# Patient Record
Sex: FEMALE | Race: WHITE | Employment: OTHER | URBAN - METROPOLITAN AREA
[De-identification: names, ages, dates, MRNs, and addresses within clinical notes are randomized per-mention and may not be internally consistent; named-entity substitution may affect disease eponyms.]

---

## 2017-01-13 ENCOUNTER — TELEPHONE (OUTPATIENT)
Dept: FAMILY MEDICINE | Facility: CLINIC | Age: 77
End: 2017-01-13

## 2017-01-13 NOTE — TELEPHONE ENCOUNTER
Prior Authorization required for Verapamil HCl ER 240MG er tablets        PA created and sent to plan. Waiting for response

## 2017-02-01 ENCOUNTER — TRANSFERRED RECORDS (OUTPATIENT)
Dept: HEALTH INFORMATION MANAGEMENT | Facility: CLINIC | Age: 77
End: 2017-02-01

## 2017-03-07 DIAGNOSIS — G47.00 PERSISTENT INSOMNIA: ICD-10-CM

## 2017-03-07 RX ORDER — ZOLPIDEM TARTRATE 5 MG/1
TABLET ORAL
Qty: 90 TABLET | Refills: 0 | Status: SHIPPED | OUTPATIENT
Start: 2017-03-07 | End: 2017-06-03

## 2017-03-07 NOTE — TELEPHONE ENCOUNTER
Routing to CMA team to please prep this med.    Then route directly to PCP for approval.  No need to route to RN---doesn't need  review.     Thank you,  Elizabeth Isabel RN, BSN

## 2017-03-07 NOTE — TELEPHONE ENCOUNTER
zolpidem (AMBIEN) 5 MG tablet      Last Written Prescription Date: 12/1/2016  Last Fill Quantity: 90,  # refills: 0   Last Office Visit with [facility], [facility] or [facility] prescribing provider: 7/25/2016

## 2017-03-07 NOTE — TELEPHONE ENCOUNTER
Reason for Call:  Medication or medication refill:    Do you use a [facility]?  Name of the pharmacy and phone number for the current request:       [facility] [address] [place], [place] - [address]      Name of the medication requested: zolpidem (AMBIEN) 5 MG tablet    Other request: pt is completely out of med. Pt was told this prescription  and they need to request a new one.    Can we leave a detailed message on this number? YES    Phone number patient can be reached at: Home number on file [phone] (home)    Best Time:     Call taken on 3/7/2017 at 3:06 PM by King Harper

## 2017-03-08 ENCOUNTER — TELEPHONE (OUTPATIENT)
Dept: FAMILY MEDICINE | Facility: CLINIC | Age: 77
End: 2017-03-08

## 2017-03-08 NOTE — TELEPHONE ENCOUNTER
Reason for Call:  Prior Auth     Detailed comments: Spoke with Sage Bell from [employer]  Who stated they are in the process of of filing a Prior Auth for zolpidem (AMBIEN) 5 MG tablet    Ph. For Sage with [employer] [phone]      Call taken on 3/8/2017 at 8:13 AM by Maryellen Newman

## 2017-03-09 NOTE — TELEPHONE ENCOUNTER
I just got a denial notice from the patient's insurance on the zolpidem through [facility] It looks like it might have been the patient that tried starting this PA. Either way from notification of this PA yesterday the insurance only gave us a day to get clinical information to them which was not enough time, thus resulting in a denial for lack of information.    I will now send in an appeal for this denial which will take longer to complete. I left detailed voice message to the patient to let her know what is going on.    Jorgito Dos Santos, [facility]

## 2017-03-15 NOTE — TELEPHONE ENCOUNTER
Appeal has come back approved. Patient can now get zolpidem covered under her insurance from 12/12/16 - 12/31/17. I called patient to let her know.    Jorgito Dos Santos, CMA

## 2017-06-03 DIAGNOSIS — G47.00 PERSISTENT INSOMNIA: ICD-10-CM

## 2017-06-03 NOTE — TELEPHONE ENCOUNTER
Pending Prescriptions:                       Disp   Refills    zolpidem (AMBIEN) 5 MG tablet [Pharmacy M*90 tab*0            Sig: TAKE 1 TABLET BY MOUTH EVERY EVENING AS NEEDED           FOR SLEEP          Last Written Prescription Date:  3/1/17  Last Fill Quantity: 90,   # refills: 0  Last Office Visit with [facility], [facility] or [facility] prescribing provider: 7/25/16 [facility]  Future Office visit:       Routing refill request to provider for review/approval because:  Drug not on the [facility], [facility] or [facility] refill protocol or controlled substance    RT Fermín(R)

## 2017-06-05 DIAGNOSIS — G47.00 PERSISTENT INSOMNIA: ICD-10-CM

## 2017-06-05 RX ORDER — ZOLPIDEM TARTRATE 5 MG/1
TABLET ORAL
Qty: 90 TABLET | Refills: 0 | OUTPATIENT
Start: 2017-06-05

## 2017-06-05 RX ORDER — ZOLPIDEM TARTRATE 5 MG/1
TABLET ORAL
Qty: 90 TABLET | Refills: 0 | Status: SHIPPED | OUTPATIENT
Start: 2017-06-05 | End: 2017-08-22

## 2017-07-19 DIAGNOSIS — I10 BENIGN ESSENTIAL HYPERTENSION: ICD-10-CM

## 2017-07-19 NOTE — TELEPHONE ENCOUNTER
verapamil (CALAN-SR) 240 MG CR tablet           Last Written Prescription Date: 7/25/2016  Last Fill Quantity: 90, # refills: 3    Last Office Visit with G, P or [facility] prescribing provider:  7/25/2016   Future Office Visit:    Next 5 appointments (look out 90 days)     Aug 22, 2017  1:30 PM CDT   PHYSICAL with Marky Josue MD   [facility]    [address] [address]   [phone]                  BP Readings from Last 3 Encounters:   10/21/16 151/82   10/04/16 168/80   08/05/16 128/86     Lab Results   Component Value Date    ALT 12 12/12/2008     No results found for: CHOL  No results found for: HDL  No results found for: LDL  No results found for: TRIG  No results found for: CHOLHDLRATIO

## 2017-07-20 RX ORDER — VERAPAMIL HYDROCHLORIDE 240 MG/1
TABLET, FILM COATED, EXTENDED RELEASE ORAL
Qty: 30 TABLET | Refills: 0 | Status: SHIPPED | OUTPATIENT
Start: 2017-07-20 | End: 2017-07-24

## 2017-07-20 NOTE — TELEPHONE ENCOUNTER
Medication is being filled for 1 time refill only due to:  Patient needs to be seen because it has been more than one year since last visit.   Last seen 7/25/16.  Future OV 8/22/17.  Roxana Carmichael RN

## 2017-07-24 DIAGNOSIS — I10 BENIGN ESSENTIAL HYPERTENSION: ICD-10-CM

## 2017-07-24 NOTE — TELEPHONE ENCOUNTER
Reason for Call:  Medication or medication refill:    Do you use a [facility]?  Name of the pharmacy and phone number for the current request:         [facility] [address] [place], [place] - [address]      Name of the medication requested: verapamil (CALAN-SR) 240 MG CR tablet    Other request: pt only wants a script now for the remaining 60 tablets  She has an upcoming appt with [facility] in August.  She is not going to  the second refill of 30.    Can we leave a detailed message on this number? YES    Phone number patient can be reached at: Home number on file [phone] (home)    Best Time: any    Call taken on 7/24/2017 at 3:28 PM by [place] Ardon

## 2017-07-24 NOTE — TELEPHONE ENCOUNTER
Patient requesting 60 day supply      verapamil (CALAN-SR) 240 MG CR tablet      Last Written Prescription Date: 7/20/2017  Last Fill Quantity: 30, # refills: 0  Last Office Visit with G, P or [facility] prescribing provider: 7/25/2016  Next 5 appointments (look out 90 days)     Aug 22, 2017  1:30 PM CDT   [facility]   [facility]    [address] [facility] [address]   [phone]                   Potassium   Date Value Ref Range Status   08/05/2016 4.5 3.4 - 5.3 mmol/L Final     Creatinine   Date Value Ref Range Status   08/05/2016 0.88 0.52 - 1.04 mg/dL Final     BP Readings from Last 3 Encounters:   10/21/16 151/82   10/04/16 168/80   08/05/16 128/86

## 2017-07-26 RX ORDER — VERAPAMIL HYDROCHLORIDE 240 MG/1
TABLET, FILM COATED, EXTENDED RELEASE ORAL
Qty: 90 TABLET | Refills: 0 | Status: SHIPPED | OUTPATIENT
Start: 2017-07-26 | End: 2017-08-22

## 2017-07-26 NOTE — TELEPHONE ENCOUNTER
PCP:    Pt is just now overdue for OV with you, is scheduled for 8/22 for full Px.   Per med list, 30 day supply was sent in (per [facility] protocol)  I did relay to the Pt I will send a 90 day supply for you to sign if appropriate (she likely will not have enough medication to get her out to her OV with you)    Thanks. Med pended.     Rona Niño RN

## 2017-08-21 NOTE — PROGRESS NOTES
SUBJECTIVE:   Elizabeth Joy is a 77 year old female who presents for Preventive Visit.    The patient feels fine, not working out reg.  Has hypertension and blood pressure fine.  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  Tried 10mg in past and not better.  Had syncope last year and no cause found. ziopatch with short lived vtach and then follow up with ep cards and nothing found, echo fine as noted.  Has elevated cholesterol but does not want to take meds for it.  No other c/o.     2nd time, 43 years, 3 sons live out of town               Past Medical History:      Past Medical History:   Diagnosis Date     Elevated blood sugar      HTN (hypertension) 35     Hx of colonoscopy 2008    bx collagenous colitis     Hypercholesteremia      Insomnia     on ambien 10mg 1/2 daily for long time     Syncope 08/2016    echo trace lvh, nl ef; ziopatch with one 5 beat run of vtach, seen by ep Dr. Allen and nothing found     Ventricular tachycardia (H) 8/16    seen on ziopatch done for syncope, just one 5 beat run             Past Surgical History:      Past Surgical History:   Procedure Laterality Date     HYSTERECTOMY, PAP NO LONGER INDICATED  1990    had bso also, not for cancer     KERATOTOMY ARCUATE WITH FEMTOSECOND LASER/IMAGING FOR ATIOL Right 7/13/2015    Procedure: KERATOTOMY ARCUATE WITH FEMTOSECOND LASER/IMAGING FOR ATIOL;  Surgeon: Lionel Reza MD;  [facility]:  [facility]     KERATOTOMY ARCUATE WITH FEMTOSECOND LASER/IMAGING FOR ATIOL Right 7/13/2015    Procedure: KERATOTOMY ARCUATE WITH FEMTOSECOND LASER/IMAGING FOR ATIOL;  Surgeon: Lionel Reza MD;  Location:  [facility]     PHACOEMULSIFICATION CLEAR CORNEA WITH STANDARD INTRAOCULAR LENS IMPLANT Right 7/13/2015    Procedure: PHACOEMULSIFICATION CLEAR CORNEA WITH STANDARD INTRAOCULAR LENS IMPLANT;  Surgeon: Lionel Reza MD;  Location:  [facility]     pilonidal cyst removed  30's             Social History:  "    Social History     Social History     Marital status:      Spouse name: N/A     Number of children: 3     Years of education: N/A     Occupational History     Not on file.     Social History Main Topics     Smoking status: Former Smoker     Types: Cigarettes     Quit date: 9/29/1997     Smokeless tobacco: Never Used     Alcohol use 0.0 oz/week     0 Standard drinks or equivalent per week      Comment: 1 drink a week     Drug use: No     Sexual activity: No     Other Topics Concern     Not on file     Social History Narrative             Family History:   reviewed         Allergies:     Allergies   Allergen Reactions     Ace Inhibitors Cough             Medications:     Current Outpatient Prescriptions   Medication Sig Dispense Refill     verapamil (CALAN-SR) 240 MG CR tablet TAKE 1 TABLET(240 MG) BY MOUTH DAILY 90 tablet 3     zolpidem (AMBIEN) 5 MG tablet TAKE 1 TABLET BY MOUTH EVERY EVENING AS NEEDED FOR SLEEP 90 tablet 0     [DISCONTINUED] verapamil (CALAN-SR) 240 MG CR tablet TAKE 1 TABLET(240 MG) BY MOUTH DAILY 90 tablet 0               Review of Systems:   The 10 point Review of Systems is negative other than noted in the HPI           Physical Exam:   Blood pressure 136/82, pulse 63, temperature 98.3  F (36.8  C), temperature source Oral, height 5' 3\" (1.6 m), weight 165 lb (74.8 kg), SpO2 99 %, not currently breastfeeding.    Exam:  Constitutional: healthy appearing, alert and in no distress  Heent: Normocephalic. Head without obvious masses or lesions. PERRLDC, EOMI. Mouth exam within normal limits: tongue, mucous membranes, posterior pharynx all normal, no lesions or abnormalities seen.  Tm's and canals within normal limits bilaterally. Neck supple, no nuchal rigidity or masses. No supraclavicular, or cervical adenopathy. Thyroid symmetric, no masses.  Cardiovascular: Regular rate and rhythm, no murmer, rub or gallops.  JVP not elevated, no edema.  Carotids within normal limits bilaterally, no " bruits.  Respiratory: Normal respiratory effort.  Lungs clear, normal flow, no wheezing or crackles.  Breasts: Normal bilaterally.  No masses or lesions.  Nipples within normal limits.  No axillary lesions or nodes.  Gastrointestinal: Normal active bowel sounds.   Soft, not tender, no masses, guarding or rebound.  No hepatosplenomegaly.   Musculoskeletal: extremities normal, no gross deformities noted.  Skin: no suspicious lesions or rashes   Neurologic: Mental status within normal limits.  Speech fluent.  No gross motor abnormalities and gait intact.  Psychiatric: mentation appears normal and affect normal.         Data:   Labs sent        Assessment:   1. Normal cpx  2. Chronic insomnia, she understands risk of ambien, would not use higher dose, try melatonin  3. Hypertension, control ok  4. Syncope, vtach, no issues  5. Elevated sugar, fulabs  6. Elevated cholesterol, does not want meds so will not check  7. hcm         Plan:   Up to date immunizations  Exercise, diet  Letter with labs  Try melatonin  Call if problems      Marky Josue M.D.              Are you in the first 12 months of your Medicare Part B coverage?  No    Healthy Habits:    Do you get at least three servings of calcium containing foods daily (dairy, green leafy vegetables, etc.)? yes    Amount of exercise or daily activities, outside of work: 7 day(s) per week    Problems taking medications regularly No    Medication side effects: No    Have you had an eye exam in the past two years? yes    Do you see a dentist twice per year? yes    Do you have sleep apnea, excessive snoring or daytime drowsiness?no    COGNITIVE SCREEN  1) Repeat 3 items (Banana, Sunrise, Chair)    2) Clock draw:   3) 3 item recall: Recalls 3 objects  Results: 3 items recalled: COGNITIVE IMPAIRMENT LESS LIKELY    Mini-CogTM Copyright S Brent. Licensed by the author for use in [facility]; reprinted with permission (harry@.[facility]). All rights reserved.   "                  Reviewed and updated as needed this visit by clinical staff         Reviewed and updated as needed this visit by Provider      Social History   Substance Use Topics     Smoking status: Former Smoker     Types: Cigarettes     Quit date: 9/29/1997     Smokeless tobacco: Never Used     Alcohol use 0.0 oz/week     0 Standard drinks or equivalent per week      Comment: 1 drink a week       The patient does not drink >3 drinks per day nor >7 drinks per week.    Today's PHQ-2 Score:   PHQ-2 ( 1999 Pfizer) 10/21/2016 1/14/2014   Q1: Little interest or pleasure in doing things 0 0   Q2: Feeling down, depressed or hopeless 0 0   PHQ-2 Score 0 0         Do you feel safe in your environment - Yes    Do you have a Health Care Directive?: Yes: Patient states has Advance Directive and will bring in a copy to clinic.    Current providers sharing in care for this patient include: Patient Care Team:  Marky Josue MD as PCP - General (Internal Medicine)      Hearing impairment: No    Ability to successfully perform activities of daily living: Yes, no assistance needed     Fall risk:         Home safety:  none identified      The following health maintenance items are reviewed in Epic and correct as of today:Health Maintenance   Topic Date Due     LIPID SCREEN Q5 YR FEMALE (SYSTEM ASSIGNED)  02/11/1985     DEXA SCAN SCREENING (SYSTEM ASSIGNED)  02/11/2005     INFLUENZA VACCINE (SYSTEM ASSIGNED)  09/01/2017     ADVANCE DIRECTIVE PLANNING Q5 YRS  10/02/2017     FALL RISK ASSESSMENT  10/21/2017     TETANUS IMMUNIZATION (SYSTEM ASSIGNED)  01/14/2024     PNEUMOCOCCAL  Completed       End of Life Planning:  Patient currently has an advanced directive: yes    COUNSELING:  Reviewed preventive health counseling, as reflected in patient instructions       Regular exercise       Healthy diet/nutrition        Estimated body mass index is 29.23 kg/(m^2) as calculated from the following:    Height as of 10/21/16: 5' 3\" " (1.6 m).    Weight as of 10/21/16: 165 lb (74.8 kg).  Weight management plan: exercise, diet   reports that she quit smoking about 19 years ago. Her smoking use included Cigarettes. She has never used smokeless tobacco.        Appropriate preventive services were discussed with this patient, including applicable screening as appropriate for cardiovascular disease, diabetes, osteopenia/osteoporosis, and glaucoma.  As appropriate for age/gender, discussed screening for colorectal cancer, prostate cancer, breast cancer, and cervical cancer. Checklist reviewing preventive services available has been given to the patient.    Reviewed patients plan of care and provided an AVS. The Basic Care Plan (routine screening as documented in Health Maintenance) for Elizabeth meets the Care Plan requirement. This Care Plan has been established and reviewed with the Patient.    Counseling Resources:  ATP IV Guidelines  Pooled Cohorts Equation Calculator  Breast Cancer Risk Calculator  FRAX Risk Assessment  ICSI Preventive Guidelines  Dietary Guidelines for Americans, 2010  USDA's MyPlate  ASA Prophylaxis  Lung CA Screening    Marky Josue MD  [facility]

## 2017-08-22 ENCOUNTER — OFFICE VISIT (OUTPATIENT)
Dept: FAMILY MEDICINE | Facility: CLINIC | Age: 77
End: 2017-08-22
Payer: COMMERCIAL

## 2017-08-22 VITALS
HEART RATE: 63 BPM | BODY MASS INDEX: 29.23 KG/M2 | TEMPERATURE: 98.3 F | SYSTOLIC BLOOD PRESSURE: 136 MMHG | WEIGHT: 165 LBS | OXYGEN SATURATION: 99 % | HEIGHT: 63 IN | DIASTOLIC BLOOD PRESSURE: 82 MMHG

## 2017-08-22 DIAGNOSIS — E78.5 HYPERLIPIDEMIA LDL GOAL <160: ICD-10-CM

## 2017-08-22 DIAGNOSIS — I47.20 VENTRICULAR TACHYCARDIA (H): ICD-10-CM

## 2017-08-22 DIAGNOSIS — R73.9 ELEVATED BLOOD SUGAR: ICD-10-CM

## 2017-08-22 DIAGNOSIS — G47.00 PERSISTENT INSOMNIA: ICD-10-CM

## 2017-08-22 DIAGNOSIS — I10 BENIGN ESSENTIAL HYPERTENSION: ICD-10-CM

## 2017-08-22 DIAGNOSIS — R55 SYNCOPE, UNSPECIFIED SYNCOPE TYPE: ICD-10-CM

## 2017-08-22 DIAGNOSIS — Z00.00 ROUTINE GENERAL MEDICAL EXAMINATION AT A HEALTH CARE FACILITY: Primary | ICD-10-CM

## 2017-08-22 DIAGNOSIS — G47.00 INSOMNIA, UNSPECIFIED TYPE: ICD-10-CM

## 2017-08-22 LAB
ERYTHROCYTE [DISTWIDTH] IN BLOOD BY AUTOMATED COUNT: 12.3 % (ref 10–15)
HBA1C MFR BLD: 5.1 % (ref 4.3–6)
HCT VFR BLD AUTO: 39 % (ref 35–47)
HGB BLD-MCNC: 13.4 G/DL (ref 11.7–15.7)
MCH RBC QN AUTO: 31.2 PG (ref 26.5–33)
MCHC RBC AUTO-ENTMCNC: 34.4 G/DL (ref 31.5–36.5)
MCV RBC AUTO: 91 FL (ref 78–100)
PLATELET # BLD AUTO: 284 10E9/L (ref 150–450)
RBC # BLD AUTO: 4.29 10E12/L (ref 3.8–5.2)
WBC # BLD AUTO: 9.1 10E9/L (ref 4–11)

## 2017-08-22 PROCEDURE — 83036 HEMOGLOBIN GLYCOSYLATED A1C: CPT | Performed by: INTERNAL MEDICINE

## 2017-08-22 PROCEDURE — 80048 BASIC METABOLIC PNL TOTAL CA: CPT | Performed by: INTERNAL MEDICINE

## 2017-08-22 PROCEDURE — 85027 COMPLETE CBC AUTOMATED: CPT | Performed by: INTERNAL MEDICINE

## 2017-08-22 PROCEDURE — 36415 COLL VENOUS BLD VENIPUNCTURE: CPT | Performed by: INTERNAL MEDICINE

## 2017-08-22 PROCEDURE — G0439 PPPS, SUBSEQ VISIT: HCPCS | Performed by: INTERNAL MEDICINE

## 2017-08-22 RX ORDER — ZOLPIDEM TARTRATE 5 MG/1
TABLET ORAL
Qty: 90 TABLET | Refills: 0 | Status: SHIPPED | OUTPATIENT
Start: 2017-08-22 | End: 2017-11-20

## 2017-08-22 RX ORDER — VERAPAMIL HYDROCHLORIDE 240 MG/1
TABLET, FILM COATED, EXTENDED RELEASE ORAL
Qty: 90 TABLET | Refills: 3 | Status: SHIPPED | OUTPATIENT
Start: 2017-08-22 | End: 2017-11-13

## 2017-08-22 NOTE — MR AVS SNAPSHOT
After Visit Summary   8/22/2017    Elizabeth Joy    MRN: [id number]           Patient Information     Date Of Birth          1940        Visit Information        Provider Department      8/22/2017 1:30 PM Marky Josue MD [facility]        Today's Diagnoses     Routine general medical examination at a health care facility    -  1    Benign essential hypertension        Persistent insomnia        Ventricular tachycardia (H)        Elevated blood sugar        Hyperlipidemia LDL goal <160        Insomnia, unspecified type        Syncope, unspecified syncope type          Care Instructions      Preventive Health Recommendations    Female Ages 65 +    Yearly exam:     See your health care provider every year in order to  o Review health changes.   o Discuss preventive care.    o Review your medicines if your doctor has prescribed any.      You no longer need a yearly Pap test unless you've had an abnormal Pap test in the past 10 years. If you have vaginal symptoms, such as bleeding or discharge, be sure to talk with your provider about a Pap test.      Every 1 to 2 years, have a mammogram.  If you are over 69, talk with your health care provider about whether or not you want to continue having screening mammograms.      Every 10 years, have a colonoscopy. Or, have a yearly FIT test (stool test). These exams will check for colon cancer.       Have a cholesterol test every 5 years, or more often if your doctor advises it.       Have a diabetes test (fasting glucose) every three years. If you are at risk for diabetes, you should have this test more often.       At age 65, have a bone density scan (DEXA) to check for osteoporosis (brittle bone disease).    Shots:    Get a flu shot each year.    Get a tetanus shot every 10 years.    Talk to your doctor about your pneumonia vaccines. There are now two you should receive - Pneumovax (PPSV 23) and Prevnar (PCV 13).    Talk to your doctor  "about the shingles vaccine.    Talk to your doctor about the hepatitis B vaccine.    Nutrition:     Eat at least 5 servings of fruits and vegetables each day.      Eat whole-grain bread, whole-wheat pasta and brown rice instead of white grains and rice.      Talk to your provider about Calcium and Vitamin D.     Lifestyle    Exercise at least 150 minutes a week (30 minutes a day, 5 days a week). This will help you control your weight and prevent disease.      Limit alcohol to one drink per day.      No smoking.       Wear sunscreen to prevent skin cancer.       See your dentist twice a year for an exam and cleaning.      See your eye doctor every 1 to 2 years to screen for conditions such as glaucoma, macular degeneration and cataracts.          Follow-ups after your visit        Who to contact     If you have questions or need follow up information about today's clinic visit or your schedule please contact [facility] directly at [phone].  Normal or non-critical lab and imaging results will be communicated to you by MyChart, letter or phone within 4 business days after the clinic has received the results. If you do not hear from us within 7 days, please contact the clinic through [facility] or phone. If you have a critical or abnormal lab result, we will notify you by phone as soon as possible.  Submit refill requests through [facility] or call your pharmacy and they will forward the refill request to us. Please allow 3 business days for your refill to be completed.          Additional Information About Your Visit        [facility] Information     [facility] lets you send messages to your doctor, view your test results, renew your prescriptions, schedule appointments and more. To sign up, go to [email or web address] . Click on \"Log in\" on the left side of the screen, which will take you to the Welcome page. Then click on \"Sign up Now\" on the right side of the page.     You will be asked to enter the access " "code listed below, as well as some personal information. Please follow the directions to create your username and password.     Your access code is: [email or web address]  Expires: 2017  1:47 PM     Your access code will  in 90 days. If you need help or a new code, please call your [facility] or [phone].        Care EveryWhere ID     This is your Care EveryWhere ID. This could be used by other organizations to access your [place] medical records  [id number]        Your Vitals Were     Pulse Temperature Height Pulse Oximetry Breastfeeding? BMI (Body Mass Index)    63 98.3  F (36.8  C) (Oral) 5' 3\" (1.6 m) 99% No 29.23 kg/m2       Blood Pressure from Last 3 Encounters:   17 136/82   10/21/16 151/82   10/04/16 168/80    Weight from Last 3 Encounters:   17 165 lb (74.8 kg)   10/21/16 165 lb (74.8 kg)   16 165 lb (74.8 kg)              We Performed the Following     Basic metabolic panel     CBC with platelets     Hemoglobin A1c          Today's Medication Changes          These changes are accurate as of: 17  1:47 PM.  If you have any questions, ask your nurse or doctor.               These medicines have changed or have updated prescriptions.        Dose/Directions    zolpidem 5 MG tablet   Commonly known as:  AMBIEN   This may have changed:  See the new instructions.   Used for:  Persistent insomnia   Changed by:  Marky Josue MD        TAKE 1 TABLET BY MOUTH EVERY EVENING AS NEEDED FOR SLEEP   Quantity:  90 tablet   Refills:  0            Where to get your medicines      These medications were sent to [facility]  [place], [place] - [address] AT [address] [place] [address] & [facility]  [address] [place] [address] [place] [address]    Hours:  24-hours Phone:  [phone]     verapamil 240 MG CR tablet         Some of these will need a paper prescription and others can be bought over the counter.  Ask your nurse if you have questions.     Bring a paper prescription for each of " these medications     zolpidem 5 MG tablet                Primary Care Provider Office Phone # Fax #    Marky Josue [phone] [address] [address]        Equal Access to Services     CASSY MARADIAGA : Hadii aad ku hadanelo Soomaali, waaxda luqadaha, qaybta kaalmada adeegyada, waxsandy idiin haychangn adejayden chua nalini . So [facility] [phone].    ATENCIÓN: Si habla español, tiene a reyes disposición servicios gratuitos de asistencia lingüística. Llame al [phone].    We comply with applicable federal civil rights laws and [place] laws. We do not discriminate on the basis of race, color, national origin, age, disability sex, sexual orientation or gender identity.            Thank you!     Thank you for choosing [facility]  for your care. Our goal is always to provide you with excellent care. Hearing back from our patients is one way we can continue to improve our services. Please take a few minutes to complete the written survey that you may receive in the mail after your visit with us. Thank you!             Your Updated Medication List - Protect others around you: Learn how to safely use, store and throw away your medicines at www.disposemymeds.org.          This list is accurate as of: 8/22/17  1:47 PM.  Always use your most recent med list.                   Brand Name Dispense Instructions for use Diagnosis    verapamil 240 MG CR tablet    CALAN-SR    90 tablet    TAKE 1 TABLET(240 MG) BY MOUTH DAILY    Benign essential hypertension       zolpidem 5 MG tablet    AMBIEN    90 tablet    TAKE 1 TABLET BY MOUTH EVERY EVENING AS NEEDED FOR SLEEP    Persistent insomnia

## 2017-08-22 NOTE — LETTER
[place] [address] [facility]  [address]  [place]  [address]  Tel: [phone]    August 23, 2017    Elizabeth Joy  [address]   [facility] [address]        Dear Ms. Joy,    It was a pleasure seeing you.  I wanted to get back to you with your test results.  I have enclosed a copy for your records.    Your labs look good including your sugar test for diabetes, blood salts and kidney tests.  Resulted Orders   CBC with platelets   Result Value Ref Range    WBC 9.1 4.0 - 11.0 10e9/L    RBC Count 4.29 3.8 - 5.2 10e12/L    Hemoglobin 13.4 11.7 - 15.7 g/dL    Hematocrit 39.0 35.0 - 47.0 %    MCV 91 78 - 100 fl    MCH 31.2 26.5 - 33.0 pg    MCHC 34.4 31.5 - 36.5 g/dL    RDW 12.3 10.0 - 15.0 %    Platelet Count 284 150 - 450 10e9/L   Basic metabolic panel   Result Value Ref Range    Sodium 141 133 - 144 mmol/L    Potassium 3.9 3.4 - 5.3 mmol/L    Chloride 105 94 - 109 mmol/L    Carbon Dioxide 29 20 - 32 mmol/L    Anion Gap 7 3 - 14 mmol/L    Glucose 98 70 - 99 mg/dL    Urea Nitrogen 16 7 - 30 mg/dL    Creatinine 1.10 (H) 0.52 - 1.04 mg/dL    GFR Estimate 48 (L) >60 mL/min/1.7m2      Comment:      Non  GFR Calc    GFR Estimate If Black 58 (L) >60 mL/min/1.7m2      Comment:       GFR Calc    Calcium 9.3 8.5 - 10.1 mg/dL   Hemoglobin A1c   Result Value Ref Range    Hemoglobin A1C 5.1 4.3 - 6.0 %        If you have any questions please call me.        Sincerely,    Marky Josue MD / agnes

## 2017-08-22 NOTE — NURSING NOTE
"Chief Complaint   Patient presents with     Medicare Visit       Initial /82  Pulse 63  Temp 98.3  F (36.8  C) (Oral)  Ht 5' 3\" (1.6 m)  Wt 165 lb (74.8 kg)  SpO2 99%  Breastfeeding? No  BMI 29.23 kg/m2 Estimated body mass index is 29.23 kg/(m^2) as calculated from the following:    Height as of this encounter: 5' 3\" (1.6 m).    Weight as of this encounter: 165 lb (74.8 kg).  Medication Reconciliation: complete   Shirlene LERMA CMA      "

## 2017-08-23 LAB
ANION GAP SERPL CALCULATED.3IONS-SCNC: 7 MMOL/L (ref 3–14)
BUN SERPL-MCNC: 16 MG/DL (ref 7–30)
CALCIUM SERPL-MCNC: 9.3 MG/DL (ref 8.5–10.1)
CHLORIDE SERPL-SCNC: 105 MMOL/L (ref 94–109)
CO2 SERPL-SCNC: 29 MMOL/L (ref 20–32)
CREAT SERPL-MCNC: 1.1 MG/DL (ref 0.52–1.04)
GFR SERPL CREATININE-BSD FRML MDRD: 48 ML/MIN/1.7M2
GLUCOSE SERPL-MCNC: 98 MG/DL (ref 70–99)
POTASSIUM SERPL-SCNC: 3.9 MMOL/L (ref 3.4–5.3)
SODIUM SERPL-SCNC: 141 MMOL/L (ref 133–144)

## 2017-08-23 NOTE — PROGRESS NOTES
It was a pleasure seeing you.  I wanted to get back to you with your test results.  I have enclosed a copy for your records.    Your labs look good including your sugar test for diabetes, blood salts and kidney tests.  If you have any questions please call me.

## 2017-08-28 DIAGNOSIS — G47.00 PERSISTENT INSOMNIA: ICD-10-CM

## 2017-08-29 RX ORDER — ZOLPIDEM TARTRATE 5 MG/1
TABLET ORAL
Qty: 90 TABLET | Refills: 0 | OUTPATIENT
Start: 2017-08-29

## 2017-11-13 DIAGNOSIS — F51.04 CHRONIC INSOMNIA: Primary | ICD-10-CM

## 2017-11-13 DIAGNOSIS — I10 BENIGN ESSENTIAL HYPERTENSION: ICD-10-CM

## 2017-11-13 RX ORDER — VERAPAMIL HYDROCHLORIDE 240 MG/1
TABLET, FILM COATED, EXTENDED RELEASE ORAL
Qty: 90 TABLET | Refills: 2 | Status: SHIPPED | OUTPATIENT
Start: 2017-11-13 | End: 2018-07-30

## 2017-11-13 RX ORDER — ZOLPIDEM TARTRATE 10 MG/1
TABLET ORAL
Qty: 90 TABLET | Refills: 0 | Status: SHIPPED | OUTPATIENT
Start: 2017-11-13 | End: 2017-11-22

## 2017-11-13 NOTE — TELEPHONE ENCOUNTER
[facility] requesting refills --- we had sent ok to  [facility]  [address]  on 08/22/2017  Verapamil qnty #90 with 3 additional refills &  Zolpidem #90 with 0 additional refills             Disp Refills Start End FREDY   verapamil (CALAN-SR) 240 MG CR tablet 90 tablet 3 8/22/2017  No   Sig: TAKE 1 TABLET(240 MG) BY MOUTH DAILY         zolpidem (AMBIEN) 5 MG tablet 90 tablet 0 8/22/2017  No   Sig: TAKE 1 TABLET BY MOUTH EVERY EVENING AS NEEDED FOR SLEEP

## 2017-11-20 ENCOUNTER — TELEPHONE (OUTPATIENT)
Dept: FAMILY MEDICINE | Facility: CLINIC | Age: 77
End: 2017-11-20

## 2017-11-20 DIAGNOSIS — G47.00 PERSISTENT INSOMNIA: ICD-10-CM

## 2017-11-20 DIAGNOSIS — F51.04 CHRONIC INSOMNIA: ICD-10-CM

## 2017-11-20 RX ORDER — ZOLPIDEM TARTRATE 5 MG/1
TABLET ORAL
Qty: 90 TABLET | Refills: 0 | Status: SHIPPED | OUTPATIENT
Start: 2017-11-20 | End: 2018-02-19

## 2017-11-20 NOTE — TELEPHONE ENCOUNTER
Reason for Call:  Medication or medication refill:    Do you use a [facility]?  Name of the pharmacy and phone number for the current request:     [facility] [id number] [place] [facility], [place] [address]    Name of the medication requested: zolpidem (AMBIEN) 5 MG tablet     Other request: Pt was prescribed the 5 mg and when switching to a new pharmacy the [facility], she was prescribed a 10 mg with 1/2 tablet directions. She would just like a a 5 mg prescription written instead of cutting the tablets in 1/2     Can we leave a detailed message on this number? YES     Phone number patient can be reached at: Other phone number:  [phone]    Best Time: anytime     Call taken on 11/20/2017 at 8:42 AM by Gricelda Fabian

## 2017-11-22 NOTE — TELEPHONE ENCOUNTER
Pharmacy did not receive script and unable to located printed Rx from 11/20/17.   Huddled with PCP - okay to call Rx in to pharmacy as it was printed:     Called in Rx:   Zolpidem Tartrate 5mg 1 tab po every evening PRN for sleep, #90, 0 refills   [facility] [address] [address] [facility] - [place], [place] - [address]

## 2017-11-24 ENCOUNTER — OFFICE VISIT (OUTPATIENT)
Dept: FAMILY MEDICINE | Facility: CLINIC | Age: 77
End: 2017-11-24
Payer: COMMERCIAL

## 2017-11-24 VITALS
WEIGHT: 165 LBS | HEIGHT: 63 IN | OXYGEN SATURATION: 98 % | HEART RATE: 57 BPM | BODY MASS INDEX: 29.23 KG/M2 | SYSTOLIC BLOOD PRESSURE: 187 MMHG | DIASTOLIC BLOOD PRESSURE: 88 MMHG | TEMPERATURE: 97.6 F

## 2017-11-24 DIAGNOSIS — I10 BENIGN ESSENTIAL HYPERTENSION: ICD-10-CM

## 2017-11-24 DIAGNOSIS — K92.1 BLACK STOOLS: ICD-10-CM

## 2017-11-24 DIAGNOSIS — Z12.11 ENCOUNTER FOR SCREENING FECAL OCCULT BLOOD TESTING: ICD-10-CM

## 2017-11-24 DIAGNOSIS — R19.7 DIARRHEA, UNSPECIFIED TYPE: Primary | ICD-10-CM

## 2017-11-24 LAB
ANION GAP SERPL CALCULATED.3IONS-SCNC: 6 MMOL/L (ref 3–14)
BUN SERPL-MCNC: 14 MG/DL (ref 7–30)
CALCIUM SERPL-MCNC: 9.1 MG/DL (ref 8.5–10.1)
CHLORIDE SERPL-SCNC: 105 MMOL/L (ref 94–109)
CO2 SERPL-SCNC: 28 MMOL/L (ref 20–32)
COLLECT DATE SP2 STL: NORMAL
COLLECT DATE SP3 STL: NORMAL
COLLECT DATE STL: NORMAL
CREAT SERPL-MCNC: 1 MG/DL (ref 0.52–1.04)
GFR SERPL CREATININE-BSD FRML MDRD: 54 ML/MIN/1.7M2
GLUCOSE SERPL-MCNC: 98 MG/DL (ref 70–99)
HEMOCCULT SP1 STL QL: NEGATIVE
HEMOCCULT SP2 STL QL: NEGATIVE
HEMOCCULT SP3 STL QL: NEGATIVE
POTASSIUM SERPL-SCNC: 3.6 MMOL/L (ref 3.4–5.3)
SODIUM SERPL-SCNC: 139 MMOL/L (ref 133–144)

## 2017-11-24 PROCEDURE — 82270 OCCULT BLOOD FECES: CPT | Performed by: NURSE PRACTITIONER

## 2017-11-24 PROCEDURE — 36415 COLL VENOUS BLD VENIPUNCTURE: CPT | Performed by: NURSE PRACTITIONER

## 2017-11-24 PROCEDURE — 80048 BASIC METABOLIC PNL TOTAL CA: CPT | Performed by: NURSE PRACTITIONER

## 2017-11-24 PROCEDURE — 99214 OFFICE O/P EST MOD 30 MIN: CPT | Performed by: NURSE PRACTITIONER

## 2017-11-24 NOTE — LETTER
[place] [address] [facility]  [address]  [place]  [address]  Tel: [phone]    November 29, 2017    Elizabeth Joy  [address]   [facility] [address]        Dear Ms. Joy,    Here are the lab results which we discussed at your most recent office visit with me.    If you have any further questions or problems, please contact our office.      Sincerely,    Marky Josue MD/yobani         Results for orders placed or performed in visit on 11/24/17   Basic metabolic panel   Result Value Ref Range    Sodium 139 133 - 144 mmol/L    Potassium 3.6 3.4 - 5.3 mmol/L    Chloride 105 94 - 109 mmol/L    Carbon Dioxide 28 20 - 32 mmol/L    Anion Gap 6 3 - 14 mmol/L    Glucose 98 70 - 99 mg/dL    Urea Nitrogen 14 7 - 30 mg/dL    Creatinine 1.00 0.52 - 1.04 mg/dL    GFR Estimate 54 (L) >60 mL/min/1.7m2    GFR Estimate If Black 65 >60 mL/min/1.7m2    Calcium 9.1 8.5 - 10.1 mg/dL   Occult blood stool 1-3 spec   Result Value Ref Range    Occult Blood Slide 1 Negative NEG^Negative    Slide 1 Date [id number]     Occult Blood Slide 2 Negative NEG^Negative    Slide 2 Date [id number]     Occult Blood Slide 3 Negative NEG^Negative    Slide 3 Date [id number]

## 2017-11-24 NOTE — PATIENT INSTRUCTIONS
Begin taking imodium as directed on the packaging  Eat a bland diet  Avoid fatty , greasy food  Push fluid intake including electrolyte fluids like gatorade  If your symptoms persist or recur you need to come back to the clinic for additional evaluation

## 2017-11-24 NOTE — MR AVS SNAPSHOT
"              After Visit Summary   11/24/2017    Elizabeth Joy    MRN: [id number]           Patient Information     Date Of Birth          1940        Visit Information        Provider Department      11/24/2017 8:30 AM Anjelica Bethea APRN CNP [facility]        Today's Diagnoses     Loose stools    -  1    Encounter for screening fecal occult blood testing          Care Instructions    Begin taking imodium as directed on the packaging  Eat a bland diet  Avoid fatty , greasy food  Push fluid intake including electrolyte fluids like gatorade  If your symptoms persist or recur you need to come back to the clinic for additional evaluation           Follow-ups after your visit        Future tests that were ordered for you today     Open Future Orders        Priority Expected Expires Ordered    **CBC with platelets FUTURE 14d Routine 12/1/2017 12/8/2017 11/24/2017    Occult blood stool 1-3 spec Routine  11/24/2018 11/24/2017            Who to contact     If you have questions or need follow up information about today's clinic visit or your schedule please contact [facility] directly at [phone].  Normal or non-critical lab and imaging results will be communicated to you by MyChart, letter or phone within 4 business days after the clinic has received the results. If you do not hear from us within 7 days, please contact the clinic through Askuityhart or phone. If you have a critical or abnormal lab result, we will notify you by phone as soon as possible.  Submit refill requests through Versa or call your pharmacy and they will forward the refill request to us. Please allow 3 business days for your refill to be completed.          Additional Information About Your Visit        Askuityhart Information     Versa lets you send messages to your doctor, view your test results, renew your prescriptions, schedule appointments and more. To sign up, go to [email or web address] . Click on \"Log " "in\" on the left side of the screen, which will take you to the Welcome page. Then click on \"Sign up Now\" on the right side of the page.     You will be asked to enter the access code listed below, as well as some personal information. Please follow the directions to create your username and password.     Your access code is: [email or web address]  Expires: 2018  8:59 AM     Your access code will  in 90 days. If you need help or a new code, please call your [facility] clinic or [phone].        Care EveryWhere ID     This is your Care EveryWhere ID. This could be used by other organizations to access your [facility] medical records  [id number]        Your Vitals Were     Pulse Temperature Height Pulse Oximetry Breastfeeding? BMI (Body Mass Index)    57 97.6  F (36.4  C) (Oral) 5' 3\" (1.6 m) 98% No 29.23 kg/m2       Blood Pressure from Last 3 Encounters:   17 187/88   17 136/82   10/21/16 151/82    Weight from Last 3 Encounters:   17 165 lb (74.8 kg)   17 165 lb (74.8 kg)   10/21/16 165 lb (74.8 kg)              We Performed the Following     Basic metabolic panel        Primary Care Provider Office Phone # Fax #    Marky Howie Josue [phone] [address]  [place] [address]        Equal Access to Services     [facility] AH: Hadii aad ku hadasho Soomaali, waaxda luqadaha, qaybta kaalmada adeegyada, yudith gayle . So [facility] [phone].    ATENCIÓN: Si habla español, tiene a reyes disposición servicios gratuitos de asistencia lingüística. Llame al [phone].    We comply with applicable federal civil rights laws and [place] laws. We do not discriminate on the basis of race, color, national origin, age, disability, sex, sexual orientation, or gender identity.            Thank you!     Thank you for choosing [facility]  for your care. Our goal is always to provide you with excellent care. Hearing back from our " patients is one way we can continue to improve our services. Please take a few minutes to complete the written survey that you may receive in the mail after your visit with us. Thank you!             Your Updated Medication List - Protect others around you: Learn how to safely use, store and throw away your medicines at www.disposemymeds.org.          This list is accurate as of: 11/24/17  8:59 AM.  Always use your most recent med list.                   Brand Name Dispense Instructions for use Diagnosis    verapamil 240 MG CR tablet    CALAN-SR    90 tablet    TAKE ONE TABLET BY MOUTH ONE TIME DAILY    Benign essential hypertension       zolpidem 5 MG tablet    AMBIEN    90 tablet    TAKE 1 TABLET BY MOUTH EVERY EVENING AS NEEDED FOR SLEEP    Persistent insomnia

## 2017-11-24 NOTE — NURSING NOTE
"Chief Complaint   Patient presents with     Diarrhea       Initial /88 (BP Location: Right arm, Patient Position: Chair, Cuff Size: Adult Regular)  Pulse 57  Temp 97.6  F (36.4  C) (Oral)  Ht 5' 3\" (1.6 m)  Wt 165 lb (74.8 kg)  SpO2 98%  Breastfeeding? No  BMI 29.23 kg/m2 Estimated body mass index is 29.23 kg/(m^2) as calculated from the following:    Height as of this encounter: 5' 3\" (1.6 m).    Weight as of this encounter: 165 lb (74.8 kg).  Medication Reconciliation: complete.  India Troy CMA    "

## 2017-11-24 NOTE — PROGRESS NOTES
SUBJECTIVE:   Elizabeth Joy is a 77 year old female who presents to clinic today for the following health issues:      Diarrhea      Duration: 1 week of water stools with flecks of stool, has had some imporvement over the past 2 days.  Fewer movements, more solid material.  Has been taking a lot of pepto bismol for 3-4 days and now notices stools are black.  No abdominal pain , no nausea or vomiting but decreased appetite.  No fever or chills.  Eating potato chips last night made it worse Concerned because of duration of these symptoms, no dizziness or weakness , no chest pain or palpitations, no SOB or headache     Description:       Consistency of stool: black       Blood in stool: no        Number of loose stools past 24 hours: 5    Intensity:  moderate    Accompanying signs and symptoms:       Fever: no        Nausea/vomitting: no        Abdominal pain: no        Weight loss: no     History (recent antibiotics or travel/ill contacts/med changes/testing done): Has had episodes like this once or twice a year ever since she can remember     Precipitating or alleviating factors: None    Therapies tried and outcome: kaopectate    Colonoscopy 2008, no polyps    No family H/O bowel disease or cancers        Problem list and histories reviewed & adjusted, as indicated.  Additional history: as documented    Patient Active Problem List   Diagnosis     Hx of colonoscopy     Benign essential hypertension     Insomnia     Advanced directives, counseling/discussion     Hyperlipidemia LDL goal <160     Elevated blood sugar     Ventricular tachycardia (H)     Syncope     Past Surgical History:   Procedure Laterality Date     HYSTERECTOMY, PAP NO LONGER INDICATED  1990    had bso also, not for cancer     KERATOTOMY ARCUATE WITH FEMTOSECOND LASER/IMAGING FOR ATIOL Right 7/13/2015    Procedure: KERATOTOMY ARCUATE WITH FEMTOSECOND LASER/IMAGING FOR ATIOL;  Surgeon: Lionel Reza MD;  Location: [facility]     KERATOTOMY ARCUATE  "WITH FEMTOSECOND LASER/IMAGING FOR ATIOL Right 7/13/2015    Procedure: KERATOTOMY ARCUATE WITH FEMTOSECOND LASER/IMAGING FOR ATIOL;  Surgeon: Lionel Reza MD;  Location:  [facility]     PHACOEMULSIFICATION CLEAR CORNEA WITH STANDARD INTRAOCULAR LENS IMPLANT Right 7/13/2015    Procedure: PHACOEMULSIFICATION CLEAR CORNEA WITH STANDARD INTRAOCULAR LENS IMPLANT;  Surgeon: Lionel Reza MD;  Location:  [facility]     pilonidal cyst removed  30's       Social History   Substance Use Topics     Smoking status: Former Smoker     Types: Cigarettes     Quit date: 9/29/1997     Smokeless tobacco: Never Used     Alcohol use 0.0 oz/week     0 Standard drinks or equivalent per week      Comment: 1 drink a week     Family History   Problem Relation Age of Onset     Breast Cancer Mother          Current Outpatient Prescriptions   Medication Sig Dispense Refill     zolpidem (AMBIEN) 5 MG tablet TAKE 1 TABLET BY MOUTH EVERY EVENING AS NEEDED FOR SLEEP 90 tablet 0     verapamil (CALAN-SR) 240 MG CR tablet TAKE ONE TABLET BY MOUTH ONE TIME DAILY 90 tablet 2     Allergies   Allergen Reactions     Ace Inhibitors Cough         Reviewed and updated as needed this visit by clinical staff     Reviewed and updated as needed this visit by Provider         ROS:  Constitutional, HEENT, cardiovascular, pulmonary, gi and gu systems are negative, except as otherwise noted.      OBJECTIVE:   /88 (BP Location: Right arm, Patient Position: Chair, Cuff Size: Adult Regular)  Pulse 57  Temp 97.6  F (36.4  C) (Oral)  Ht 5' 3\" (1.6 m)  Wt 165 lb (74.8 kg)  SpO2 98%  Breastfeeding? No  BMI 29.23 kg/m2  Body mass index is 29.23 kg/(m^2). Manual /102  GENERAL: healthy, alert and no distress  EYES: Eyes grossly normal to inspection, PERRL and conjunctivae and sclerae HENT: ear canals and TM's normal, nose and mouth without ulcers or lesions  NECK: no adenopathy, no asymmetry, masses, or scars and thyroid normal to palpation  RESP: lungs clear " to auscultation - no rales, rhonchi or wheezes  CV: regular rate and rhythm, normal S1 S2, no S3 or S4, no murmur, click or rub, no peripheral edema and peripheral pulses strong  ABDOMEN: soft, nontender, no hepatosplenomegaly, no masses and bowel sounds normal  MS: no gross musculoskeletal defects noted, no edema  PSYCH: mentation appears normal, affect normal/bright; poor historian, reluctant with recomendations    Diagnostic Test Results:  pending    ASSESSMENT/PLAN:         ICD-10-CM    1. Diarrhea, unspecified type R19.7    2. Encounter for screening fecal occult blood testing Z12.11 Occult blood stool 1-3 spec   I suspect pepto bismol as the source of stool blackening but can not assume  She strongly objects to rectal exam and in clinic guia so will do this in home     Patient Instructions   Begin taking imodium as directed on the packaging  Eat a bland diet  Avoid fatty , greasy food  Push fluid intake including electrolyte fluids like gatorade  RTC on Monday or Tuesday for diarrhea and BP recheck    TT: 30min  CT: 25min  ZUHAIR Mason CNP  [facility]

## 2017-11-27 ENCOUNTER — OFFICE VISIT (OUTPATIENT)
Dept: FAMILY MEDICINE | Facility: CLINIC | Age: 77
End: 2017-11-27
Payer: COMMERCIAL

## 2017-11-27 VITALS
HEART RATE: 68 BPM | TEMPERATURE: 98 F | HEIGHT: 63 IN | DIASTOLIC BLOOD PRESSURE: 75 MMHG | SYSTOLIC BLOOD PRESSURE: 129 MMHG | OXYGEN SATURATION: 93 %

## 2017-11-27 DIAGNOSIS — I10 BENIGN ESSENTIAL HYPERTENSION: ICD-10-CM

## 2017-11-27 DIAGNOSIS — R19.7 DIARRHEA, UNSPECIFIED TYPE: Primary | ICD-10-CM

## 2017-11-27 LAB
ALBUMIN UR-MCNC: ABNORMAL MG/DL
APPEARANCE UR: CLEAR
BACTERIA #/AREA URNS HPF: ABNORMAL /HPF
BASOPHILS # BLD AUTO: 0 10E9/L (ref 0–0.2)
BASOPHILS NFR BLD AUTO: 0.3 %
BILIRUB UR QL STRIP: ABNORMAL
C DIFF STL QL CULT: ABNORMAL
C DIFF TOX B STL QL: NEGATIVE
COLOR UR AUTO: YELLOW
DIFFERENTIAL METHOD BLD: NORMAL
EOSINOPHIL # BLD AUTO: 0.1 10E9/L (ref 0–0.7)
EOSINOPHIL NFR BLD AUTO: 1 %
ERYTHROCYTE [DISTWIDTH] IN BLOOD BY AUTOMATED COUNT: 12.2 % (ref 10–15)
GLUCOSE UR STRIP-MCNC: NEGATIVE MG/DL
HCT VFR BLD AUTO: 38.6 % (ref 35–47)
HGB BLD-MCNC: 13.3 G/DL (ref 11.7–15.7)
HGB UR QL STRIP: ABNORMAL
KETONES UR STRIP-MCNC: ABNORMAL MG/DL
LEUKOCYTE ESTERASE UR QL STRIP: NEGATIVE
LYMPHOCYTES # BLD AUTO: 2.7 10E9/L (ref 0.8–5.3)
LYMPHOCYTES NFR BLD AUTO: 25.1 %
MCH RBC QN AUTO: 31.3 PG (ref 26.5–33)
MCHC RBC AUTO-ENTMCNC: 34.5 G/DL (ref 31.5–36.5)
MCV RBC AUTO: 91 FL (ref 78–100)
MONOCYTES # BLD AUTO: 0.7 10E9/L (ref 0–1.3)
MONOCYTES NFR BLD AUTO: 6.7 %
MUCOUS THREADS #/AREA URNS LPF: PRESENT /LPF
NEUTROPHILS # BLD AUTO: 7.3 10E9/L (ref 1.6–8.3)
NEUTROPHILS NFR BLD AUTO: 66.9 %
NITRATE UR QL: NEGATIVE
NON-SQ EPI CELLS #/AREA URNS LPF: ABNORMAL /LPF
PH UR STRIP: 5.5 PH (ref 5–7)
PLATELET # BLD AUTO: 292 10E9/L (ref 150–450)
RBC # BLD AUTO: 4.25 10E12/L (ref 3.8–5.2)
RBC #/AREA URNS AUTO: ABNORMAL /HPF
SOURCE: ABNORMAL
SP GR UR STRIP: >1.03 (ref 1–1.03)
SPECIMEN SOURCE: ABNORMAL
SPECIMEN SOURCE: NORMAL
UROBILINOGEN UR STRIP-ACNC: 0.2 EU/DL (ref 0.2–1)
WBC # BLD AUTO: 10.9 10E9/L (ref 4–11)
WBC #/AREA URNS AUTO: ABNORMAL /HPF

## 2017-11-27 PROCEDURE — 85025 COMPLETE CBC W/AUTO DIFF WBC: CPT | Performed by: NURSE PRACTITIONER

## 2017-11-27 PROCEDURE — 87075 CULTR BACTERIA EXCEPT BLOOD: CPT | Performed by: NURSE PRACTITIONER

## 2017-11-27 PROCEDURE — 99213 OFFICE O/P EST LOW 20 MIN: CPT | Performed by: NURSE PRACTITIONER

## 2017-11-27 PROCEDURE — 36415 COLL VENOUS BLD VENIPUNCTURE: CPT | Performed by: NURSE PRACTITIONER

## 2017-11-27 PROCEDURE — 81001 URINALYSIS AUTO W/SCOPE: CPT | Performed by: NURSE PRACTITIONER

## 2017-11-27 PROCEDURE — 87506 IADNA-DNA/RNA PROBE TQ 6-11: CPT | Performed by: NURSE PRACTITIONER

## 2017-11-27 PROCEDURE — 87493 C DIFF AMPLIFIED PROBE: CPT | Performed by: NURSE PRACTITIONER

## 2017-11-27 NOTE — PATIENT INSTRUCTIONS
Make sure you have enough fluids  Stop taking the imodium now  Collect the stool specimens for bacterial and viral cultures  Eat a bland diet

## 2017-11-27 NOTE — LETTER
[place] [address]  [address]  [place]  [address]  Tel: [phone]    November 28, 2017    Elizabeth Joy  [address]   [facility] [place] [address]        Dear Ms. Joy,    You do not have either bacterial or viral infection by these specified organisms.    So as we discussed it is time for you to have a colonoscopy with biopsy to identify the problem and then follow up with Dr Josue.    I am putting in an order for the colonoscopy and you will be called to schedule it .  Please don't make the schedule for too far out  Call me if you need anything else, Elizabeth.      Sincerely,    Anjelica Bethea, HEATHER/bhargavi    Results for orders placed or performed in visit on 11/27/17   CBC with platelets and differential   Result Value Ref Range    WBC 10.9 4.0 - 11.0 10e9/L    RBC Count 4.25 3.8 - 5.2 10e12/L    Hemoglobin 13.3 11.7 - 15.7 g/dL    Hematocrit 38.6 35.0 - 47.0 %    MCV 91 78 - 100 fl    MCH 31.3 26.5 - 33.0 pg    MCHC 34.5 31.5 - 36.5 g/dL    RDW 12.2 10.0 - 15.0 %    Platelet Count 292 150 - 450 10e9/L    Diff Method Automated Method     % Neutrophils 66.9 %    % Lymphocytes 25.1 %    % Monocytes 6.7 %    % Eosinophils 1.0 %    % Basophils 0.3 %    Absolute Neutrophil 7.3 1.6 - 8.3 10e9/L    Absolute Lymphocytes 2.7 0.8 - 5.3 10e9/L    Absolute Monocytes 0.7 0.0 - 1.3 10e9/L    Absolute Eosinophils 0.1 0.0 - 0.7 10e9/L    Absolute Basophils 0.0 0.0 - 0.2 10e9/L   *UA reflex to Microscopic and Culture ([place] and [facility] (except [place] and [place])   Result Value Ref Range    Color Urine Yellow     Appearance Urine Clear     Glucose Urine Negative NEG^Negative mg/dL    Bilirubin Urine Small (A) NEG^Negative    Ketones Urine Trace (A) NEG^Negative mg/dL    Specific Gravity Urine >1.030 1.003 - 1.035    Blood Urine Small (A) NEG^Negative    pH Urine 5.5 5.0 - 7.0 pH    Protein Albumin Urine Trace (A) NEG^Negative mg/dL    Urobilinogen Urine 0.2 0.2 - 1.0 EU/dL    Nitrite Urine  Negative NEG^Negative    Leukocyte Esterase Urine Negative NEG^Negative    Source Midstream Urine    Urine Microscopic   Result Value Ref Range    WBC Urine O - 2 OTO2^O - 2 /HPF    RBC Urine O - 2 OTO2^O - 2 /HPF    Squamous Epithelial /LPF Urine Moderate (A) FEW^Few /LPF    Bacteria Urine Few (A) NEG^Negative /HPF    Mucous Urine Present (A) NEG^Negative /LPF   C difficile culture   Result Value Ref Range    Specimen Descrip Feces     C Difficile Culture Test canceled - Lab  error (A) NEG^Negative   Enteric Bacteria and Virus Panel by TOAN Stool   Result Value Ref Range    Campylobacter group by TOAN Not Detected NDET^Not Detected    Salmonella species by TOAN Not Detected NDET^Not Detected    Shigella species by TOAN Not Detected NDET^Not Detected    Vibrio group by TOAN Not Detected NDET^Not Detected    Rotavirus A by TOAN Not Detected NDET^Not Detected    Shiga toxin 1 gene by TOAN Not Detected NDET^Not Detected    Shiga toxin 2 gene by TOAN Not Detected NDET^Not Detected    Norovirus I and II by TOAN Not Detected NDET^Not Detected    Yersinia enterocolitica by TOAN Not Detected NDET^Not Detected    Enteric pathogen comment       Testing performed by multiplexed, qualitative PCR using the [employer] Enteric   Pathogens Nucleic Acid Test. Results should not be used as the sole basis for diagnosis,   treatment, or other patient management decisions.     Clostridium difficile Toxin B PCR   Result Value Ref Range    Specimen Description Feces     C Diff Toxin B PCR Negative NEG^Negative           Enclosure: Lab Results

## 2017-11-27 NOTE — MR AVS SNAPSHOT
"              After Visit Summary   11/27/2017    Elizabeth Joy    MRN: [id number]           Patient Information     Date Of Birth          1940        Visit Information        Provider Department      11/27/2017 8:00 AM Anjelica Bethea APRN CNP [facility]        Today's Diagnoses     Diarrhea, unspecified type    -  1      Care Instructions    Make sure you have enough fluids  Stop taking the imodium now  Collect the stool specimens for bacterial and viral cultures  Eat a bland diet           Follow-ups after your visit        Future tests that were ordered for you today     Open Future Orders        Priority Expected Expires Ordered    C difficile culture Routine  11/27/2018 11/27/2017    Enteric Bacteria and Virus Panel by TOAN Stool Routine  11/27/2018 11/27/2017            Who to contact     If you have questions or need follow up information about today's clinic visit or your schedule please contact [facility] directly at [phone].  Normal or non-critical lab and imaging results will be communicated to you by MyChart, letter or phone within 4 business days after the clinic has received the results. If you do not hear from us within 7 days, please contact the clinic through [facility] or phone. If you have a critical or abnormal lab result, we will notify you by phone as soon as possible.  Submit refill requests through [facility] or call your pharmacy and they will forward the refill request to us. Please allow 3 business days for your refill to be completed.          Additional Information About Your Visit        MyChart Information     [facility] lets you send messages to your doctor, view your test results, renew your prescriptions, schedule appointments and more. To sign up, go to [email or web address] . Click on \"Log in\" on the left side of the screen, which will take you to the Welcome page. Then click on \"Sign up Now\" on the right side of the page.     You will be asked to " "enter the access code listed below, as well as some personal information. Please follow the directions to create your username and password.     Your access code is: [email or web address]  Expires: 2018  8:59 AM     Your access code will  in 90 days. If you need help or a new code, please call your [facility] clinic or [phone].        Care EveryWhere ID     This is your Care EveryWhere ID. This could be used by other organizations to access your [facility] medical records  [id number]        Your Vitals Were     Pulse Temperature Height Pulse Oximetry          68 98  F (36.7  C) (Oral) 5' 3\" (1.6 m) 93%         Blood Pressure from Last 3 Encounters:   17 129/75   17 187/88   17 136/82    Weight from Last 3 Encounters:   17 165 lb (74.8 kg)   17 165 lb (74.8 kg)   10/21/16 165 lb (74.8 kg)              We Performed the Following     *UA reflex to Microscopic and Culture ([place] and [facility] (except [place] and [place])     CBC with platelets and differential        Primary Care Provider Office Phone # Fax #    Marky Josue [phone] [address] [address]        Equal Access to Services     [facility]: Hadii aad ku hadasho Soomaali, waaxda luqadaha, qaybta kaalmada adeegyada, yudith gayle . So [facility] [phone].    ATENCIÓN: Si habla español, tiene a reyes disposición servicios gratuitos de asistencia lingüística. Wallace al [phone].    We comply with applicable federal civil rights laws and [place] laws. We do not discriminate on the basis of race, color, national origin, age, disability, sex, sexual orientation, or gender identity.            Thank you!     Thank you for choosing [facility]  for your care. Our goal is always to provide you with excellent care. Hearing back from our patients is one way we can continue to improve our services. Please take a few minutes to " complete the written survey that you may receive in the mail after your visit with us. Thank you!             Your Updated Medication List - Protect others around you: Learn how to safely use, store and throw away your medicines at www.disposemymeds.org.          This list is accurate as of: 11/27/17  8:39 AM.  Always use your most recent med list.                   Brand Name Dispense Instructions for use Diagnosis    verapamil 240 MG CR tablet    CALAN-SR    90 tablet    TAKE ONE TABLET BY MOUTH ONE TIME DAILY    Benign essential hypertension       zolpidem 5 MG tablet    AMBIEN    90 tablet    TAKE 1 TABLET BY MOUTH EVERY EVENING AS NEEDED FOR SLEEP    Persistent insomnia

## 2017-11-27 NOTE — NURSING NOTE
"Chief Complaint   Patient presents with     Hypertension       Initial /75 (BP Location: Left arm, Cuff Size: Adult Regular)  Pulse 68  Temp 98  F (36.7  C) (Oral)  Ht 5' 3\" (1.6 m)  SpO2 93% Estimated body mass index is 29.23 kg/(m^2) as calculated from the following:    Height as of 11/24/17: 5' 3\" (1.6 m).    Weight as of 11/24/17: 165 lb (74.8 kg).  Medication Reconciliation: complete     Maryellen Wilks MA    "

## 2017-11-27 NOTE — PROGRESS NOTES
SUBJECTIVE:   Elizabeth Joy is a 77 year old female who presents to clinic today for the following health issues:      Hypertension Follow-up      Outpatient blood pressures are not being checked.    Low Salt Diet: no added salt    Amount of exercise or physical activity: None    Problems taking medications regularly: No    Medication side effects: none    Diet: regular (no restrictions)    Elizabeth is here today as followup on persistent diarrhea of 2 weeks.  She is taking more than recommended dose of imodium exceeding daily by 1 pill.  She has had only one watery stool this morning.  Continues to deny blood (hemoccult x 3 negative), no abd cramping, no nausea or vomiting, tired of bland diet. Has no fever or chills.  Denies weakness, light headedness or dizziness    Problem list and histories reviewed & adjusted, as indicated.  Additional history: as documented    Patient Active Problem List   Diagnosis     Hx of colonoscopy     Benign essential hypertension     Insomnia     Advanced directives, counseling/discussion     Hyperlipidemia LDL goal <160     Elevated blood sugar     Ventricular tachycardia (H)     Syncope     Past Surgical History:   Procedure Laterality Date     HYSTERECTOMY, PAP NO LONGER INDICATED  1990    had bso also, not for cancer     KERATOTOMY ARCUATE WITH FEMTOSECOND LASER/IMAGING FOR ATIOL Right 7/13/2015    Procedure: KERATOTOMY ARCUATE WITH FEMTOSECOND LASER/IMAGING FOR ATIOL;  Surgeon: Lionel Reza MD;  [facility]:  [facility]     KERATOTOMY ARCUATE WITH FEMTOSECOND LASER/IMAGING FOR ATIOL Right 7/13/2015    Procedure: KERATOTOMY ARCUATE WITH FEMTOSECOND LASER/IMAGING FOR ATIOL;  Surgeon: Lionel Reza MD;  Location:  [facility]     PHACOEMULSIFICATION CLEAR CORNEA WITH STANDARD INTRAOCULAR LENS IMPLANT Right 7/13/2015    Procedure: PHACOEMULSIFICATION CLEAR CORNEA WITH STANDARD INTRAOCULAR LENS IMPLANT;  Surgeon: Lionel Reza MD;  Location:  [facility]     pilonidal cyst removed  30's      "  Social History   Substance Use Topics     Smoking status: Former Smoker     Types: Cigarettes     Quit date: 9/29/1997     Smokeless tobacco: Never Used     Alcohol use 0.0 oz/week     0 Standard drinks or equivalent per week      Comment: 1 drink a week     Family History   Problem Relation Age of Onset     Breast Cancer Mother          Current Outpatient Prescriptions   Medication Sig Dispense Refill     zolpidem (AMBIEN) 5 MG tablet TAKE 1 TABLET BY MOUTH EVERY EVENING AS NEEDED FOR SLEEP 90 tablet 0     verapamil (CALAN-SR) 240 MG CR tablet TAKE ONE TABLET BY MOUTH ONE TIME DAILY 90 tablet 2     Allergies   Allergen Reactions     Ace Inhibitors Cough         Reviewed and updated as needed this visit by clinical staffTobacco  Allergies  Meds  Problems  Med Hx  Surg Hx  Fam Hx  Soc Hx        Reviewed and updated as needed this visit by Provider         ROS:  Constitutional, HEENT, cardiovascular, pulmonary, gi and gu systems are negative, except as otherwise noted.      OBJECTIVE:   /75 (BP Location: Left arm, Cuff Size: Adult Regular)  Pulse 68  Temp 98  F (36.7  C) (Oral)  Ht 5' 3\" (1.6 m)  SpO2 93%  There is no height or weight on file to calculate BMI.  GENERAL: healthy, alert and no distress  EYES: Eyes grossly normal to inspection, PERRL and conjunctivae and sclerae normal  HENT: ear canals and TM's normal, nose and mouth without ulcers or lesions  NECK: no adenopathy, no asymmetry, masses, or scars and thyroid normal to palpation  RESP: lungs clear to auscultation - no rales, rhonchi or wheezes  CV: regular rate and rhythm, normal S1 S2, no S3 or S4, no murmur, click or rub,   ABDOMEN: soft, nontender, no hepatosplenomegaly, no masses and bowel sounds normal  MS: no gross musculoskeletal defects noted, no edema    Diagnostic Test Results:  Additional tests ordered ; stool cultures and c dif   negative    ASSESSMENT/PLAN:       ICD-10-CM    1. Diarrhea, unspecified type R19.7 CBC with " platelets and differential     C difficile culture     Enteric Bacteria and Virus Panel by TOAN Stool     *UA reflex to Microscopic and Culture ([facility] (except [place] and [place])     Urine Microscopic     C difficile culture     Enteric Bacteria and Virus Panel by TOAN Stool     Clostridium difficile Toxin B PCR     GASTROENTEROLOGY ADULT REF PROCEDURE ONLY   2. Benign essential hypertension I10 Normotensive today       Patient Instructions   Make sure you have enough fluids  Stop taking the imodium now  Collect the stool specimens for bacterial and viral cultures  Eat a bland diet     [id number]/1/17  She is referred for colonoscopy and will follow up with Dr Josue after procedure completed to discuss result       ZUHAIR Mason [facility]

## 2017-11-28 NOTE — PROGRESS NOTES
You do not have either bacterial or viral infection by these specified organisms.    So as we discussed it is time for you to have a colonoscopy with biopsy to identify the problem and then follow up with Dr Josue.    I am putting in an order for the colonoscopy and you will be called to schedule it .  Please don't make the schedule for too far out  Call me if you need anything else, Elizabeth.

## 2018-01-01 NOTE — TELEPHONE ENCOUNTER
Reason for call:  Other   Patient called regarding (reason for call): appointment  Additional comments: Needs a referral to an gastro Doctor.      Phone number to reach patient:  Home number on file [phone] (home)    Best Time:  Anytime      Can we leave a detailed message on this number?  YES

## 2018-01-02 ENCOUNTER — TELEPHONE (OUTPATIENT)
Dept: FAMILY MEDICINE | Facility: CLINIC | Age: 78
End: 2018-01-02

## 2018-01-02 NOTE — TELEPHONE ENCOUNTER
Reason for Call: Request for an order or referral:    Order or referral being requested: Referral    Date needed: at your convenience    Has the patient been seen by the PCP for this problem? NO   Anjelica Bethea recommenced she have this done    Additional comments: asking for a referral for a Gastroenterologist so she can have a Colonoscopy     Phone number Patient can be reached at:  Home number on file [phone] (home)    Best Time:  anytime    Can we leave a detailed message on this number?  YES    Call taken on 1/2/2018 at 8:39 AM by Sagar Vásquez

## 2018-01-02 NOTE — TELEPHONE ENCOUNTER
Spoke with pt and let her know that referral was done 11/24/2017 and info was provided.    Whit Young [phone]    Which [facility] Provider? Dr. FLORENTIN Baca ,[facility]

## 2018-01-02 NOTE — LETTER
[facility]  [address] [facility]  [address]  [place]  [address]  Tel: [phone]    January 2, 2018    Elizabeth Joy  [address]   [facility] [address]        Dear Ms. Joy,    You have been referred to Gastroenterology for a colonoscopy. Normally we would just have you call our [facility]'s [facility] phone number at [phone]. If you do not care who you see for this colonoscopy procedure these guys will be able to get you scheduled with the soonest available provider.    If you would like to see someone that has been particularly recommended then Anjelica recommends, Dr Yovany Lan. His office phone is [phone].    If you have any further questions or problems, please contact our office.      Sincerely,    [facility]

## 2018-01-02 NOTE — TELEPHONE ENCOUNTER
Called and left the patient a detailed voice message and sent letter.    Jorgito Dos Santos, [facility]

## 2018-01-02 NOTE — TELEPHONE ENCOUNTER
This patient saw Anjelica Huffman for diarrhea and colon was ordered then, is there a reason needs another referral?    Thanks    Marky Josue M.D.

## 2018-01-08 ENCOUNTER — OFFICE VISIT (OUTPATIENT)
Dept: FAMILY MEDICINE | Facility: CLINIC | Age: 78
End: 2018-01-08
Payer: COMMERCIAL

## 2018-01-08 VITALS
SYSTOLIC BLOOD PRESSURE: 135 MMHG | OXYGEN SATURATION: 95 % | BODY MASS INDEX: 29.23 KG/M2 | WEIGHT: 165 LBS | HEART RATE: 70 BPM | HEIGHT: 63 IN | TEMPERATURE: 97.6 F | DIASTOLIC BLOOD PRESSURE: 81 MMHG

## 2018-01-08 DIAGNOSIS — K52.832 LYMPHOCYTIC COLITIS: ICD-10-CM

## 2018-01-08 DIAGNOSIS — N95.1 VAGINAL DRYNESS, MENOPAUSAL: Primary | ICD-10-CM

## 2018-01-08 PROCEDURE — 99213 OFFICE O/P EST LOW 20 MIN: CPT | Performed by: INTERNAL MEDICINE

## 2018-01-08 RX ORDER — ESTRADIOL 0.1 MG/G
2 CREAM VAGINAL
Qty: 42.5 G | Refills: 0 | Status: SHIPPED | OUTPATIENT
Start: 2018-01-08 | End: 2018-09-04

## 2018-01-08 NOTE — NURSING NOTE
"Chief Complaint   Patient presents with     Vaginal Problem     vaginal dryness, saw percy/gyn     Diarrhea     since Nov Fall     last Wed., put ice on it       Initial /81 (BP Location: Right arm, Patient Position: Chair, Cuff Size: Adult Large)  Pulse 70  Temp 97.6  F (36.4  C) (Oral)  Ht 5' 3\" (1.6 m)  Wt 165 lb (74.8 kg)  SpO2 95%  Breastfeeding? No  BMI 29.23 kg/m2 Estimated body mass index is 29.23 kg/(m^2) as calculated from the following:    Height as of this encounter: 5' 3\" (1.6 m).    Weight as of this encounter: 165 lb (74.8 kg).  Medication Reconciliation: complete.  India Troy CMA    "

## 2018-01-08 NOTE — PROGRESS NOTES
The patient is here for a few issues.  1.  She slid off the bed last Thursday.  She gently hit the right side of her head.  She had some bruising but this is mostly resolved.  She is not on any blood thinners and she feels fine.  She does not have headaches or neurologic changes.    2.  she has had vaginal dryness for quite some time.  She saw her gynecologist,  who did an evaluation biopsy.  The patient everything was normal.  She does not have any discharge or bleeding.  She wants to consider hormone replacement for this.    3.  She has had diarrhea since early November.  By this she means she will have an hour or 2 in the morning will she will have diarrhea on and off but then throughout the rest of the day she is fine.  She is not having abdominal pain or nausea or vomiting.  No fevers chills or night sweats.  Occasionally she will have nocturnal diarrhea.  She does not have bloody or black stools.  She does not use NSAIDs.  She has not been on antibiotics recently or traveled recently.  There have been no diet changes, supplements, or medication changes.  Of significance is the fact that in 2008 she had diarrhea and a colonoscopy and biopsy did show lymphocytic colitis at that time.  Up until this November her bowels were fine.  She has been using Kaopectate and this does help during the day but she still has morning diarrhea.  She did have stool testing as noted in the fall.    Past Medical History:   Diagnosis Date     Elevated blood sugar      HTN (hypertension) 35     Hx of colonoscopy 2008    bx collagenous colitis     Hypercholesteremia      Insomnia     on ambien 10mg 1/2 daily for long time     Lymphocytic colitis 2008    on colon exam at [facility]     Syncope 08/2016    echo trace lvh, nl ef; ziopatch with one 5 beat run of vtach, seen by ep Dr. Allen and nothing found     Ventricular tachycardia (H) 8/16    seen on ziopatch done for syncope, just one 5 beat run     Past Surgical History:    Procedure Laterality Date     HYSTERECTOMY, PAP NO LONGER INDICATED  1990    had bso also, not for cancer     KERATOTOMY ARCUATE WITH FEMTOSECOND LASER/IMAGING FOR ATIOL Right 7/13/2015    Procedure: KERATOTOMY ARCUATE WITH FEMTOSECOND LASER/IMAGING FOR ATIOL;  Surgeon: Lionel Reza MD;  [facility]: [facility]     KERATOTOMY ARCUATE WITH FEMTOSECOND LASER/IMAGING FOR ATIOL Right 7/13/2015    Procedure: KERATOTOMY ARCUATE WITH FEMTOSECOND LASER/IMAGING FOR ATIOL;  Surgeon: Lionel Reza MD;  Location: [facility]     PHACOEMULSIFICATION CLEAR CORNEA WITH STANDARD INTRAOCULAR LENS IMPLANT Right 7/13/2015    Procedure: PHACOEMULSIFICATION CLEAR CORNEA WITH STANDARD INTRAOCULAR LENS IMPLANT;  Surgeon: Lionel Reza MD;  Location:  [facility]     pilonidal cyst removed  30's     Social History     Social History     Marital status:      Spouse name: N/A     Number of children: 3     Years of education: N/A     Occupational History     Not on file.     Social History Main Topics     Smoking status: Former Smoker     Types: Cigarettes     Quit date: 9/29/1997     Smokeless tobacco: Never Used     Alcohol use 0.0 oz/week     0 Standard drinks or equivalent per week      Comment: 1 drink a week     Drug use: No     Sexual activity: No     Other Topics Concern     Not on file     Social History Narrative     Current Outpatient Prescriptions   Medication Sig Dispense Refill     estradiol (ESTRACE VAGINAL) 0.1 MG/GM cream Place 2 g vaginally twice a week 42.5 g 0     zolpidem (AMBIEN) 5 MG tablet TAKE 1 TABLET BY MOUTH EVERY EVENING AS NEEDED FOR SLEEP 90 tablet 0     verapamil (CALAN-SR) 240 MG CR tablet TAKE ONE TABLET BY MOUTH ONE TIME DAILY 90 tablet 2     Allergies   Allergen Reactions     Ace Inhibitors Cough     FAMILY HISTORY NOTED AND REVIEWED    REVIEW OF SYSTEMS: above    PHYSICAL EXAM    /81 (BP Location: Right arm, Patient Position: Chair, Cuff Size: Adult Large)  Pulse 70  Temp 97.6  F (36.4  C) (Oral)  " Ht 5' 3\" (1.6 m)  Wt 165 lb (74.8 kg)  SpO2 95%  Breastfeeding? No  BMI 29.23 kg/m2    Patient appears non toxic  Mouth and eyes within normal limits  Neck supple  No supraclavicular, cervical or axillary lymphadenopathy  Lungs clear  cv reglar rate and rhythm  Abdomen normal active bowel sounds soft, non-tender, no mgr, no hepatosplenomegaly  Neuro exam: cn within normal limits, motor grossly normal, gait within normal limits, speech fluent    ASSESSMENT:  1. Diarrhea, most c/w lymphocytic colitis, doubt ibd, doubt tumor, doubt med or other cause, doubt infectious  2. Vag dryness, ok to try med  3. Head trauma, doubt significant, doubt cns bleed    PLAN:  Call if any neuro changes  Estrace vag cream  Try imodium and if not resolvinig can try budesonide pills.  If not better soon to get colon exam    Marky Josue M.D.      I called patient this am and discussed with patient her dx of lichen slerosis.  Explained small risk of going on to malig and to monitor lesions.  I discussed with patient that estrogen may not help, but she already picked it up and prefers to use that first. If not effective can try steroid creams    Marky Josue M.D.    "

## 2018-01-08 NOTE — MR AVS SNAPSHOT
After Visit Summary   1/8/2018    Elizabeth Joy    MRN: [id number]           Patient Information     Date Of Birth          1940        Visit Information        Provider Department      1/8/2018 3:30 PM Marky Josue MD [facility]        Today's Diagnoses     Vaginal dryness, menopausal    -  1    Lymphocytic colitis           Follow-ups after your visit        Your next 10 appointments already scheduled     Jan 09, 2018  2:00 PM CST   Office Visit with ZUHAIR Mason CNP   [facility]    [address] [id number] [phone]           Bring a current list of meds and any records pertaining to this visit. For Physicals, please bring immunization records and any forms needing to be filled out. Please arrive 10 minutes early to complete paperwork.            Jan 18, 2018   Procedure with Yovany Lan MD   [facility] ([facility])    [address] [id number] [phone]           [facility] is located at [address] [place]              Who to contact     If you have questions or need follow up information about today's clinic visit or your schedule please contact [facility] directly at [phone].  Normal or non-critical lab and imaging results will be communicated to you by MyChart, letter or phone within 4 business days after the clinic has received the results. If you do not hear from us within 7 days, please contact the clinic through MyChart or phone. If you have a critical or abnormal lab result, we will notify you by phone as soon as possible.  Submit refill requests through Algisys or call your pharmacy and they will forward the refill request to us. Please allow 3 business days for your refill to be completed.          Additional Information About Your Visit        ArviragoharWan Shidao management Information     Algisys lets you send  "messages to your doctor, view your test results, renew your prescriptions, schedule appointments and more. To sign up, go to [email or web address] . Click on \"Log in\" on the left side of the screen, which will take you to the Welcome page. Then click on \"Sign up Now\" on the right side of the page.     You will be asked to enter the access code listed below, as well as some personal information. Please follow the directions to create your username and password.     Your access code is: [email or web address]  Expires: 2018  8:59 AM     Your access code will  in 90 days. If you need help or a new code, please call your [place] clinic or [phone].        Care EveryWhere ID     This is your Care EveryWhere ID. This could be used by other organizations to access your [place] medical records  [id number]        Your Vitals Were     Pulse Temperature Height Pulse Oximetry Breastfeeding? BMI (Body Mass Index)    70 97.6  F (36.4  C) (Oral) 5' 3\" (1.6 m) 95% No 29.23 kg/m2       Blood Pressure from Last 3 Encounters:   18 135/81   17 129/75   17 187/88    Weight from Last 3 Encounters:   18 165 lb (74.8 kg)   17 165 lb (74.8 kg)   17 165 lb (74.8 kg)              Today, you had the following     No orders found for display         Today's Medication Changes          These changes are accurate as of: 18  4:22 PM.  If you have any questions, ask your nurse or doctor.               Start taking these medicines.        Dose/Directions    estradiol 0.1 MG/GM cream   Commonly known as:  ESTRACE VAGINAL   Used for:  Vaginal dryness, menopausal   Started by:  Marky Josue MD        Dose:  2 g   Place 2 g vaginally twice a week   Quantity:  42.5 g   Refills:  0            Where to get your medicines      These medications were sent to [facility] [address] [place] [facility] [place] - [address]  [address] [place] [address]     Phone:  [phone]     estradiol 0.1 MG/GM cream       "          Primary Care Provider Office Phone # Fax #    Marky Josue [phone] [address] [facility] [address]  [facility] [address]        Equal Access to Services     CASSY MARADIAGA : Hadii rosita ku hadanelo Soomaali, waaxda luqadaha, qaybta kaalmada adeegyada, yudith chua laTheresahernan almanzar. So [facility] [phone].    ATENCIÓN: Si habla español, tiene a reyes disposición servicios gratuitos de asistencia lingüística. Llame al [phone].    We comply with applicable federal civil rights laws and [place] laws. We do not discriminate on the basis of race, color, national origin, age, disability, sex, sexual orientation, or gender identity.            Thank you!     Thank you for choosing [facility]  for your care. Our goal is always to provide you with excellent care. Hearing back from our patients is one way we can continue to improve our services. Please take a few minutes to complete the written survey that you may receive in the mail after your visit with us. Thank you!             Your Updated Medication List - Protect others around you: Learn how to safely use, store and throw away your medicines at www.disposemymeds.org.          This list is accurate as of: 1/8/18  4:22 PM.  Always use your most recent med list.                   Brand Name Dispense Instructions for use Diagnosis    estradiol 0.1 MG/GM cream    ESTRACE VAGINAL    42.5 g    Place 2 g vaginally twice a week    Vaginal dryness, menopausal       verapamil 240 MG CR tablet    CALAN-SR    90 tablet    TAKE ONE TABLET BY MOUTH ONE TIME DAILY    Benign essential hypertension       zolpidem 5 MG tablet    AMBIEN    90 tablet    TAKE 1 TABLET BY MOUTH EVERY EVENING AS NEEDED FOR SLEEP    Persistent insomnia

## 2018-01-09 PROBLEM — N90.4 LICHEN SCLEROSUS ET ATROPHICUS OF THE VULVA: Status: ACTIVE | Noted: 2017-02-01

## 2018-02-05 ENCOUNTER — TELEPHONE (OUTPATIENT)
Dept: FAMILY MEDICINE | Facility: CLINIC | Age: 78
End: 2018-02-05

## 2018-02-19 DIAGNOSIS — G47.00 PERSISTENT INSOMNIA: ICD-10-CM

## 2018-02-19 RX ORDER — ZOLPIDEM TARTRATE 5 MG/1
TABLET ORAL
Qty: 90 TABLET | Refills: 0 | Status: SHIPPED | OUTPATIENT
Start: 2018-02-19 | End: 2018-05-11

## 2018-02-19 NOTE — TELEPHONE ENCOUNTER
zolpidem (AMBIEN) 5 MG tablet        Last Written Prescription Date:  11/20/2017  Last Fill Quantity: 90,   # refills: 0  Last Office Visit: 1/8/2018  Future Office visit:       Routing refill request to provider for review/approval because:  Drug not on the [facility], [facility] or [facility] refill protocol or controlled substance

## 2018-02-23 ENCOUNTER — HOSPITAL ENCOUNTER (OUTPATIENT)
Facility: CLINIC | Age: 78
End: 2018-02-23
Attending: INTERNAL MEDICINE | Admitting: INTERNAL MEDICINE
Payer: MEDICARE

## 2018-03-07 ENCOUNTER — TRANSFERRED RECORDS (OUTPATIENT)
Dept: HEALTH INFORMATION MANAGEMENT | Facility: CLINIC | Age: 78
End: 2018-03-07

## 2018-04-17 ENCOUNTER — TRANSFERRED RECORDS (OUTPATIENT)
Dept: HEALTH INFORMATION MANAGEMENT | Facility: CLINIC | Age: 78
End: 2018-04-17

## 2018-04-23 ENCOUNTER — OFFICE VISIT (OUTPATIENT)
Dept: FAMILY MEDICINE | Facility: CLINIC | Age: 78
End: 2018-04-23
Payer: COMMERCIAL

## 2018-04-23 VITALS
HEIGHT: 63 IN | HEART RATE: 66 BPM | TEMPERATURE: 97.6 F | OXYGEN SATURATION: 97 % | WEIGHT: 165 LBS | SYSTOLIC BLOOD PRESSURE: 128 MMHG | BODY MASS INDEX: 29.23 KG/M2 | DIASTOLIC BLOOD PRESSURE: 79 MMHG

## 2018-04-23 DIAGNOSIS — R30.0 DYSURIA: Primary | ICD-10-CM

## 2018-04-23 LAB
ALBUMIN UR-MCNC: NEGATIVE MG/DL
APPEARANCE UR: CLEAR
BILIRUB UR QL STRIP: NEGATIVE
COLOR UR AUTO: YELLOW
GLUCOSE UR STRIP-MCNC: NEGATIVE MG/DL
HGB UR QL STRIP: ABNORMAL
KETONES UR STRIP-MCNC: NEGATIVE MG/DL
LEUKOCYTE ESTERASE UR QL STRIP: NEGATIVE
NITRATE UR QL: NEGATIVE
PH UR STRIP: 5.5 PH (ref 5–7)
RBC #/AREA URNS AUTO: NORMAL /HPF
SOURCE: ABNORMAL
SP GR UR STRIP: 1.02 (ref 1–1.03)
UROBILINOGEN UR STRIP-ACNC: 0.2 EU/DL (ref 0.2–1)
WBC #/AREA URNS AUTO: NORMAL /HPF

## 2018-04-23 PROCEDURE — 99213 OFFICE O/P EST LOW 20 MIN: CPT | Performed by: NURSE PRACTITIONER

## 2018-04-23 PROCEDURE — 81001 URINALYSIS AUTO W/SCOPE: CPT | Performed by: NURSE PRACTITIONER

## 2018-04-23 NOTE — PROGRESS NOTES
SUBJECTIVE:   Elizabeth Joy is a 78 year old female who presents to clinic today for the following health issues:      URINARY TRACT SYMPTOMS      Duration: 5 days ago for one day had urinary frequency, small voidings and some shivering with micturition    Description  Low output, feels strange    Intensity:      Accompanying signs and symptoms:  Fever/chills: no   Flank pain no   Nausea and vomiting: no   Vaginal symptoms: none  Abdominal/Pelvic Pain: no     History  History of frequent UTI's: no   History of kidney stones: no   Sexually Active: no   Possibility of pregnancy: No    Precipitating or alleviating factors: None    Therapies tried and outcome: none   Outcome:       Problem list and histories reviewed & adjusted, as indicated.  Additional history: as documented    Patient Active Problem List   Diagnosis     Hx of colonoscopy     Benign essential hypertension     Insomnia     Advanced directives, counseling/discussion     Hyperlipidemia LDL goal <160     Elevated blood sugar     Ventricular tachycardia (H)     Syncope     Lymphocytic colitis     Lichen sclerosus et atrophicus of the vulva     Past Surgical History:   Procedure Laterality Date     HYSTERECTOMY, PAP NO LONGER INDICATED  1990    had bso also, not for cancer     KERATOTOMY ARCUATE WITH FEMTOSECOND LASER/IMAGING FOR ATIOL Right 7/13/2015    Procedure: KERATOTOMY ARCUATE WITH FEMTOSECOND LASER/IMAGING FOR ATIOL;  Surgeon: Lionel Reza MD;  Location:  [facility]     KERATOTOMY ARCUATE WITH FEMTOSECOND LASER/IMAGING FOR ATIOL Right 7/13/2015    Procedure: KERATOTOMY ARCUATE WITH FEMTOSECOND LASER/IMAGING FOR ATIOL;  Surgeon: Lionel Reza MD;  Location:  [facility]     PHACOEMULSIFICATION CLEAR CORNEA WITH STANDARD INTRAOCULAR LENS IMPLANT Right 7/13/2015    Procedure: PHACOEMULSIFICATION CLEAR CORNEA WITH STANDARD INTRAOCULAR LENS IMPLANT;  Surgeon: Lionel Reza MD;  Location:  [facility]     pilonidal cyst removed  30's       Social History  "  Substance Use Topics     Smoking status: Former Smoker     Types: Cigarettes     Quit date: 9/29/1997     Smokeless tobacco: Never Used     Alcohol use 0.0 oz/week     0 Standard drinks or equivalent per week      Comment: 1 drink a week     Family History   Problem Relation Age of Onset     Breast Cancer Mother          Current Outpatient Prescriptions   Medication Sig Dispense Refill     estradiol (ESTRACE VAGINAL) 0.1 MG/GM cream Place 2 g vaginally twice a week 42.5 g 0     verapamil (CALAN-SR) 240 MG CR tablet TAKE ONE TABLET BY MOUTH ONE TIME DAILY 90 tablet 2     zolpidem (AMBIEN) 5 MG tablet TAKE 1 TABLET BY MOUTH NIGHTLY AT BEDTIME AS NEEDED FOR SLEEP 90 tablet 0     Allergies   Allergen Reactions     Ace Inhibitors Cough       Reviewed and updated as needed this visit by clinical staff       Reviewed and updated as needed this visit by Provider       ROS:     OBJECTIVE:     /79 (BP Location: Left arm, Patient Position: Chair, Cuff Size: Adult Regular)  Pulse 66  Temp 97.6  F (36.4  C) (Oral)  Ht 5' 3\" (1.6 m)  Wt 165 lb (74.8 kg)  SpO2 97%  Breastfeeding? No  BMI 29.23 kg/m2  Body mass index is 29.23 kg/(m^2).  GENERAL: healthy, alert and no distress  BACK: no CVA tenderness,   Diagnostic Test Results:  Results for orders placed or performed in visit on 04/23/18   *UA reflex to Microscopic and Culture ([facility] (except [place] and [place])   Result Value Ref Range    Color Urine Yellow     Appearance Urine Clear     Glucose Urine Negative NEG^Negative mg/dL    Bilirubin Urine Negative NEG^Negative    Ketones Urine Negative NEG^Negative mg/dL    Specific Gravity Urine 1.020 1.003 - 1.035    Blood Urine Trace (A) NEG^Negative    pH Urine 5.5 5.0 - 7.0 pH    Protein Albumin Urine Negative NEG^Negative mg/dL    Urobilinogen Urine 0.2 0.2 - 1.0 EU/dL    Nitrite Urine Negative NEG^Negative    Leukocyte Esterase Urine Negative NEG^Negative    Source Midstream Urine    Urine " Microscopic   Result Value Ref Range    WBC Urine 0 - 5 OTO5^0 - 5 /HPF    RBC Urine O - 2 OTO2^O - 2 /HPF       ASSESSMENT/PLAN:       ICD-10-CM    1. Dysuria R30.0 *UA reflex to Microscopic and Culture ([facility] (except [place] and [place])     Urine Microscopic   keep up with hydration   Avoid caffeine and hot water baths    ZUHAIR Mason [facility]

## 2018-04-23 NOTE — MR AVS SNAPSHOT
"              After Visit Summary   4/23/2018    Elizabeth Joy    MRN: [id number]           Patient Information     Date Of Birth          1940        Visit Information        Provider Department      4/23/2018 8:30 AM Anjelica Bethea APRN CNP [employer]        Today's Diagnoses     Dysuria    -  1       Follow-ups after your visit        Follow-up notes from your care team     Return if symptoms worsen or fail to improve.      Your next 10 appointments already scheduled     May 03, 2018   Procedure with Yovany Lan MD   [facility] ([facility])    [address]  [place] [id number] [phone]           [facility] [place] [facility] is located at [address] [place]              Who to contact     If you have questions or need follow up information about today's clinic visit or your schedule please contact [facility] directly at [phone].  Normal or non-critical lab and imaging results will be communicated to you by LiveOnDemandhart, letter or phone within 4 business days after the clinic has received the results. If you do not hear from us within 7 days, please contact the clinic through LiveOnDemandhart or phone. If you have a critical or abnormal lab result, we will notify you by phone as soon as possible.  Submit refill requests through Dada Room or call your pharmacy and they will forward the refill request to us. Please allow 3 business days for your refill to be completed.          Additional Information About Your Visit        LiveOnDemandhart Information     Dada Room lets you send messages to your doctor, view your test results, renew your prescriptions, schedule appointments and more. To sign up, go to [email or web address] . Click on \"Log in\" on the left side of the screen, which will take you to the Welcome page. Then click on \"Sign up Now\" on the right side of the page.     You will be asked to enter the access code listed below, as well " "as some personal information. Please follow the directions to create your username and password.     Your access code is: [email or web address]  Expires: 2018  9:41 AM     Your access code will  in 90 days. If you need help or a new code, please call your [place] clinic or [phone].        Care EveryWhere ID     This is your Care EveryWhere ID. This could be used by other organizations to access your [place] medical records  [id number]        Your Vitals Were     Pulse Temperature Height Pulse Oximetry Breastfeeding? BMI (Body Mass Index)    66 97.6  F (36.4  C) (Oral) 5' 3\" (1.6 m) 97% No 29.23 kg/m2       Blood Pressure from Last 3 Encounters:   18 128/79   18 135/81   17 129/75    Weight from Last 3 Encounters:   18 165 lb (74.8 kg)   18 165 lb (74.8 kg)   17 165 lb (74.8 kg)              We Performed the Following     *UA reflex to Microscopic and Culture ([facility] (except [place] and [place])     Urine Microscopic        Primary Care Provider Office Phone # Fax #    Marky Josue [phone] [address] [address]        Equal Access to Services     MONTRELL [facility] Hospital[facility] : Hadii aad ku hadasho Soomaali, waaxda luqadaha, qaybta kaalmada adeselwyn, yudith gayle . So [facility] [phone].    ATENCIÓN: Si habla español, tiene a reyes disposición servicios gratuitos de asistencia lingüística. Wallace al [phone].    We comply with applicable federal civil rights laws and [place] laws. We do not discriminate on the basis of race, color, national origin, age, disability, sex, sexual orientation, or gender identity.            Thank you!     Thank you for choosing [facility]  for your care. Our goal is always to provide you with excellent care. Hearing back from our patients is one way we can continue to improve our services. Please take a few minutes to complete the written " survey that you may receive in the mail after your visit with us. Thank you!             Your Updated Medication List - Protect others around you: Learn how to safely use, store and throw away your medicines at www.disposemymeds.org.          This list is accurate as of 4/23/18  9:41 AM.  Always use your most recent med list.                   Brand Name Dispense Instructions for use Diagnosis    estradiol 0.1 MG/GM cream    ESTRACE VAGINAL    42.5 g    Place 2 g vaginally twice a week    Vaginal dryness, menopausal       verapamil 240 MG CR tablet    CALAN-SR    90 tablet    TAKE ONE TABLET BY MOUTH ONE TIME DAILY    Benign essential hypertension       zolpidem 5 MG tablet    AMBIEN    90 tablet    TAKE 1 TABLET BY MOUTH NIGHTLY AT BEDTIME AS NEEDED FOR SLEEP    Persistent insomnia

## 2018-04-23 NOTE — NURSING NOTE
"Chief Complaint   Patient presents with     Urinary Problem       Initial /79 (BP Location: Left arm, Patient Position: Chair, Cuff Size: Adult Regular)  Pulse 66  Temp 97.6  F (36.4  C) (Oral)  Ht 5' 3\" (1.6 m)  Wt 165 lb (74.8 kg)  SpO2 97%  Breastfeeding? No  BMI 29.23 kg/m2 Estimated body mass index is 29.23 kg/(m^2) as calculated from the following:    Height as of this encounter: 5' 3\" (1.6 m).    Weight as of this encounter: 165 lb (74.8 kg).  Medication Reconciliation: complete.  India Troy CMA    "

## 2018-04-23 NOTE — PROGRESS NOTES
Your urine was without sign of infection today , Elizabeth and the RBC shows that the dipstick reporting blood in urine was not accurate.  There is no blood in the urine either

## 2018-04-23 NOTE — LETTER
[place] [address] [facility]  [address]  [place]  [address]  Tel: [phone]    April 23, 2018    Elizabeth Joy  [address]   [place] [address]        Dear Ms. Joy,    Your urine was without sign of infection today , Elizabeth and the RBC shows that the dipstick reporting blood in urine was not accurate.  There is no blood in the urine either.       If you have any further questions or problems, please contact our office.      Sincerely,    Anjelica Bethea NP          Enclosure: Lab Results

## 2018-05-11 DIAGNOSIS — G47.00 PERSISTENT INSOMNIA: ICD-10-CM

## 2018-05-11 RX ORDER — ZOLPIDEM TARTRATE 5 MG/1
TABLET ORAL
Qty: 90 TABLET | Refills: 0 | Status: SHIPPED | OUTPATIENT
Start: 2018-05-11 | End: 2018-08-11

## 2018-05-11 NOTE — TELEPHONE ENCOUNTER
Zolpidem   Last Written Prescription Date:  2/19/18   Last Fill Quantity: 90,   # refills: 0  Last Office Visit: 1/8/18 with PCP (last yical 8/22/17)   Future Office visit: none     Routing refill request to provider for review/approval because:  Drug not on the [facility], [facility] or [facility] refill protocol or controlled substance    Kizzy LERMA RN

## 2018-05-18 ENCOUNTER — TELEPHONE (OUTPATIENT)
Dept: FAMILY MEDICINE | Facility: CLINIC | Age: 78
End: 2018-05-18

## 2018-05-18 NOTE — TELEPHONE ENCOUNTER
Prior Authorization Retail Medication Request    Medication/Dose: zolpidem (AMBIEN) 5 MG tablet  ICD code (if different than what is on RX):    Previously Tried and Failed:  Trazodone, Rozerem  Rationale:  Patient has failed with other hypnotics. Stable on zolpidem. Patient also requires treatment almost every night so she needs greater than 90 tablets per 365 days. Aware of high risk in people over 64 years, however benefits outweigh risks    Insurance Name:  [employer] Rx ([facility] part D)  Insurance ID:  [id number]      Pharmacy Information (if different than what is on RX)  Name:    Phone:      Jorgito Dos Santos CMA

## 2018-05-18 NOTE — TELEPHONE ENCOUNTER
PA Initiation    Medication: zolpidem (AMBIEN) 5 MG tablet  Insurance Company: [employer] - Phone [phone] Fax [phone]  Pharmacy Filling the Rx: [id number] [place] [facility] [place] - [address]  Filling Pharmacy Phone: [phone]  Filling Pharmacy Fax:    Start Date: 5/18/2018    THIS HAS BEEN SUBMITTED BY THE PRIOR-AUTHORIZATION TEAM. ANY QUESTIONS PLEASE CALL [phone]. THANK YOU

## 2018-05-21 NOTE — TELEPHONE ENCOUNTER
Prior Authorization Approval    Authorization Effective Date: 2/17/2018  Authorization Expiration Date: 12/31/2018  Medication: zolpidem (AMBIEN) 5 MG tablet APPROVED   Approved Dose/Quantity:   Reference #:     Insurance Company: [employer] - [employer] [phone] Fax [phone]  Expected CoPay:       CoPay Card Available:      Foundation Assistance Needed:    Which Pharmacy is filling the prescription (Not needed for infusion/clinic administered): [facility] [id number] [place] [facility] [place] - [address]  Pharmacy Notified: Yes  Patient Notified: Yes

## 2018-07-30 DIAGNOSIS — I10 BENIGN ESSENTIAL HYPERTENSION: ICD-10-CM

## 2018-07-30 NOTE — TELEPHONE ENCOUNTER
"Requested Prescriptions   Pending Prescriptions Disp Refills     verapamil (CALAN-SR) 240 MG CR tablet [Pharmacy Med Name: VERAPAMIL  MG TABLET] 90 tablet 2     Sig: TAKE ONE TABLET BY MOUTH ONE TIME DAILY    Calcium Channel Blockers Protocol  Failed    7/30/2018  1:04 AM       Failed - Normal ALT in past 12 months    No lab results found.         Passed - Blood pressure under 140/90 in past 12 months    BP Readings from Last 3 Encounters:   04/23/18 128/79   01/08/18 135/81   11/27/17 129/75                Passed - Recent (12 mo) or future (30 days) visit within the authorizing provider's specialty    Patient had office visit in the last 12 months or has a visit in the next 30 days with authorizing provider or within the authorizing provider's specialty.  See \"Patient Info\" tab in inbasket, or \"Choose Columns\" in Meds & Orders section of the refill encounter.           Passed - Patient is age 18 or older       Passed - No active pregnancy on record       Passed - Normal serum creatinine on file in past 12 months    Recent Labs   Lab Test  11/24/17   0925   CR  1.00            Passed - No positive pregnancy test in past 12 months        Last Written Prescription Date:  11/13/17  Last Fill Quantity: 90,  # refills: 2   Last office visit: 4/23/2018 with prescribing provider:     Future Office Visit:    Maryellen Wilks MA      "

## 2018-07-31 RX ORDER — VERAPAMIL HYDROCHLORIDE 240 MG/1
TABLET, FILM COATED, EXTENDED RELEASE ORAL
Qty: 90 TABLET | Refills: 0 | Status: SHIPPED | OUTPATIENT
Start: 2018-07-31 | End: 2018-09-04

## 2018-07-31 NOTE — TELEPHONE ENCOUNTER
Routing refill request to provider for review/approval because:  Labs not current:  No ALT results found in Epic  Noted last physical was 8/22/17 - 90 day Rx pended with note to schedule OV  Kizzy LERMA RN

## 2018-08-11 DIAGNOSIS — G47.00 PERSISTENT INSOMNIA: ICD-10-CM

## 2018-08-13 NOTE — TELEPHONE ENCOUNTER
Zolpidem 5 mg    Last Written Prescription Date:  05/11/18  Last Fill Quantity: 90 tablets,  # refills: 0   Last office visit: 4/23/2018 with prescribing provider:  Anjelica Bethea   Future Office Visit:      Routing refill request to provider for review/approval because:  Drug not on the [facility], [facility] or [facility] refill protocol or controlled substance

## 2018-08-14 RX ORDER — ZOLPIDEM TARTRATE 5 MG/1
TABLET ORAL
Qty: 90 TABLET | Refills: 0 | Status: SHIPPED | OUTPATIENT
Start: 2018-08-14 | End: 2018-10-30

## 2018-08-14 NOTE — TELEPHONE ENCOUNTER
TCs: Please call pt to help schedule appt.     Routing refill request to provider for review/approval because:  Drug not on the [facility], P or  Health refill protocol or controlled substance    Please review and authorize if appropriate,   Thank you,  Gutierrez BERMAN RN

## 2018-09-04 ENCOUNTER — OFFICE VISIT (OUTPATIENT)
Dept: FAMILY MEDICINE | Facility: CLINIC | Age: 78
End: 2018-09-04
Payer: COMMERCIAL

## 2018-09-04 VITALS
SYSTOLIC BLOOD PRESSURE: 141 MMHG | OXYGEN SATURATION: 97 % | TEMPERATURE: 98.7 F | DIASTOLIC BLOOD PRESSURE: 84 MMHG | HEART RATE: 53 BPM

## 2018-09-04 DIAGNOSIS — I10 BENIGN ESSENTIAL HYPERTENSION: ICD-10-CM

## 2018-09-04 DIAGNOSIS — G47.00 INSOMNIA, UNSPECIFIED TYPE: ICD-10-CM

## 2018-09-04 DIAGNOSIS — G47.00 PERSISTENT INSOMNIA: Primary | ICD-10-CM

## 2018-09-04 DIAGNOSIS — I47.20 VENTRICULAR TACHYCARDIA (H): ICD-10-CM

## 2018-09-04 PROCEDURE — 99213 OFFICE O/P EST LOW 20 MIN: CPT | Performed by: INTERNAL MEDICINE

## 2018-09-04 RX ORDER — VERAPAMIL HYDROCHLORIDE 240 MG/1
240 TABLET, FILM COATED, EXTENDED RELEASE ORAL DAILY
Qty: 90 TABLET | Refills: 3 | Status: SHIPPED | OUTPATIENT
Start: 2018-09-04 | End: 2019-11-14

## 2018-09-04 RX ORDER — ZOLPIDEM TARTRATE 5 MG/1
TABLET ORAL
Qty: 90 TABLET | Refills: 0 | Status: CANCELLED | OUTPATIENT
Start: 2018-09-04

## 2018-09-04 NOTE — MR AVS SNAPSHOT
After Visit Summary   9/4/2018    Elizabeth Joy    MRN: [id number]           Patient Information     Date Of Birth          1940        Visit Information        Provider Department      9/4/2018 1:30 PM Marky Josue MD [facility]        Today's Diagnoses     Persistent insomnia    -  1    Benign essential hypertension        Ventricular tachycardia (H)        Insomnia, unspecified type           Follow-ups after your visit        Who to contact     If you have questions or need follow up information about today's clinic visit or your schedule please contact [facility] directly at [phone].  Normal or non-critical lab and imaging results will be communicated to you by MyChart, letter or phone within 4 business days after the clinic has received the results. If you do not hear from us within 7 days, please contact the clinic through MyChart or phone. If you have a critical or abnormal lab result, we will notify you by phone as soon as possible.  Submit refill requests through [employer] or call your pharmacy and they will forward the refill request to us. Please allow 3 business days for your refill to be completed.          Additional Information About Your Visit        Care EveryWhere ID     This is your Care EveryWhere ID. This could be used by other organizations to access your [facility] medical records  [id number]        Your Vitals Were     Pulse Temperature Pulse Oximetry             53 98.7  F (37.1  C) (Tympanic) 97%          Blood Pressure from Last 3 Encounters:   09/04/18 141/84   04/23/18 128/79   01/08/18 135/81    Weight from Last 3 Encounters:   04/23/18 165 lb (74.8 kg)   01/08/18 165 lb (74.8 kg)   11/24/17 165 lb (74.8 kg)              Today, you had the following     No orders found for display         Today's Medication Changes          These changes are accurate as of 9/4/18  1:42 PM.  If you have any questions, ask your nurse or doctor.                These medicines have changed or have updated prescriptions.        Dose/Directions    verapamil 240 MG CR tablet   Commonly known as:  CALAN-SR   This may have changed:  See the new instructions.   Used for:  Benign essential hypertension   Changed by:  Marky Josue MD        Dose:  240 mg   Take 1 tablet (240 mg) by mouth daily   Quantity:  90 tablet   Refills:  3         Stop taking these medicines if you haven't already. Please contact your care team if you have questions.     estradiol 0.1 MG/GM cream   Commonly known as:  ESTRACE VAGINAL   Stopped by:  Marky Josue MD                Where to get your medicines      These medications were sent to [facility] - [place], [place] - [address]  [address], [place] [address]     Phone:  [phone]     verapamil 240 MG CR tablet                Primary Care Provider Office Phone # Fax #    Marky Josue [phone] [address]  [place] [address]        Equal Access to Services     [facility]: Hadii aad ku hadasho Soomaali, waaxda luqadaha, qaybta kaalmada adeegyada, waxay idiin hayaan adeeg toma gayle . So [facility] [phone].    ATENCIÓN: Si habla español, tiene a reyes disposición servicios gratuitos de asistencia lingüística. [facility] [phone].    We comply with applicable federal civil rights laws and [place] laws. We do not discriminate on the basis of race, color, national origin, age, disability, sex, sexual orientation, or gender identity.            Thank you!     Thank you for choosing [facility]  for your care. Our goal is always to provide you with excellent care. Hearing back from our patients is one way we can continue to improve our services. Please take a few minutes to complete the written survey that you may receive in the mail after your visit with us. Thank you!             Your Updated Medication List - Protect others around you: Learn how to safely  use, store and throw away your medicines at www.disposemymeds.org.          This list is accurate as of 9/4/18  1:42 PM.  Always use your most recent med list.                   Brand Name Dispense Instructions for use Diagnosis    verapamil 240 MG CR tablet    CALAN-SR    90 tablet    Take 1 tablet (240 mg) by mouth daily    Benign essential hypertension       zolpidem 5 MG tablet    AMBIEN    90 tablet    TAKE 1 TABLET BY MOUTH NIGHTLY AT BEDTIME AS NEEDED FOR SLEEP    Persistent insomnia

## 2018-09-04 NOTE — PROGRESS NOTES
The patient is here for routine follow-up and really does not want any exam or testing done.  She feels quite well.  Is active.  She takes her medications regularly.  She is not using the Estrace.  She is not monitoring her blood pressure.  With respect to the Ambien she has been on this for many years.  She takes one at bedtime and occasionally will take a second.  She has no complaints and review of systems.    Past Medical History:   Diagnosis Date     Elevated blood sugar      HTN (hypertension) 35     Hx of colonoscopy 2008    bx collagenous colitis     Hypercholesteremia      Insomnia     on ambien 10mg 1/2 daily for long time     Lichen sclerosus et atrophicus of the vulva 02/2017    dx by Dr. Weldon, had bx     Lymphocytic colitis 2008    on colon [facility]     Syncope 08/2016    echo trace lvh, nl ef; ziopatch with one 5 beat run of vtach, seen by ep Dr. Allen and nothing found     Ventricular tachycardia (H) 8/16    seen on ziopatch done for syncope, just one 5 beat run     Past Surgical History:   Procedure Laterality Date     HYSTERECTOMY, PAP NO LONGER INDICATED  1990    had bso also, not for cancer     KERATOTOMY ARCUATE WITH FEMTOSECOND LASER/IMAGING FOR ATIOL Right 7/13/2015    Procedure: KERATOTOMY ARCUATE WITH FEMTOSECOND LASER/IMAGING FOR ATIOL;  Surgeon: Lionel Reza MD;  [facility]     KERATOTOMY ARCUATE WITH FEMTOSECOND LASER/IMAGING FOR ATIOL Right 7/13/2015    Procedure: KERATOTOMY ARCUATE WITH FEMTOSECOND LASER/IMAGING FOR ATIOL;  Surgeon: Lionel Reza MD;  [facility]     PHACOEMULSIFICATION CLEAR CORNEA WITH STANDARD INTRAOCULAR LENS IMPLANT Right 7/13/2015    Procedure: PHACOEMULSIFICATION CLEAR CORNEA WITH STANDARD INTRAOCULAR LENS IMPLANT;  Surgeon: Lionel Reza MD;  Location:  [facility]     pilonidal cyst removed  30's     Social History     Social History     Marital status:      Spouse name: N/A     Number of children: 3     Years of education: N/A      Occupational History     Not on file.     Social History Main Topics     Smoking status: Former Smoker     Types: Cigarettes     Quit date: 9/29/1997     Smokeless tobacco: Never Used     Alcohol use 0.0 oz/week     0 Standard drinks or equivalent per week      Comment: 1 drink a week     Drug use: No     Sexual activity: No     Other Topics Concern     Not on file     Social History Narrative     Current Outpatient Prescriptions   Medication Sig Dispense Refill     verapamil (CALAN-SR) 240 MG CR tablet Take 1 tablet (240 mg) by mouth daily 90 tablet 3     zolpidem (AMBIEN) 5 MG tablet TAKE 1 TABLET BY MOUTH NIGHTLY AT BEDTIME AS NEEDED FOR SLEEP 90 tablet 0     [DISCONTINUED] verapamil (CALAN-SR) 240 MG CR tablet TAKE ONE TABLET BY MOUTH ONE TIME DAILY 90 tablet 0     Allergies   Allergen Reactions     Ace Inhibitors Cough     FAMILY HISTORY NOTED AND REVIEWED    REVIEW OF SYSTEMS: above    PHYSICAL EXAM    /84 (BP Location: Right arm, Cuff Size: Adult Large)  Pulse 53  Temp 98.7  F (37.1  C) (Tympanic)  SpO2 97%    Patient appears non toxic  Lungs clear  cv reglar rate and rhythm    ASSESSMENT:  1. Hypertension, control fair, whe will monitor it  2. Insomnia, chronic.  I discussed with the patient the risk of Ambien.  She states she has tried all the over-the-counter medications have not been helpful and she needs to take this.  She has been on it for years.  I explained to her the risks including memory issues, high risk of falling as well as confusion and she clearly understands this but wants to continue it.  3. V tach, no recurrence    PLAN:  Refilled meds  Call if problems    Marky Josue M.D.

## 2018-10-30 DIAGNOSIS — G47.00 PERSISTENT INSOMNIA: ICD-10-CM

## 2018-10-30 NOTE — TELEPHONE ENCOUNTER
zolpidem (AMBIEN) 5 MG tablet  Last Written Prescription Date:  8/14/2018  Last Fill Quantity: 90,  # refills: 0   Last office visit: 9/4/2018 with prescribing provider:  Joselo   Future Office Visit:      Routing refill request to provider for review/approval because:  Drug not on the [facility], [facility] or [facility] refill protocol or controlled substance

## 2018-11-01 RX ORDER — ZOLPIDEM TARTRATE 5 MG/1
TABLET ORAL
Qty: 90 TABLET | Refills: 0 | Status: SHIPPED | OUTPATIENT
Start: 2018-11-01 | End: 2019-01-31

## 2018-12-31 ENCOUNTER — TRANSFERRED RECORDS (OUTPATIENT)
Dept: HEALTH INFORMATION MANAGEMENT | Facility: CLINIC | Age: 78
End: 2018-12-31

## 2019-01-31 ENCOUNTER — OFFICE VISIT (OUTPATIENT)
Dept: FAMILY MEDICINE | Facility: CLINIC | Age: 79
End: 2019-01-31
Payer: COMMERCIAL

## 2019-01-31 VITALS
BODY MASS INDEX: 29.23 KG/M2 | DIASTOLIC BLOOD PRESSURE: 80 MMHG | OXYGEN SATURATION: 97 % | TEMPERATURE: 98 F | HEIGHT: 63 IN | WEIGHT: 165 LBS | HEART RATE: 85 BPM | SYSTOLIC BLOOD PRESSURE: 140 MMHG

## 2019-01-31 DIAGNOSIS — I47.20 VENTRICULAR TACHYCARDIA (H): ICD-10-CM

## 2019-01-31 DIAGNOSIS — I10 BENIGN ESSENTIAL HYPERTENSION: ICD-10-CM

## 2019-01-31 DIAGNOSIS — R55 SYNCOPE, UNSPECIFIED SYNCOPE TYPE: Primary | ICD-10-CM

## 2019-01-31 DIAGNOSIS — M25.50 MULTIPLE JOINT PAIN: ICD-10-CM

## 2019-01-31 DIAGNOSIS — G47.00 PERSISTENT INSOMNIA: ICD-10-CM

## 2019-01-31 LAB
CRP SERPL-MCNC: 100 MG/L (ref 0–8)
ERYTHROCYTE [DISTWIDTH] IN BLOOD BY AUTOMATED COUNT: 12.8 % (ref 10–15)
ERYTHROCYTE [SEDIMENTATION RATE] IN BLOOD BY WESTERGREN METHOD: 72 MM/H (ref 0–30)
HCT VFR BLD AUTO: 33.2 % (ref 35–47)
HGB BLD-MCNC: 10.4 G/DL (ref 11.7–15.7)
MCH RBC QN AUTO: 29.3 PG (ref 26.5–33)
MCHC RBC AUTO-ENTMCNC: 31.3 G/DL (ref 31.5–36.5)
MCV RBC AUTO: 94 FL (ref 78–100)
PLATELET # BLD AUTO: 471 10E9/L (ref 150–450)
RBC # BLD AUTO: 3.55 10E12/L (ref 3.8–5.2)
WBC # BLD AUTO: 11.6 10E9/L (ref 4–11)

## 2019-01-31 PROCEDURE — 85652 RBC SED RATE AUTOMATED: CPT | Performed by: INTERNAL MEDICINE

## 2019-01-31 PROCEDURE — 86431 RHEUMATOID FACTOR QUANT: CPT | Performed by: INTERNAL MEDICINE

## 2019-01-31 PROCEDURE — 85027 COMPLETE CBC AUTOMATED: CPT | Performed by: INTERNAL MEDICINE

## 2019-01-31 PROCEDURE — 80048 BASIC METABOLIC PNL TOTAL CA: CPT | Performed by: INTERNAL MEDICINE

## 2019-01-31 PROCEDURE — 93000 ELECTROCARDIOGRAM COMPLETE: CPT | Performed by: INTERNAL MEDICINE

## 2019-01-31 PROCEDURE — 99214 OFFICE O/P EST MOD 30 MIN: CPT | Performed by: INTERNAL MEDICINE

## 2019-01-31 PROCEDURE — 36415 COLL VENOUS BLD VENIPUNCTURE: CPT | Performed by: INTERNAL MEDICINE

## 2019-01-31 PROCEDURE — 86140 C-REACTIVE PROTEIN: CPT | Performed by: INTERNAL MEDICINE

## 2019-01-31 RX ORDER — MELOXICAM 7.5 MG/1
7.5 TABLET ORAL DAILY
Qty: 90 TABLET | Refills: 3 | Status: SHIPPED | OUTPATIENT
Start: 2019-01-31 | End: 2019-01-31

## 2019-01-31 RX ORDER — ZOLPIDEM TARTRATE 5 MG/1
TABLET ORAL
Qty: 90 TABLET | Refills: 0 | Status: SHIPPED | OUTPATIENT
Start: 2019-01-31 | End: 2019-02-01

## 2019-01-31 RX ORDER — MELOXICAM 7.5 MG/1
7.5 TABLET ORAL DAILY
Qty: 60 TABLET | Refills: 3 | Status: SHIPPED | OUTPATIENT
Start: 2019-01-31 | End: 2020-04-06

## 2019-01-31 ASSESSMENT — MIFFLIN-ST. JEOR: SCORE: 1197.57

## 2019-01-31 NOTE — TELEPHONE ENCOUNTER
zolpidem (AMBIEN) 5 MG tablet 90 tablet 0 11/1/2018           Last Written Prescription Date:  11/01/2018  Last Fill Quantity: 90,   # refills: 0  Last Office Visit: 01/02/14/201931/2019  Future Office visit:    Next 5 appointments (look out 90 days)    Feb 14, 2019  2:00 PM CST  [facility]  [facility] [address] [facility] [address]  [phone]           Routing refill request to provider for review/approval because:  Drug not on the [facility], [facility] or  Health refill protocol or controlled substance

## 2019-01-31 NOTE — PROGRESS NOTES
The patient is seen for syncope as well as joint pains.    Approximately a week ago she had lunch at [employer].  She then walked to the counter and was going to pay.  She lifted her shoulder which caused severe pain and then she fainted.  She is not sure how long she was down but it was a short time.  And when she woke up she was fine.  She had one prior episode of fainting in 2016 as noted and reviewed.  She had not been sick before or after and had just eaten lunch.  Since then she is been fine.  She has not had any recent chest pain or shortness of breath or palpitations or dizziness.  No GI symptoms.  No fevers.  No medication changes.  No orthostatic symptoms.    With respect to the joint pains that has been going on since early December.  It is in the shoulders bilaterally and somewhat in the wrist but not in the fingers or other joints.  No hip pain.  No headaches, fevers or night sweats.  No jaw claudication or unexplained weight changes.  She does have some slight constipation but no bloody or black stools.    Past Medical History:   Diagnosis Date     Elevated blood sugar      HTN (hypertension) 35     Hx of colonoscopy 2008    bx collagenous colitis     Hypercholesteremia      Insomnia     on ambien 10mg 1/2 daily for long time     Lichen sclerosus et atrophicus of the vulva 02/2017    dx by Dr. Weldon, had bx     Lymphocytic colitis 2008    on colon exam [facility]     Syncope 08/2016    echo trace lvh, nl ef; ziopatch with one 5 beat run of vtach, seen by ep Dr. Allen and nothing found     Ventricular tachycardia (H) 8/16    seen on ziopatch done for syncope, just one 5 beat run     Past Surgical History:   Procedure Laterality Date     HYSTERECTOMY, PAP NO LONGER INDICATED  1990    had bso also, not for cancer     KERATOTOMY ARCUATE WITH FEMTOSECOND LASER/IMAGING FOR ATIOL Right 7/13/2015    Procedure: KERATOTOMY ARCUATE WITH FEMTOSECOND LASER/IMAGING FOR ATIOL;  Surgeon: [place], Lionel LUNA MD;   [facility]     KERATOTOMY ARCUATE WITH FEMTOSECOND LASER/IMAGING FOR ATIOL Right 2015    Procedure: KERATOTOMY ARCUATE WITH FEMTOSECOND LASER/IMAGING FOR ATIOL;  Surgeon: Lionel Reza MD;  [facility]     PHACOEMULSIFICATION CLEAR CORNEA WITH STANDARD INTRAOCULAR LENS IMPLANT Right 2015    Procedure: PHACOEMULSIFICATION CLEAR CORNEA WITH STANDARD INTRAOCULAR LENS IMPLANT;  Surgeon: Lionel Reza MD;  [facility]     pilonidal cyst removed  30's     Social History     Socioeconomic History     Marital status:      Spouse name: Not on file     Number of children: 3     Years of education: Not on file     Highest education level: Not on file   Social Needs     Financial resource strain: Not on file     Food insecurity - worry: Not on file     Food insecurity - inability: Not on file     Transportation needs - medical: Not on file     Transportation needs - non-medical: Not on file   Occupational History     Not on file   Tobacco Use     Smoking status: Former Smoker     Types: Cigarettes     Last attempt to quit: 1997     Years since quittin.3     Smokeless tobacco: Never Used   Substance and Sexual Activity     Alcohol use: Yes     Alcohol/week: 0.0 oz     Comment: 1 drink a week     Drug use: No     Sexual activity: No   Other Topics Concern     Parent/sibling w/ CABG, MI or angioplasty before 65F 55M? Not Asked   Social History Narrative     Not on file     Current Outpatient Medications   Medication Sig Dispense Refill     meloxicam (MOBIC) 7.5 MG tablet Take 1 tablet (7.5 mg) by mouth daily 60 tablet 3     verapamil (CALAN-SR) 240 MG CR tablet Take 1 tablet (240 mg) by mouth daily 90 tablet 3     zolpidem (AMBIEN) 5 MG tablet TAKE 1 TABLET BY MOUTH NIGHTLY AT BEDTIME AS NEEDED FOR SLEEP 90 tablet 0     Allergies   Allergen Reactions     Ace Inhibitors Cough     FAMILY HISTORY NOTED AND REVIEWED    REVIEW OF SYSTEMS: above    PHYSICAL EXAM    /80   Pulse 85    "Temp 98  F (36.7  C) (Oral)   Ht 1.6 m (5' 3\")   Wt 74.8 kg (165 lb)   SpO2 97%   Breastfeeding? No   BMI 29.23 kg/m    Blood pressure same sitting and standing  Patient appears non toxic  Mouth - tongue midline and within normal limits, mucous membranes and posterior pharynx within normal limits, no lesions seen.  Neck - no masses, lesions or tenderness  Nodes - no supraclavicular, cervical or axially adenopathy .  Lungs - clear, normal flow  Cardiovascular - regular rate and rhythm, no murmer, rub or gallop, no jvp or edema, carotids within normal limits, no bruits.  Abdomen - normal active bowel sounds, soft, non tender, no masses, guarding or rebound, no hepatosplenomegaly  No temp artery tenderness  No shoulder redness, tenderness or warmth    Labs sent; ekg - nsr, within normal limits.    ASSESSMENT:  1. Syncope, suspect v.v due to pain, doubt due to rhythm, ischemia, gi bleed, sepsis, etc  2. Prior vtach, doubt this was cause  3. jt pains, suspect oa, doubt pmr  4. Hypertension, control fair    PLAN:  Labs  Cards eval  Trial of mobic  She plans shoulder injections Monday  Call if any dizziness or syncope  See me 1 to 2 weeks      Marky Josue M.D.                      "

## 2019-01-31 NOTE — PATIENT INSTRUCTIONS
Call me if you have anymore fainting    I will have cardiology call you to schedule an appointment    Take the mobic for the joint pains    See me in 1 to 2 weeks    Marky Josue M.D.

## 2019-02-01 ENCOUNTER — TELEPHONE (OUTPATIENT)
Dept: FAMILY MEDICINE | Facility: CLINIC | Age: 79
End: 2019-02-01

## 2019-02-01 DIAGNOSIS — M35.3 PMR (POLYMYALGIA RHEUMATICA) (H): Primary | ICD-10-CM

## 2019-02-01 LAB
ANION GAP SERPL CALCULATED.3IONS-SCNC: 9 MMOL/L (ref 3–14)
BUN SERPL-MCNC: 24 MG/DL (ref 7–30)
CALCIUM SERPL-MCNC: 9.3 MG/DL (ref 8.5–10.1)
CHLORIDE SERPL-SCNC: 104 MMOL/L (ref 94–109)
CO2 SERPL-SCNC: 25 MMOL/L (ref 20–32)
CREAT SERPL-MCNC: 0.84 MG/DL (ref 0.52–1.04)
GFR SERPL CREATININE-BSD FRML MDRD: 66 ML/MIN/{1.73_M2}
GLUCOSE SERPL-MCNC: 108 MG/DL (ref 70–99)
POTASSIUM SERPL-SCNC: 3.8 MMOL/L (ref 3.4–5.3)
RHEUMATOID FACT SER NEPH-ACNC: <20 IU/ML (ref 0–20)
SODIUM SERPL-SCNC: 138 MMOL/L (ref 133–144)

## 2019-02-01 RX ORDER — ZOLPIDEM TARTRATE 5 MG/1
TABLET ORAL
Qty: 45 TABLET | Refills: 0 | Status: SHIPPED | OUTPATIENT
Start: 2019-02-01 | End: 2019-02-14

## 2019-02-01 RX ORDER — PREDNISONE 20 MG/1
TABLET ORAL
Qty: 30 TABLET | Refills: 0 | Status: SHIPPED | OUTPATIENT
Start: 2019-02-01 | End: 2019-02-14

## 2019-02-01 RX ORDER — ZOLPIDEM TARTRATE 5 MG/1
TABLET ORAL
Qty: 45 TABLET | Refills: 0 | Status: CANCELLED | OUTPATIENT
Start: 2019-02-01

## 2019-02-01 NOTE — TELEPHONE ENCOUNTER
Prior Authorization Retail Medication Request    Medication/Dose: Prednisone 20mg  ICD code (if different than what is on RX):  M35.3  Previously Tried and Failed:  None  Rationale:  Patient with possible polymyalgia rheumatica and needing treatment with steroids with close follow up    Insurance Name:  [facility] Part D  Insurance ID:  [id number]      Pharmacy Information (if different than what is on RX)  Name:  [facility]  Phone:  [phone]

## 2019-02-01 NOTE — TELEPHONE ENCOUNTER
I called patient to discuss the results of her labs.  The rheumatoid factor is not back yet.  I suspect she may have polymyalgia rheumatica but certainly it is hard to know for sure.  I discussed this with her and explained the diagnosis and treatment and also the risk of steroids.  She understands this.  We will start her at 20 mg daily and she has an appointment to see me on February 14.  If she has problems before then she will let me know.    Marky Josue M.D.

## 2019-02-01 NOTE — TELEPHONE ENCOUNTER
Spoke with patient's   Walked in to clinic  Unfortunately this message was done'd before it got to triage   State patient does need this and does want to try the 2.5mg tablets     See pended Rx    Kizzy LERMA RN

## 2019-02-07 NOTE — TELEPHONE ENCOUNTER
Central Prior Authorization Team   Phone: [phone]      PA Initiation    Medication: Prednisone 20mg  Insurance Company: [employer] - Phone [phone] Fax [phone]  Pharmacy Filling the Rx: [facility] [address], [address]  Filling Pharmacy Phone: [phone]  Filling Pharmacy Fax:    Start Date: 2/7/2019

## 2019-02-07 NOTE — TELEPHONE ENCOUNTER
Prior Authorization Approval    Authorization Effective Date: 1/8/2019  Authorization Expiration Date: 2/6/2022  Medication: Prednisone 20mg  Approved Dose/Quantity:    Reference #:     Insurance Company: [employer] - Phone [phone] Fax [phone]  Expected CoPay:       CoPay Card Available:      Foundation Assistance Needed:    Which Pharmacy is filling the prescription (Not needed for infusion/clinic administered): [facility], [place] - [address], [address]  Pharmacy Notified: Yes  Patient Notified: Yes

## 2019-02-14 ENCOUNTER — OFFICE VISIT (OUTPATIENT)
Dept: FAMILY MEDICINE | Facility: CLINIC | Age: 79
End: 2019-02-14
Payer: COMMERCIAL

## 2019-02-14 VITALS
HEART RATE: 68 BPM | SYSTOLIC BLOOD PRESSURE: 121 MMHG | BODY MASS INDEX: 29.23 KG/M2 | TEMPERATURE: 98 F | DIASTOLIC BLOOD PRESSURE: 71 MMHG | OXYGEN SATURATION: 97 % | HEIGHT: 63 IN | WEIGHT: 165 LBS

## 2019-02-14 DIAGNOSIS — M35.3 PMR (POLYMYALGIA RHEUMATICA) (H): ICD-10-CM

## 2019-02-14 LAB
BASOPHILS # BLD AUTO: 0.1 10E9/L (ref 0–0.2)
CRP SERPL-MCNC: 5 MG/L (ref 0–8)
DIFFERENTIAL METHOD BLD: ABNORMAL
EOSINOPHIL # BLD AUTO: 0 10E9/L (ref 0–0.7)
EOSINOPHIL NFR BLD AUTO: 0 %
ERYTHROCYTE [DISTWIDTH] IN BLOOD BY AUTOMATED COUNT: 13.7 % (ref 10–15)
ERYTHROCYTE [SEDIMENTATION RATE] IN BLOOD BY WESTERGREN METHOD: 18 MM/H (ref 0–30)
HCT VFR BLD AUTO: 38.5 % (ref 35–47)
HGB BLD-MCNC: 12.2 G/DL (ref 11.7–15.7)
LYMPHOCYTES # BLD AUTO: 1 10E9/L (ref 0.8–5.3)
MCH RBC QN AUTO: 29.5 PG (ref 26.5–33)
MCHC RBC AUTO-ENTMCNC: 31.7 G/DL (ref 31.5–36.5)
MCV RBC AUTO: 93 FL (ref 78–100)
MONOCYTES # BLD AUTO: 0.3 10E9/L (ref 0–1.3)
NEUTROPHILS # BLD AUTO: 13.5 10E9/L (ref 1.6–8.3)
PLATELET # BLD AUTO: 359 10E9/L (ref 150–450)
PLATELET # BLD EST: ABNORMAL 10*3/UL
RBC # BLD AUTO: 4.13 10E12/L (ref 3.8–5.2)
RBC MORPH BLD: NORMAL
WBC # BLD AUTO: 14.9 10E9/L (ref 4–11)

## 2019-02-14 PROCEDURE — 85025 COMPLETE CBC W/AUTO DIFF WBC: CPT | Performed by: INTERNAL MEDICINE

## 2019-02-14 PROCEDURE — 99213 OFFICE O/P EST LOW 20 MIN: CPT | Performed by: INTERNAL MEDICINE

## 2019-02-14 PROCEDURE — 86140 C-REACTIVE PROTEIN: CPT | Performed by: INTERNAL MEDICINE

## 2019-02-14 PROCEDURE — 85652 RBC SED RATE AUTOMATED: CPT | Performed by: INTERNAL MEDICINE

## 2019-02-14 PROCEDURE — 36415 COLL VENOUS BLD VENIPUNCTURE: CPT | Performed by: INTERNAL MEDICINE

## 2019-02-14 RX ORDER — PREDNISONE 20 MG/1
20 TABLET ORAL DAILY
Qty: 7 TABLET | Refills: 0 | Status: SHIPPED | OUTPATIENT
Start: 2019-02-14 | End: 2019-02-14

## 2019-02-14 RX ORDER — PREDNISONE 20 MG/1
TABLET ORAL
Qty: 30 TABLET | Refills: 0 | Status: SHIPPED | OUTPATIENT
Start: 2019-02-14 | End: 2019-03-08

## 2019-02-14 ASSESSMENT — MIFFLIN-ST. JEOR: SCORE: 1192.57

## 2019-02-14 NOTE — LETTER
[address] [facility]  [address]  [place]  [address]  Tel: [phone]    February 15, 2019    Elizabeth Joy  [address]   [place] [address]        Dear Ms. Joy,    It was a pleasure seeing you.  I wanted to get back to you with your test results.  I have enclosed a copy for your records.     Your labs look very good.  Please follow up with me in 3 weeks.  If you have any questions please call me. If you have any further questions or problems, please contact our office.      Sincerely,    Marky Josue MD / matt     Results for orders placed or performed in visit on 02/14/19   CBC with platelets differential   Result Value Ref Range    WBC 14.9 (H) 4.0 - 11.0 10e9/L    RBC Count 4.13 3.8 - 5.2 10e12/L    Hemoglobin 12.2 11.7 - 15.7 g/dL    Hematocrit 38.5 35.0 - 47.0 %    MCV 93 78 - 100 fl    MCH 29.5 26.5 - 33.0 pg    MCHC 31.7 31.5 - 36.5 g/dL    RDW 13.7 10.0 - 15.0 %    Platelet Count 359 150 - 450 10e9/L    Diff Method Manual Differential     % Eosinophils 0 %    Absolute Neutrophil 13.5 (H) 1.6 - 8.3 10e9/L    Absolute Lymphocytes 1.0 0.8 - 5.3 10e9/L    Absolute Monocytes 0.3 0.0 - 1.3 10e9/L    Absolute Eosinophils 0.0 0.0 - 0.7 10e9/L    Absolute Basophils 0.1 0.0 - 0.2 10e9/L    RBC Morphology Normal     Platelet Estimate       Automated count confirmed.  Platelet morphology is normal.   ESR: Erythrocyte sedimentation rate   Result Value Ref Range    Sed Rate 18 0 - 30 mm/h   CRP, inflammation   Result Value Ref Range    CRP Inflammation 5.0 0.0 - 8.0 mg/L               Enclosure: Lab Results

## 2019-02-14 NOTE — PATIENT INSTRUCTIONS
Stay on the prednisone 20mg for now.    Call if you get ill, have headaches or fevers    See me in 4 weeks    Marky Josue M.D.

## 2019-02-14 NOTE — PROGRESS NOTES
The patient is here for follow-up to her last office visit on January 31.  At that time she was complaining of aches and pains in her shoulders and somewhat in the wrists.  No headaches or fevers or night sweats.  She had labs done with significant elevation of her sed rate and CRP as well as a low hemoglobin.  Due to that I added prednisone 20 mg which she is been taking since.  She had immediate pain relief.  She is doing extremely well now with minimal shoulder pains although still some and really no significant other pains.  She is not having headaches or fevers or night sweats.  No unexplained weight loss.  No new issues otherwise.  She is not taking the Mobic anymore because she does not have the pain and she has not taken Ambien in a few weeks.    Past Medical History:   Diagnosis Date     Elevated blood sugar      HTN (hypertension) 35     Hx of colonoscopy 2008    bx collagenous colitis     Hypercholesteremia      Insomnia     on ambien 10mg 1/2 daily for long time     Lichen sclerosus et atrophicus of the vulva 02/2017    dx by Dr. Weldon, had bx     Lymphocytic colitis 2008    [place] [facility]     PMR (polymyalgia rheumatica) (H) 01/31/2019     Syncope 08/2016    echo trace lvh, nl ef; ziopatch with one 5 beat run of vtach, seen by ep Dr. Allen and nothing found     Ventricular tachycardia (H) 8/16    seen on ziopatch done for syncope, just one 5 beat run     Past Surgical History:   Procedure Laterality Date     HYSTERECTOMY, PAP NO LONGER INDICATED  1990    had bso also, not for cancer     KERATOTOMY ARCUATE WITH FEMTOSECOND LASER/IMAGING FOR ATIOL Right 7/13/2015    Procedure: KERATOTOMY ARCUATE WITH FEMTOSECOND LASER/IMAGING FOR ATIOL;  Surgeon: Lionel Reza MD;  Location: [facility]     KERATOTOMY ARCUATE WITH FEMTOSECOND LASER/IMAGING FOR ATIOL Right 7/13/2015    Procedure: KERATOTOMY ARCUATE WITH FEMTOSECOND LASER/IMAGING FOR ATIOL;  Surgeon: Lionel Reza MD;  Location: [facility]      "PHACOEMULSIFICATION CLEAR CORNEA WITH STANDARD INTRAOCULAR LENS IMPLANT Right 2015    Procedure: PHACOEMULSIFICATION CLEAR CORNEA WITH STANDARD INTRAOCULAR LENS IMPLANT;  Surgeon: [place], Lionel LUNA MD;  Location:  [facility]     pilonidal cyst removed  30's     Social History     Socioeconomic History     Marital status:      Spouse name: Not on file     Number of children: 3     Years of education: Not on file     Highest education level: Not on file   Social Needs     Financial resource strain: Not on file     Food insecurity - worry: Not on file     Food insecurity - inability: Not on file     Transportation needs - medical: Not on file     Transportation needs - non-medical: Not on file   Occupational History     Not on file   Tobacco Use     Smoking status: Former Smoker     Types: Cigarettes     Last attempt to quit: 1997     Years since quittin.3     Smokeless tobacco: Never Used   Substance and Sexual Activity     Alcohol use: Yes     Alcohol/week: 0.0 oz     Comment: 1 drink a week     Drug use: No     Sexual activity: No   Other Topics Concern     Parent/sibling w/ CABG, MI or angioplasty before 65F 55M? Not Asked   Social History Narrative     Not on file     Current Outpatient Medications   Medication Sig Dispense Refill     predniSONE (DELTASONE) 20 MG tablet Take one tablet daily. 30 tablet 0     verapamil (CALAN-SR) 240 MG CR tablet Take 1 tablet (240 mg) by mouth daily 90 tablet 3     meloxicam (MOBIC) 7.5 MG tablet Take 1 tablet (7.5 mg) by mouth daily (Patient not taking: Reported on 2019) 60 tablet 3     Allergies   Allergen Reactions     Ace Inhibitors Cough     FAMILY HISTORY NOTED AND REVIEWED    REVIEW OF SYSTEMS: above    PHYSICAL EXAM    /71 (BP Location: Left arm, Patient Position: Chair, Cuff Size: Adult Large)   Pulse 68   Temp 98  F (36.7  C) (Oral)   Ht 1.6 m (5' 3\")   Wt 74.8 kg (165 lb)   SpO2 97%   Breastfeeding? No   BMI 29.23 kg/m      Patient " appears non toxic  No temporal artery tenderness or enlargement.    ASSESSMENT:  Probable pmr, no signs temp arteritis, doubt tumor, cancer, tb    PLAN:  Labs now  Follow up 4 weeks  Told to call if ha, fever or new issues    Marky Josue M.D.

## 2019-02-15 NOTE — RESULT ENCOUNTER NOTE
It was a pleasure seeing you.  I wanted to get back to you with your test results.  I have enclosed a copy for your records.    Your labs look very good.  Please follow up with me in 3 weeks.  If you have any questions please call me.

## 2019-03-08 ENCOUNTER — TELEPHONE (OUTPATIENT)
Dept: FAMILY MEDICINE | Facility: CLINIC | Age: 79
End: 2019-03-08

## 2019-03-08 DIAGNOSIS — M35.3 PMR (POLYMYALGIA RHEUMATICA) (H): ICD-10-CM

## 2019-03-08 RX ORDER — PREDNISONE 20 MG/1
TABLET ORAL
Qty: 30 TABLET | Refills: 0 | Status: SHIPPED | OUTPATIENT
Start: 2019-03-08 | End: 2019-03-14
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
## 2019-03-14 ENCOUNTER — OFFICE VISIT (OUTPATIENT)
Dept: FAMILY MEDICINE | Facility: CLINIC | Age: 79
End: 2019-03-14
Payer: COMMERCIAL

## 2019-03-14 VITALS
HEIGHT: 63 IN | SYSTOLIC BLOOD PRESSURE: 146 MMHG | OXYGEN SATURATION: 96 % | DIASTOLIC BLOOD PRESSURE: 88 MMHG | BODY MASS INDEX: 29.23 KG/M2 | TEMPERATURE: 98 F | WEIGHT: 165 LBS | HEART RATE: 70 BPM

## 2019-03-14 DIAGNOSIS — M35.3 PMR (POLYMYALGIA RHEUMATICA) (H): Primary | ICD-10-CM

## 2019-03-14 DIAGNOSIS — Z79.52 CURRENT CHRONIC USE OF SYSTEMIC STEROIDS: ICD-10-CM

## 2019-03-14 LAB
CRP SERPL-MCNC: 5.1 MG/L (ref 0–8)
ERYTHROCYTE [DISTWIDTH] IN BLOOD BY AUTOMATED COUNT: 14.1 % (ref 10–15)
ERYTHROCYTE [SEDIMENTATION RATE] IN BLOOD BY WESTERGREN METHOD: 11 MM/H (ref 0–30)
HCT VFR BLD AUTO: 37.8 % (ref 35–47)
HGB BLD-MCNC: 12.2 G/DL (ref 11.7–15.7)
MCH RBC QN AUTO: 30.2 PG (ref 26.5–33)
MCHC RBC AUTO-ENTMCNC: 32.3 G/DL (ref 31.5–36.5)
MCV RBC AUTO: 94 FL (ref 78–100)
PLATELET # BLD AUTO: 278 10E9/L (ref 150–450)
RBC # BLD AUTO: 4.04 10E12/L (ref 3.8–5.2)
WBC # BLD AUTO: 15.1 10E9/L (ref 4–11)

## 2019-03-14 PROCEDURE — 86140 C-REACTIVE PROTEIN: CPT | Performed by: INTERNAL MEDICINE

## 2019-03-14 PROCEDURE — 85027 COMPLETE CBC AUTOMATED: CPT | Performed by: INTERNAL MEDICINE

## 2019-03-14 PROCEDURE — 36415 COLL VENOUS BLD VENIPUNCTURE: CPT | Performed by: INTERNAL MEDICINE

## 2019-03-14 PROCEDURE — 85652 RBC SED RATE AUTOMATED: CPT | Performed by: INTERNAL MEDICINE

## 2019-03-14 PROCEDURE — 99213 OFFICE O/P EST LOW 20 MIN: CPT | Performed by: INTERNAL MEDICINE

## 2019-03-14 RX ORDER — PREDNISONE 5 MG/1
TABLET ORAL
Qty: 100 TABLET | Refills: 3 | Status: SHIPPED | OUTPATIENT
Start: 2019-03-14 | End: 2020-03-13

## 2019-03-14 ASSESSMENT — MIFFLIN-ST. JEOR: SCORE: 1192.57

## 2019-03-14 NOTE — PROGRESS NOTES
The patient presents with her stepdaughter.  She is here to follow-up on polymyalgia rheumatica.  This was diagnosed at the end of January when she had aches and pains in her shoulders and hands.  Her CRP and sed rate were very high and she responded quite well the prednisone.  She is taking 20 mg a day.    She is doing quite well with the medication.  She has no pain at all.  No joint pains.  No headaches or jaw claudication.  No fevers or night sweats.    We also discussed her living situation.  It somewhat stressful now as her  has memory issues and is not doing as well.    Past Medical History:   Diagnosis Date     Elevated blood sugar      HTN (hypertension) 35     Hx of colonoscopy 2008    bx collagenous colitis     Hypercholesteremia      Insomnia     on ambien 10mg 1/2 daily for long time     Lichen sclerosus et atrophicus of the vulva 02/2017    dx by Dr. Weldon, had bx     Lymphocytic colitis 2008    [facility]     PMR (polymyalgia rheumatica) (H) 01/31/2019     Syncope 08/2016    echo trace lvh, nl ef; ziopatch with one 5 beat run of vtach, seen by ep Dr. Allen and nothing found     Ventricular tachycardia (H) 8/16    seen on ziopatch done for syncope, just one 5 beat run     Past Surgical History:   Procedure Laterality Date     HYSTERECTOMY, PAP NO LONGER INDICATED  1990    had bso also, not for cancer     KERATOTOMY ARCUATE WITH FEMTOSECOND LASER/IMAGING FOR ATIOL Right 7/13/2015    Procedure: KERATOTOMY ARCUATE WITH FEMTOSECOND LASER/IMAGING FOR ATIOL;  Surgeon: Lionel Reza MD;  [facility]     KERATOTOMY ARCUATE WITH FEMTOSECOND LASER/IMAGING FOR ATIOL Right 7/13/2015    Procedure: KERATOTOMY ARCUATE WITH FEMTOSECOND LASER/IMAGING FOR ATIOL;  Surgeon: Lionel Reza MD;  Location:  [facility]     PHACOEMULSIFICATION CLEAR CORNEA WITH STANDARD INTRAOCULAR LENS IMPLANT Right 7/13/2015    Procedure: PHACOEMULSIFICATION CLEAR CORNEA WITH STANDARD INTRAOCULAR LENS IMPLANT;   Surgeon: Juaquin, Lionel LUNA MD;  Location:  [facility]     pilonidal cyst removed  30's     Social History     Socioeconomic History     Marital status:      Spouse name: Not on file     Number of children: 3     Years of education: Not on file     Highest education level: Not on file   Occupational History     Not on file   Social Needs     Financial resource strain: Not on file     Food insecurity:     Worry: Not on file     Inability: Not on file     Transportation needs:     Medical: Not on file     Non-medical: Not on file   Tobacco Use     Smoking status: Former Smoker     Types: Cigarettes     Last attempt to quit: 1997     Years since quittin.4     Smokeless tobacco: Never Used   Substance and Sexual Activity     Alcohol use: Yes     Alcohol/week: 0.0 oz     Comment: 1 drink a week     Drug use: No     Sexual activity: No   Lifestyle     Physical activity:     Days per week: Not on file     Minutes per session: Not on file     Stress: Not on file   Relationships     Social connections:     Talks on phone: Not on file     Gets together: Not on file     Attends Latter day service: Not on file     Active member of club or organization: Not on file     Attends meetings of clubs or organizations: Not on file     Relationship status: Not on file     Intimate partner violence:     Fear of current or ex partner: Not on file     Emotionally abused: Not on file     Physically abused: Not on file     Forced sexual activity: Not on file   Other Topics Concern     Parent/sibling w/ CABG, MI or angioplasty before 65F 55M? Not Asked   Social History Narrative     Not on file     Current Outpatient Medications   Medication Sig Dispense Refill     predniSONE (DELTASONE) 5 MG tablet Take 3 pills once daily. 100 tablet 3     verapamil (CALAN-SR) 240 MG CR tablet Take 1 tablet (240 mg) by mouth daily 90 tablet 3     meloxicam (MOBIC) 7.5 MG tablet Take 1 tablet (7.5 mg) by mouth daily (Patient not taking: Reported on  "3/14/2019) 60 tablet 3     Allergies   Allergen Reactions     Ace Inhibitors Cough     FAMILY HISTORY NOTED AND REVIEWED    REVIEW OF SYSTEMS: above    PHYSICAL EXAM    /88 (BP Location: Right arm, Patient Position: Chair, Cuff Size: Adult Large)   Pulse 70   Temp 98  F (36.7  C) (Oral)   Ht 1.6 m (5' 3\")   Wt 74.8 kg (165 lb)   SpO2 96%   Breastfeeding? No   BMI 29.23 kg/m      Patient appears non toxic  cv reglar rate and rhythm  No temp artery abnormalities  \  Labs sent    ASSESSMENT:  Pmr, stable, change prednisone to 15mg a day, follow up labs 4 weeks, dexa ordered    PLAN:  Above, call if ha, fever or more aches    Marky Josue M.D.        "

## 2019-03-14 NOTE — LETTER
[place] [address] [facility]  [address]  [place]  [address]  Tel: [phone]    March 14, 2019    Elizabeth Joy  [address]   [place] [address]        Dear Ms. Joy,    It was a pleasure seeing you.  I wanted to get back to you with your test results.  I have enclosed a copy for your records.    Your labs look fine.  Please be sure to come back for the follow up labs in 3 weeks and continue the prednisone at 15mg daily.  If you have any further questions or problems, please contact our office.      Sincerely,    Marky Josue MD/SENAIT          Enclosure: Lab Results  Results for orders placed or performed in visit on 03/14/19   CBC with platelets   Result Value Ref Range    WBC 15.1 (H) 4.0 - 11.0 10e9/L    RBC Count 4.04 3.8 - 5.2 10e12/L    Hemoglobin 12.2 11.7 - 15.7 g/dL    Hematocrit 37.8 35.0 - 47.0 %    MCV 94 78 - 100 fl    MCH 30.2 26.5 - 33.0 pg    MCHC 32.3 31.5 - 36.5 g/dL    RDW 14.1 10.0 - 15.0 %    Platelet Count 278 150 - 450 10e9/L   ESR: Erythrocyte sedimentation rate   Result Value Ref Range    Sed Rate 11 0 - 30 mm/h   CRP, inflammation   Result Value Ref Range    CRP Inflammation 5.1 0.0 - 8.0 mg/L

## 2019-03-14 NOTE — PATIENT INSTRUCTIONS
Change the dose of the prednisone to 15mg once daily.  Come back in 4 weeks for a non fasting lab    Call if you have more aches or get headaches or fevers    Marky Josue M.D.

## 2019-03-14 NOTE — RESULT ENCOUNTER NOTE
It was a pleasure seeing you.  I wanted to get back to you with your test results.  I have enclosed a copy for your records.    Your labs look fine.  Please be sure to come back for the follow up labs in 3 weeks and continue the prednisone at 15mg daily.

## 2019-03-22 ENCOUNTER — OFFICE VISIT (OUTPATIENT)
Dept: CARDIOLOGY | Facility: CLINIC | Age: 79
End: 2019-03-22
Attending: INTERNAL MEDICINE
Payer: COMMERCIAL

## 2019-03-22 VITALS
SYSTOLIC BLOOD PRESSURE: 130 MMHG | HEART RATE: 61 BPM | BODY MASS INDEX: 29.23 KG/M2 | DIASTOLIC BLOOD PRESSURE: 80 MMHG | HEIGHT: 63 IN

## 2019-03-22 DIAGNOSIS — I10 BENIGN ESSENTIAL HYPERTENSION: ICD-10-CM

## 2019-03-22 DIAGNOSIS — E78.5 HYPERLIPIDEMIA LDL GOAL <160: ICD-10-CM

## 2019-03-22 DIAGNOSIS — I47.20 VENTRICULAR TACHYCARDIA (H): ICD-10-CM

## 2019-03-22 DIAGNOSIS — I45.9 STOKES-ADAMS SYNCOPE: Primary | ICD-10-CM

## 2019-03-22 PROCEDURE — 99204 OFFICE O/P NEW MOD 45 MIN: CPT | Performed by: INTERNAL MEDICINE

## 2019-03-22 SDOH — HEALTH STABILITY: MENTAL HEALTH: HOW OFTEN DO YOU HAVE A DRINK CONTAINING ALCOHOL?: NEVER

## 2019-03-22 NOTE — LETTER
3/22/2019    Marky Josue MD  [address]  [place] [address]    RE: Elizabeth Joy       Dear Colleague,    I had the pleasure of seeing Elizabeth Joy in the [facility].    HPI and Plan:   See dictation:[id number]    Orders Placed This Encounter   Procedures     Zio Patch Holter Adult Pediatric Greater than 48 hrs       No orders of the defined types were placed in this encounter.      There are no discontinued medications.      Encounter Diagnoses   Name Primary?     Fung-Figueroa syncope Yes     Benign essential hypertension      Hyperlipidemia LDL goal <160      Ventricular tachycardia (H)        CURRENT MEDICATIONS:  Current Outpatient Medications   Medication Sig Dispense Refill     meloxicam (MOBIC) 7.5 MG tablet Take 1 tablet (7.5 mg) by mouth daily 60 tablet 3     predniSONE (DELTASONE) 5 MG tablet Take 3 pills once daily. 100 tablet 3     verapamil (CALAN-SR) 240 MG CR tablet Take 1 tablet (240 mg) by mouth daily 90 tablet 3       ALLERGIES     Allergies   Allergen Reactions     Ace Inhibitors Cough       PAST MEDICAL HISTORY:  Past Medical History:   Diagnosis Date     Elevated blood sugar      HTN (hypertension) 35     Hx of colonoscopy 2008    bx collagenous colitis     Hypercholesteremia      Insomnia     on ambien 10mg 1/2 daily for long time     Lichen sclerosus et atrophicus of the vulva 02/2017    dx by Dr. Weldon, had bx     Lymphocytic colitis 2008    [facility]     PMR (polymyalgia rheumatica) (H) 01/31/2019     Syncope 08/2016    echo trace lvh, nl ef; ziopatch with one 5 beat run of vtach, seen by ep Dr. Allen and nothing found     Ventricular tachycardia (H) 8/16    seen on ziopatch done for syncope, just one 5 beat run       PAST SURGICAL HISTORY:  Past Surgical History:   Procedure Laterality Date     HYSTERECTOMY, PAP NO LONGER INDICATED  1990    had bso also, not for cancer     KERATOTOMY ARCUATE WITH FEMTOSECOND LASER/IMAGING FOR ATIOL  Right 2015    Procedure: KERATOTOMY ARCUATE WITH FEMTOSECOND LASER/IMAGING FOR ATIOL;  Surgeon: Lionel Reza MD;  [facility]     KERATOTOMY ARCUATE WITH FEMTOSECOND LASER/IMAGING FOR ATIOL Right 2015    Procedure: KERATOTOMY ARCUATE WITH FEMTOSECOND LASER/IMAGING FOR ATIOL;  Surgeon: Lionel Reza MD;  [facility]     PHACOEMULSIFICATION CLEAR CORNEA WITH STANDARD INTRAOCULAR LENS IMPLANT Right 2015    Procedure: PHACOEMULSIFICATION CLEAR CORNEA WITH STANDARD INTRAOCULAR LENS IMPLANT;  Surgeon: Lionel eRza MD;  [facility]:  [facility]     pilonidal cyst removed  30's       FAMILY HISTORY:  Family History   Problem Relation Age of Onset     Breast Cancer Mother        SOCIAL HISTORY:  Social History     Socioeconomic History     Marital status:      Spouse name: None     Number of children: 3     Years of education: None     Highest education level: None   Occupational History     None   Social Needs     Financial resource strain: None     Food insecurity:     Worry: None     Inability: None     Transportation needs:     Medical: None     Non-medical: None   Tobacco Use     Smoking status: Former Smoker     Packs/day: 1.00     Years: 42.00     Pack years: 42.00     Types: Cigarettes     Start date:      Last attempt to quit: 1997     Years since quittin.4     Smokeless tobacco: Never Used   Substance and Sexual Activity     Alcohol use: No     Alcohol/week: 0.0 oz     Frequency: Never     Drug use: No     Sexual activity: No   Lifestyle     Physical activity:     Days per week: None     Minutes per session: None     Stress: None   Relationships     Social connections:     Talks on phone: None     Gets together: None     Attends Buddhist service: None     Active member of club or organization: None     Attends meetings of clubs or organizations: None     Relationship status: None     Intimate partner violence:     Fear of current or ex partner: None     Emotionally  "abused: None     Physically abused: None     Forced sexual activity: None   Other Topics Concern     Parent/sibling w/ CABG, MI or angioplasty before 65F 55M? Not Asked   Social History Narrative     None       Review of Systems:  Skin:  Negative       Eyes:  Positive for glasses reading  ENT:  Negative      Respiratory:  Negative       Cardiovascular:    syncope or near-syncope;Positive for    Gastroenterology: Negative      Genitourinary:  Negative      Musculoskeletal:  Negative      Neurologic:  Negative      Psychiatric:  Positive for anxiety    Heme/Lymph/Imm:  Negative      Endocrine:  Negative        Physical Exam:  Vitals: /80   Pulse 61   Ht 1.6 m (5' 3\")   BMI 29.23 kg/m       Constitutional:  cooperative, alert and oriented, well developed, well nourished, in no acute distress        Skin:  warm and dry to the touch, no apparent skin lesions or masses noted          Head:  normocephalic, no masses or lesions        Eyes:  pupils equal and round, conjunctivae and lids unremarkable, sclera white, no xanthalasma, EOMS intact, no nystagmus        Lymph:      ENT:  no pallor or cyanosis, dentition good        Neck:  carotid pulses are full and equal bilaterally, JVP normal, no carotid bruit        Respiratory:  normal breath sounds, clear to auscultation, normal A-P diameter, normal symmetry, normal respiratory excursion, no use of accessory muscles         Cardiac: regular rhythm, normal S1/S2, no S3 or S4, apical impulse not displaced, no murmurs, gallops or rubs                pulses full and equal, no bruits auscultated                                        GI:  abdomen soft, non-tender, BS normoactive, no mass, no HSM, no bruits        Extremities and Muscular Skeletal:  no deformities, clubbing, cyanosis, erythema observed;no edema              Neurological:  no gross motor deficits;affect appropriate        Psych:  Alert and Oriented x 3        CC  Marky Josue MD  [address] [facility] [address]  [place] [address]                Thank you for allowing me to participate in the care of your patient.      Sincerely,     John Waddell MD     [facility]    cc:   Marky Josue MD  [address] [facility] [address]  [place] [address]

## 2019-03-22 NOTE — PROGRESS NOTES
HPI and Plan:   See dictation:[id number]    Orders Placed This Encounter   Procedures     Zio Patch Holter Adult Pediatric Greater than 48 hrs       No orders of the defined types were placed in this encounter.      There are no discontinued medications.      Encounter Diagnoses   Name Primary?     Fung-Figueroa syncope Yes     Benign essential hypertension      Hyperlipidemia LDL goal <160      Ventricular tachycardia (H)        CURRENT MEDICATIONS:  Current Outpatient Medications   Medication Sig Dispense Refill     meloxicam (MOBIC) 7.5 MG tablet Take 1 tablet (7.5 mg) by mouth daily 60 tablet 3     predniSONE (DELTASONE) 5 MG tablet Take 3 pills once daily. 100 tablet 3     verapamil (CALAN-SR) 240 MG CR tablet Take 1 tablet (240 mg) by mouth daily 90 tablet 3       ALLERGIES     Allergies   Allergen Reactions     Ace Inhibitors Cough       PAST MEDICAL HISTORY:  Past Medical History:   Diagnosis Date     Elevated blood sugar      HTN (hypertension) 35     Hx of colonoscopy 2008    bx collagenous colitis     Hypercholesteremia      Insomnia     on ambien 10mg 1/2 daily for long time     Lichen sclerosus et atrophicus of the vulva 02/2017    dx by Dr. Weldon, had bx     Lymphocytic colitis 2008    [facility]     PMR (polymyalgia rheumatica) (H) 01/31/2019     Syncope 08/2016    echo trace lvh, nl ef; ziopatch with one 5 beat run of vtach, seen by ep Dr. Allen and nothing found     Ventricular tachycardia (H) 8/16    seen on ziopatch done for syncope, just one 5 beat run       PAST SURGICAL HISTORY:  Past Surgical History:   Procedure Laterality Date     HYSTERECTOMY, PAP NO LONGER INDICATED  1990    had bso also, not for cancer     KERATOTOMY ARCUATE WITH FEMTOSECOND LASER/IMAGING FOR ATIOL Right 7/13/2015    Procedure: KERATOTOMY ARCUATE WITH FEMTOSECOND LASER/IMAGING FOR ATIOL;  Surgeon: Lionel Reza MD;  [facility]: [facility]     KERATOTOMY ARCUATE WITH FEMTOSECOND LASER/IMAGING FOR ATIOL Right 7/13/2015     Procedure: KERATOTOMY ARCUATE WITH FEMTOSECOND LASER/IMAGING FOR ATIOL;  Surgeon: Lionel Reza MD;  [facility]:  [facility]     PHACOEMULSIFICATION CLEAR CORNEA WITH STANDARD INTRAOCULAR LENS IMPLANT Right 2015    Procedure: PHACOEMULSIFICATION CLEAR CORNEA WITH STANDARD INTRAOCULAR LENS IMPLANT;  Surgeon: Lionel Reza MD;  [facility]:  [facility]     pilonidal cyst removed  30's       FAMILY HISTORY:  Family History   Problem Relation Age of Onset     Breast Cancer Mother        SOCIAL HISTORY:  Social History     Socioeconomic History     Marital status:      Spouse name: None     Number of children: 3     Years of education: None     Highest education level: None   Occupational History     None   Social Needs     Financial resource strain: None     Food insecurity:     Worry: None     Inability: None     Transportation needs:     Medical: None     Non-medical: None   Tobacco Use     Smoking status: Former Smoker     Packs/day: 1.00     Years: 42.00     Pack years: 42.00     Types: Cigarettes     Start date:      Last attempt to quit: 1997     Years since quittin.4     Smokeless tobacco: Never Used   Substance and Sexual Activity     Alcohol use: No     Alcohol/week: 0.0 oz     Frequency: Never     Drug use: No     Sexual activity: No   Lifestyle     Physical activity:     Days per week: None     Minutes per session: None     Stress: None   Relationships     Social connections:     Talks on phone: None     Gets together: None     Attends Mormonism service: None     Active member of club or organization: None     Attends meetings of clubs or organizations: None     Relationship status: None     Intimate partner violence:     Fear of current or ex partner: None     Emotionally abused: None     Physically abused: None     Forced sexual activity: None   Other Topics Concern     Parent/sibling w/ CABG, MI or angioplasty before 65F 55M? Not Asked   Social History Narrative     None       Review of  "Systems:  Skin:  Negative       Eyes:  Positive for glasses reading  ENT:  Negative      Respiratory:  Negative       Cardiovascular:    syncope or near-syncope;Positive for    Gastroenterology: Negative      Genitourinary:  Negative      Musculoskeletal:  Negative      Neurologic:  Negative      Psychiatric:  Positive for anxiety    Heme/Lymph/Imm:  Negative      Endocrine:  Negative        Physical Exam:  Vitals: /80   Pulse 61   Ht 1.6 m (5' 3\")   BMI 29.23 kg/m      Constitutional:  cooperative, alert and oriented, well developed, well nourished, in no acute distress        Skin:  warm and dry to the touch, no apparent skin lesions or masses noted          Head:  normocephalic, no masses or lesions        Eyes:  pupils equal and round, conjunctivae and lids unremarkable, sclera white, no xanthalasma, EOMS intact, no nystagmus        Lymph:      ENT:  no pallor or cyanosis, dentition good        Neck:  carotid pulses are full and equal bilaterally, JVP normal, no carotid bruit        Respiratory:  normal breath sounds, clear to auscultation, normal A-P diameter, normal symmetry, normal respiratory excursion, no use of accessory muscles         Cardiac: regular rhythm, normal S1/S2, no S3 or S4, apical impulse not displaced, no murmurs, gallops or rubs                pulses full and equal, no bruits auscultated                                        GI:  abdomen soft, non-tender, BS normoactive, no mass, no HSM, no bruits        Extremities and Muscular Skeletal:  no deformities, clubbing, cyanosis, erythema observed;no edema              Neurological:  no gross motor deficits;affect appropriate        Psych:  Alert and Oriented x 3        CC  Marky Josue MD  [address] [facility] [address]  [place] [address]              "

## 2019-03-22 NOTE — PROGRESS NOTES
Service Date: 03/22/2019      PRIMARY CARE PHYSICIAN:  Dr. Marky Josue.      HISTORY OF PRESENT ILLNESS:  I had the pleasure of seeing your patient, Elizabeth Joy, at [facility] for evaluation of syncope.  The patient was previously seen by my associate, Dr. Calvin Allen, on 10/04/2016 for an episode of syncope.  The patient was walking in the hallway at her home in 2016 and without warning lost consciousness for a few seconds.  She was not confused afterwards.  A 2-week Zio Patch was worn with one 5-beat run of nonsustained ventricular tachycardia ranging between 114-158 beats per minute for which the patient was asymptomatic.  No other significant arrhythmias were noted with very rare PACs and PVCs.  The patient states she had had other fainting spells previously triggered by the flu.  She also had near-syncopal episodes with having blood drawn.  The patient had not had any further syncopal episodes since 2016 until late January of this year.  The patient states that she was in front of the pie section at [employer] and had sudden onset of syncope.  She slowly slid to the ground without traumatic injury.  The next thing she was aware of was the EMTs coming in the restaurant for her.  She denied bowel or bladder incontinence.  No known seizures.  She awoke alert.  The patient has not had other episodes of syncope or presyncope since then.  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  Recently she has been complaining of diffuse myalgias and saw an orthopedist who felt this was generalized arthritis.  Dr. Josue after reviewing all of the laboratory tests felt this represented polymyalgia rheumatica and started the patient on prednisone.  The patient has felt considerably better since then.  Meloxicam was prescribed as well, but the patient is not taking this.  The patient has a history of hypertension which she believes has been well controlled with verapamil  alone.  The patient denies palpitations or tachyarrhythmias.  She denies a history of diabetes, myocardial infarction or stroke.  Her family history does include father who is  with hypertension and an MI.  The patient is a nonsmoker since  after 42 pack years of cigarettes.  She does not drink alcohol.      PAST MEDICAL HISTORY:  Does include polymyalgia rheumatica and history of hypercholesterolemia.  She is status post hysterectomy in .  She had a pilonidal cyst removed in her 30s.  She has had a cataract extracted and lens implant in her right eye in 2015.  No history of peptic ulcer disease, cancer, tuberculosis, kidney stones or disease, hyper or hypothyroidism.  Her hemoglobin on 2019 was 12.2 and electrolytes on  were normal.      PHYSICAL EXAMINATION:  As listed below.      EKG from 2019 showed normal sinus rhythm without ST or T-wave abnormalities.  Echocardiogram from 10/25/2016 demonstrated normal left ventricular size and function.  There was mild concentric left ventricular hypertrophy.  No significant valvular abnormalities.  No evidence of pulmonary hypertension.  Her blood pressure at that time was 150/82.      ASSESSMENT:   1.  Elizabeth Joy is a pleasant 79-year-old female with several episodes of syncope beginning in .  There is no obvious initiating factor for her syncope.  She was standing in place in late January when this occurred.  This could represent sinus node dysfunction and Fung-Figueroa attack.  We are going to place a 2-week Zio Patch to see if there is any evidence of bradyarrhythmias.  If nothing is found we could consider placing an indwelling loop recorder for further long-term monitoring.  The patient notes that she is unlikely to permit this.  The patient did mention that she only awoke to see EMT approaching which would likely have suggested a syncopal episode lasting 2-5 minutes.  This is unlikely due to a bradyarrhythmia.  I would  suggest further neurologic evaluation if her 2-week monitor is normal.  Additionally a TIA or stroke is unlikely to cause syncope of this nature.      It is my pleasure to assist in the care of Elizabeth Lee.  All her questions were answered to her satisfaction.  We will call her with the results of her Zio Patch when available and decide followup.      Tarah Garcia MD        cc:   Marky Josue MD    [address]    [place] [address]         TARAH GARCIA MD, FACC             D: 2019   T: 2019   MT: RICARDA      Name:     ELIZABETH LEE   MRN:      [id number]        Account:      [id number]   :      1940           Service Date: 2019      Document: [id number]

## 2019-03-22 NOTE — LETTER
3/22/2019      Marky Josue MD  [address]  [facility] [address]      RE: Elizabeth Joy       Dear Colleague,    I had the pleasure of seeing Elizabeth Joy in the [facility].    Service Date: 03/22/2019      PRIMARY CARE PHYSICIAN:  Dr. Marky Josue.      HISTORY OF PRESENT ILLNESS:  I had the pleasure of seeing your patient, Elizabeth Joy, at [facility] for evaluation of syncope.  The patient was previously seen by my associate, Dr. Calvin Allen, on 10/04/2016 for an episode of syncope.  The patient was walking in the hallway at her home in 2016 and without warning lost consciousness for a few seconds.  She was not confused afterwards.  A 2-week Zio Patch was worn with one 5-beat run of nonsustained ventricular tachycardia ranging between 114-158 beats per minute for which the patient was asymptomatic.  No other significant arrhythmias were noted with very rare PACs and PVCs.  The patient states she had had other fainting spells previously triggered by the flu.  She also had near-syncopal episodes with having blood drawn.  The patient had not had any further syncopal episodes since 2016 until late January of this year.  The patient states that she was in front of the pie section at [place] and had sudden onset of syncope.  She slowly slid to the ground without traumatic injury.  The next thing she was aware of was the EMTs coming in the restaurant for her.  She denied bowel or bladder incontinence.  No known seizures.  She awoke alert.  The patient has not had other episodes of syncope or presyncope since then.  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  Recently she has been complaining of diffuse myalgias and saw an orthopedist who felt this was generalized arthritis.  Dr. Josue after reviewing all of the laboratory tests felt this represented polymyalgia rheumatica and started the patient on prednisone.  The  patient has felt considerably better since then.  Meloxicam was prescribed as well, but the patient is not taking this.  The patient has a history of hypertension which she believes has been well controlled with verapamil alone.  The patient denies palpitations or tachyarrhythmias.  She denies a history of diabetes, myocardial infarction or stroke.  Her family history does include father who is  with hypertension and an MI.  The patient is a nonsmoker since  after 42 pack years of cigarettes.  She does not drink alcohol.      PAST MEDICAL HISTORY:  Does include polymyalgia rheumatica and history of hypercholesterolemia.  She is status post hysterectomy in .  She had a pilonidal cyst removed in her 30s.  She has had a cataract extracted and lens implant in her right eye in 2015.  No history of peptic ulcer disease, cancer, tuberculosis, kidney stones or disease, hyper or hypothyroidism.  Her hemoglobin on 2019 was 12.2 and electrolytes on  were normal.      PHYSICAL EXAMINATION:  As listed below.      EKG from 2019 showed normal sinus rhythm without ST or T-wave abnormalities.  Echocardiogram from 10/25/2016 demonstrated normal left ventricular size and function.  There was mild concentric left ventricular hypertrophy.  No significant valvular abnormalities.  No evidence of pulmonary hypertension.  Her blood pressure at that time was 150/82.      ASSESSMENT:   1.  Elizabeth Joy is a pleasant 79-year-old female with several episodes of syncope beginning in .  There is no obvious initiating factor for her syncope.  She was standing in place in late January when this occurred.  This could represent sinus node dysfunction and Fung-Figueroa attack.  We are going to place a 2-week Zio Patch to see if there is any evidence of bradyarrhythmias.  If nothing is found we could consider placing an indwelling loop recorder for further long-term monitoring.  The patient notes that she is  unlikely to permit this.  The patient did mention that she only awoke to see EMT approaching which would likely have suggested a syncopal episode lasting 2-5 minutes.  This is unlikely due to a bradyarrhythmia.  I would suggest further neurologic evaluation if her 2-week monitor is normal.  Additionally a TIA or stroke is unlikely to cause syncope of this nature.      It is my pleasure to assist in the care of Elizabeth Lee.  All her questions were answered to her satisfaction.  We will call her with the results of her Zio Patch when available and decide followup.      Tarah Garcia MD        cc:   Marky Josue MD    [address], [address]    [place] [address]         TARAH GARCIA MD, [facility]             D: 2019   T: 2019   MT: RICARDA      Name:     ELIZABETH LEE   MRN:      [id number]        Account:      [id number]   :      1940           Service Date: 2019      Document: [id number]         Outpatient Encounter Medications as of 3/22/2019   Medication Sig Dispense Refill     meloxicam (MOBIC) 7.5 MG tablet Take 1 tablet (7.5 mg) by mouth daily 60 tablet 3     predniSONE (DELTASONE) 5 MG tablet Take 3 pills once daily. 100 tablet 3     verapamil (CALAN-SR) 240 MG CR tablet Take 1 tablet (240 mg) by mouth daily 90 tablet 3     No facility-administered encounter medications on file as of 3/22/2019.        Again, thank you for allowing me to participate in the care of your patient.      Sincerely,    Tarah Garcia MD     [facility]

## 2019-04-01 ENCOUNTER — HOSPITAL ENCOUNTER (OUTPATIENT)
Dept: CARDIOLOGY | Facility: CLINIC | Age: 79
Discharge: HOME | End: 2019-04-01
Attending: INTERNAL MEDICINE | Admitting: INTERNAL MEDICINE
Payer: COMMERCIAL

## 2019-04-01 DIAGNOSIS — I45.9 STOKES-ADAMS SYNCOPE: ICD-10-CM

## 2019-04-01 PROCEDURE — 0296T ZIO PATCH HOLTER ADULT PEDIATRIC GREATER THAN 48 HRS: CPT

## 2019-04-01 PROCEDURE — 0298T ZIO PATCH HOLTER ADULT PEDIATRIC GREATER THAN 48 HRS: CPT | Performed by: INTERNAL MEDICINE

## 2019-04-16 ENCOUNTER — OFFICE VISIT (OUTPATIENT)
Dept: FAMILY MEDICINE | Facility: CLINIC | Age: 79
End: 2019-04-16
Payer: COMMERCIAL

## 2019-04-16 VITALS
SYSTOLIC BLOOD PRESSURE: 134 MMHG | HEIGHT: 63 IN | WEIGHT: 165 LBS | DIASTOLIC BLOOD PRESSURE: 80 MMHG | BODY MASS INDEX: 29.23 KG/M2 | HEART RATE: 76 BPM | TEMPERATURE: 97 F | OXYGEN SATURATION: 97 %

## 2019-04-16 DIAGNOSIS — M35.3 PMR (POLYMYALGIA RHEUMATICA) (H): Primary | ICD-10-CM

## 2019-04-16 DIAGNOSIS — R55 SYNCOPE, UNSPECIFIED SYNCOPE TYPE: ICD-10-CM

## 2019-04-16 LAB
CRP SERPL-MCNC: <2.9 MG/L (ref 0–8)
ERYTHROCYTE [DISTWIDTH] IN BLOOD BY AUTOMATED COUNT: 14.1 % (ref 10–15)
ERYTHROCYTE [SEDIMENTATION RATE] IN BLOOD BY WESTERGREN METHOD: 9 MM/H (ref 0–30)
HCT VFR BLD AUTO: 40.6 % (ref 35–47)
HGB BLD-MCNC: 13.4 G/DL (ref 11.7–15.7)
MCH RBC QN AUTO: 30.5 PG (ref 26.5–33)
MCHC RBC AUTO-ENTMCNC: 33 G/DL (ref 31.5–36.5)
MCV RBC AUTO: 92 FL (ref 78–100)
PLATELET # BLD AUTO: 303 10E9/L (ref 150–450)
RBC # BLD AUTO: 4.4 10E12/L (ref 3.8–5.2)
WBC # BLD AUTO: 16.4 10E9/L (ref 4–11)

## 2019-04-16 PROCEDURE — 85027 COMPLETE CBC AUTOMATED: CPT | Performed by: INTERNAL MEDICINE

## 2019-04-16 PROCEDURE — 85652 RBC SED RATE AUTOMATED: CPT | Performed by: INTERNAL MEDICINE

## 2019-04-16 PROCEDURE — 86140 C-REACTIVE PROTEIN: CPT | Performed by: INTERNAL MEDICINE

## 2019-04-16 PROCEDURE — 36415 COLL VENOUS BLD VENIPUNCTURE: CPT | Performed by: INTERNAL MEDICINE

## 2019-04-16 PROCEDURE — 99213 OFFICE O/P EST LOW 20 MIN: CPT | Performed by: INTERNAL MEDICINE

## 2019-04-16 RX ORDER — PREDNISONE 2.5 MG/1
TABLET ORAL
Qty: 60 TABLET | Refills: 0 | Status: SHIPPED | OUTPATIENT
Start: 2019-04-16 | End: 2019-08-13

## 2019-04-16 ASSESSMENT — MIFFLIN-ST. JEOR: SCORE: 1192.57

## 2019-04-16 NOTE — PATIENT INSTRUCTIONS
Change your total daily dose of prednisone to 12.5mg once daily.  Do this by taking 2 of the 5mg tablets and one of the 2.5mg tablets every day.    If you start having more aches or headaches call me right away.    I want you to come back in 4 weeks just for a blood test.  You do not need to fast or see me but call before you come.    Marky Josue M.D.

## 2019-04-16 NOTE — PROGRESS NOTES
The patient presents for a couple of issues.  As noted, at the end of January she was diagnosed with polymyalgia rheumatica based on joint pains and elevated labs including sed rate and CRP with a low hemoglobin.  She is responded quite well to prednisone.  She is now on 15 mg and doing well.  She has no pains at all.  No headaches or jaw claudication.  No fevers or night sweats.    As noted, the patient did have an episode of syncope.  She was seen by cardiology and just finished wearing her ZIO patch.  She said no further symptoms of no cardiovascular complaints and review of systems.  She otherwise is feeling well    Past Medical History:   Diagnosis Date     Elevated blood sugar      HTN (hypertension) 35     Hx of colonoscopy 2008    bx collagenous colitis     Hypercholesteremia      Insomnia     on ambien 10mg 1/2 daily for long time     Lichen sclerosus et atrophicus of the vulva 02/2017    dx by Dr. Weldon, had bx     Lymphocytic colitis 2008    on colon exam [facility]     PMR (polymyalgia rheumatica) (H) 01/31/2019     Syncope 08/2016    echo trace lvh, nl ef; ziopatch with one 5 beat run of vtach, seen by ep Dr. Allen and nothing found     Ventricular tachycardia (H) 8/16    seen on ziopatch done for syncope, just one 5 beat run     Past Surgical History:   Procedure Laterality Date     HYSTERECTOMY, PAP NO LONGER INDICATED  1990    had bso also, not for cancer     KERATOTOMY ARCUATE WITH FEMTOSECOND LASER/IMAGING FOR ATIOL Right 7/13/2015    Procedure: KERATOTOMY ARCUATE WITH FEMTOSECOND LASER/IMAGING FOR ATIOL;  Surgeon: Lionel Reza MD;  [facility]     KERATOTOMY ARCUATE WITH FEMTOSECOND LASER/IMAGING FOR ATIOL Right 7/13/2015    Procedure: KERATOTOMY ARCUATE WITH FEMTOSECOND LASER/IMAGING FOR ATIOL;  Surgeon: Lionel Reza MD;  Location:  [facility]     PHACOEMULSIFICATION CLEAR CORNEA WITH STANDARD INTRAOCULAR LENS IMPLANT Right 7/13/2015    Procedure: PHACOEMULSIFICATION CLEAR CORNEA WITH  STANDARD INTRAOCULAR LENS IMPLANT;  Surgeon: Lionel Reza MD;  Location:  [facility]     pilonidal cyst removed  30's     Social History     Socioeconomic History     Marital status:      Spouse name: Not on file     Number of children: 3     Years of education: Not on file     Highest education level: Not on file   Occupational History     Not on file   Social Needs     Financial resource strain: Not on file     Food insecurity:     Worry: Not on file     Inability: Not on file     Transportation needs:     Medical: Not on file     Non-medical: Not on file   Tobacco Use     Smoking status: Former Smoker     Packs/day: 1.00     Years: 42.00     Pack years: 42.00     Types: Cigarettes     Start date:      Last attempt to quit: 1997     Years since quittin.5     Smokeless tobacco: Never Used   Substance and Sexual Activity     Alcohol use: No     Alcohol/week: 0.0 oz     Frequency: Never     Drug use: No     Sexual activity: Never   Lifestyle     Physical activity:     Days per week: Not on file     Minutes per session: Not on file     Stress: Not on file   Relationships     Social connections:     Talks on phone: Not on file     Gets together: Not on file     Attends Advent service: Not on file     Active member of club or organization: Not on file     Attends meetings of clubs or organizations: Not on file     Relationship status: Not on file     Intimate partner violence:     Fear of current or ex partner: Not on file     Emotionally abused: Not on file     Physically abused: Not on file     Forced sexual activity: Not on file   Other Topics Concern     Parent/sibling w/ CABG, MI or angioplasty before 65F 55M? Not Asked   Social History Narrative     Not on file     Current Outpatient Medications   Medication Sig Dispense Refill     meloxicam (MOBIC) 7.5 MG tablet Take 1 tablet (7.5 mg) by mouth daily 60 tablet 3     predniSONE (DELTASONE) 2.5 MG tablet Take one of the 2.5mg with 2 of the 5mg  "tablets for a total daily dose of 12.5mg.. 60 tablet 0     predniSONE (DELTASONE) 5 MG tablet Take 3 pills once daily. 100 tablet 3     verapamil (CALAN-SR) 240 MG CR tablet Take 1 tablet (240 mg) by mouth daily 90 tablet 3     Allergies   Allergen Reactions     Ace Inhibitors Cough     FAMILY HISTORY NOTED AND REVIEWED    REVIEW OF SYSTEMS: above    PHYSICAL EXAM    /80 (BP Location: Left arm, Patient Position: Chair, Cuff Size: Adult Large)   Pulse 76   Temp 97  F (36.1  C) (Oral)   Ht 1.6 m (5' 3\")   Wt 74.8 kg (165 lb)   SpO2 97%   Breastfeeding? No   BMI 29.23 kg/m      Patient appears non toxic  No temp artery tend  cv reglar rate and rhythm, no jvp    Labs sent    ASSESSMENT:  1. Pmr, doing super, will lower dose of pred to 12.5mg and follow up labs 4 weeks  2. Syncope, ?cause, ziopatch     PLAN:  Change prednisone to 12.5mg  Call if ha's, fever  Follow up labs 4 weeks    Marky Josue M.D.        "

## 2019-04-26 ENCOUNTER — TELEPHONE (OUTPATIENT)
Dept: CARDIOLOGY | Facility: CLINIC | Age: 79
End: 2019-04-26

## 2019-04-26 NOTE — TELEPHONE ENCOUNTER
A single run of slow SVT and brief NSVT.  Nothing to explain syncope or presyncope.  Will follow for further symptoms.  John Waddell MD, [facility]       Writer attempted to call pt with results as above, but no answer. VM left of results and instructed to f/u with us with any further symptoms or concerns. TRE Marion RN.

## 2019-05-05 ENCOUNTER — TELEPHONE (OUTPATIENT)
Dept: FAMILY MEDICINE | Facility: CLINIC | Age: 79
End: 2019-05-05

## 2019-05-06 NOTE — TELEPHONE ENCOUNTER
It is confusing but I think the headline:  Patient Request (When will Dr contact patient about heart results, was the message.  I think best to just let her know what he said, Bairon Josue M.D.

## 2019-05-06 NOTE — TELEPHONE ENCOUNTER
"Called pt- no answer, left VM to call back or to call cardiology    \"A single run of slow SVT and brief NSVT.  Nothing to explain syncope or presyncope.  Will follow for further symptoms.  John Waddell MD, [facility]    Writer attempted to call pt with results as above, but no answer. VM left of results and instructed to f/u with us with any further symptoms or concerns. TRE Marion RN.\"      Gutierrez SHULTZ RN  "

## 2019-05-06 NOTE — TELEPHONE ENCOUNTER
To PCP: Is there further instructions for how Triage can assist?     I am not seeing the original message in this encounter, if there is one.     Thank you,   Suzan BRUNER RN

## 2019-05-07 NOTE — TELEPHONE ENCOUNTER
MARIO from patient, requesting the results of her zio patch monitor. Spoke with patient and reviewed results and Dr. Waddell's recommendations and comments below. Last OV note with Dr. Waddell mentioned patient seeing neurology if the event monitor results are normal. Reviewed these recommendations with the patient. Patient politely declined and stated that she does not want to go to any more doctors at this point. Patient requested a copy of the report be mailed to her house. Results mailed to patient's home address.

## 2019-05-08 ENCOUNTER — TELEPHONE (OUTPATIENT)
Dept: FAMILY MEDICINE | Facility: CLINIC | Age: 79
End: 2019-05-08

## 2019-05-08 NOTE — TELEPHONE ENCOUNTER
Reason for call:  Patient reporting a symptom    Symptom or request: joint pain    Duration (how long have symptoms been present): about 2 weeks    Have you been treated for this before? Yes    Additional comments: patient decreased her dosage of prednisone about 2 weeks ago. Since then, the joint pain has increased and is very painful.    Should she increase the dose of prednisone again?    Please call to advise    Phone Number patient can be reached at:  Home number on file [phone] (home)    Best Time:  anytime    Can we leave a detailed message on this number:  YES    Call taken on 5/8/2019 at 8:35 AM by Annette Bae.

## 2019-05-08 NOTE — TELEPHONE ENCOUNTER
(cardiology staff contacted patient yesterday.  Copied this message from 's nurse's note in Epic:)    VM from patient, requesting the results of her zio patch monitor. Spoke with patient and reviewed results and Dr. Waddell's recommendations and comments below. Last OV note with Dr. Waddell mentioned patient seeing neurology if the event monitor results are normal. Reviewed these recommendations with the patient. Patient politely declined and stated that she does not want to go to any more doctors at this point. Patient requested a copy of the report be mailed to her house. Results mailed to patient's home address.       Nevaeh Grover RN

## 2019-05-08 NOTE — TELEPHONE ENCOUNTER
Please find out if having headaches, fevers, jaw pain, other symptoms and how much prednisone is she on and has she been taking it every day    Marky Josue M.D.

## 2019-05-08 NOTE — TELEPHONE ENCOUNTER
"Spoke with pt. Pt reports that she does not have headaches,fevers or jaw pain.    Pt c/o intermittent joint pain in both arms,both knees,her right ankle and now her neck. Pt does not have pain when she is laying down. Pt describes her pain as being \"sore\"    Pt rates her pain about a \"5\"    Pt has been taking 12.5 mg daily as directed.    Pt also took meloxicam today due to the pain.    The original dose was prednisone 15 mg daily.    Pt wondering what to do?    P.S Pt talked to  about the results of her ziopatch  "

## 2019-05-08 NOTE — TELEPHONE ENCOUNTER
From 4/16/19 Progress Note:    ASSESSMENT:  1. Pmr, doing super, will lower dose of pred to 12.5mg and follow up labs 4 weeks  2. Syncope, ?cause, ziopatch      PLAN:  Change prednisone to 12.5mg  Call if ha's, fever  Follow up labs 4 weeks     Marky Josue M.D.    Pt is scheduled for follow up visit with  on 5/16/19.    Please review and advise

## 2019-05-08 NOTE — TELEPHONE ENCOUNTER
She is already scheduled to see PCP on 5/16/19. Pt advised. She will wait to see PCP on 5/16/19 to discuss.

## 2019-05-16 ENCOUNTER — OFFICE VISIT (OUTPATIENT)
Dept: FAMILY MEDICINE | Facility: CLINIC | Age: 79
End: 2019-05-16
Payer: COMMERCIAL

## 2019-05-16 VITALS
DIASTOLIC BLOOD PRESSURE: 89 MMHG | TEMPERATURE: 97 F | OXYGEN SATURATION: 98 % | SYSTOLIC BLOOD PRESSURE: 150 MMHG | HEART RATE: 70 BPM | BODY MASS INDEX: 29.23 KG/M2 | WEIGHT: 165 LBS | HEIGHT: 63 IN

## 2019-05-16 DIAGNOSIS — I10 BENIGN ESSENTIAL HYPERTENSION: ICD-10-CM

## 2019-05-16 DIAGNOSIS — M35.3 PMR (POLYMYALGIA RHEUMATICA) (H): Primary | ICD-10-CM

## 2019-05-16 DIAGNOSIS — R73.9 ELEVATED BLOOD SUGAR: ICD-10-CM

## 2019-05-16 DIAGNOSIS — R55 SYNCOPE, UNSPECIFIED SYNCOPE TYPE: ICD-10-CM

## 2019-05-16 PROCEDURE — 99213 OFFICE O/P EST LOW 20 MIN: CPT | Performed by: INTERNAL MEDICINE

## 2019-05-16 ASSESSMENT — MIFFLIN-ST. JEOR: SCORE: 1192.57

## 2019-05-16 NOTE — PROGRESS NOTES
The patient presents for follow-up to her polymyalgia rheumatica.  As noted, I saw her February 14 and prior to that January 31.  She was noting aches and pains in her shoulders and somewhat in her wrists with an elevation of her sed rate and CRP and a mild anemia.  I added prednisone and she had immediate relief.  Since then she has been doing fairly well.  She did try tapering it but had a recurrence of her symptoms.  She is now on 12.5 mg and with that is doing very well with no significant aches or pains.  No headaches, fevers or night sweats.  No jaw claudication.    She also had a syncopal spell as noted in January.  She subsequently saw cardiology as noted and had a ZIO Patch placed without any significant dysrhythmias.  She had no cardiovascular symptoms since then.resents    She does have a fair amount of stress related to caring for her  with memory issues.    Past Medical History:   Diagnosis Date     Elevated blood sugar      HTN (hypertension) 35     Hx of colonoscopy 2008    bx collagenous colitis     Hypercholesteremia      Insomnia     on ambien 10mg 1/2 daily for long time     Lichen sclerosus et atrophicus of the vulva 02/2017    dx by Dr. Weldon, had bx     Lymphocytic colitis 2008    on colon exam [facility]     PMR (polymyalgia rheumatica) (H) 01/31/2019     Syncope 08/2016    echo trace lvh, nl ef; ziopatch with one 5 beat run of vtach, seen by ep Dr. Allen and nothing found     Ventricular tachycardia (H) 8/16    seen on ziopatch done for syncope, just one 5 beat run     Past Surgical History:   Procedure Laterality Date     HYSTERECTOMY, PAP NO LONGER INDICATED  1990    had bso also, not for cancer     KERATOTOMY ARCUATE WITH FEMTOSECOND LASER/IMAGING FOR ATIOL Right 7/13/2015    Procedure: KERATOTOMY ARCUATE WITH FEMTOSECOND LASER/IMAGING FOR ATIOL;  Surgeon: Richton, Lionel LUNA MD;  Location: [facility]     KERATOTOMY ARCUATE WITH FEMTOSECOND LASER/IMAGING FOR ATIOL Right 7/13/2015     Procedure: KERATOTOMY ARCUATE WITH FEMTOSECOND LASER/IMAGING FOR ATIOL;  Surgeon: Loinel Reza MD;  Location:  [facility]     PHACOEMULSIFICATION CLEAR CORNEA WITH STANDARD INTRAOCULAR LENS IMPLANT Right 2015    Procedure: PHACOEMULSIFICATION CLEAR CORNEA WITH STANDARD INTRAOCULAR LENS IMPLANT;  Surgeon: Lionel Reza MD;  Location:  [facility]     pilonidal cyst removed  30's     Social History     Socioeconomic History     Marital status:      Spouse name: Not on file     Number of children: 3     Years of education: Not on file     Highest education level: Not on file   Occupational History     Not on file   Social Needs     Financial resource strain: Not on file     Food insecurity:     Worry: Not on file     Inability: Not on file     Transportation needs:     Medical: Not on file     Non-medical: Not on file   Tobacco Use     Smoking status: Former Smoker     Packs/day: 1.00     Years: 42.00     Pack years: 42.00     Types: Cigarettes     Start date:      Last attempt to quit: 1997     Years since quittin.6     Smokeless tobacco: Never Used   Substance and Sexual Activity     Alcohol use: No     Alcohol/week: 0.0 oz     Frequency: Never     Drug use: No     Sexual activity: Never   Lifestyle     Physical activity:     Days per week: Not on file     Minutes per session: Not on file     Stress: Not on file   Relationships     Social connections:     Talks on phone: Not on file     Gets together: Not on file     Attends Sikhism service: Not on file     Active member of club or organization: Not on file     Attends meetings of clubs or organizations: Not on file     Relationship status: Not on file     Intimate partner violence:     Fear of current or ex partner: Not on file     Emotionally abused: Not on file     Physically abused: Not on file     Forced sexual activity: Not on file   Other Topics Concern     Parent/sibling w/ CABG, MI or angioplasty before 65F 55M? Not Asked   Social  "History Narrative     Not on file     Current Outpatient Medications   Medication Sig Dispense Refill     meloxicam (MOBIC) 7.5 MG tablet Take 1 tablet (7.5 mg) by mouth daily 60 tablet 3     predniSONE (DELTASONE) 2.5 MG tablet Take one of the 2.5mg with 2 of the 5mg tablets for a total daily dose of 12.5mg.. 60 tablet 0     predniSONE (DELTASONE) 5 MG tablet Take 3 pills once daily. 100 tablet 3     verapamil (CALAN-SR) 240 MG CR tablet Take 1 tablet (240 mg) by mouth daily 90 tablet 3     Allergies   Allergen Reactions     Ace Inhibitors Cough     FAMILY HISTORY NOTED AND REVIEWED    REVIEW OF SYSTEMS: above    PHYSICAL EXAM    /89 (BP Location: Right arm, Patient Position: Chair, Cuff Size: Adult Large)   Pulse 70   Temp 97  F (36.1  C) (Oral)   Ht 1.6 m (5' 3\")   Wt 74.8 kg (165 lb)   SpO2 98%   BMI 29.23 kg/m      Patient appears non toxic  No temporal artery tenderness or enlargement  cv reglar rate and rhythm  Wrists not tender, red or warm  Shoulders with normal range of motion    Labs sent    ASSESSMENT:  Presumed pmr, stable, will try and wean prednisone  Stress  Hypertension, up today  Syncope, call if more    PLAN:  Labs now and if can reduce prednisone, follow up labs 4 weeks, office visit 8 weeks.  If ha's or changes to call    Marky Josue M.D.        "

## 2019-05-16 NOTE — PATIENT INSTRUCTIONS
I will let you know what dose of prednisone to take tomorrow    If you develop headaches or get ill let me know.    I want to do a follow up blood test in 4 weeks and then see you again in 8 weeks    Marky Josue M.D.

## 2019-06-10 ENCOUNTER — OFFICE VISIT (OUTPATIENT)
Dept: URGENT CARE | Facility: URGENT CARE | Age: 79
End: 2019-06-10
Payer: COMMERCIAL

## 2019-06-10 ENCOUNTER — TELEPHONE (OUTPATIENT)
Dept: FAMILY MEDICINE | Facility: CLINIC | Age: 79
End: 2019-06-10

## 2019-06-10 VITALS
TEMPERATURE: 98.1 F | HEART RATE: 61 BPM | SYSTOLIC BLOOD PRESSURE: 138 MMHG | DIASTOLIC BLOOD PRESSURE: 82 MMHG | OXYGEN SATURATION: 98 %

## 2019-06-10 DIAGNOSIS — H81.10 BENIGN PAROXYSMAL POSITIONAL VERTIGO, UNSPECIFIED LATERALITY: Primary | ICD-10-CM

## 2019-06-10 PROCEDURE — 99213 OFFICE O/P EST LOW 20 MIN: CPT

## 2019-06-10 ASSESSMENT — ENCOUNTER SYMPTOMS
ARTHRALGIAS: 1
NECK STIFFNESS: 0
DIZZINESS: 1
EYE DISCHARGE: 0
HEADACHES: 0
EYE PAIN: 0
NAUSEA: 0
VOMITING: 0
SHORTNESS OF BREATH: 0
DIFFICULTY URINATING: 0
COUGH: 0
RHINORRHEA: 0
WEAKNESS: 0
FEVER: 0
DIARRHEA: 0
CHILLS: 0
TREMORS: 0
SORE THROAT: 0

## 2019-06-10 NOTE — TELEPHONE ENCOUNTER
Spoke with patient     Thinks she has vertigo but has never been seen for this      All children coming into town - she doesn't want to be dizzy for this     Has not been seen for this, sister-in-law takes meclizine for her vertigo    Advised pt she should really be seen for this as dizziness could be from many things, needs to be seen to evaluate whether she has vertigo    Pt agreed to come to [facility] RN

## 2019-06-10 NOTE — PROGRESS NOTES
SUBJECTIVE:   Elizabeth Joy is a 79 year old female presenting with a chief complaint of   Chief Complaint   Patient presents with     Urgent Care     Dizziness     since lastnight. Patient states that she has not hit head or passed out.        Last night noted dizziness. Rolling over in bed magnified vertigo or sensation the room was spinning.   Denies tinnitus or ear pain or headache. Denies recent URI, or sinusitis, or medicine changes.     Currently taking prednisone for achy joints.     One other episode in the past 2 years and lasting a couple of days    Review of Systems   Constitutional: Negative for chills and fever.   HENT: Negative for congestion, ear pain, rhinorrhea and sore throat.    Eyes: Negative for pain, discharge and visual disturbance.   Respiratory: Negative for cough and shortness of breath.    Gastrointestinal: Negative for diarrhea, nausea and vomiting.   Genitourinary: Negative for difficulty urinating, vaginal bleeding, vaginal discharge and vaginal pain.   Musculoskeletal: Positive for arthralgias (inflammatory arthritis? on prednisone 3 months.). Negative for neck stiffness.   Neurological: Positive for dizziness and syncope (Jan 2019 and was sent to cardiology at that time. monitoring was normal ). Negative for tremors, weakness and headaches.       Past Medical History:   Diagnosis Date     Elevated blood sugar      HTN (hypertension) 35     Hx of colonoscopy 2008    bx collagenous colitis     Hypercholesteremia      Insomnia     on ambien 10mg 1/2 daily for long time     Lichen sclerosus et atrophicus of the vulva 02/2017    dx by Dr. Weldon, had bx     Lymphocytic colitis 2008    [facility]     PMR (polymyalgia rheumatica) (H) 01/31/2019     Syncope 08/2016    echo trace lvh, nl ef; ziopatch with one 5 beat run of vtach, seen by ep Dr. Allen and nothing found     Ventricular tachycardia (H) 8/16    seen on ziopatch done for syncope, just one 5 beat run     Family  History   Problem Relation Age of Onset     Breast Cancer Mother      Current Outpatient Medications   Medication Sig Dispense Refill     meloxicam (MOBIC) 7.5 MG tablet Take 1 tablet (7.5 mg) by mouth daily 60 tablet 3     predniSONE (DELTASONE) 2.5 MG tablet Take one of the 2.5mg with 2 of the 5mg tablets for a total daily dose of 12.5mg.. 60 tablet 0     predniSONE (DELTASONE) 5 MG tablet Take 3 pills once daily. 100 tablet 3     verapamil (CALAN-SR) 240 MG CR tablet Take 1 tablet (240 mg) by mouth daily 90 tablet 3     Social History     Tobacco Use     Smoking status: Former Smoker     Packs/day: 1.00     Years: 42.00     Pack years: 42.00     Types: Cigarettes     Start date:      Last attempt to quit: 1997     Years since quittin.7     Smokeless tobacco: Never Used   Substance Use Topics     Alcohol use: No     Alcohol/week: 0.0 oz     Frequency: Never     /82   Pulse 61   Temp 98.1  F (36.7  C) (Oral)   SpO2 98%    OBJECTIVE  BP (!) 174/98   Pulse 61   Temp 98.1  F (36.7  C) (Oral)   SpO2 98%     Physical Exam   Constitutional: She is oriented to person, place, and time.   HENT:   Head: Normocephalic and atraumatic.   Right Ear: External ear normal.   Left Ear: External ear normal.   Nose: Nose normal.   Mouth/Throat: Oropharynx is clear and moist. No oropharyngeal exudate.   Eyes: Pupils are equal, round, and reactive to light. Conjunctivae are normal. Right eye exhibits no discharge. Left eye exhibits no discharge. No scleral icterus.   Neck: Neck supple. No tracheal deviation present. No thyromegaly present.   Cardiovascular: Normal rate, regular rhythm, normal heart sounds and intact distal pulses. Exam reveals no gallop and no friction rub.   No murmur heard.  Pulmonary/Chest: Effort normal and breath sounds normal. No stridor. No respiratory distress. She has no wheezes. She has no rales. She exhibits no tenderness.   Abdominal: Soft. Bowel sounds are normal. She exhibits no  distension and no mass. There is no tenderness. There is no rebound and no guarding.   Musculoskeletal: She exhibits no edema, tenderness or deformity.   Lymphadenopathy:     She has no cervical adenopathy.   Neurological: She is alert and oriented to person, place, and time. No cranial nerve deficit.   Whitmer-Hallpike maneuver does not elicit dizziness or vertigo   Skin: Skin is warm and dry. No rash noted. No erythema.   Psychiatric: Judgment normal.       ASSESSMENT:    ICD-10-CM    1. Benign paroxysmal positional vertigo, unspecified laterality H81.10           PLAN  Seems to be mild and resolving. Trial of OTC meclizine recommended prn dizziness.   The patient indicates understanding of these issues and agrees with the plan.   Patient educational/instructional material provided including reasons for follow-up   Riky Wick MD

## 2019-06-10 NOTE — TELEPHONE ENCOUNTER
Non-detailed message left for patient to return call  KEVIN LangleyN, RN  [employer] [facility]

## 2019-06-10 NOTE — TELEPHONE ENCOUNTER
Reason for Call:  Medication or medication refill:    Do you use a [facility]?  Name of the pharmacy and phone number for the current request:      [facility] [address] [place], [place] - [address]    Name of the medication requested: Meclizine    Other request: Pt wants to get prescribed this medication for vertigo. She wants it to be sent asap. Told her that Dr. Josue will need to approve and she understands.    Can we leave a detailed message on this number? YES    Phone number patient can be reached at: Cell number on file:    Telephone Information:   Mobile [phone]       Best Time: any    Call taken on 6/10/2019 at 1:45 PM by Gt Sanchez

## 2019-07-16 ENCOUNTER — OFFICE VISIT (OUTPATIENT)
Dept: FAMILY MEDICINE | Facility: CLINIC | Age: 79
End: 2019-07-16
Payer: COMMERCIAL

## 2019-07-16 VITALS
SYSTOLIC BLOOD PRESSURE: 144 MMHG | OXYGEN SATURATION: 98 % | TEMPERATURE: 97.8 F | HEART RATE: 62 BPM | DIASTOLIC BLOOD PRESSURE: 87 MMHG

## 2019-07-16 DIAGNOSIS — I10 BENIGN ESSENTIAL HYPERTENSION: ICD-10-CM

## 2019-07-16 DIAGNOSIS — R73.9 ELEVATED BLOOD SUGAR: ICD-10-CM

## 2019-07-16 DIAGNOSIS — M35.3 PMR (POLYMYALGIA RHEUMATICA) (H): Primary | ICD-10-CM

## 2019-07-16 LAB
ANION GAP SERPL CALCULATED.3IONS-SCNC: 5 MMOL/L (ref 3–14)
BUN SERPL-MCNC: 25 MG/DL (ref 7–30)
CALCIUM SERPL-MCNC: 9.1 MG/DL (ref 8.5–10.1)
CHLORIDE SERPL-SCNC: 104 MMOL/L (ref 94–109)
CO2 SERPL-SCNC: 28 MMOL/L (ref 20–32)
CREAT SERPL-MCNC: 0.98 MG/DL (ref 0.52–1.04)
CRP SERPL-MCNC: <2.9 MG/L (ref 0–8)
ERYTHROCYTE [DISTWIDTH] IN BLOOD BY AUTOMATED COUNT: 13.5 % (ref 10–15)
ERYTHROCYTE [SEDIMENTATION RATE] IN BLOOD BY WESTERGREN METHOD: 8 MM/H (ref 0–30)
GFR SERPL CREATININE-BSD FRML MDRD: 55 ML/MIN/{1.73_M2}
GLUCOSE SERPL-MCNC: 100 MG/DL (ref 70–99)
HBA1C MFR BLD: 5.5 % (ref 0–5.6)
HCT VFR BLD AUTO: 40.9 % (ref 35–47)
HGB BLD-MCNC: 13.8 G/DL (ref 11.7–15.7)
MCH RBC QN AUTO: 31.3 PG (ref 26.5–33)
MCHC RBC AUTO-ENTMCNC: 33.7 G/DL (ref 31.5–36.5)
MCV RBC AUTO: 93 FL (ref 78–100)
PLATELET # BLD AUTO: 256 10E9/L (ref 150–450)
POTASSIUM SERPL-SCNC: 3.9 MMOL/L (ref 3.4–5.3)
RBC # BLD AUTO: 4.41 10E12/L (ref 3.8–5.2)
SODIUM SERPL-SCNC: 137 MMOL/L (ref 133–144)
WBC # BLD AUTO: 13.1 10E9/L (ref 4–11)

## 2019-07-16 PROCEDURE — 99207 C PAF COMPLETED  NO CHARGE: CPT | Performed by: INTERNAL MEDICINE

## 2019-07-16 PROCEDURE — 85027 COMPLETE CBC AUTOMATED: CPT | Performed by: INTERNAL MEDICINE

## 2019-07-16 PROCEDURE — 80048 BASIC METABOLIC PNL TOTAL CA: CPT | Performed by: INTERNAL MEDICINE

## 2019-07-16 PROCEDURE — 85652 RBC SED RATE AUTOMATED: CPT | Performed by: INTERNAL MEDICINE

## 2019-07-16 PROCEDURE — 83036 HEMOGLOBIN GLYCOSYLATED A1C: CPT | Performed by: INTERNAL MEDICINE

## 2019-07-16 PROCEDURE — 86140 C-REACTIVE PROTEIN: CPT | Performed by: INTERNAL MEDICINE

## 2019-07-16 PROCEDURE — 36415 COLL VENOUS BLD VENIPUNCTURE: CPT | Performed by: INTERNAL MEDICINE

## 2019-07-16 PROCEDURE — 99213 OFFICE O/P EST LOW 20 MIN: CPT | Performed by: INTERNAL MEDICINE

## 2019-07-16 NOTE — LETTER
[address] [facility]  [address]  [place]  [address]  Tel: [phone]    July 17, 2019    Elizabeth Joy  [address]   [place] [address]        Dear Ms. Joy,    Your labs look very good.  No signs of inflammation and your other labs are fine.    If you have any further questions or problems, please contact our office.      Sincerely,    Marky Josue MD/ Leia Acevedo CMA  Results for orders placed or performed in visit on 07/16/19   CBC with platelets   Result Value Ref Range    WBC 13.1 (H) 4.0 - 11.0 10e9/L    RBC Count 4.41 3.8 - 5.2 10e12/L    Hemoglobin 13.8 11.7 - 15.7 g/dL    Hematocrit 40.9 35.0 - 47.0 %    MCV 93 78 - 100 fl    MCH 31.3 26.5 - 33.0 pg    MCHC 33.7 31.5 - 36.5 g/dL    RDW 13.5 10.0 - 15.0 %    Platelet Count 256 150 - 450 10e9/L   Basic metabolic panel   Result Value Ref Range    Sodium 137 133 - 144 mmol/L    Potassium 3.9 3.4 - 5.3 mmol/L    Chloride 104 94 - 109 mmol/L    Carbon Dioxide 28 20 - 32 mmol/L    Anion Gap 5 3 - 14 mmol/L    Glucose 100 (H) 70 - 99 mg/dL    Urea Nitrogen 25 7 - 30 mg/dL    Creatinine 0.98 0.52 - 1.04 mg/dL    GFR Estimate 55 (L) >60 mL/min/[1.73_m2]    GFR Estimate If Black 64 >60 mL/min/[1.73_m2]    Calcium 9.1 8.5 - 10.1 mg/dL   Hemoglobin A1c   Result Value Ref Range    Hemoglobin A1C 5.5 0 - 5.6 %   ESR: Erythrocyte sedimentation rate   Result Value Ref Range    Sed Rate 8 0 - 30 mm/h   CRP, inflammation   Result Value Ref Range    CRP Inflammation <2.9 0.0 - 8.0 mg/L               Enclosure: Lab Results

## 2019-07-16 NOTE — PROGRESS NOTES
The patient presents with her  for follow-up of polymyalgia rheumatica, hypertension and elevated blood sugar.  It turns out she really has not been taking her prednisone very often at all.  She takes it only as needed when she gets aches, 2.5 mg.  This is been fairly effective.  For the most part she is been feeling well although has significant stress.  She is not having any significant headaches, fevers or night sweats.  No jaw claudication.  Occasional ache and pain but really not too often.    She needs follow-up labs today for her hypertension and elevated sugar.    Patient denies chest pain or shortness of breath.  No GI symptoms.    Past Medical History:   Diagnosis Date     Elevated blood sugar      HTN (hypertension) 35     Hx of colonoscopy 2008    bx collagenous colitis     Hypercholesteremia      Insomnia     on ambien 10mg 1/2 daily for long time     Lichen sclerosus et atrophicus of the vulva 02/2017    dx by Dr. Weldon, had bx     Lymphocytic colitis 2008    on colon exam [facility]     PMR (polymyalgia rheumatica) (H) 01/31/2019     Syncope 08/2016    echo trace lvh, nl ef; ziopatch with one 5 beat run of vtach, seen by ep Dr. Allen and nothing found     Ventricular tachycardia (H) 8/16    seen on ziopatch done for syncope, just one 5 beat run     Past Surgical History:   Procedure Laterality Date     HYSTERECTOMY, PAP NO LONGER INDICATED  1990    had bso also, not for cancer     KERATOTOMY ARCUATE WITH FEMTOSECOND LASER/IMAGING FOR ATIOL Right 7/13/2015    Procedure: KERATOTOMY ARCUATE WITH FEMTOSECOND LASER/IMAGING FOR ATIOL;  Surgeon: Lionel Reza MD;  Location: [facility]     KERATOTOMY ARCUATE WITH FEMTOSECOND LASER/IMAGING FOR ATIOL Right 7/13/2015    Procedure: KERATOTOMY ARCUATE WITH FEMTOSECOND LASER/IMAGING FOR ATIOL;  Surgeon: Lionel Reza MD;  [facility]     PHACOEMULSIFICATION CLEAR CORNEA WITH STANDARD INTRAOCULAR LENS IMPLANT Right 7/13/2015    Procedure:  PHACOEMULSIFICATION CLEAR CORNEA WITH STANDARD INTRAOCULAR LENS IMPLANT;  Surgeon: Lionel Reza MD;  [facility]     pilonidal cyst removed  30's     Social History     Socioeconomic History     Marital status:      Spouse name: Not on file     Number of children: 3     Years of education: Not on file     Highest education level: Not on file   Occupational History     Not on file   Social Needs     Financial resource strain: Not on file     Food insecurity:     Worry: Not on file     Inability: Not on file     Transportation needs:     Medical: Not on file     Non-medical: Not on file   Tobacco Use     Smoking status: Former Smoker     Packs/day: 1.00     Years: 42.00     Pack years: 42.00     Types: Cigarettes     Start date:      Last attempt to quit: 1997     Years since quittin.8     Smokeless tobacco: Never Used   Substance and Sexual Activity     Alcohol use: No     Alcohol/week: 0.0 oz     Frequency: Never     Drug use: No     Sexual activity: Never   Lifestyle     Physical activity:     Days per week: Not on file     Minutes per session: Not on file     Stress: Not on file   Relationships     Social connections:     Talks on phone: Not on file     Gets together: Not on file     Attends Mandaeism service: Not on file     Active member of club or organization: Not on file     Attends meetings of clubs or organizations: Not on file     Relationship status: Not on file     Intimate partner violence:     Fear of current or ex partner: Not on file     Emotionally abused: Not on file     Physically abused: Not on file     Forced sexual activity: Not on file   Other Topics Concern     Parent/sibling w/ CABG, MI or angioplasty before 65F 55M? Not Asked   Social History Narrative     Not on file     Current Outpatient Medications   Medication Sig Dispense Refill     meloxicam (MOBIC) 7.5 MG tablet Take 1 tablet (7.5 mg) by mouth daily 60 tablet 3     predniSONE (DELTASONE) 2.5 MG tablet  Take one of the 2.5mg with 2 of the 5mg tablets for a total daily dose of 12.5mg.. 60 tablet 0     predniSONE (DELTASONE) 5 MG tablet Take 3 pills once daily. 100 tablet 3     verapamil (CALAN-SR) 240 MG CR tablet Take 1 tablet (240 mg) by mouth daily 90 tablet 3     Allergies   Allergen Reactions     Ace Inhibitors Cough     FAMILY HISTORY NOTED AND REVIEWED    REVIEW OF SYSTEMS: above    PHYSICAL EXAM    /87 (BP Location: Right arm, Cuff Size: Adult Regular)   Pulse 62   Temp 97.8  F (36.6  C) (Oral)   SpO2 98%     Patient appears non toxic  No temporal artery tenderness or enlargement.  Lungs - clear, normal flow  Cardiovascular - regular rate and rhythm, no murmer, rub or gallop, no jvp or edema, carotids within normal limits, no bruits.  Abdomen - normal active bowel sounds, soft, non tender, no masses, guarding or rebound, no hepatosplenomegaly    Labs sent    ASSESSMENT:  1. Presumed pmr, off prednisone on her own ?6 weeks, for now not to resume it, check labs, call if ha's, more aches  2. Hypertension, control fair  3. Elevated sugar    PLAN:  Labs today  Above  Follow up 4 months      Marky Josue M.D.

## 2019-07-17 NOTE — RESULT ENCOUNTER NOTE
It was a pleasure seeing you.  I wanted to get back to you with your test results.  I have enclosed a copy for your records.    Your labs look very good.  No signs of inflammation and your other labs are fine.    If you have any questions please call me.

## 2019-08-13 DIAGNOSIS — M35.3 PMR (POLYMYALGIA RHEUMATICA) (H): ICD-10-CM

## 2019-08-14 NOTE — TELEPHONE ENCOUNTER
predniSONE (DELTASONE) 5 MG tablet 100 tablet 3 3/14/2019           Last Written Prescription Date:  03/14/2019  Last Fill Quantity: 100,   # refills: 3  Last Office Visit: 07/16/2019  Future Office visit:   Unknown    Routing refill request to provider for review/approval because:  Drug not on the [facility], [facility] or [facility] refill protocol or controlled substance

## 2019-08-15 NOTE — TELEPHONE ENCOUNTER
"Med list has:     Prednisone 2.5mg tabs - 1 of the 2.5mg tabs with 2 of the 5mg tabs for total dose of 12.5mg tabs     Prednisone 5mg tabs - take 3 tabs once daily     Per Last OV notes \"It turns out she really has not been taking her prednisone very often at all.  She takes it only as needed when she gets aches, 2.5 mg.\"      Unclear which directions pt is taking     Left message asking patient to call back to clarify current prednisone dosing     Kizzy LERMA RN    "

## 2019-08-15 NOTE — TELEPHONE ENCOUNTER
"Pt called back     States she has \"so many\" different prednisone prescriptions     Pt is unsure what she is to be doing - has 5mg tabs, 2.5mg tabs, 20mg tabs. Unsure of which one to fill     Pt states she \"skips around\" on dosing. Pt very unclear. Thinks maybe she is to be on 2.5mg daily but uncertain what to take and \"I don't like to make it up. But I recall he was trying to maybe wean me off of this.\"     Please advise     Kizzy LERMA RN    "

## 2019-08-16 RX ORDER — PREDNISONE 2.5 MG/1
2.5 TABLET ORAL DAILY
Qty: 30 TABLET | Refills: 0 | Status: SHIPPED | OUTPATIENT
Start: 2019-08-16 | End: 2019-09-05

## 2019-09-05 DIAGNOSIS — M35.3 PMR (POLYMYALGIA RHEUMATICA) (H): ICD-10-CM

## 2019-09-05 NOTE — TELEPHONE ENCOUNTER
Requested Prescriptions   Pending Prescriptions Disp Refills     predniSONE (DELTASONE) 2.5 MG tablet [Pharmacy Med Name: PREDNISONE 2.5MG TABLETS] 30 tablet 0     Sig: TAKE 1 TABLET(2.5 MG) BY MOUTH DAILY       There is no refill protocol information for this order            Last Written Prescription Date:  8/16/19  Last Fill Quantity: 30 tablet,   # refills: 0  Last Office Visit: 7/16/2019 (Joselo)  Future Office visit:    Next 5 appointments (look out 90 days)    Nov 19, 2019 10:00 AM CST  Office Visit with Marky Josue MD  [facility] [address] [place] [address]  [phone]           Routing refill request to provider for review/approval because:  Drug not on the [facility], [facility] or [facility] refill protocol or controlled substance

## 2019-09-06 RX ORDER — PREDNISONE 2.5 MG/1
TABLET ORAL
Qty: 30 TABLET | Refills: 0 | Status: SHIPPED | OUTPATIENT
Start: 2019-09-06 | End: 2020-03-13

## 2019-09-06 NOTE — TELEPHONE ENCOUNTER
"To PCP:     Can you please clarify which strength of Prednisone pt should be taking daily?     (2.5mg tabs are pended)    Please see Encounter on 8/13/19, it is unclear if this was clarified:      States she has \"so many\" different prednisone prescriptions      Pt is unsure what she is to be doing - has 5mg tabs, 2.5mg tabs, 20mg tabs. Unsure of which one to fill      Pt states she \"skips around\" on dosing. Pt very unclear. Thinks maybe she is to be on 2.5mg daily but uncertain what to take and \"I don't like to make it up. But I recall he was trying to maybe wean me off of this.\"      Please advise      Kizzy LERMA RN    Please advise,     Thank you,   Suzan BRUNER RN    "

## 2019-09-18 ENCOUNTER — TELEPHONE (OUTPATIENT)
Dept: FAMILY MEDICINE | Facility: CLINIC | Age: 79
End: 2019-09-18

## 2019-09-18 NOTE — TELEPHONE ENCOUNTER
Reason for Call:  Other Medical Advice     Detailed comments: Pt called and wanted to know if she should get the shingrix shot. She can get it at [facility]'s, but wants to make sure that it is okay medically.     Phone Number Patient can be reached at: Home number on file [phone] (home)    Best Time: Any    Can we leave a detailed message on this number? YES    Call taken on 9/18/2019 at 2:13 PM by Breana Green

## 2019-11-14 DIAGNOSIS — I10 BENIGN ESSENTIAL HYPERTENSION: ICD-10-CM

## 2019-11-19 ENCOUNTER — OFFICE VISIT (OUTPATIENT)
Dept: FAMILY MEDICINE | Facility: CLINIC | Age: 79
End: 2019-11-19
Payer: COMMERCIAL

## 2019-11-19 VITALS
HEIGHT: 66 IN | DIASTOLIC BLOOD PRESSURE: 90 MMHG | SYSTOLIC BLOOD PRESSURE: 149 MMHG | HEART RATE: 80 BPM | BODY MASS INDEX: 26.52 KG/M2 | WEIGHT: 165 LBS | OXYGEN SATURATION: 97 % | TEMPERATURE: 97 F

## 2019-11-19 DIAGNOSIS — M25.50 MULTIPLE JOINT PAIN: ICD-10-CM

## 2019-11-19 DIAGNOSIS — M35.3 PMR (POLYMYALGIA RHEUMATICA) (H): Primary | ICD-10-CM

## 2019-11-19 DIAGNOSIS — I10 BENIGN ESSENTIAL HYPERTENSION: ICD-10-CM

## 2019-11-19 PROCEDURE — 99213 OFFICE O/P EST LOW 20 MIN: CPT | Performed by: INTERNAL MEDICINE

## 2019-11-19 ASSESSMENT — MIFFLIN-ST. JEOR: SCORE: 1240.19

## 2019-11-19 NOTE — PROGRESS NOTES
The patient presents as an urgent work in with her .  She has a history of polymyalgia rheumatica and has been somewhat poor about always taking the meds or taking the proper dose.  She is here today because of aches and pains.    Patient is not having headache or jaw claudication.  No fevers or night sweats.  No unexplained weight changes.  No significant shoulder pains except slightly on the right with movements.  No hip pains.    Her biggest complaint is that some of her joints are stiff.  She does not have any pain or symptoms in her hands or wrists.  She notes it mostly in the right knee and occasionally the ankle.  She does not have chest pain or breathing trouble or other systemic symptoms.  She does take Mobic regularly.    Past Medical History:   Diagnosis Date     Elevated blood sugar      HTN (hypertension) 35     Hx of colonoscopy 2008    bx collagenous colitis     Hypercholesteremia      Insomnia     on ambien 10mg 1/2 daily for long time     Lichen sclerosus et atrophicus of the vulva 02/2017    dx by Dr. Weldon, had bx     Lymphocytic colitis 2008    [facility]     PMR (polymyalgia rheumatica) (H) 01/31/2019     Syncope 08/2016    echo trace lvh, nl ef; ziopatch with one 5 beat run of vtach, seen by ep Dr. Allen and nothing found     Ventricular tachycardia (H) 8/16    seen on ziopatch done for syncope, just one 5 beat run     Past Surgical History:   Procedure Laterality Date     HYSTERECTOMY, PAP NO LONGER INDICATED  1990    had bso also, not for cancer     KERATOTOMY ARCUATE WITH FEMTOSECOND LASER/IMAGING FOR ATIOL Right 7/13/2015    Procedure: KERATOTOMY ARCUATE WITH FEMTOSECOND LASER/IMAGING FOR ATIOL;  Surgeon: Lionel Reza MD;  Location: [facility]     KERATOTOMY ARCUATE WITH FEMTOSECOND LASER/IMAGING FOR ATIOL Right 7/13/2015    Procedure: KERATOTOMY ARCUATE WITH FEMTOSECOND LASER/IMAGING FOR ATIOL;  Surgeon: Lionel Reza MD;  [facility]     PHACOEMULSIFICATION  CLEAR CORNEA WITH STANDARD INTRAOCULAR LENS IMPLANT Right 2015    Procedure: PHACOEMULSIFICATION CLEAR CORNEA WITH STANDARD INTRAOCULAR LENS IMPLANT;  Surgeon: Lionel Reza MD;  Location:  [facility]     pilonidal cyst removed  30's     Social History     Socioeconomic History     Marital status:      Spouse name: Not on file     Number of children: 3     Years of education: Not on file     Highest education level: Not on file   Occupational History     Not on file   Social Needs     Financial resource strain: Not on file     Food insecurity:     Worry: Not on file     Inability: Not on file     Transportation needs:     Medical: Not on file     Non-medical: Not on file   Tobacco Use     Smoking status: Former Smoker     Packs/day: 1.00     Years: 42.00     Pack years: 42.00     Types: Cigarettes     Start date:      Last attempt to quit: 1997     Years since quittin.1     Smokeless tobacco: Never Used   Substance and Sexual Activity     Alcohol use: No     Alcohol/week: 0.0 standard drinks     Frequency: Never     Drug use: No     Sexual activity: Never   Lifestyle     Physical activity:     Days per week: Not on file     Minutes per session: Not on file     Stress: Not on file   Relationships     Social connections:     Talks on phone: Not on file     Gets together: Not on file     Attends Cheondoism service: Not on file     Active member of club or organization: Not on file     Attends meetings of clubs or organizations: Not on file     Relationship status: Not on file     Intimate partner violence:     Fear of current or ex partner: Not on file     Emotionally abused: Not on file     Physically abused: Not on file     Forced sexual activity: Not on file   Other Topics Concern     Parent/sibling w/ CABG, MI or angioplasty before 65F 55M? Not Asked   Social History Narrative     Not on file     Current Outpatient Medications   Medication Sig Dispense Refill     meloxicam (MOBIC) 7.5 MG  "tablet Take 1 tablet (7.5 mg) by mouth daily 60 tablet 3     predniSONE (DELTASONE) 2.5 MG tablet TAKE 1 TABLET(2.5 MG) BY MOUTH DAILY 30 tablet 0     predniSONE (DELTASONE) 5 MG tablet Take 3 pills once daily. 100 tablet 3     verapamil (CALAN-SR) 240 MG CR tablet Take 1 tablet (240 mg) by mouth daily 90 tablet 3     Allergies   Allergen Reactions     Ace Inhibitors Cough     FAMILY HISTORY NOTED AND REVIEWED    REVIEW OF SYSTEMS: above    PHYSICAL EXAM    BP (!) 149/90 (BP Location: Right arm, Patient Position: Chair, Cuff Size: Adult Large)   Pulse 80   Temp 97  F (36.1  C) (Oral)   Ht 1.676 m (5' 6\")   Wt 74.8 kg (165 lb)   SpO2 97%   BMI 26.63 kg/m      Patient appears non toxic  Lungs clear  cv reglar rate and rhythm, no murmer, rub or gallop  Abdomen non-tender  Joints of hands within normal limits  Knee not tender, some pain with range of motion  Right ankle within normal limits  No temp artery tend or enlargement    Labs sent    ASSESSMENT:  jt pains in patient with presumed history of pmr.  Her current symptoms really do not fit with polymyalgia rheumatica and are more in line with osteoarthritis which I suspect this is.  At this point I will check her labs.  She needs to call me with the exact doses of the medications.  I suspect the best treatment will be for osteoarthritis at this time.    PLAN:  above      "

## 2019-11-20 RX ORDER — VERAPAMIL HYDROCHLORIDE 240 MG/1
TABLET, FILM COATED, EXTENDED RELEASE ORAL
Qty: 30 TABLET | Refills: 0 | Status: SHIPPED | OUTPATIENT
Start: 2019-11-20 | End: 2019-11-25

## 2019-11-21 DIAGNOSIS — M35.3 PMR (POLYMYALGIA RHEUMATICA) (H): ICD-10-CM

## 2019-11-21 NOTE — TELEPHONE ENCOUNTER
Last Written Prescription Date:  9/6/19  Last Fill Quantity: 30,  # refills: 0   Last office visit: 11/19/2019 with prescribing provider:     Future Office Visit:    Requested Prescriptions   Pending Prescriptions Disp Refills     predniSONE (DELTASONE) 2.5 MG tablet [Pharmacy Med Name: PREDNISONE 2.5MG TABLETS] 30 tablet 0     Sig: TAKE 1 TABLET(2.5 MG) BY MOUTH DAILY       There is no refill protocol information for this order

## 2019-11-22 NOTE — TELEPHONE ENCOUNTER
The problem is that she did not do her labs on the day of her office visit which she was supposed to do.  I do not know if she needs prednisone until I get her labs done.  I would like her to come in for nonfasting labs as soon as possible and then we can sort this out.    Marky Josue M.D.

## 2019-11-22 NOTE — TELEPHONE ENCOUNTER
Called patient:     No answer, MB was full, could not leave VM    Marking Recall    Suzan BRUNER RN

## 2019-11-24 ENCOUNTER — NURSE TRIAGE (OUTPATIENT)
Dept: NURSING | Facility: CLINIC | Age: 79
End: 2019-11-24

## 2019-11-24 NOTE — TELEPHONE ENCOUNTER
Patient calling requesting to know if there are any restrictions prior to her lab testing tomorrow.   Reviewed Epic appointment notes with patient. Caller verbalized understanding. Denies further questions.    Teresa Alonzo RN  [facility]

## 2019-11-25 ENCOUNTER — TELEPHONE (OUTPATIENT)
Dept: FAMILY MEDICINE | Facility: CLINIC | Age: 79
End: 2019-11-25

## 2019-11-25 DIAGNOSIS — M35.3 PMR (POLYMYALGIA RHEUMATICA) (H): ICD-10-CM

## 2019-11-25 DIAGNOSIS — I10 BENIGN ESSENTIAL HYPERTENSION: ICD-10-CM

## 2019-11-25 LAB
ANION GAP SERPL CALCULATED.3IONS-SCNC: 7 MMOL/L (ref 3–14)
BUN SERPL-MCNC: 23 MG/DL (ref 7–30)
CALCIUM SERPL-MCNC: 9.3 MG/DL (ref 8.5–10.1)
CHLORIDE SERPL-SCNC: 105 MMOL/L (ref 94–109)
CO2 SERPL-SCNC: 29 MMOL/L (ref 20–32)
CREAT SERPL-MCNC: 1.11 MG/DL (ref 0.52–1.04)
CRP SERPL-MCNC: <2.9 MG/L (ref 0–8)
ERYTHROCYTE [DISTWIDTH] IN BLOOD BY AUTOMATED COUNT: 13.3 % (ref 10–15)
ERYTHROCYTE [SEDIMENTATION RATE] IN BLOOD BY WESTERGREN METHOD: 8 MM/H (ref 0–30)
GFR SERPL CREATININE-BSD FRML MDRD: 47 ML/MIN/{1.73_M2}
GLUCOSE SERPL-MCNC: 87 MG/DL (ref 70–99)
HCT VFR BLD AUTO: 40.1 % (ref 35–47)
HGB BLD-MCNC: 13.8 G/DL (ref 11.7–15.7)
MCH RBC QN AUTO: 32.3 PG (ref 26.5–33)
MCHC RBC AUTO-ENTMCNC: 34.4 G/DL (ref 31.5–36.5)
MCV RBC AUTO: 94 FL (ref 78–100)
PLATELET # BLD AUTO: 280 10E9/L (ref 150–450)
POTASSIUM SERPL-SCNC: 3.6 MMOL/L (ref 3.4–5.3)
RBC # BLD AUTO: 4.27 10E12/L (ref 3.8–5.2)
SODIUM SERPL-SCNC: 141 MMOL/L (ref 133–144)
WBC # BLD AUTO: 14.6 10E9/L (ref 4–11)

## 2019-11-25 PROCEDURE — 36415 COLL VENOUS BLD VENIPUNCTURE: CPT | Performed by: INTERNAL MEDICINE

## 2019-11-25 PROCEDURE — 80048 BASIC METABOLIC PNL TOTAL CA: CPT | Performed by: INTERNAL MEDICINE

## 2019-11-25 PROCEDURE — 86140 C-REACTIVE PROTEIN: CPT | Performed by: INTERNAL MEDICINE

## 2019-11-25 PROCEDURE — 85027 COMPLETE CBC AUTOMATED: CPT | Performed by: INTERNAL MEDICINE

## 2019-11-25 PROCEDURE — 85652 RBC SED RATE AUTOMATED: CPT | Performed by: INTERNAL MEDICINE

## 2019-11-25 RX ORDER — PREDNISONE 2.5 MG/1
TABLET ORAL
Qty: 30 TABLET | Refills: 0 | OUTPATIENT
Start: 2019-11-25

## 2019-11-25 RX ORDER — VERAPAMIL HYDROCHLORIDE 240 MG/1
240 TABLET, FILM COATED, EXTENDED RELEASE ORAL DAILY
Qty: 90 TABLET | Refills: 0 | Status: SHIPPED | OUTPATIENT
Start: 2019-11-25 | End: 2020-03-10

## 2019-11-25 NOTE — TELEPHONE ENCOUNTER
Another request from pharmacy for prednisone.    Patient was in today for lab work.    Radha Xavier RT (R)

## 2019-11-25 NOTE — TELEPHONE ENCOUNTER
I phoned pt and relayed Dr's comments.   Pt said this explains why the prednisone is not ready at [facility] then!   She thanks Dr. Josue for the wonderful care and she will schedule with Dr. Disla.

## 2019-11-25 NOTE — TELEPHONE ENCOUNTER
Please call the patient regarding her labs which are all normal.  I do not believe her symptoms are related to polymyalgia rheumatica so she does not need prednisone and should not take this.  I have canceled the prescription at the pharmacy.    The next step would be a possible cortisone shot which can be done in the clinic by Dr. Winston Disla.  I would recommend an office visit with him.    Marky Josue M.D.

## 2019-11-25 NOTE — TELEPHONE ENCOUNTER
Reason for Call:  RX     Detailed comments:     Please resend this rx to the pharmacy listed     [id number] - [place] - [address]    verapamil ER (CALAN-SR) 240 MG CR tablet 30 tablet 0 11/20/2019  No   Sig: TAKE 1 TABLET BY MOUTH ONCE DAILY   Sent to pharmacy as: verapamil ER (CALAN-SR) 240 MG CR tablet   Class: E-Prescribe   Order: [id number]   E-Prescribing Status: Receipt confirmed by pharmacy (11/20/2019  8:55 AM CST)

## 2019-11-25 NOTE — TELEPHONE ENCOUNTER
There was only a 30 day supply of Verapamil sent to pharmacy.  Please do a 90 day supply if appropriate.  She did come in for her labs today.   Maryellen Wilks MA

## 2019-12-11 ENCOUNTER — OFFICE VISIT (OUTPATIENT)
Dept: ORTHOPEDICS | Facility: CLINIC | Age: 79
End: 2019-12-11
Payer: COMMERCIAL

## 2019-12-11 ENCOUNTER — ANCILLARY PROCEDURE (OUTPATIENT)
Dept: GENERAL RADIOLOGY | Facility: CLINIC | Age: 79
End: 2019-12-11
Attending: FAMILY MEDICINE
Payer: COMMERCIAL

## 2019-12-11 VITALS
WEIGHT: 165 LBS | DIASTOLIC BLOOD PRESSURE: 84 MMHG | SYSTOLIC BLOOD PRESSURE: 135 MMHG | HEIGHT: 66 IN | BODY MASS INDEX: 26.52 KG/M2

## 2019-12-11 DIAGNOSIS — M25.561 CHRONIC PAIN OF BOTH KNEES: ICD-10-CM

## 2019-12-11 DIAGNOSIS — M17.0 PRIMARY OSTEOARTHRITIS OF BOTH KNEES: Primary | ICD-10-CM

## 2019-12-11 DIAGNOSIS — G89.29 CHRONIC PAIN OF BOTH KNEES: ICD-10-CM

## 2019-12-11 DIAGNOSIS — M25.562 CHRONIC PAIN OF BOTH KNEES: ICD-10-CM

## 2019-12-11 PROCEDURE — 99204 OFFICE O/P NEW MOD 45 MIN: CPT | Performed by: FAMILY MEDICINE

## 2019-12-11 PROCEDURE — 73562 X-RAY EXAM OF KNEE 3: CPT | Mod: 59 | Performed by: FAMILY MEDICINE

## 2019-12-11 ASSESSMENT — MIFFLIN-ST. JEOR: SCORE: 1240.19

## 2019-12-11 NOTE — PROGRESS NOTES
[facility]   Clinic Visit s Dec 11, 2019    PCP: Marky Josue Howie Johnson is a 79 year old female who is seen as self referral for   Chief Complaint   Patient presents with     Right Knee - Pain     Ankle Pain       Injury: Reports insidious onset without acute precipitating event.     Location of Pain: bilateral knee anterior , nonradiating   Duration of Pain: chronic, worsening, 10 month(s),   Rating of Pain at worst: 7/10  Rating of Pain Currently: 4/10  Pain is better with: activity avoidance   Pain is worse with: crossing her legs, putting on her pants  Treatment so far consists of:  prednisone  Associated symptoms: swelling Moderate  Recent imaging completed: No recent imaging completed.  Prior History of related problems: was on prednisone for PMR since Feb, stopped recently.    Social History: retired     Past Medical History:   Diagnosis Date     Elevated blood sugar      HTN (hypertension) 35     Hx of colonoscopy 2008    bx collagenous colitis     Hypercholesteremia      Insomnia     on ambien 10mg 1/2 daily for long time     Lichen sclerosus et atrophicus of the vulva 02/2017    dx by Dr. Weldon, had bx     Lymphocytic colitis 2008    on colon exam [facility]     PMR (polymyalgia rheumatica) (H) 01/31/2019     Syncope 08/2016    echo trace lvh, nl ef; ziopatch with one 5 beat run of vtach, seen by ep Dr. Allen and nothing found     Ventricular tachycardia (H) 8/16    seen on ziopatch done for syncope, just one 5 beat run       Patient Active Problem List    Diagnosis Date Noted     PMR (polymyalgia rheumatica) (H) 01/31/2019     Priority: Medium     Lichen sclerosus et atrophicus of the vulva 02/01/2017     Priority: Medium     dx by Dr. Weldon, had bx       Fung-Figueroa syncope      Priority: Medium     echo trace lvh, nl ef; ziopatch with one 5 beat run of vtach       Ventricular tachycardia (H)      Priority: Medium     seen on ziopatch done for syncope, just one  5 beat run       Elevated blood sugar      Priority: Medium     Advanced directives, counseling/discussion 10/02/2012     Priority: Medium     Patient states has Advance Directive and will bring in a copy to clinic. 10/2/2012 Maddy LUNA LPN           Hyperlipidemia LDL goal <160 10/02/2012     Priority: Medium     Benign essential hypertension      Priority: Medium     Insomnia      Priority: Medium     Lymphocytic colitis 2008     Priority: Medium     on colon exam [facility]         Family History   Problem Relation Age of Onset     Breast Cancer Mother        Social History     Socioeconomic History     Marital status:      Spouse name: Not on file     Number of children: 3     Years of education: Not on file     Highest education level: Not on file   Occupational History     Not on file   Social Needs     Financial resource strain: Not on file     Food insecurity:     Worry: Not on file     Inability: Not on file     Transportation needs:     Medical: Not on file     Non-medical: Not on file   Tobacco Use     Smoking status: Former Smoker     Packs/day: 1.00     Years: 42.00     Pack years: 42.00     Types: Cigarettes     Start date:      Last attempt to quit: 1997     Years since quittin.2     Smokeless tobacco: Never Used   Substance and Sexual Activity     Alcohol use: No     Alcohol/week: 0.0 standard drinks     Frequency: Never       Past Surgical History:   Procedure Laterality Date     HYSTERECTOMY, PAP NO LONGER INDICATED      had bso also, not for cancer     KERATOTOMY ARCUATE WITH FEMTOSECOND LASER/IMAGING FOR ATIOL Right 2015    Procedure: KERATOTOMY ARCUATE WITH FEMTOSECOND LASER/IMAGING FOR ATIOL;  Surgeon: Lionel Reza MD;  Location: [facility]     KERATOTOMY ARCUATE WITH FEMTOSECOND LASER/IMAGING FOR ATIOL Right 2015    Procedure: KERATOTOMY ARCUATE WITH FEMTOSECOND LASER/IMAGING FOR ATIOL;  Surgeon: Lionel Reza MD;  Location: [facility]      "PHACOEMULSIFICATION CLEAR CORNEA WITH STANDARD INTRAOCULAR LENS IMPLANT Right 7/13/2015    Procedure: PHACOEMULSIFICATION CLEAR CORNEA WITH STANDARD INTRAOCULAR LENS IMPLANT;  Surgeon: [place], Lionel LUNA MD;  Location: [facility]     pilonidal cyst removed  30's             Review of Systems   Musculoskeletal: Positive for joint pain.   All other systems reviewed and are negative.        Physical Exam  Musculoskeletal:      Right knee: She exhibits effusion.      Left knee: She exhibits no effusion.       /84   Ht 1.676 m (5' 6\")   Wt 74.8 kg (165 lb)   BMI 26.63 kg/m    Constitutional:well-developed, well-nourished, and in no distress.   Cardiovascular: Intact distal pulses.    Neurological: alert. Gait Abnormal:   Gait with shuffling steps  Skin: Skin is warm and dry.   Psychiatric: Mood and affect normal.   Respiratory: unlabored, speaks in full sentences  Lymph: no LAD, no lymphangitis        Right Knee Exam     Tenderness   The patient is experiencing tenderness in the medial joint line.    Range of Motion   Extension: normal   Flexion: 120     Tests   Varus: negative Valgus: negative  Drawer:  Anterior - negative    Posterior - negative  Patellar apprehension: negative    Other   Erythema: absent  Scars: absent  Sensation: normal  Pulse: present  Swelling: mild  Effusion: effusion present      Left Knee Exam     Tenderness   The patient is experiencing no tenderness.     Range of Motion   Extension: normal   Flexion: normal     Tests   Varus: negative Valgus: negative  Drawer:  Anterior - negative     Posterior - negative  Patellar apprehension: negative    Other   Erythema: absent  Scars: absent  Sensation: normal  Pulse: present  Swelling: none  Effusion: no effusion present             X-ray images Ordered and independently reviewed by me in the office today with the patient. X-ray shows:     Recent Results (from the past 744 hour(s))   XR Knee Bilateral 3 Views    Narrative    12/12/2019    Moderate " damage diffuse medial and lateral compartment narrowing   bilaterally, and mild lateral patellar arthritis bilaterally.  Moderate   stenosis also noted in the medial and lateral compartments, and there are   vascular calcifications in the posterior knees bilaterally.  No acute   fractures identified.       ASSESSMENT/PLAN    ICD-10-CM    1. Primary osteoarthritis of both knees M17.0 PAT[facility] PT, HAND, AND CHIROPRACTIC REFERRAL   2. Chronic pain of both knees M25.561 XR Knee Bilateral 3 Views    M25.562     G89.29      Reviewed nature of degenerative arthritis, discussed options including joint protection strategies and symptom management, including NSAIDS,  acetaminophen on a scheduled and/or as needed basis, joint injection with cortisone or hyaluronic acid derivatives, bracing, glucosamine, maintenance of overall knee stability through strengthening through physical therapy.  Pt opts for  symptom treatment, activity modification/rest and rehab

## 2019-12-11 NOTE — LETTER
12/11/2019         RE: Elizabeth Joy  [address]  [place] [address]        Dear Colleague,    Thank you for referring your patient, Elizabeth Joy, to the  [facility]. Please see a copy of my visit note below.    [facility]   Clinic Visit s Dec 11, 2019    PCP: Marky Josue Howie Johnson is a 79 year old female who is seen as self referral for   Chief Complaint   Patient presents with     Right Knee - Pain     Ankle Pain       Injury: Reports insidious onset without acute precipitating event.     Location of Pain: bilateral knee anterior , nonradiating   Duration of Pain: chronic, worsening, 10 month(s),   Rating of Pain at worst: 7/10  Rating of Pain Currently: 4/10  Pain is better with: activity avoidance   Pain is worse with: crossing her legs, putting on her pants  Treatment so far consists of:  prednisone  Associated symptoms: swelling Moderate  Recent imaging completed: No recent imaging completed.  Prior History of related problems: was on prednisone for PMR since Feb, stopped recently.    Social History: retired     Past Medical History:   Diagnosis Date     Elevated blood sugar      HTN (hypertension) 35     Hx of colonoscopy 2008    bx collagenous colitis     Hypercholesteremia      Insomnia     on ambien 10mg 1/2 daily for long time     Lichen sclerosus et atrophicus of the vulva 02/2017    dx by Dr. Weldon, had bx     Lymphocytic colitis 2008    on colon exam [facility]     PMR (polymyalgia rheumatica) (H) 01/31/2019     Syncope 08/2016    echo trace lvh, nl ef; ziopatch with one 5 beat run of vtach, seen by ep Dr. Allen and nothing found     Ventricular tachycardia (H) 8/16    seen on ziopatch done for syncope, just one 5 beat run       Patient Active Problem List    Diagnosis Date Noted     PMR (polymyalgia rheumatica) (H) 01/31/2019     Priority: Medium     Lichen sclerosus et atrophicus of the vulva 02/01/2017     Priority: Medium     dx  by Dr. Weldon, had bx       Fung-Figueroa syncope      Priority: Medium     echo trace lvh, nl ef; ziopatch with one 5 beat run of vtach       Ventricular tachycardia (H)      Priority: Medium     seen on ziopatch done for syncope, just one 5 beat run       Elevated blood sugar      Priority: Medium     Advanced directives, counseling/discussion 10/02/2012     Priority: Medium     Patient states has Advance Directive and will bring in a copy to clinic. 10/2/2012 Maddy LUNA LPN           Hyperlipidemia LDL goal <160 10/02/2012     Priority: Medium     Benign essential hypertension      Priority: Medium     Insomnia      Priority: Medium     Lymphocytic colitis 2008     Priority: Medium     on colon exam [facility]         Family History   Problem Relation Age of Onset     Breast Cancer Mother        Social History     Socioeconomic History     Marital status:      Spouse name: Not on file     Number of children: 3     Years of education: Not on file     Highest education level: Not on file   Occupational History     Not on file   Social Needs     Financial resource strain: Not on file     Food insecurity:     Worry: Not on file     Inability: Not on file     Transportation needs:     Medical: Not on file     Non-medical: Not on file   Tobacco Use     Smoking status: Former Smoker     Packs/day: 1.00     Years: 42.00     Pack years: 42.00     Types: Cigarettes     Start date:      Last attempt to quit: 1997     Years since quittin.2     Smokeless tobacco: Never Used   Substance and Sexual Activity     Alcohol use: No     Alcohol/week: 0.0 standard drinks     Frequency: Never       Past Surgical History:   Procedure Laterality Date     HYSTERECTOMY, PAP NO LONGER INDICATED      had bso also, not for cancer     KERATOTOMY ARCUATE WITH FEMTOSECOND LASER/IMAGING FOR ATIOL Right 2015    Procedure: KERATOTOMY ARCUATE WITH FEMTOSECOND LASER/IMAGING FOR ATIOL;  Surgeon: Lionel Reza MD;   "[facility]     KERATOTOMY ARCUATE WITH FEMTOSECOND LASER/IMAGING FOR ATIOL Right 7/13/2015    Procedure: KERATOTOMY ARCUATE WITH FEMTOSECOND LASER/IMAGING FOR ATIOL;  Surgeon: Lionel Reza MD;  [facility]     PHACOEMULSIFICATION CLEAR CORNEA WITH STANDARD INTRAOCULAR LENS IMPLANT Right 7/13/2015    Procedure: PHACOEMULSIFICATION CLEAR CORNEA WITH STANDARD INTRAOCULAR LENS IMPLANT;  Surgeon: Lionel Reza MD;  [facility]     pilonidal cyst removed  30's             Review of Systems   Musculoskeletal: Positive for joint pain.   All other systems reviewed and are negative.        Physical Exam  Musculoskeletal:      Right knee: She exhibits effusion.      Left knee: She exhibits no effusion.       /84   Ht 1.676 m (5' 6\")   Wt 74.8 kg (165 lb)   BMI 26.63 kg/m     Constitutional:well-developed, well-nourished, and in no distress.   Cardiovascular: Intact distal pulses.    Neurological: alert. Gait Abnormal:   Gait with shuffling steps  Skin: Skin is warm and dry.   Psychiatric: Mood and affect normal.   Respiratory: unlabored, speaks in full sentences  Lymph: no LAD, no lymphangitis        Right Knee Exam     Tenderness   The patient is experiencing tenderness in the medial joint line.    Range of Motion   Extension: normal   Flexion: 120     Tests   Varus: negative Valgus: negative  Drawer:  Anterior - negative    Posterior - negative  Patellar apprehension: negative    Other   Erythema: absent  Scars: absent  Sensation: normal  Pulse: present  Swelling: mild  Effusion: effusion present      Left Knee Exam     Tenderness   The patient is experiencing no tenderness.     Range of Motion   Extension: normal   Flexion: normal     Tests   Varus: negative Valgus: negative  Drawer:  Anterior - negative     Posterior - negative  Patellar apprehension: negative    Other   Erythema: absent  Scars: absent  Sensation: normal  Pulse: present  Swelling: none  Effusion: no effusion present         "     X-ray images Ordered and independently reviewed by me in the office today with the patient. X-ray shows:     Recent Results (from the past 744 hour(s))   XR Knee Bilateral 3 Views    Narrative    12/12/2019    Moderate damage diffuse medial and lateral compartment narrowing   bilaterally, and mild lateral patellar arthritis bilaterally.  Moderate   stenosis also noted in the medial and lateral compartments, and there are   vascular calcifications in the posterior knees bilaterally.  No acute   fractures identified.       ASSESSMENT/PLAN    ICD-10-CM    1. Primary osteoarthritis of both knees M17.0 PATIENCE PT, HAND, AND CHIROPRACTIC REFERRAL   2. Chronic pain of both knees M25.561 XR Knee Bilateral 3 Views    M25.562     G89.29      Reviewed nature of degenerative arthritis, discussed options including joint protection strategies and symptom management, including NSAIDS,  acetaminophen on a scheduled and/or as needed basis, joint injection with cortisone or hyaluronic acid derivatives, bracing, glucosamine, maintenance of overall knee stability through strengthening through physical therapy.  Pt opts for  symptom treatment, activity modification/rest and rehab      Again, thank you for allowing me to participate in the care of your patient.        Sincerely,        Winston Disla MD

## 2019-12-12 ENCOUNTER — TELEPHONE (OUTPATIENT)
Dept: FAMILY MEDICINE | Facility: CLINIC | Age: 79
End: 2019-12-12

## 2019-12-13 ENCOUNTER — NURSE TRIAGE (OUTPATIENT)
Dept: FAMILY MEDICINE | Facility: CLINIC | Age: 79
End: 2019-12-13

## 2019-12-13 NOTE — TELEPHONE ENCOUNTER
Pt called back to pt reps, didn't want to wait until PCP's first available so instead scheduled with TEAM provider     Next 5 appointments (look out 90 days)    Dec 18, 2019 10:00 AM CST  Office Visit with ZUHAIR Salazar CNP  [facility] [address] [place] [facility] [address]  [phone]        Kizzy LERMA RN

## 2019-12-13 NOTE — TELEPHONE ENCOUNTER
As long as she is not having chest pain or shortness of breath and the swelling is bilateral I am not concerned.  She should try and keep her legs elevated and limit her salt intake.  She should see me in the next 2 to 3 weeks but call if he gets worse or does not improve soon or if she has any shortness of breath.    Marky Josue M.D.

## 2019-12-13 NOTE — TELEPHONE ENCOUNTER
"TO PCP:     Pt recently stopped Prednisone about a week ago, then 2 days ago noticed equal, pitting, bilateral edema in both feet/ankle/lower legs. Not improving or worsening since it started. Feet are warm, not hot, no pain or redness. Denies any CP, SOB, or other associated symptoms.     Has no idea how much she weighs - states \"no one takes my weight\" and doesn't have a scale at home so can't do daily weights     Recommended elevating legs and reducing sodium intake, if ANY new or worsening symptoms call back right away (discussed symptoms to watch for). Scheduled Team visit for Monday as no availability today (and pt unsure if she could find a ride today).     Pt wondering if the prednisone could be the cause of the edema? She didn't notice the swelling until almost a week after stopping it.     Kizzy LERMA RN      1. LOCATION: \"Which joint is swollen?\" ankles, feet, goes up onto calf a little - checked with Dr. Disla's office and was dx with arthritis. Both sides - equal bilateral     2. ONSET: \"When did the swelling start?\" 2 days ago prior to visit with Dr. Disla - tightness sensation. No pain, redness, or heat currently. Was \"pink\" before     3. SIZE: \"How large is the swelling?\" difficult to get shoes on - is pitting and does not instantly rebound     4. PAIN: \"Is there any pain?\" If so, ask: \"How bad is it?\" (Scale 1-10; or mild, moderate, severe) no pain     5. CAUSE: \"What do you think caused the swollen joint?\"    6. OTHER SYMPTOMS: \"Do you have any other symptoms?\" (e.g., fever, chest pain, difficulty breathing, calf pain) no SOB or cough - no CP    No daily weights or BP checks at home    BP was \"good yesterday\"   Refuses weights in clinic     Occasionally but not like this   Normal warm      Prednisone recently stopped - Dr. Disla said this COULD be related to that - stopped about a week, tapered down and up with the Prednisone. Can't remember the last dose she was on.     Face not puffy "   Denies abdominal distension   Additional Information    Negative: Fever    Negative: Patient sounds very sick or weak to the triager    Negative: Difficulty breathing    Negative: Entire foot is cool or blue in comparison to other side    Negative: Chest pain    Negative: Followed an ankle injury    Negative: Ankle pain is main symptom    Negative: Swelling of both ankles (i.e., pedal edema)    Negative: Swelling of calf or leg    Negative: [1] SEVERE pain (e.g., excruciating, unable to walk) AND [2] not improved after 2 hours of pain medicine    Negative: [1] Can't move swollen joint at all AND [2] no fever    Negative: [1] Redness AND [2] painful when touched AND [3] no fever    Negative: [1] Red area or streak [2] large (> 2 in. or 5 cm)    Negative: [1] Thigh or calf pain AND [2] only 1 side AND [3] present > 1 hour    Negative: [1] Thigh, calf, or ankle swelling AND [2] only 1 side    Negative: [1] Thigh, calf, or ankle swelling AND [2] bilateral AND [3] 1 side is more swollen    MILD swelling of both ankles (i.e., pedal edema) AND new onset or worsening    Protocols used: ANKLE SWELLING-A-AH, LEG SWELLING AND EDEMA-A-OH

## 2019-12-13 NOTE — TELEPHONE ENCOUNTER
"Phone call to patient. She states she has another issue going on right now. She has had swelling in her calves to feet for the past 2 days. She is unable to get a shoe or boot on. Skin is tight. She asks if Dr. Disla can prescribe something for the swelling. She feels he was aware of her swelling at her appointment but doesn't remember what they specifically discussed except that going off prednisone could have caused it.   Per chart review, swelling was not noted. Recommended she contact PCP as there can be several reasons for leg swelling. She states she will call him.     She also states she is aware there are different types of cortisone used and she would like the \"strongest one\" for her knee injections. Informed she may discuss with Dr. Disla at her appointment and he can let her know which one he recommends. Also discussed that it can take up to 2 weeks to know the full effect from the cortisone. physical therapy is usually recommended after that time because the hope is that her pain would be less. She will follow up at appointment.     BRIDGET Moyer RN    "
Reason for Call:  Other appointment    Detailed comments: Pt called requesting to speak with Dr Disla before appointment regarding Cortisone options and pt wants to know if she goes to physical therapy before or after injection.     Phone Number Patient can be reached at: Home number on file [phone] (home)    Best Time: Anytime     Can we leave a detailed message on this number? YES    Call taken on 12/12/2019 at 12:50 PM by Daniella Knapp       
- - -

## 2019-12-13 NOTE — TELEPHONE ENCOUNTER
TO PCP:     Discussed with patient     She is agreeable to plan, would prefer to see you but your fist available is 1/20 (>4 weeks). Pt does not want to wait that long to see you. Are you able to fit her in sometime sooner 2-3 weeks?     Please advise   Kizzy LERMA RN

## 2019-12-13 NOTE — TELEPHONE ENCOUNTER
Reason for Call:  Medication or medication refill: requesting medication for swelling     Do you use a [facility]?  Name of the pharmacy and phone number for the current request:      "[id number] - [place] - [address]    Name of the medication requested: requesting medication for swelling     Other request: Pt called requesting medication for swelling.     Can we leave a detailed message on this number? YES    Phone number patient can be reached at: Home number on file [phone] (home)    Best Time: Anytime     Call taken on 12/13/2019 at 9:10 AM by Daniella Knapp

## 2019-12-13 NOTE — TELEPHONE ENCOUNTER
Called patient to notify of Dr. Josue's response and schedule OV for January, no answer. LVM asking pt to call back     Kizzy LERMA RN

## 2019-12-18 ENCOUNTER — OFFICE VISIT (OUTPATIENT)
Dept: ORTHOPEDICS | Facility: CLINIC | Age: 79
End: 2019-12-18
Payer: COMMERCIAL

## 2019-12-18 VITALS
DIASTOLIC BLOOD PRESSURE: 82 MMHG | HEIGHT: 66 IN | SYSTOLIC BLOOD PRESSURE: 129 MMHG | WEIGHT: 165 LBS | BODY MASS INDEX: 26.52 KG/M2

## 2019-12-18 DIAGNOSIS — M17.0 PRIMARY OSTEOARTHRITIS OF BOTH KNEES: Primary | ICD-10-CM

## 2019-12-18 PROCEDURE — 99213 OFFICE O/P EST LOW 20 MIN: CPT | Mod: 25 | Performed by: FAMILY MEDICINE

## 2019-12-18 PROCEDURE — 20610 DRAIN/INJ JOINT/BURSA W/O US: CPT | Mod: RT | Performed by: FAMILY MEDICINE

## 2019-12-18 RX ADMIN — TRIAMCINOLONE ACETONIDE 80 MG: 40 INJECTION, SUSPENSION INTRA-ARTICULAR; INTRAMUSCULAR at 11:11

## 2019-12-18 RX ADMIN — LIDOCAINE HYDROCHLORIDE 3 ML: 10 INJECTION, SOLUTION INFILTRATION; PERINEURAL at 11:11

## 2019-12-18 ASSESSMENT — MIFFLIN-ST. JEOR: SCORE: 1240.19

## 2019-12-18 NOTE — LETTER
12/18/2019         RE: Elizabeth Joy  [address]  [place] [id number]        Dear Colleague,    Thank you for referring your patient, Elizabeth Joy, to the  SPORTS MEDICINE. Please see a copy of my visit note below.    [facility]   Follow-up Visit s Dec 18, 2019    PCP: Marky Josue      Subjective:  Elizabeth is a 79 year old female who is seen in follow up for evaluation of   Chief Complaint   Patient presents with     Left Knee - Pain     Right Knee - Pain     Her last visit was on 12/11/2019.  Since that time, symptoms have been unchanged. Elizabeth Joy is accompanied today by self.     Patient has noticed no change of symptoms with rest and activity avoidance  treatment.  Patient has slight swelling  Pain is located bilateral knees and bilateral groin persistent.  Patient is using no aids.    Patient denies any new injuries.    She is wondering what other options are available.  She is curious about cortisone injections.    Patient's past medical, surgical, social and family histories are reviewed today.    History from previous visit on 12/11/2019  Injury: Reports insidious onset without acute precipitating event.     Location of Pain: bilateral knee anterior , nonradiating   Duration of Pain: chronic, worsening, 10 month(s),   Rating of Pain at worst: 7/10  Rating of Pain Currently: 4/10  Pain is better with: activity avoidance   Pain is worse with: crossing her legs, putting on her pants  Treatment so far consists of:  prednisone  Associated symptoms: swelling Moderate  Recent imaging completed: No recent imaging completed.  Prior History of related problems: was on prednisone for PMR since Feb, stopped recently.    Social History: retired     Past Medical History:   Diagnosis Date     Elevated blood sugar      HTN (hypertension) 35     Hx of colonoscopy 2008    bx collagenous colitis     Hypercholesteremia      Insomnia     on ambien 10mg 1/2 daily  for long time     Lichen sclerosus et atrophicus of the vulva 02/2017    dx by Dr. Weldon, had bx     Lymphocytic colitis 2008    on colon exam at [facility]     PMR (polymyalgia rheumatica) (H) 01/31/2019     Syncope 08/2016    echo trace lvh, nl ef; ziopatch with one 5 beat run of vtach, seen by ep Dr. Allen and nothing found     Ventricular tachycardia (H) 8/16    seen on ziopatch done for syncope, just one 5 beat run       Patient Active Problem List    Diagnosis Date Noted     PMR (polymyalgia rheumatica) (H) 01/31/2019     Priority: Medium     Lichen sclerosus et atrophicus of the vulva 02/01/2017     Priority: Medium     dx by Dr. Weldon, had bx       Fung-Figueroa syncope      Priority: Medium     echo trace lvh, nl ef; ziopatch with one 5 beat run of vtach       Ventricular tachycardia (H)      Priority: Medium     seen on ziopatch done for syncope, just one 5 beat run       Elevated blood sugar      Priority: Medium     Advanced directives, counseling/discussion 10/02/2012     Priority: Medium     Patient states has Advance Directive and will bring in a copy to clinic. 10/2/2012 Maddy LUNA LPN           Hyperlipidemia LDL goal <160 10/02/2012     Priority: Medium     Benign essential hypertension      Priority: Medium     Insomnia      Priority: Medium     Lymphocytic colitis 01/01/2008     Priority: Medium     on colon exam at [facility]         Family History   Problem Relation Age of Onset     Breast Cancer Mother        Social History     Socioeconomic History     Marital status:      Spouse name: Not on file     Number of children: 3     Years of education: Not on file     Highest education level: Not on file   Occupational History     Not on file   Social Needs     Financial resource strain: Not on file     Food insecurity:     Worry: Not on file     Inability: Not on file     Transportation needs:     Medical: Not on file     Non-medical: Not on file   Tobacco Use     Smoking status: Former Smoker      "Packs/day: 1.00     Years: 42.00     Pack years: 42.00     Types: Cigarettes     Start date:      Last attempt to quit: 1997     Years since quittin.2     Smokeless tobacco: Never Used   Substance and Sexual Activity     Alcohol use: No     Alcohol/week: 0.0 standard drinks     Frequency: Never       Past Surgical History:   Procedure Laterality Date     HYSTERECTOMY, PAP NO LONGER INDICATED      had bso also, not for cancer     KERATOTOMY ARCUATE WITH FEMTOSECOND LASER/IMAGING FOR ATIOL Right 2015    Procedure: KERATOTOMY ARCUATE WITH FEMTOSECOND LASER/IMAGING FOR ATIOL;  Surgeon: Lionel Reza MD;  [facility]  [facility]     KERATOTOMY ARCUATE WITH FEMTOSECOND LASER/IMAGING FOR ATIOL Right 2015    Procedure: KERATOTOMY ARCUATE WITH FEMTOSECOND LASER/IMAGING FOR ATIOL;  Surgeon: Lionel Reza MD;  Location:  [facility]     PHACOEMULSIFICATION CLEAR CORNEA WITH STANDARD INTRAOCULAR LENS IMPLANT Right 2015    Procedure: PHACOEMULSIFICATION CLEAR CORNEA WITH STANDARD INTRAOCULAR LENS IMPLANT;  Surgeon: Lionel Reza MD;  Location:  [facility]     pilonidal cyst removed  30's             Review of Systems   Musculoskeletal: Positive for joint pain.   All other systems reviewed and are negative.        Physical Exam  Musculoskeletal:      Right knee: She exhibits effusion.      Left knee: She exhibits no effusion.       /82   Ht 1.676 m (5' 6\")   Wt 74.8 kg (165 lb)   BMI 26.63 kg/m     Constitutional:well-developed, well-nourished, and in no distress.   Cardiovascular: Intact distal pulses.    Neurological: alert. Gait Abnormal:   Gait with shuffling steps  Skin: Skin is warm and dry.   Psychiatric: Mood and affect normal.   Respiratory: unlabored, speaks in full sentences  Lymph: no LAD, no lymphangitis        Right Knee Exam     Tenderness   The patient is experiencing tenderness in the medial joint line.    Range of Motion   Extension: normal   Flexion: 120     Tests   Varus: " negative Valgus: negative  Drawer:  Anterior - negative    Posterior - negative  Patellar apprehension: negative    Other   Erythema: absent  Scars: absent  Sensation: normal  Pulse: present  Swelling: mild  Effusion: effusion present      Left Knee Exam     Tenderness   The patient is experiencing no tenderness.     Range of Motion   Extension: normal   Flexion: normal     Tests   Varus: negative Valgus: negative  Drawer:  Anterior - negative     Posterior - negative  Patellar apprehension: negative    Other   Erythema: absent  Scars: absent  Sensation: normal  Pulse: present  Swelling: none  Effusion: no effusion present             X-ray images previously ordered and independently reviewed by me in the office today with the patient. X-ray shows:     Recent Results (from the past 744 hour(s))   XR Knee Bilateral 3 Views    Narrative    12/12/2019    Moderate damage diffuse medial and lateral compartment narrowing   bilaterally, and mild lateral patellar arthritis bilaterally.  Moderate   stenosis also noted in the medial and lateral compartments, and there are   vascular calcifications in the posterior knees bilaterally.  No acute   fractures identified.       ASSESSMENT/PLAN    ICD-10-CM    1. Primary osteoarthritis of both knees M17.0      Lengthy discussion about osteoarthritis, and treatment options, including supplements, physical therapy, cortisone injections, including risks benefits and alternatives of cortisone injections, including progression of degeneration with cortisone use.  Pain relief can be enhanced.  Visco supplementation  discussed as well.  She opted to go forward with cortisone injections today.      Large Joint Injection/Arthocentesis: R knee joint  Date/Time: 12/18/2019 11:11 AM  Performed by: Winston Disla MD  Authorized by: Winston Disla MD     Indications:  Pain and osteoarthritis  Needle Size:  25 G  Guidance: landmark guided    Approach:  Superolateral  Location:   Knee      Medications:  3 mL lidocaine 1 %; 80 mg triamcinolone 40 MG/ML  Outcome:  Tolerated well, no immediate complications  Procedure discussed: discussed risks, benefits, and alternatives    Consent Given by:  Patient  Timeout: timeout called immediately prior to procedure    Prep: patient was prepped and draped in usual sterile fashion            Again, thank you for allowing me to participate in the care of your patient.        Sincerely,        Winston Disla MD

## 2019-12-18 NOTE — PROGRESS NOTES
[facility]   Follow-up Visit s Dec 18, 2019    PCP: Marky Josue      Subjective:  Elizabeth is a 79 year old female who is seen in follow up for evaluation of   Chief Complaint   Patient presents with     Left Knee - Pain     Right Knee - Pain     Her last visit was on 12/11/2019.  Since that time, symptoms have been unchanged. Elizabeth Joy is accompanied today by self.     Patient has noticed no change of symptoms with rest and activity avoidance  treatment.  Patient has slight swelling  Pain is located bilateral knees and bilateral groin persistent.  Patient is using no aids.    Patient denies any new injuries.    She is wondering what other options are available.  She is curious about cortisone injections.    Patient's past medical, surgical, social and family histories are reviewed today.    History from previous visit on 12/11/2019  Injury: Reports insidious onset without acute precipitating event.     Location of Pain: bilateral knee anterior , nonradiating   Duration of Pain: chronic, worsening, 10 month(s),   Rating of Pain at worst: 7/10  Rating of Pain Currently: 4/10  Pain is better with: activity avoidance   Pain is worse with: crossing her legs, putting on her pants  Treatment so far consists of:  prednisone  Associated symptoms: swelling Moderate  Recent imaging completed: No recent imaging completed.  Prior History of related problems: was on prednisone for PMR since Feb, stopped recently.    Social History: retired     Past Medical History:   Diagnosis Date     Elevated blood sugar      HTN (hypertension) 35     Hx of colonoscopy 2008    bx collagenous colitis     Hypercholesteremia      Insomnia     on ambien 10mg 1/2 daily for long time     Lichen sclerosus et atrophicus of the vulva 02/2017    dx by Dr. Weldon, had bx     Lymphocytic colitis 2008    on colon exam [facility]     PMR (polymyalgia rheumatica) (H) 01/31/2019     Syncope 08/2016    echo trace lvh,  nl ef; ziopatch with one 5 beat run of vtach, seen by ep Dr. Allen and nothing found     Ventricular tachycardia (H)     seen on ziopatch done for syncope, just one 5 beat run       Patient Active Problem List    Diagnosis Date Noted     PMR (polymyalgia rheumatica) (H) 2019     Priority: Medium     Lichen sclerosus et atrophicus of the vulva 2017     Priority: Medium     dx by Dr. Weldon, had bx       Fung-Figueroa syncope      Priority: Medium     echo trace lvh, nl ef; ziopatch with one 5 beat run of vtach       Ventricular tachycardia (H)      Priority: Medium     seen on ziopatch done for syncope, just one 5 beat run       Elevated blood sugar      Priority: Medium     Advanced directives, counseling/discussion 10/02/2012     Priority: Medium     Patient states has Advance Directive and will bring in a copy to clinic. 10/2/2012 Maddy LUNA LPN           Hyperlipidemia LDL goal <160 10/02/2012     Priority: Medium     Benign essential hypertension      Priority: Medium     Insomnia      Priority: Medium     Lymphocytic colitis 2008     Priority: Medium     on colon exam [facility]         Family History   Problem Relation Age of Onset     Breast Cancer Mother        Social History     Socioeconomic History     Marital status:      Spouse name: Not on file     Number of children: 3     Years of education: Not on file     Highest education level: Not on file   Occupational History     Not on file   Social Needs     Financial resource strain: Not on file     Food insecurity:     Worry: Not on file     Inability: Not on file     Transportation needs:     Medical: Not on file     Non-medical: Not on file   Tobacco Use     Smoking status: Former Smoker     Packs/day: 1.00     Years: 42.00     Pack years: 42.00     Types: Cigarettes     Start date:      Last attempt to quit: 1997     Years since quittin.2     Smokeless tobacco: Never Used   Substance and Sexual Activity     Alcohol  "use: No     Alcohol/week: 0.0 standard drinks     Frequency: Never       Past Surgical History:   Procedure Laterality Date     HYSTERECTOMY, PAP NO LONGER INDICATED  1990    had bso also, not for cancer     KERATOTOMY ARCUATE WITH FEMTOSECOND LASER/IMAGING FOR ATIOL Right 7/13/2015    Procedure: KERATOTOMY ARCUATE WITH FEMTOSECOND LASER/IMAGING FOR ATIOL;  Surgeon: Lionel Reza MD;  [facility]:  [facility]     KERATOTOMY ARCUATE WITH FEMTOSECOND LASER/IMAGING FOR ATIOL Right 7/13/2015    Procedure: KERATOTOMY ARCUATE WITH FEMTOSECOND LASER/IMAGING FOR ATIOL;  Surgeon: Lionel Reza MD;  [facility]:  [facility]     PHACOEMULSIFICATION CLEAR CORNEA WITH STANDARD INTRAOCULAR LENS IMPLANT Right 7/13/2015    Procedure: PHACOEMULSIFICATION CLEAR CORNEA WITH STANDARD INTRAOCULAR LENS IMPLANT;  Surgeon: Lionel Reza MD;  [facility]:  [facility]     pilonidal cyst removed  30's             Review of Systems   Musculoskeletal: Positive for joint pain.   All other systems reviewed and are negative.        Physical Exam  Musculoskeletal:      Right knee: She exhibits effusion.      Left knee: She exhibits no effusion.       /82   Ht 1.676 m (5' 6\")   Wt 74.8 kg (165 lb)   BMI 26.63 kg/m    Constitutional:well-developed, well-nourished, and in no distress.   Cardiovascular: Intact distal pulses.    Neurological: alert. Gait Abnormal:   Gait with shuffling steps  Skin: Skin is warm and dry.   Psychiatric: Mood and affect normal.   Respiratory: unlabored, speaks in full sentences  Lymph: no LAD, no lymphangitis        Right Knee Exam     Tenderness   The patient is experiencing tenderness in the medial joint line.    Range of Motion   Extension: normal   Flexion: 120     Tests   Varus: negative Valgus: negative  Drawer:  Anterior - negative    Posterior - negative  Patellar apprehension: negative    Other   Erythema: absent  Scars: absent  Sensation: normal  Pulse: present  Swelling: mild  Effusion: effusion present      Left Knee " Exam     Tenderness   The patient is experiencing no tenderness.     Range of Motion   Extension: normal   Flexion: normal     Tests   Varus: negative Valgus: negative  Drawer:  Anterior - negative     Posterior - negative  Patellar apprehension: negative    Other   Erythema: absent  Scars: absent  Sensation: normal  Pulse: present  Swelling: none  Effusion: no effusion present             X-ray images previously ordered and independently reviewed by me in the office today with the patient. X-ray shows:     Recent Results (from the past 744 hour(s))   XR Knee Bilateral 3 Views    Narrative    12/12/2019    Moderate damage diffuse medial and lateral compartment narrowing   bilaterally, and mild lateral patellar arthritis bilaterally.  Moderate   stenosis also noted in the medial and lateral compartments, and there are   vascular calcifications in the posterior knees bilaterally.  No acute   fractures identified.       ASSESSMENT/PLAN    ICD-10-CM    1. Primary osteoarthritis of both knees M17.0      Lengthy discussion about osteoarthritis, and treatment options, including supplements, physical therapy, cortisone injections, including risks benefits and alternatives of cortisone injections, including progression of degeneration with cortisone use.  Pain relief can be enhanced.  Visco supplementation  discussed as well.  She opted to go forward with cortisone injections today.      Large Joint Injection/Arthocentesis: R knee joint  Date/Time: 12/18/2019 11:11 AM  Performed by: Winston Disla MD  Authorized by: Winston Disla MD     Indications:  Pain and osteoarthritis  Needle Size:  25 G  Guidance: landmark guided    Approach:  Superolateral  Location:  Knee      Medications:  3 mL lidocaine 1 %; 80 mg triamcinolone 40 MG/ML  Outcome:  Tolerated well, no immediate complications  Procedure discussed: discussed risks, benefits, and alternatives    Consent Given by:  Patient  Timeout: timeout called  immediately prior to procedure    Prep: patient was prepped and draped in usual sterile fashion

## 2019-12-19 RX ORDER — LIDOCAINE HYDROCHLORIDE 10 MG/ML
3 INJECTION, SOLUTION INFILTRATION; PERINEURAL
Status: DISCONTINUED | OUTPATIENT
Start: 2019-12-18 | End: 2020-11-30

## 2019-12-19 RX ORDER — TRIAMCINOLONE ACETONIDE 40 MG/ML
80 INJECTION, SUSPENSION INTRA-ARTICULAR; INTRAMUSCULAR
Status: DISCONTINUED | OUTPATIENT
Start: 2019-12-18 | End: 2020-11-30

## 2020-01-09 ENCOUNTER — THERAPY VISIT (OUTPATIENT)
Dept: PHYSICAL THERAPY | Facility: CLINIC | Age: 80
End: 2020-01-09
Attending: FAMILY MEDICINE
Payer: COMMERCIAL

## 2020-01-09 DIAGNOSIS — M17.0 PRIMARY OSTEOARTHRITIS OF BOTH KNEES: ICD-10-CM

## 2020-01-09 DIAGNOSIS — M25.561 CHRONIC PAIN OF BOTH KNEES: ICD-10-CM

## 2020-01-09 DIAGNOSIS — M25.562 CHRONIC PAIN OF BOTH KNEES: ICD-10-CM

## 2020-01-09 DIAGNOSIS — G89.29 CHRONIC PAIN OF BOTH KNEES: ICD-10-CM

## 2020-01-09 PROCEDURE — 97161 PT EVAL LOW COMPLEX 20 MIN: CPT | Mod: GP | Performed by: PHYSICAL THERAPIST

## 2020-01-09 PROCEDURE — 97110 THERAPEUTIC EXERCISES: CPT | Mod: GP | Performed by: PHYSICAL THERAPIST

## 2020-01-09 PROCEDURE — 97530 THERAPEUTIC ACTIVITIES: CPT | Mod: GP | Performed by: PHYSICAL THERAPIST

## 2020-01-09 NOTE — PROGRESS NOTES
[place] [facility] Initial Evaluation  Subjective:       Pertinent medical history includes:  High blood pressure and overweight.  Medical allergies: No.  Surgeries include:  None.  Current medications:  High blood pressure medication.   Primary job tasks include:  Driving.           Occupation: None.                           Objective:  System    Physical Exam    General     ROS    Assessment/Plan:

## 2020-01-09 NOTE — PROGRESS NOTES
[place] [facility] Initial Evaluation    Physical Therapy Initial Examination/Evaluation  January 9, 2020    Elizabeth Joy  is a 79 year old  female referred to physical therapy by Dr. Disla for treatment of B knee pain due to OA.      DOI/onset MD referral date 12-11-19  Mechanism of injury patient reports a gradual onset of B knee pain over the past year without incident. X-rays show advanced OA.  DOS n/a  Prior treatment none.     Chief Complaint:   Limited walking and stairs.   Pain location: B knee joints, non specific  Quality: aching  Constant/Intermittent: intermittent  Time of day: no time pattern  Symptoms have improved since onset.    Current pain 1/10.  Pain at best 1/10.  Pain at worst 2/10.    Symptoms aggravated by prolonged walking and stairs.    Symptoms improved with rest.     Social history:  Lives with her  who has dementia.    Occupation: retired.     Patient having difficulty with ADLs: prolonged standing activities such as grooming, washing dishes, cooking.    Patient's goals are to reduce knee pain.    Patient reports general health as good.  Related medical history no specific hx of knee injury.    Surgical History:  none.    Imaging: x-rays.    Medications:  prednisone.       Return to MD:  As needed.        Subjective:  HPI                    Objective:  Standing Alignment:              Knee:  Genu valgus R and genu valgus L      Gait:    Gait Type:  Normal         Flexibility/Screens:   Positive screens:  Knee        Lower Extremity:  Normal                                                    Knee Evaluation:  ROM:  Strength wnl knee: B hip flex 4-/5; abd 4-/5; ext 4-/5.  AROM    Hyperextension:  Left:  0    Right: 0  Extension:  Left: 0    Right:  0  Flexion: Left: 125    Right: 125        Strength:     Extension:  Left: 4/5   Pain:      Right: 4/5   Pain:  Flexion:  Left: 4/5   Pain:      Right: 4/5   Pain:        Ligament Testing:  Normal                Special  Tests:   Left knee positive for the following special tests:  Meniscal  Right knee positive for the following tests:  Meniscal  Palpation:    Left knee tenderness present at:  Medial Joint Line  Right knee tenderness present at:  Medial Joint Line  Edema:  Normal    Mobility Testing:  Normal            Functional Testing:          Quad:    Single Leg Squat:  Left:      Right:        Bilateral Leg Squat:   Excessive anterior knee excursion                  General Evaluation:          Lower Extremity Flexibility:  normal                                                                             ROS    Assessment/Plan:    Patient is a 79 year old female with both sides knee complaints.    Patient has the following significant findings with corresponding treatment plan.                Diagnosis 1:  B knee pain  Pain -  self management and education  Decreased strength - therapeutic exercise and therapeutic activities  Impaired muscle performance - neuro re-education  Decreased function - therapeutic activities    Therapy Evaluation Codes:   1) History comprised of:   Personal factors that impact the plan of care:      Time since onset of symptoms.    Comorbidity factors that impact the plan of care are:      Osteoarthritis.     Medications impacting care: None.  2) Examination of Body Systems comprised of:   Body structures and functions that impact the plan of care:      Knee.   Activity limitations that impact the plan of care are:      Stairs and Walking.  3) Clinical presentation characteristics are:   Stable/Uncomplicated.  4) Decision-Making    Low complexity using standardized patient assessment instrument and/or measureable assessment of functional outcome.  Cumulative Therapy Evaluation is: Low complexity.    Previous and current functional limitations:  (See Goal Flow Sheet for this information)    Short term and Long term goals: (See Goal Flow Sheet for this information)     Communication ability:  Patient  appears to be able to clearly communicate and understand verbal and written communication and follow directions correctly.  Treatment Explanation - The following has been discussed with the patient:   RX ordered/plan of care  Anticipated outcomes  Possible risks and side effects  This patient would benefit from PT intervention to resume normal activities.   Rehab potential is good.    Frequency:  2 X a month, once daily  Duration:  for 1 month  Discharge Plan:  Achieve all LTG.  Independent in home treatment program.  Reach maximal therapeutic benefit.    Please refer to the daily flowsheet for treatment today, total treatment time and time spent performing 1:1 timed codes.

## 2020-01-27 ENCOUNTER — ANCILLARY PROCEDURE (OUTPATIENT)
Dept: GENERAL RADIOLOGY | Facility: CLINIC | Age: 80
End: 2020-01-27
Attending: FAMILY MEDICINE
Payer: COMMERCIAL

## 2020-01-27 ENCOUNTER — OFFICE VISIT (OUTPATIENT)
Dept: ORTHOPEDICS | Facility: CLINIC | Age: 80
End: 2020-01-27
Payer: COMMERCIAL

## 2020-01-27 VITALS
HEIGHT: 66 IN | WEIGHT: 165 LBS | SYSTOLIC BLOOD PRESSURE: 137 MMHG | BODY MASS INDEX: 26.52 KG/M2 | DIASTOLIC BLOOD PRESSURE: 90 MMHG

## 2020-01-27 DIAGNOSIS — M25.512 CHRONIC PAIN OF BOTH SHOULDERS: ICD-10-CM

## 2020-01-27 DIAGNOSIS — M25.511 CHRONIC PAIN OF BOTH SHOULDERS: ICD-10-CM

## 2020-01-27 DIAGNOSIS — M19.011 PRIMARY OSTEOARTHRITIS OF BOTH SHOULDERS: Primary | ICD-10-CM

## 2020-01-27 DIAGNOSIS — G89.29 CHRONIC PAIN OF BOTH SHOULDERS: ICD-10-CM

## 2020-01-27 DIAGNOSIS — M19.012 PRIMARY OSTEOARTHRITIS OF BOTH SHOULDERS: Primary | ICD-10-CM

## 2020-01-27 PROCEDURE — 73030 X-RAY EXAM OF SHOULDER: CPT | Mod: 59

## 2020-01-27 PROCEDURE — 99214 OFFICE O/P EST MOD 30 MIN: CPT | Performed by: FAMILY MEDICINE

## 2020-01-27 ASSESSMENT — MIFFLIN-ST. JEOR: SCORE: 1240.19

## 2020-01-27 NOTE — PROGRESS NOTES
[facility]   Follow-up Visit s Jan 27, 2020    PCP: Marky Josue      Subjective:  Elizabeth is a 79 year old female who is seen in follow up for evaluation of   Chief Complaint   Patient presents with     Left Shoulder - Pain     Right Shoulder - Pain     Her last visit was on 12/18/2019.  Since that time, symptoms have been better than before in her knees. Today she would like to discuss bilateral shoulder pain. Elizabeth Joy is accompanied today by self.       Patient had a right knee Cortisone injection on 12/18/2019 that provided 100% relief.   Patient has noticed improved symptoms with injection and home exercise program  Treatment. Uses ibuprofen and tylenol for pain control.     Pain is located bilateral shoulders, diffuse, persistent. Denies numbness, tingling in hands. Pain worsens with ADLs and pushing herself up in bed.   Patient is using no aids.    Patient denies any new injuries.    Patient's past medical, surgical, social and family histories are reviewed today.    History from previous visit on 12/18/2019  Her last visit was on 12/11/2019.  Since that time, symptoms have been unchanged. Elizabeth Joy is accompanied today by self.     Patient has noticed no change of symptoms with rest and activity avoidance  treatment.  Patient has slight swelling  Pain is located bilateral knees and bilateral groin persistent.  Patient is using no aids.    Patient denies any new injuries.    She is wondering what other options are available.  She is curious about cortisone injections.    Patient's past medical, surgical, social and family histories are reviewed today.    History from previous visit on 12/11/2019  Injury: Reports insidious onset without acute precipitating event.     Location of Pain: bilateral knee anterior , nonradiating   Duration of Pain: chronic, worsening, 10 month(s),   Rating of Pain at worst: 7/10  Rating of Pain Currently: 4/10  Pain is better with:  activity avoidance   Pain is worse with: crossing her legs, putting on her pants  Treatment so far consists of:  prednisone  Associated symptoms: swelling Moderate  Recent imaging completed: No recent imaging completed.  Prior History of related problems: was on prednisone for PMR since Feb, stopped recently.    Social History: retired     Past Medical History:   Diagnosis Date     Elevated blood sugar      HTN (hypertension) 35     Hx of colonoscopy 2008    bx collagenous colitis     Hypercholesteremia      Insomnia     on ambien 10mg 1/2 daily for long time     Lichen sclerosus et atrophicus of the vulva 02/2017    dx by Dr. Weldon, had bx     Lymphocytic colitis 2008    on colon exam at [facility]     PMR (polymyalgia rheumatica) (H) 01/31/2019     Syncope 08/2016    echo trace lvh, nl ef; ziopatch with one 5 beat run of vtach, seen by ep Dr. Allen and nothing found     Ventricular tachycardia (H) 8/16    seen on ziopatch done for syncope, just one 5 beat run       Patient Active Problem List    Diagnosis Date Noted     Chronic pain of both knees 01/09/2020     Priority: Medium     PMR (polymyalgia rheumatica) (H) 01/31/2019     Priority: Medium     Lichen sclerosus et atrophicus of the vulva 02/01/2017     Priority: Medium     dx by Dr. Weldon, had bx       Fung-Figueroa syncope      Priority: Medium     echo trace lvh, nl ef; ziopatch with one 5 beat run of vtach       Ventricular tachycardia (H)      Priority: Medium     seen on ziopatch done for syncope, just one 5 beat run       Elevated blood sugar      Priority: Medium     Advanced directives, counseling/discussion 10/02/2012     Priority: Medium     Patient states has Advance Directive and will bring in a copy to clinic. 10/2/2012 Maddy LUNA LPN           Hyperlipidemia LDL goal <160 10/02/2012     Priority: Medium     Benign essential hypertension      Priority: Medium     Insomnia      Priority: Medium     Lymphocytic colitis 01/01/2008     Priority: Medium     " on colon exam at [facility]         Family History   Problem Relation Age of Onset     Breast Cancer Mother        Social History     Socioeconomic History     Marital status:      Spouse name: Not on file     Number of children: 3     Years of education: Not on file     Highest education level: Not on file   Occupational History     Not on file   Social Needs     Financial resource strain: Not on file     Food insecurity:     Worry: Not on file     Inability: Not on file     Transportation needs:     Medical: Not on file     Non-medical: Not on file   Tobacco Use     Smoking status: Former Smoker     Packs/day: 1.00     Years: 42.00     Pack years: 42.00     Types: Cigarettes     Start date:      Last attempt to quit: 1997     Years since quittin.2     Smokeless tobacco: Never Used   Substance and Sexual Activity     Alcohol use: No     Alcohol/week: 0.0 standard drinks     Frequency: Never       Past Surgical History:   Procedure Laterality Date     HYSTERECTOMY, PAP NO LONGER INDICATED      had bso also, not for cancer     KERATOTOMY ARCUATE WITH FEMTOSECOND LASER/IMAGING FOR ATIOL Right 2015    Procedure: KERATOTOMY ARCUATE WITH FEMTOSECOND LASER/IMAGING FOR ATIOL;  Surgeon: Lionel Reza MD;  [facility]     KERATOTOMY ARCUATE WITH FEMTOSECOND LASER/IMAGING FOR ATIOL Right 2015    Procedure: KERATOTOMY ARCUATE WITH FEMTOSECOND LASER/IMAGING FOR ATIOL;  Surgeon: Lionel Reza MD;  Location: [facility]     PHACOEMULSIFICATION CLEAR CORNEA WITH STANDARD INTRAOCULAR LENS IMPLANT Right 2015    Procedure: PHACOEMULSIFICATION CLEAR CORNEA WITH STANDARD INTRAOCULAR LENS IMPLANT;  Surgeon: Lionel Reza MD;  Location:  [facility]     pilonidal cyst removed  30's             Review of Systems   Musculoskeletal: Positive for joint pain.   All other systems reviewed and are negative.        Physical Exam  BP (!) 137/90   Ht 1.676 m (5' 6\")   Wt 74.8 kg (165 lb)   BMI 26.63 kg/m  "   Constitutional:well-developed, well-nourished, and in no distress.   Cardiovascular: Intact distal pulses.    Neurological: alert. Gait Normal:   Gait, station, stance, and balance appear normal for age  Skin: Skin is warm and dry.   Psychiatric: Mood and affect normal.   Respiratory: unlabored, speaks in full sentences  Lymph: no LAD, no lymphangitis        Right Shoulder Exam     Tenderness   The patient is experiencing no tenderness.    Range of Motion   Active abduction:  90 normal   Passive abduction:  90 normal   Extension: normal   External rotation:  70 normal   Forward flexion:  90 normal   Internal rotation 0 degrees: Sacrum     Muscle Strength   Abduction: 5/5   Internal rotation: 5/5   External rotation: 4/5   Supraspinatus: 4/5   Subscapularis: 5/5   Biceps: 5/5     Tests   Villavicencio test: positive  Impingement: positive  Drop arm: positive    Other   Erythema: absent  Scars: absent  Sensation: normal  Pulse: present      Left Shoulder Exam     Range of Motion   Active abduction: 90   Passive abduction: 90   External rotation: 70   Forward flexion: 90   Internal rotation 0 degrees: Sacrum     Muscle Strength   Abduction: 5/5   Internal rotation: 5/5   External rotation: 4/5   Supraspinatus: 4/5   Subscapularis: 5/5   Biceps: 5/5     Tests   Villavicencio test: positive  Impingement: positive  Drop arm: positive    Other   Erythema: absent  Scars: absent  Sensation: normal  Pulse: present                X-ray images Ordered and independently reviewed by me in the office today with the patient. X-ray shows:   Recent Results (from the past 48 hour(s))   XR Shoulder Bilateral G/E 2 Views    Narrative    SHOULDER BILATERAL TWO OR MORE VIEWS  1/27/2020 11:35 AM     HISTORY: Chronic pain of both shoulders.    COMPARISON: None.      Impression    IMPRESSION:   1. Right shoulder: Mild to moderate acromioclavicular degenerative  changes. Moderate glenohumeral degenerative changes with inferior  humeral marginal  osteophyte formation and subchondral sclerosis. No  evidence of fracture.    2. Left shoulder: Mild to moderate acromioclavicular degenerative  changes. Moderate glenohumeral degenerative changes. No evidence of  fracture.    HERSON SOTO MD       ASSESSMENT/PLAN    ICD-10-CM    1. 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 Chronic pain of both shoulders M25.511 XR Shoulder Bilateral G/E 2 Views    G89.29     M25.512      Moderate DJD of bilateral shoulders.  treatments discussed; she opts to proceed with bilateral US guided cortisone steroid injection. This will be scheduled at a later date.   PHYSICAL THERAPY referral also.       Procedures

## 2020-01-27 NOTE — LETTER
1/27/2020         RE: Elizabeth Joy  [address]  [place] [address]        Dear Colleague,    Thank you for referring your patient, Elizabeth Joy, to the  SP[facility]TS [facility]. Please see a copy of my visit note below.    [facility]   Follow-up Visit s Jan 27, 2020    PCP: Marky Josue      Subjective:  Elizabeth is a 79 year old female who is seen in follow up for evaluation of   Chief Complaint   Patient presents with     Left Shoulder - Pain     Right Shoulder - Pain     Her last visit was on 12/18/2019.  Since that time, symptoms have been better than before in her knees. Today she would like to discuss bilateral shoulder pain. Elizabeth Joy is accompanied today by self.       Patient had a right knee Cortisone injection on 12/18/2019 that provided 100% relief.   Patient has noticed improved symptoms with injection and home exercise program  Treatment. Uses ibuprofen and tylenol for pain control.     Pain is located bilateral shoulders, diffuse, persistent. Denies numbness, tingling in hands. Pain worsens with ADLs and pushing herself up in bed.   Patient is using no aids.    Patient denies any new injuries.    Patient's past medical, surgical, social and family histories are reviewed today.    History from previous visit on 12/18/2019  Her last visit was on 12/11/2019.  Since that time, symptoms have been unchanged. Elizabeth Joy is accompanied today by self.     Patient has noticed no change of symptoms with rest and activity avoidance  treatment.  Patient has slight swelling  Pain is located bilateral knees and bilateral groin persistent.  Patient is using no aids.    Patient denies any new injuries.    She is wondering what other options are available.  She is curious about cortisone injections.    Patient's past medical, surgical, social and family histories are reviewed today.    History from previous visit on 12/11/2019  Injury: Reports insidious  onset without acute precipitating event.     Location of Pain: bilateral knee anterior , nonradiating   Duration of Pain: chronic, worsening, 10 month(s),   Rating of Pain at worst: 7/10  Rating of Pain Currently: 4/10  Pain is better with: activity avoidance   Pain is worse with: crossing her legs, putting on her pants  Treatment so far consists of:  prednisone  Associated symptoms: swelling Moderate  Recent imaging completed: No recent imaging completed.  Prior History of related problems: was on prednisone for PMR since Feb, stopped recently.    Social History: retired     Past Medical History:   Diagnosis Date     Elevated blood sugar      HTN (hypertension) 35     Hx of colonoscopy 2008    bx collagenous colitis     Hypercholesteremia      Insomnia     on ambien 10mg 1/2 daily for long time     Lichen sclerosus et atrophicus of the vulva 02/2017    dx by Dr. Weldon, had bx     Lymphocytic colitis 2008    on colon exam at [facility]     PMR (polymyalgia rheumatica) (H) 01/31/2019     Syncope 08/2016    echo trace lvh, nl ef; ziopatch with one 5 beat run of vtach, seen by ep Dr. Allen and nothing found     Ventricular tachycardia (H) 8/16    seen on ziopatch done for syncope, just one 5 beat run       Patient Active Problem List    Diagnosis Date Noted     Chronic pain of both knees 01/09/2020     Priority: Medium     PMR (polymyalgia rheumatica) (H) 01/31/2019     Priority: Medium     Lichen sclerosus et atrophicus of the vulva 02/01/2017     Priority: Medium     dx by Dr. Weldon, had bx       Fung-Figueroa syncope      Priority: Medium     echo trace lvh, nl ef; ziopatch with one 5 beat run of vtach       Ventricular tachycardia (H)      Priority: Medium     seen on ziopatch done for syncope, just one 5 beat run       Elevated blood sugar      Priority: Medium     Advanced directives, counseling/discussion 10/02/2012     Priority: Medium     Patient states has Advance Directive and will bring in a copy to clinic.  10/2/2012 Maddy LUNA LPN           Hyperlipidemia LDL goal <160 10/02/2012     Priority: Medium     Benign essential hypertension      Priority: Medium     Insomnia      Priority: Medium     Lymphocytic colitis 2008     Priority: Medium     on colon exam at [facility]         Family History   Problem Relation Age of Onset     Breast Cancer Mother        Social History     Socioeconomic History     Marital status:      Spouse name: Not on file     Number of children: 3     Years of education: Not on file     Highest education level: Not on file   Occupational History     Not on file   Social Needs     Financial resource strain: Not on file     Food insecurity:     Worry: Not on file     Inability: Not on file     Transportation needs:     Medical: Not on file     Non-medical: Not on file   Tobacco Use     Smoking status: Former Smoker     Packs/day: 1.00     Years: 42.00     Pack years: 42.00     Types: Cigarettes     Start date:      Last attempt to quit: 1997     Years since quittin.2     Smokeless tobacco: Never Used   Substance and Sexual Activity     Alcohol use: No     Alcohol/week: 0.0 standard drinks     Frequency: Never       Past Surgical History:   Procedure Laterality Date     HYSTERECTOMY, PAP NO LONGER INDICATED      had bso also, not for cancer     KERATOTOMY ARCUATE WITH FEMTOSECOND LASER/IMAGING FOR ATIOL Right 2015    Procedure: KERATOTOMY ARCUATE WITH FEMTOSECOND LASER/IMAGING FOR ATIOL;  Surgeon: Lionel Reza MD;  Location: [facility]     KERATOTOMY ARCUATE WITH FEMTOSECOND LASER/IMAGING FOR ATIOL Right 2015    Procedure: KERATOTOMY ARCUATE WITH FEMTOSECOND LASER/IMAGING FOR ATIOL;  Surgeon: Lionel Reza MD;  Location: [facility]     PHACOEMULSIFICATION CLEAR CORNEA WITH STANDARD INTRAOCULAR LENS IMPLANT Right 2015    Procedure: PHACOEMULSIFICATION CLEAR CORNEA WITH STANDARD INTRAOCULAR LENS IMPLANT;  Surgeon: Lionel Reza MD;  Location: [facility]      "pilonidal cyst removed  30's             Review of Systems   Musculoskeletal: Positive for joint pain.   All other systems reviewed and are negative.        Physical Exam  BP (!) 137/90   Ht 1.676 m (5' 6\")   Wt 74.8 kg (165 lb)   BMI 26.63 kg/m     Constitutional:well-developed, well-nourished, and in no distress.   Cardiovascular: Intact distal pulses.    Neurological: alert. Gait Normal:   Gait, station, stance, and balance appear normal for age  Skin: Skin is warm and dry.   Psychiatric: Mood and affect normal.   Respiratory: unlabored, speaks in full sentences  Lymph: no LAD, no lymphangitis        Right Shoulder Exam     Tenderness   The patient is experiencing no tenderness.    Range of Motion   Active abduction:  90 normal   Passive abduction:  90 normal   Extension: normal   External rotation:  70 normal   Forward flexion:  90 normal   Internal rotation 0 degrees: Sacrum     Muscle Strength   Abduction: 5/5   Internal rotation: 5/5   External rotation: 4/5   Supraspinatus: 4/5   Subscapularis: 5/5   Biceps: 5/5     Tests   Villavicencio test: positive  Impingement: positive  Drop arm: positive    Other   Erythema: absent  Scars: absent  Sensation: normal  Pulse: present      Left Shoulder Exam     Range of Motion   Active abduction: 90   Passive abduction: 90   External rotation: 70   Forward flexion: 90   Internal rotation 0 degrees: Sacrum     Muscle Strength   Abduction: 5/5   Internal rotation: 5/5   External rotation: 4/5   Supraspinatus: 4/5   Subscapularis: 5/5   Biceps: 5/5     Tests   Villavicencio test: positive  Impingement: positive  Drop arm: positive    Other   Erythema: absent  Scars: absent  Sensation: normal  Pulse: present                X-ray images Ordered and independently reviewed by me in the office today with the patient. X-ray shows:   Recent Results (from the past 48 hour(s))   XR Shoulder Bilateral G/E 2 Views    Narrative    SHOULDER BILATERAL TWO OR MORE VIEWS  1/27/2020 11:35 AM "     HISTORY: Chronic pain of both shoulders.    COMPARISON: None.      Impression    IMPRESSION:   1. Right shoulder: Mild to moderate acromioclavicular degenerative  changes. Moderate glenohumeral degenerative changes with inferior  humeral marginal osteophyte formation and subchondral sclerosis. No  evidence of fracture.    2. Left shoulder: Mild to moderate acromioclavicular degenerative  changes. Moderate glenohumeral degenerative changes. No evidence of  fracture.    HERSON SOTO MD       ASSESSMENT/PLAN    ICD-10-CM    1. 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 Chronic pain of both shoulders M25.511 XR Shoulder Bilateral G/E 2 Views    G89.29     M25.512      Moderate DJD of bilateral shoulders.  treatments discussed; she opts to proceed with bilateral US guided cortisone steroid injection. This will be scheduled at a later date.   PHYSICAL THERAPY referral also.       Procedures      Again, thank you for allowing me to participate in the care of your patient.        Sincerely,        Winston Disla MD

## 2020-02-03 ENCOUNTER — OFFICE VISIT (OUTPATIENT)
Dept: ORTHOPEDICS | Facility: CLINIC | Age: 80
End: 2020-02-03

## 2020-02-03 VITALS
HEIGHT: 66 IN | WEIGHT: 165 LBS | BODY MASS INDEX: 26.52 KG/M2 | SYSTOLIC BLOOD PRESSURE: 129 MMHG | DIASTOLIC BLOOD PRESSURE: 82 MMHG

## 2020-02-03 DIAGNOSIS — M19.011 PRIMARY OSTEOARTHRITIS OF BOTH SHOULDERS: Primary | ICD-10-CM

## 2020-02-03 DIAGNOSIS — M19.012 PRIMARY OSTEOARTHRITIS OF BOTH SHOULDERS: Primary | ICD-10-CM

## 2020-02-03 PROCEDURE — 20611 DRAIN/INJ JOINT/BURSA W/US: CPT | Mod: 50 | Performed by: FAMILY MEDICINE

## 2020-02-03 RX ORDER — LIDOCAINE HYDROCHLORIDE 10 MG/ML
4 INJECTION, SOLUTION INFILTRATION; PERINEURAL
Status: DISCONTINUED | OUTPATIENT
Start: 2020-02-03 | End: 2020-11-30

## 2020-02-03 RX ORDER — TRIAMCINOLONE ACETONIDE 40 MG/ML
40 INJECTION, SUSPENSION INTRA-ARTICULAR; INTRAMUSCULAR
Status: DISCONTINUED | OUTPATIENT
Start: 2020-02-03 | End: 2020-11-30

## 2020-02-03 RX ADMIN — TRIAMCINOLONE ACETONIDE 40 MG: 40 INJECTION, SUSPENSION INTRA-ARTICULAR; INTRAMUSCULAR at 09:16

## 2020-02-03 RX ADMIN — LIDOCAINE HYDROCHLORIDE 4 ML: 10 INJECTION, SOLUTION INFILTRATION; PERINEURAL at 09:16

## 2020-02-03 ASSESSMENT — MIFFLIN-ST. JEOR: SCORE: 1240.19

## 2020-02-03 NOTE — PROGRESS NOTES
Large Joint Injection/Arthocentesis: bilateral glenohumeral  Date/Time: 2/3/2020 9:16 AM  Performed by: Winston Disla MD  Authorized by: Winston Disla MD     Indications:  Pain  Needle Size:  22 G  Guidance: ultrasound    Approach:  Posterolateral  Location:  Shoulder  Laterality:  Bilateral      Site:  Bilateral glenohumeral  Medications (Right):  40 mg triamcinolone 40 MG/ML; 4 mL lidocaine 1 %  Medications (Left):  40 mg triamcinolone 40 MG/ML; 4 mL lidocaine 1 %  Outcome:  Tolerated well, no immediate complications  Procedure discussed: discussed risks, benefits, and alternatives    Consent Given by:  Patient  Timeout: timeout called immediately prior to procedure    Prep: patient was prepped and draped in usual sterile fashion

## 2020-02-03 NOTE — PATIENT INSTRUCTIONS
Post procedure instructions      Most people can return to normal work following procedures    Do not soak in a hot tub, lake or swimming pool for 48 hours    It is ok to shower    The lidocaine (what is giving you pain relief right now) will likely stop working in 1-2 hours.  You will then have pain again; similar to before you received the injection. The corticosteroid may take up to 2 weeks to take full effect.    Ice today if you have pain at the injection site.     It is OK to do your normal activity; vigorous exercise is not recommended.     Post injection instructions given. Watch for any sign of infection: Red, hot to touch and swollen, call or return immediately if so.     Ice twice today for 20 minutes each time and three times tomorrow (20 minutes each session) for symptom management.     OTC meds for pain only as needed (Tylenol extra strength-500mg )    It is common to have facial flushing for a few days. You can also occasionally experience a feeling of restlessness for a few days after the injection. Any other concerns, give us a call.    Follow up with PCP for general medical issues.     The injection you received today may take 7-10 days to begin to reduce your pain and swelling.    Cannot have a repeat steroid injection any sooner than 3 months.     If you are a known diabetic, make sure to increase the frequency you check your blood sugars. Adjust accordingly. Steroid injections will increase your blood sugars for about 1 week.     Follow up if needed. Call direct clinic number [[phone]] at any time with questions or concerns.

## 2020-02-03 NOTE — LETTER
2/3/2020         RE: Elizabeth Joy  [address]  [place] [address]        Dear Colleague,    Thank you for referring your patient, Elizabeth Joy, to the  [facility]TS [facility]IC[facility]. Please see a copy of my visit note below.    Large Joint Injection/Arthocentesis: bilateral glenohumeral  Date/Time: 2/3/2020 9:16 AM  Performed by: Winston Disla MD  Authorized by: Winston Disla MD     Indications:  Pain  Needle Size:  22 G  Guidance: ultrasound    Approach:  Posterolateral  Location:  Shoulder  Laterality:  Bilateral      Site:  Bilateral glenohumeral  Medications (Right):  40 mg triamcinolone 40 MG/ML; 4 mL lidocaine 1 %  Medications (Left):  40 mg triamcinolone 40 MG/ML; 4 mL lidocaine 1 %  Outcome:  Tolerated well, no immediate complications  Procedure discussed: discussed risks, benefits, and alternatives    Consent Given by:  Patient  Timeout: timeout called immediately prior to procedure    Prep: patient was prepped and draped in usual sterile fashion            Again, thank you for allowing me to participate in the care of your patient.        Sincerely,        Winston Disla MD

## 2020-02-11 ENCOUNTER — TELEPHONE (OUTPATIENT)
Dept: FAMILY MEDICINE | Facility: CLINIC | Age: 80
End: 2020-02-11

## 2020-02-11 NOTE — TELEPHONE ENCOUNTER
"Spoke to patient.  Intermittent dizziness and \"feels like my vertigo that I had a few years ago.\" Is cautious and not sure she should drive during this \"bout of dizziness\".     Worse when first getting up from sitting or lying position.  Intermittent; no nausea.    He concern is \"transportation to the clinic\" while this dizziness is persisting.  Isn't able to leave her  unattended (memory issues).    Wondering if Meclizine is worth a try.    I informed her that we could try to locate a pharmacy that delivers IF  feels this is an option.  She agrees to an office visit if needed (will need to arrange transportation), or if Meclizine is not effective.    , is she a candidate for Meclizine?  If so, please PEND order until we locate a pharmacy that delivers.    Thank you,      Nevaeh Grover RN on 2/11/2020 at 5:00 PM      "

## 2020-02-11 NOTE — TELEPHONE ENCOUNTER
Left message on machine to call back  Offer appt.with Team if she wishes.    Nevaeh Grover RN on 2/11/2020 at 1:26 PM

## 2020-02-11 NOTE — TELEPHONE ENCOUNTER
Reason for call:  Patient reporting a symptom    Symptom or request: dizziness    Duration (how long have symptoms been present): 5+ days    Have you been treated for this before? Yes    Additional comments: Pt would like someone to call her back regarding her symptoms and what she should do. Please call to discuss.     Phone Number patient can be reached at:  Home number on file [phone] (home)    Best Time:  Any    Can we leave a detailed message on this number:  YES    Call taken on 2/11/2020 at 11:27 AM by Veronica Torre

## 2020-02-12 NOTE — TELEPHONE ENCOUNTER
"Spoke with Pt:     1) Feels as though she is not on solid ground \"nothing is spinning\" I just feel unstable     2) Position changes only- denies at rest     3) Denies LOC/Syncope     4) Denies CP, SOB     5) Denies fever, however, their furnace keeps going \"off\" when it goes on again, she feels \"very, very warm\"     States she \"guessed\" it was vertigo because she has had it before. Her sister-in-law also has vertigo and wards off her symptoms with Meclizine     We discussed hydration- \"I am trying\" Encouraged to continue hydrated. If symptoms worsen, she falls, or LOC or SOB/CP, call 911. (Pt verbalizes agreement to plan)    Their help is out of town for a couple of weeks, and their children do not live near. She is afraid to drive \"and won't drive\" (as discussed below).     To PCP: If RX is appropriate, please pend and we can find a pharmacy that will deliver.     Thank you,   Suzan BRUNER RN    Addendum: Pt returns call back and states that her niece dropped off Meclizine 25mg (1-2 tablets/day) \"Niece is Judy Ewing, her father is a doctor\"     OK to take? 1 full tab or 1/2 tab? Up to 2 times daily?     Thank you,   Suzan BRUNER RN    "

## 2020-02-12 NOTE — TELEPHONE ENCOUNTER
Just a few more details please, is it a spinning sensation, is it present at rest, any syncope, chest pain or shortness of breath, fever or other symptoms.    I believe you can buy meclizine otc    Marky Josue M.D.

## 2020-02-12 NOTE — TELEPHONE ENCOUNTER
Detailed message left for patient notifying of this, advising he call back if any questions    Kizzy LERMA RN

## 2020-02-12 NOTE — TELEPHONE ENCOUNTER
TO PCP:     Patient notified and agreeable to plan. Will start with 1/2 tab - she wants to know how often she can take 1/2 tablet if it is helpful?     Kizzy LERMA RN

## 2020-02-17 ENCOUNTER — TELEPHONE (OUTPATIENT)
Dept: FAMILY MEDICINE | Facility: CLINIC | Age: 80
End: 2020-02-17

## 2020-02-17 NOTE — TELEPHONE ENCOUNTER
Reason for call:  Patient reporting a symptom    Symptom or request: fell; tenderness on her back    Duration (how long have symptoms been present): this morning    Have you been treated for this before? No    Additional comments: Sharon calling in to report that she fell this morning on her back but has no other symptoms. She doesn't know if she should be seen or not. She said that she can still walk, didn't hit her head or anything.    Please triage.    Phone Number patient can be reached at:  Cell number on file:    Telephone Information:   Mobile [phone]       Best Time:  any    Can we leave a detailed message on this number:  YES    Call taken on 2/17/2020 at 9:54 AM by Gt Sanchez

## 2020-02-17 NOTE — TELEPHONE ENCOUNTER
"\"Haven't had a pedicare for weeks. My feet are ugly, with dry skin and terrible nails.  I was walking barefoot like a little jungle beauty and just SLID on the slippery hardwood floor onto my left buttock\".     NOTE: (Recent c/o dizziness though she has had NO dizziness for a couple of days)    States it was \"quite a humorous production trying to get up off of the floor\"  She is up and about without any difficulty walking.  Feels like a bruise may be forming when she pushes on the \"fattest\" part of her buttock.  No swelling, redness or other.     Did NOT hit head. Does NOT want to come to clinic.  Advised VERY close observation.  She will likely become significantly stiff and sore over the next couple of days.      Ice pack to buttock; Tylenol as directed for discomfort. Will call soon with a follow up.  She is aware of after hours urgent care in case feeling worse after hours.    Nevaeh Grover RN on 2/17/2020 at 10:55 AM        "

## 2020-03-05 NOTE — PROGRESS NOTES
[facility]   Follow-up Visit s Mar 9, 2020    PCP: Marky Josue      Subjective:  Elizabeth is a 80 year old female who is seen in follow up for evaluation of   Chief Complaint   Patient presents with     Left Knee - Pain     Her last visit was on 2/3/2020.  Since that time, symptoms have been worse than before in her left knee. Elizabeth Joy is accompanied today by self.     Patient had a right knee Cortisone injection on 12/18/2020 that provided 100% relief.      Patient has noticed improved symptoms with injection treatment until last week when pain flared up significantly.  However yesterday her symptoms dramatically resolved and today she denies any significant pain.    Takes occaionsal ibuprofen, 400 mg every 4 hours, occaional tylnol but ran out.     Pain is located left knee, waxing and waning.  Patient is using no aids.    Patient denies any new injuries.    Patient's past medical, surgical, social and family histories are reviewed today.    Prior History of related problems: was on prednisone for PMR since Feb, stopped recently.    Social History: retired     Past Medical History:   Diagnosis Date     Elevated blood sugar      HTN (hypertension) 35     Hx of colonoscopy 2008    bx collagenous colitis     Hypercholesteremia      Insomnia     on ambien 10mg 1/2 daily for long time     Lichen sclerosus et atrophicus of the vulva 02/2017    dx by Dr. Weldon, had bx     Lymphocytic colitis 2008    on colon exam at [facility]     PMR (polymyalgia rheumatica) (H) 01/31/2019     Syncope 08/2016    echo trace lvh, nl ef; ziopatch with one 5 beat run of vtach, seen by ep Dr. Allen and nothing found     Ventricular tachycardia (H) 8/16    seen on ziopatch done for syncope, just one 5 beat run       Patient Active Problem List    Diagnosis Date Noted     Chronic pain of both knees 01/09/2020     Priority: Medium     PMR (polymyalgia rheumatica) (H) 01/31/2019     Priority: Medium      Lichen sclerosus et atrophicus of the vulva 2017     Priority: Medium     dx by Dr. Weldon, had bx       Fung-Figueroa syncope      Priority: Medium     echo trace lvh, nl ef; ziopatch with one 5 beat run of vtach       Ventricular tachycardia (H)      Priority: Medium     seen on ziopatch done for syncope, just one 5 beat run       Elevated blood sugar      Priority: Medium     Advanced directives, counseling/discussion 10/02/2012     Priority: Medium     Patient states has Advance Directive and will bring in a copy to clinic. 10/2/2012 Maddy LUNA LPN           Hyperlipidemia LDL goal <160 10/02/2012     Priority: Medium     Benign essential hypertension      Priority: Medium     Insomnia      Priority: Medium     Lymphocytic colitis 2008     Priority: Medium     on colon exam at [facility]         Family History   Problem Relation Age of Onset     Breast Cancer Mother        Social History     Socioeconomic History     Marital status:      Spouse name: Not on file     Number of children: 3     Years of education: Not on file     Highest education level: Not on file   Occupational History     Not on file   Social Needs     Financial resource strain: Not on file     Food insecurity:     Worry: Not on file     Inability: Not on file     Transportation needs:     Medical: Not on file     Non-medical: Not on file   Tobacco Use     Smoking status: Former Smoker     Packs/day: 1.00     Years: 42.00     Pack years: 42.00     Types: Cigarettes     Start date:      Last attempt to quit: 1997     Years since quittin.2     Smokeless tobacco: Never Used   Substance and Sexual Activity     Alcohol use: No     Alcohol/week: 0.0 standard drinks     Frequency: Never       Past Surgical History:   Procedure Laterality Date     HYSTERECTOMY, PAP NO LONGER INDICATED      had bso also, not for cancer     KERATOTOMY ARCUATE WITH FEMTOSECOND LASER/IMAGING FOR ATIOL Right 2015    Procedure: KERATOTOMY  "ARCUATE WITH FEMTOSECOND LASER/IMAGING FOR ATIOL;  Surgeon: Lionel Reza MD;  [facility]:  [facility]     KERATOTOMY ARCUATE WITH FEMTOSECOND LASER/IMAGING FOR ATIOL Right 7/13/2015    Procedure: KERATOTOMY ARCUATE WITH FEMTOSECOND LASER/IMAGING FOR ATIOL;  Surgeon: Lionel Reza MD;  Location:  [facility]     PHACOEMULSIFICATION CLEAR CORNEA WITH STANDARD INTRAOCULAR LENS IMPLANT Right 7/13/2015    Procedure: PHACOEMULSIFICATION CLEAR CORNEA WITH STANDARD INTRAOCULAR LENS IMPLANT;  Surgeon: Lionel Reza MD;  Location:  [facility]     pilonidal cyst removed  30's             Review of Systems   Musculoskeletal: Positive for joint pain.   All other systems reviewed and are negative.        Physical Exam  Musculoskeletal:      Right knee: She exhibits no effusion.      Left knee: She exhibits no effusion.       BP (!) 174/98   Ht 1.676 m (5' 6\")   Wt 74.8 kg (165 lb)   BMI 26.63 kg/m    Constitutional:well-developed, well-nourished, and in no distress.   Cardiovascular: Intact distal pulses.    Neurological: alert. Gait Normal:   Gait, station, stance, and balance appear normal for age  Skin: Skin is warm and dry.   Psychiatric: Mood and affect normal.   Respiratory: unlabored, speaks in full sentences  Lymph: no LAD, no lymphangitis        Right Knee Exam     Tenderness   The patient is experiencing tenderness in the medial joint line.    Range of Motion   Extension: normal   Flexion: 120     Tests   Varus: negative Valgus: negative  Drawer:  Anterior - negative    Posterior - negative  Patellar apprehension: negative    Other   Erythema: absent  Scars: absent  Sensation: normal  Pulse: present  Swelling: none  Effusion: no effusion present      Left Knee Exam     Tenderness   The patient is experiencing no tenderness.     Range of Motion   Extension: normal   Flexion: normal     Tests   Varus: negative Valgus: negative  Drawer:  Anterior - negative     Posterior - negative  Patellar apprehension: negative    Other "   Erythema: absent  Scars: absent  Sensation: normal  Pulse: present  Swelling: none  Effusion: no effusion present             X-ray images previously ordered and independently reviewed by me in the office today with the patient. X-ray shows:     12/12/2019     Moderate damage diffuse medial and lateral compartment narrowing   bilaterally, and mild lateral patellar arthritis bilaterally.  Moderate   stenosis also noted in the medial and lateral compartments, and there are   vascular calcifications in the posterior knees bilaterally.  No acute   fractures identified.           ASSESSMENT/PLAN    ICD-10-CM    1. Primary osteoarthritis of both knees  M17.0    2. CKD (chronic kidney disease) stage 3, GFR 30-59 ml/min (H)  N18.3      Acute osteoarthritis of last week, which has spontaneously resolved.  We discussed options and deferred injection today.  Her kidney status was noted, and regular NSAIDs were discouraged.  Emphasized use of Tylenol on a more regular basis instead.  Other treatment options including physical therapy and unloading knee brace were discussed but she is comfortable monitoring for now.      Time spent in one-on-one evalution and discussion with an established patient of this clinic.  regarding nature of problem, course, prior treatments, and therapeutic options, at least 50% of which was spent in counseling and coordination of care: 15- 20 minutes.  Procedures

## 2020-03-05 NOTE — PROGRESS NOTES
"HPI   [facility]   Follow-up Visit s Mar 9, 2020    PCP: Marky Josue      Subjective:  Elizabeth is a 80 year old female who is seen in follow up for evaluation of No chief complaint on file.    Her last visit was on 2/3/2020.  Since that time, symptoms have been {BETTER/WORSE:342503}. Elizabeth Joy is accompanied today by {Side:874253}.       Patient had a { :546340} MRI since last visit.      Patient had a {RIGHT/LEFT/BILATERAL 3:557693} {AJ Body Parts:702839} {INJECTIONS:788124} injection on 2/3/2020 that provided ***% relief, lasting for {TIME FRAME :363281}.   Patient reports ***% improvement since last visit.  Patient has noticed {AJ improved:339704::\"improved\"} symptoms with *** treatment.  Patient has {AJ swellin}  Pain is located ***, {Aj intermittent/persistent:988049}.  Patient is using {walker:230150}.    Patient denies any new injuries.    Patient's past medical, surgical, social and family histories are reviewed today.    History from previous visit on 2020       [facility]   Follow-up Visit s 2020    PCP: Marky Josue      Subjective:  Elizabeth is a 79 year old female who is seen in follow up for evaluation of   No chief complaint on file.    Her last visit was on 2019.  Since that time, symptoms have been better than before in her knees. Today she would like to discuss bilateral shoulder pain. Elizabeth Joy is accompanied today by self.       Patient had a right knee Cortisone injection on 2019 that provided 100% relief.   Patient has noticed improved symptoms with injection and home exercise program  Treatment. Uses ibuprofen and tylenol for pain control.     Pain is located bilateral shoulders, diffuse, persistent. Denies numbness, tingling in hands. Pain worsens with ADLs and pushing herself up in bed.   Patient is using no aids.    Patient denies any new injuries.    Patient's past medical, " surgical, social and family histories are reviewed today.    History from previous visit on 12/18/2019  Her last visit was on 12/11/2019.  Since that time, symptoms have been unchanged. Elizabeth Joy is accompanied today by self.     Patient has noticed no change of symptoms with rest and activity avoidance  treatment.  Patient has slight swelling  Pain is located bilateral knees and bilateral groin persistent.  Patient is using no aids.    Patient denies any new injuries.    She is wondering what other options are available.  She is curious about cortisone injections.    Patient's past medical, surgical, social and family histories are reviewed today.    History from previous visit on 12/11/2019  Injury: Reports insidious onset without acute precipitating event.     Location of Pain: bilateral knee anterior , nonradiating   Duration of Pain: chronic, worsening, 10 month(s),   Rating of Pain at worst: 7/10  Rating of Pain Currently: 4/10  Pain is better with: activity avoidance   Pain is worse with: crossing her legs, putting on her pants  Treatment so far consists of:  prednisone  Associated symptoms: swelling Moderate  Recent imaging completed: No recent imaging completed.  Prior History of related problems: was on prednisone for PMR since Feb, stopped recently.    Social History: retired     Past Medical History:   Diagnosis Date     Elevated blood sugar      HTN (hypertension) 35     Hx of colonoscopy 2008    bx collagenous colitis     Hypercholesteremia      Insomnia     on ambien 10mg 1/2 daily for long time     Lichen sclerosus et atrophicus of the vulva 02/2017    dx by Dr. Weldon, had bx     Lymphocytic colitis 2008    on colon exam at [facility]     PMR (polymyalgia rheumatica) (H) 01/31/2019     Syncope 08/2016    echo trace lvh, nl ef; ziopatch with one 5 beat run of vtach, seen by ep Dr. Allen and nothing found     Ventricular tachycardia (H) 8/16    seen on ziopatch done for syncope, just one 5 beat run        Patient Active Problem List    Diagnosis Date Noted     Chronic pain of both knees 2020     Priority: Medium     PMR (polymyalgia rheumatica) (H) 2019     Priority: Medium     Lichen sclerosus et atrophicus of the vulva 2017     Priority: Medium     dx by Dr. Weldon, had bx       Fung-Figueroa syncope      Priority: Medium     echo trace lvh, nl ef; ziopatch with one 5 beat run of vtach       Ventricular tachycardia (H)      Priority: Medium     seen on ziopatch done for syncope, just one 5 beat run       Elevated blood sugar      Priority: Medium     Advanced directives, counseling/discussion 10/02/2012     Priority: Medium     Patient states has Advance Directive and will bring in a copy to clinic. 10/2/2012 Maddy LUNA LPN           Hyperlipidemia LDL goal <160 10/02/2012     Priority: Medium     Benign essential hypertension      Priority: Medium     Insomnia      Priority: Medium     Lymphocytic colitis 2008     Priority: Medium     on colon exam at [facility]         Family History   Problem Relation Age of Onset     Breast Cancer Mother        Social History     Socioeconomic History     Marital status:      Spouse name: Not on file     Number of children: 3     Years of education: Not on file     Highest education level: Not on file   Occupational History     Not on file   Social Needs     Financial resource strain: Not on file     Food insecurity:     Worry: Not on file     Inability: Not on file     Transportation needs:     Medical: Not on file     Non-medical: Not on file   Tobacco Use     Smoking status: Former Smoker     Packs/day: 1.00     Years: 42.00     Pack years: 42.00     Types: Cigarettes     Start date:      Last attempt to quit: 1997     Years since quittin.2     Smokeless tobacco: Never Used   Substance and Sexual Activity     Alcohol use: No     Alcohol/week: 0.0 standard drinks     Frequency: Never       Past Surgical History:   Procedure  Laterality Date     HYSTERECTOMY, PAP NO LONGER INDICATED  1990    had bso also, not for cancer     KERATOTOMY ARCUATE WITH FEMTOSECOND LASER/IMAGING FOR ATIOL Right 7/13/2015    Procedure: KERATOTOMY ARCUATE WITH FEMTOSECOND LASER/IMAGING FOR ATIOL;  Surgeon: Lionel Reza MD;  [facility]:  [facility]     KERATOTOMY ARCUATE WITH FEMTOSECOND LASER/IMAGING FOR ATIOL Right 7/13/2015    Procedure: KERATOTOMY ARCUATE WITH FEMTOSECOND LASER/IMAGING FOR ATIOL;  Surgeon: Lionel Reza MD;  Location:  [facility]     PHACOEMULSIFICATION CLEAR CORNEA WITH STANDARD INTRAOCULAR LENS IMPLANT Right 7/13/2015    Procedure: PHACOEMULSIFICATION CLEAR CORNEA WITH STANDARD INTRAOCULAR LENS IMPLANT;  Surgeon: Lionel Reza MD;  [facility]:  [facility]     pilonidal cyst removed  30's             Review of Systems   Musculoskeletal: Positive for joint pain.   All other systems reviewed and are negative.        Physical Exam  There were no vitals taken for this visit.  Constitutional:well-developed, well-nourished, and in no distress.   Cardiovascular: Intact distal pulses.    Neurological: alert. Gait Normal:   Gait, station, stance, and balance appear normal for age  Skin: Skin is warm and dry.   Psychiatric: Mood and affect normal.   Respiratory: unlabored, speaks in full sentences  Lymph: no LAD, no lymphangitis        Right Shoulder Exam     Tenderness   The patient is experiencing no tenderness.    Range of Motion   Active abduction:  90 normal   Passive abduction:  90 normal   Extension: normal   External rotation:  70 normal   Forward flexion:  90 normal   Internal rotation 0 degrees: Sacrum     Muscle Strength   Abduction: 5/5   Internal rotation: 5/5   External rotation: 4/5   Supraspinatus: 4/5   Subscapularis: 5/5   Biceps: 5/5     Tests   Villavicencio test: positive  Impingement: positive  Drop arm: positive    Other   Erythema: absent  Scars: absent  Sensation: normal  Pulse: present      Left Shoulder Exam     Range of Motion   Active  abduction: 90   Passive abduction: 90   External rotation: 70   Forward flexion: 90   Internal rotation 0 degrees: Sacrum     Muscle Strength   Abduction: 5/5   Internal rotation: 5/5   External rotation: 4/5   Supraspinatus: 4/5   Subscapularis: 5/5   Biceps: 5/5     Tests   Villavicencio test: positive  Impingement: positive  Drop arm: positive    Other   Erythema: absent  Scars: absent  Sensation: normal  Pulse: present                X-ray images Ordered and independently reviewed by me in the office today with the patient. X-ray shows:   No results found for this or any previous visit (from the past 48 hour(s)).    ASSESSMENT/PLAN  No diagnosis found.  Moderate DJD of bilateral shoulders.  treatments discussed; she opts to proceed with bilateral US guided cortisone steroid injection. This will be scheduled at a later date.   PHYSICAL THERAPY referral also.       Procedures

## 2020-03-09 ENCOUNTER — OFFICE VISIT (OUTPATIENT)
Dept: ORTHOPEDICS | Facility: CLINIC | Age: 80
End: 2020-03-09
Payer: COMMERCIAL

## 2020-03-09 VITALS
BODY MASS INDEX: 26.52 KG/M2 | WEIGHT: 165 LBS | HEIGHT: 66 IN | SYSTOLIC BLOOD PRESSURE: 174 MMHG | DIASTOLIC BLOOD PRESSURE: 98 MMHG

## 2020-03-09 DIAGNOSIS — M17.0 PRIMARY OSTEOARTHRITIS OF BOTH KNEES: Primary | ICD-10-CM

## 2020-03-09 DIAGNOSIS — N18.30 CKD (CHRONIC KIDNEY DISEASE) STAGE 3, GFR 30-59 ML/MIN (H): ICD-10-CM

## 2020-03-09 PROCEDURE — 99213 OFFICE O/P EST LOW 20 MIN: CPT | Performed by: FAMILY MEDICINE

## 2020-03-09 ASSESSMENT — MIFFLIN-ST. JEOR: SCORE: 1235.19

## 2020-03-09 NOTE — LETTER
3/9/2020         RE: Elizabeth Joy  [address]  [place] [address]        Dear Colleague,    Thank you for referring your patient, Elizabeth oJy, to the  SPORTS MEDICINE. Please see a copy of my visit note below.    [facility]   Follow-up Visit s Mar 9, 2020    PCP: Marky Josue      Subjective:  Elizabeth is a 80 year old female who is seen in follow up for evaluation of   Chief Complaint   Patient presents with     Left Knee - Pain     Her last visit was on 2/3/2020.  Since that time, symptoms have been worse than before in her left knee. Elizabeth Joy is accompanied today by self.     Patient had a right knee Cortisone injection on 12/18/2020 that provided 100% relief.      Patient has noticed improved symptoms with injection treatment until last week when pain flared up significantly.  However yesterday her symptoms dramatically resolved and today she denies any significant pain.    Takes occaionsal ibuprofen, 400 mg every 4 hours, occaional tylnol but ran out.     Pain is located left knee, waxing and waning.  Patient is using no aids.    Patient denies any new injuries.    Patient's past medical, surgical, social and family histories are reviewed today.    Prior History of related problems: was on prednisone for PMR since Feb, stopped recently.    Social History: retired     Past Medical History:   Diagnosis Date     Elevated blood sugar      HTN (hypertension) 35     Hx of colonoscopy 2008    bx collagenous colitis     Hypercholesteremia      Insomnia     on ambien 10mg 1/2 daily for long time     Lichen sclerosus et atrophicus of the vulva 02/2017    dx by Dr. Weldon, had bx     Lymphocytic colitis 2008    on colon exam [facility]     PMR (polymyalgia rheumatica) (H) 01/31/2019     Syncope 08/2016    echo trace lvh, nl ef; ziopatch with one 5 beat run of vtach, seen by ep Dr. Allen and nothing found     Ventricular tachycardia (H) 8/16    seen on  ziopatch done for syncope, just one 5 beat run       Patient Active Problem List    Diagnosis Date Noted     Chronic pain of both knees 2020     Priority: Medium     PMR (polymyalgia rheumatica) (H) 2019     Priority: Medium     Lichen sclerosus et atrophicus of the vulva 2017     Priority: Medium     dx by Dr. Weldon, had bx       Fung-Figueroa syncope      Priority: Medium     echo trace lvh, nl ef; ziopatch with one 5 beat run of vtach       Ventricular tachycardia (H)      Priority: Medium     seen on ziopatch done for syncope, just one 5 beat run       Elevated blood sugar      Priority: Medium     Advanced directives, counseling/discussion 10/02/2012     Priority: Medium     Patient states has Advance Directive and will bring in a copy to clinic. 10/2/2012 Maddy LUNA LPN           Hyperlipidemia LDL goal <160 10/02/2012     Priority: Medium     Benign essential hypertension      Priority: Medium     Insomnia      Priority: Medium     Lymphocytic colitis 2008     Priority: Medium     on colon exam at [facility]         Family History   Problem Relation Age of Onset     Breast Cancer Mother        Social History     Socioeconomic History     Marital status:      Spouse name: Not on file     Number of children: 3     Years of education: Not on file     Highest education level: Not on file   Occupational History     Not on file   Social Needs     Financial resource strain: Not on file     Food insecurity:     Worry: Not on file     Inability: Not on file     Transportation needs:     Medical: Not on file     Non-medical: Not on file   Tobacco Use     Smoking status: Former Smoker     Packs/day: 1.00     Years: 42.00     Pack years: 42.00     Types: Cigarettes     Start date:      Last attempt to quit: 1997     Years since quittin.2     Smokeless tobacco: Never Used   Substance and Sexual Activity     Alcohol use: No     Alcohol/week: 0.0 standard drinks     Frequency: Never  "      Past Surgical History:   Procedure Laterality Date     HYSTERECTOMY, PAP NO LONGER INDICATED  1990    had bso also, not for cancer     KERATOTOMY ARCUATE WITH FEMTOSECOND LASER/IMAGING FOR ATIOL Right 7/13/2015    Procedure: KERATOTOMY ARCUATE WITH FEMTOSECOND LASER/IMAGING FOR ATIOL;  Surgeon: Lionel Reza MD;  [facility]     KERATOTOMY ARCUATE WITH FEMTOSECOND LASER/IMAGING FOR ATIOL Right 7/13/2015    Procedure: KERATOTOMY ARCUATE WITH FEMTOSECOND LASER/IMAGING FOR ATIOL;  Surgeon: Lionel Reza MD;  [facility]:  [facility]     PHACOEMULSIFICATION CLEAR CORNEA WITH STANDARD INTRAOCULAR LENS IMPLANT Right 7/13/2015    Procedure: PHACOEMULSIFICATION CLEAR CORNEA WITH STANDARD INTRAOCULAR LENS IMPLANT;  Surgeon: Lionel Reza MD;  [facility]:  [facility]     pilonidal cyst removed  30's             Review of Systems   Musculoskeletal: Positive for joint pain.   All other systems reviewed and are negative.        Physical Exam  Musculoskeletal:      Right knee: She exhibits no effusion.      Left knee: She exhibits no effusion.       BP (!) 174/98   Ht 1.676 m (5' 6\")   Wt 74.8 kg (165 lb)   BMI 26.63 kg/m    Constitutional:well-developed, well-nourished, and in no distress.   Cardiovascular: Intact distal pulses.    Neurological: alert. Gait Normal:   Gait, station, stance, and balance appear normal for age  Skin: Skin is warm and dry.   Psychiatric: Mood and affect normal.   Respiratory: unlabored, speaks in full sentences  Lymph: no LAD, no lymphangitis        Right Knee Exam     Tenderness   The patient is experiencing tenderness in the medial joint line.    Range of Motion   Extension: normal   Flexion: 120     Tests   Varus: negative Valgus: negative  Drawer:  Anterior - negative    Posterior - negative  Patellar apprehension: negative    Other   Erythema: absent  Scars: absent  Sensation: normal  Pulse: present  Swelling: none  Effusion: no effusion present      Left Knee Exam     Tenderness   The " patient is experiencing no tenderness.     Range of Motion   Extension: normal   Flexion: normal     Tests   Varus: negative Valgus: negative  Drawer:  Anterior - negative     Posterior - negative  Patellar apprehension: negative    Other   Erythema: absent  Scars: absent  Sensation: normal  Pulse: present  Swelling: none  Effusion: no effusion present             X-ray images previously ordered and independently reviewed by me in the office today with the patient. X-ray shows:     12/12/2019     Moderate damage diffuse medial and lateral compartment narrowing   bilaterally, and mild lateral patellar arthritis bilaterally.  Moderate   stenosis also noted in the medial and lateral compartments, and there are   vascular calcifications in the posterior knees bilaterally.  No acute   fractures identified.           ASSESSMENT/PLAN    ICD-10-CM    1. Primary osteoarthritis of both knees  M17.0    2. CKD (chronic kidney disease) stage 3, GFR 30-59 ml/min (H)  N18.3      Acute osteoarthritis of last week, which has spontaneously resolved.  We discussed options and deferred injection today.  Her kidney status was noted, and regular NSAIDs were discouraged.  Emphasized use of Tylenol on a more regular basis instead.  Other treatment options including physical therapy and unloading knee brace were discussed but she is comfortable monitoring for now.      Time spent in one-on-one evalution and discussion with an established patient of this clinic.  regarding nature of problem, course, prior treatments, and therapeutic options, at least 50% of which was spent in counseling and coordination of care: 15- 20 minutes.  Procedures      Again, thank you for allowing me to participate in the care of your patient.        Sincerely,        Winston Disla MD

## 2020-03-10 DIAGNOSIS — I10 BENIGN ESSENTIAL HYPERTENSION: ICD-10-CM

## 2020-03-10 PROBLEM — N18.30 CKD (CHRONIC KIDNEY DISEASE) STAGE 3, GFR 30-59 ML/MIN (H): Status: ACTIVE | Noted: 2020-03-10

## 2020-03-10 RX ORDER — VERAPAMIL HYDROCHLORIDE 240 MG/1
TABLET, FILM COATED, EXTENDED RELEASE ORAL
Qty: 90 TABLET | Refills: 0 | Status: SHIPPED | OUTPATIENT
Start: 2020-03-10 | End: 2020-06-09

## 2020-03-10 NOTE — TELEPHONE ENCOUNTER
"  verapamil ER (CALAN-SR) 240 MG CR tablet  90 tablet  0  11/25/2019          Last Written Prescription Date:  11/25/2019  Last Fill Quantity: 90,  # refills: 0   Last office visit: 11/19/2019 with prescribing provider:     Future Office Visit:   Next 5 appointments (look out 90 days)    Mar 13, 2020 11:30 AM CDT  Office Visit with Marky Josue MD  [facility] ([facility]) [address] [facility] [address] [phone]           Requested Prescriptions   Pending Prescriptions Disp Refills     verapamil ER (CALAN-SR) 240 MG CR tablet [Pharmacy Med Name: VERAPAMIL ER 240MG TABLETS] 90 tablet 0     Sig: TAKE 1 TABLET BY MOUTH DAILY       Calcium Channel Blockers Protocol  Failed - 3/10/2020 11:05 AM        Failed - Blood pressure under 140/90 in past 12 months     BP Readings from Last 3 Encounters:   03/09/20 (!) 174/98   02/03/20 129/82   01/27/20 (!) 137/90                 Failed - Normal ALT in past 12 months     No lab results found.          Failed - Normal serum creatinine on file in past 12 months     Recent Labs   Lab Test 11/25/19  0814   CR 1.11*             Passed - Recent (12 mo) or future (30 days) visit within the authorizing provider's specialty     Patient has had an office visit with the authorizing provider or a provider within the authorizing providers department within the previous 12 mos or has a future within next 30 days. See \"Patient Info\" tab in inbasket, or \"Choose Columns\" in Meds & Orders section of the refill encounter.              Passed - Medication is active on med list        Passed - Patient is age 18 or older        Passed - No active pregnancy on record        Passed - No positive pregnancy test in past 12 months             "

## 2020-03-13 ENCOUNTER — OFFICE VISIT (OUTPATIENT)
Dept: FAMILY MEDICINE | Facility: CLINIC | Age: 80
End: 2020-03-13
Payer: COMMERCIAL

## 2020-03-13 VITALS
SYSTOLIC BLOOD PRESSURE: 136 MMHG | OXYGEN SATURATION: 96 % | HEART RATE: 78 BPM | DIASTOLIC BLOOD PRESSURE: 72 MMHG | TEMPERATURE: 97.5 F

## 2020-03-13 DIAGNOSIS — M25.50 MULTIPLE JOINT PAIN: Primary | ICD-10-CM

## 2020-03-13 DIAGNOSIS — I10 BENIGN ESSENTIAL HYPERTENSION: ICD-10-CM

## 2020-03-13 DIAGNOSIS — M35.3 PMR (POLYMYALGIA RHEUMATICA) (H): ICD-10-CM

## 2020-03-13 DIAGNOSIS — N18.30 CKD (CHRONIC KIDNEY DISEASE) STAGE 3, GFR 30-59 ML/MIN (H): ICD-10-CM

## 2020-03-13 LAB
BASOPHILS NFR BLD AUTO: 1 %
CRP SERPL-MCNC: 110 MG/L (ref 0–8)
DIFFERENTIAL METHOD BLD: NORMAL
ERYTHROCYTE [DISTWIDTH] IN BLOOD BY AUTOMATED COUNT: 13.3 % (ref 10–15)
ERYTHROCYTE [SEDIMENTATION RATE] IN BLOOD BY WESTERGREN METHOD: 39 MM/H (ref 0–30)
HCT VFR BLD AUTO: 38.2 % (ref 35–47)
HGB BLD-MCNC: 12.9 G/DL (ref 11.7–15.7)
LYMPHOCYTES NFR BLD AUTO: 18 %
MCH RBC QN AUTO: 31.4 PG (ref 26.5–33)
MCHC RBC AUTO-ENTMCNC: 33.8 G/DL (ref 31.5–36.5)
MCV RBC AUTO: 93 FL (ref 78–100)
MONOCYTES NFR BLD AUTO: 9 %
NEUTROPHILS NFR BLD AUTO: 72 %
PLATELET # BLD AUTO: 349 10E9/L (ref 150–450)
RBC # BLD AUTO: 4.11 10E12/L (ref 3.8–5.2)
WBC # BLD AUTO: 14 10E9/L (ref 4–11)

## 2020-03-13 PROCEDURE — 85025 COMPLETE CBC W/AUTO DIFF WBC: CPT | Performed by: INTERNAL MEDICINE

## 2020-03-13 PROCEDURE — 85652 RBC SED RATE AUTOMATED: CPT | Performed by: INTERNAL MEDICINE

## 2020-03-13 PROCEDURE — 99214 OFFICE O/P EST MOD 30 MIN: CPT | Performed by: INTERNAL MEDICINE

## 2020-03-13 PROCEDURE — 36415 COLL VENOUS BLD VENIPUNCTURE: CPT | Performed by: INTERNAL MEDICINE

## 2020-03-13 PROCEDURE — 86140 C-REACTIVE PROTEIN: CPT | Performed by: INTERNAL MEDICINE

## 2020-03-13 RX ORDER — CELECOXIB 100 MG/1
100 CAPSULE ORAL 2 TIMES DAILY PRN
Qty: 50 CAPSULE | Refills: 3 | Status: SHIPPED | OUTPATIENT
Start: 2020-03-13 | End: 2020-03-16

## 2020-03-13 NOTE — PROGRESS NOTES
The patient presents for variety of issues.      The patient is having a lot of pain that she describes as diffuse pain, hurting everywhere, all the time.  She does have a history of polymyalgia rheumatica but has been off prednisone for a long time.  She is not having headaches or jaw claudication.  No fevers, night sweats or weight loss.  In terms of the joint pain she has had these for quite some time.  Prior sed rate and CRP in November were normal when she had the pains.  She has known osteoarthritis of her knees.    The patient has hypertension as noted.  She was here recently for sports medicine and it was elevated.  She takes her medicine regularly.  For me it is significantly better.  She does have CKD as noted.    She denies chest pain or shortness of breath.  No abdominal pain or nausea or vomiting.    Past Medical History:   Diagnosis Date     Elevated blood sugar      HTN (hypertension) 35     Hx of colonoscopy 2008    bx collagenous colitis     Hypercholesteremia      Insomnia     on ambien 10mg 1/2 daily for long time     Lichen sclerosus et atrophicus of the vulva 02/2017    dx by Dr. Weldon, had bx     Lymphocytic colitis 2008    on colon exam [facility]     PMR (polymyalgia rheumatica) (H) 01/31/2019     Syncope 08/2016    echo trace lvh, nl ef; ziopatch with one 5 beat run of vtach, seen by ep Dr. Allen and nothing found     Ventricular tachycardia (H) 8/16    seen on ziopatch done for syncope, just one 5 beat run     Past Surgical History:   Procedure Laterality Date     HYSTERECTOMY, PAP NO LONGER INDICATED  1990    had bso also, not for cancer     KERATOTOMY ARCUATE WITH FEMTOSECOND LASER/IMAGING FOR ATIOL Right 7/13/2015    Procedure: KERATOTOMY ARCUATE WITH FEMTOSECOND LASER/IMAGING FOR ATIOL;  Surgeon: [place], Lionel LUNA MD;  [facility]: [facility]     KERATOTOMY ARCUATE WITH FEMTOSECOND LASER/IMAGING FOR ATIOL Right 7/13/2015    Procedure: KERATOTOMY ARCUATE WITH FEMTOSECOND LASER/IMAGING FOR ATIOL;   Surgeon: Lionel Reza MD;  [facility]     PHACOEMULSIFICATION CLEAR CORNEA WITH STANDARD INTRAOCULAR LENS IMPLANT Right 2015    Procedure: PHACOEMULSIFICATION CLEAR CORNEA WITH STANDARD INTRAOCULAR LENS IMPLANT;  Surgeon: Lionel Reza MD;  [facility]:  [facility]     pilonidal cyst removed  30's     Social History     Socioeconomic History     Marital status:      Spouse name: Not on file     Number of children: 3     Years of education: Not on file     Highest education level: Not on file   Occupational History     Not on file   Social Needs     Financial resource strain: Not on file     Food insecurity     Worry: Not on file     Inability: Not on file     Transportation needs     Medical: Not on file     Non-medical: Not on file   Tobacco Use     Smoking status: Former Smoker     Packs/day: 1.00     Years: 42.00     Pack years: 42.00     Types: Cigarettes     Start date:      Last attempt to quit: 1997     Years since quittin.4     Smokeless tobacco: Never Used   Substance and Sexual Activity     Alcohol use: No     Alcohol/week: 0.0 standard drinks     Frequency: Never     Drug use: No     Sexual activity: Never   Lifestyle     Physical activity     Days per week: Not on file     Minutes per session: Not on file     Stress: Not on file   Relationships     Social connections     Talks on phone: Not on file     Gets together: Not on file     Attends [employer] service: Not on file     Active member of club or organization: Not on file     Attends meetings of clubs or organizations: Not on file     Relationship status: Not on file     Intimate partner violence     Fear of current or ex partner: Not on file     Emotionally abused: Not on file     Physically abused: Not on file     Forced sexual activity: Not on file   Other Topics Concern     Parent/sibling w/ CABG, MI or angioplasty before 65F 55M? Not Asked   Social History Narrative     Not on file     Current Outpatient Medications    Medication Sig Dispense Refill     celecoxib (CELEBREX) 100 MG capsule Take 1 capsule (100 mg) by mouth 2 times daily as needed for moderate pain 50 capsule 3     meloxicam (MOBIC) 7.5 MG tablet Take 1 tablet (7.5 mg) by mouth daily 60 tablet 3     verapamil ER (CALAN-SR) 240 MG CR tablet TAKE 1 TABLET BY MOUTH DAILY 90 tablet 0     Allergies   Allergen Reactions     Ace Inhibitors Cough     FAMILY HISTORY NOTED AND REVIEWED    REVIEW OF SYSTEMS: above    PHYSICAL EXAM    /72   Pulse 78   Temp 97.5  F (36.4  C) (Oral)   SpO2 96%     Patient appears non toxic  Mouth - tongue midline and within normal limits, mucous membranes and posterior pharynx within normal limits, no lesions seen.  Neck - no masses, lesions or tenderness  Nodes - no supraclavicular, cervical or axially adenopathy .  Lungs - clear, normal flow  Cardiovascular - regular rate and rhythm, no murmer, rub or gallop, no jvp or edema, carotids within normal limits, no bruits.  Abdomen - normal active bowel sounds, soft, non tender, no masses, guarding or rebound, no hepatosplenomegaly  No temp artery tenderness or enlargement  The joints of her hands or wrists are not tender or swollen.    Labs sent    ASSESSMENT:  1. jt pains, suspect oa, doubt pmr, vasculitis, etc  2. Hypertension, blood pressure ok for me today  3. Ckd, stable  4. Pmr, doubt is this    PLAN:  Labs today  celebrex prn  Call if not better soon      Marky Josue M.D.

## 2020-03-13 NOTE — PATIENT INSTRUCTIONS
Use the celebrex as needed twice daily for the pains.  If it does not work let me know.    Marky Josue M.D.

## 2020-03-15 ENCOUNTER — TELEPHONE (OUTPATIENT)
Dept: FAMILY MEDICINE | Facility: CLINIC | Age: 80
End: 2020-03-15

## 2020-03-15 DIAGNOSIS — M35.3 PMR (POLYMYALGIA RHEUMATICA) (H): Primary | ICD-10-CM

## 2020-03-15 RX ORDER — PREDNISONE 10 MG/1
TABLET ORAL
Qty: 60 TABLET | Refills: 1 | Status: SHIPPED | OUTPATIENT
Start: 2020-03-15 | End: 2020-09-17

## 2020-03-16 DIAGNOSIS — M25.50 MULTIPLE JOINT PAIN: ICD-10-CM

## 2020-03-16 RX ORDER — CELECOXIB 100 MG/1
100 CAPSULE ORAL 2 TIMES DAILY PRN
Qty: 50 CAPSULE | Refills: 3 | Status: SHIPPED | OUTPATIENT
Start: 2020-03-16 | End: 2020-08-06

## 2020-03-16 NOTE — TELEPHONE ENCOUNTER
Please call the patient re her labs and aches.  The labs suggest that her pmr is active again.  I want her to start back on prednisone and take 15mg daily.  If she develops headaches, fevers, jaw pain or vision change call right away.  Please have her see me in 2 weeks.  .    Marky Josue M.D.

## 2020-03-16 NOTE — TELEPHONE ENCOUNTER
Reason for Call:  Medication or medication refill:    Do you use a [facility]?  Name of the pharmacy and phone number for the current request:     [id number] - [place], [place] - [address]    Name of the medication requested: celecoxib (CELEBREX) 100 MG capsule     Other request: Pt lost medication given on Friday. Is excited they relieve her pain    Can we leave a detailed message on this number? NO    Phone number patient can be reached at: Cell number on file:    Telephone Information:   Mobile [phone]     Best Time: any    Call taken on 3/16/2020 at 10:53 AM by Alcira Abreu

## 2020-03-16 NOTE — TELEPHONE ENCOUNTER
Placed call.  Information given to patient from PCP.  States understood and agreed with advise.  Elizabeth Isabel RN

## 2020-03-17 ENCOUNTER — TELEPHONE (OUTPATIENT)
Dept: FAMILY MEDICINE | Facility: CLINIC | Age: 80
End: 2020-03-17

## 2020-03-26 PROBLEM — G89.29 CHRONIC PAIN OF BOTH KNEES: Status: RESOLVED | Noted: 2020-01-09 | Resolved: 2020-03-26

## 2020-03-26 PROBLEM — M25.561 CHRONIC PAIN OF BOTH KNEES: Status: RESOLVED | Noted: 2020-01-09 | Resolved: 2020-03-26

## 2020-03-26 PROBLEM — M25.562 CHRONIC PAIN OF BOTH KNEES: Status: RESOLVED | Noted: 2020-01-09 | Resolved: 2020-03-26

## 2020-04-02 ENCOUNTER — TELEPHONE (OUTPATIENT)
Dept: FAMILY MEDICINE | Facility: CLINIC | Age: 80
End: 2020-04-02

## 2020-04-02 NOTE — TELEPHONE ENCOUNTER
Pt reports that she is doing just fine:     Headaches- Denies   Fevers- Denies     Denies any symptoms at all     She would like to give you her regards and hopes that you are doing well and staying very safe.     We have scheduled a f/u Telephone visit for Monday     Thank you,   Suzan BRUNER RN

## 2020-04-06 ENCOUNTER — VIRTUAL VISIT (OUTPATIENT)
Dept: FAMILY MEDICINE | Facility: CLINIC | Age: 80
End: 2020-04-06
Payer: COMMERCIAL

## 2020-04-06 DIAGNOSIS — M35.3 PMR (POLYMYALGIA RHEUMATICA) (H): Primary | ICD-10-CM

## 2020-04-06 PROCEDURE — 99213 OFFICE O/P EST LOW 20 MIN: CPT | Mod: TEL | Performed by: INTERNAL MEDICINE

## 2020-04-06 NOTE — PROGRESS NOTES
"Subjective     Elizabeth Joy is a 80 year old female who is being evaluated via a billable telephone visit.      The patient has been notified of following:     \"This telephone visit will be conducted via a call between you and your physician/provider. We have found that certain health care needs can be provided without the need for a physical exam.  This service lets us provide the care you need with a short phone conversation.  If a prescription is necessary we can send it directly to your pharmacy.  If lab work is needed we can place an order for that and you can then stop by our lab to have the test done at a later time.    If during the course of the call the physician/provider feels a telephone visit is not appropriate, you will not be charged for this service.\"     Patient has given verbal consent for Telephone visit?  Yes      I called patient to follow up presumed pmr, as noted seen 3/13/20, then phone call 3/15 and started prednisone at that time, 15mg.  Since then doing very well, no pains, no ha, no fever, no chest pain or shortness of breath.      Will lower prednisone to 10mg a day.  She will call if symptoms return or ha.  Will follow up by phone in 1 month.    Marky Josue M.D.    Time 6:05  "

## 2020-05-07 ENCOUNTER — TELEPHONE (OUTPATIENT)
Dept: FAMILY MEDICINE | Facility: CLINIC | Age: 80
End: 2020-05-07

## 2020-05-08 ENCOUNTER — VIRTUAL VISIT (OUTPATIENT)
Dept: FAMILY MEDICINE | Facility: CLINIC | Age: 80
End: 2020-05-08
Payer: COMMERCIAL

## 2020-05-08 DIAGNOSIS — M35.3 PMR (POLYMYALGIA RHEUMATICA) (H): Primary | ICD-10-CM

## 2020-05-08 PROCEDURE — 99213 OFFICE O/P EST LOW 20 MIN: CPT | Mod: 95 | Performed by: INTERNAL MEDICINE

## 2020-05-08 RX ORDER — PREDNISONE 5 MG/1
TABLET ORAL
Qty: 90 TABLET | Refills: 0 | Status: SHIPPED | OUTPATIENT
Start: 2020-05-08 | End: 2020-10-12

## 2020-05-08 RX ORDER — PREDNISONE 1 MG/1
TABLET ORAL
Qty: 100 TABLET | Refills: 4 | Status: ON HOLD | OUTPATIENT
Start: 2020-05-08 | End: 2021-06-07

## 2020-05-08 NOTE — PROGRESS NOTES
"Elizabeth Joy is a 80 year old female who is being evaluated via a billable telephone visit.      The patient has been notified of following:     \"This telephone visit will be conducted via a call between you and your physician/provider. We have found that certain health care needs can be provided without the need for a physical exam.  This service lets us provide the care you need with a short phone conversation.  If a prescription is necessary we can send it directly to your pharmacy.  If lab work is needed we can place an order for that and you can then stop by our lab to have the test done at a later time.    Telephone visits are billed at different rates depending on your insurance coverage. During this emergency period, for some insurers they may be billed the same as an in-person visit.  Please reach out to your insurance provider with any questions.    If during the course of the call the physician/provider feels a telephone visit is not appropriate, you will not be charged for this service.\"    Patient has given verbal consent for Telephone visit?  Yes    What phone number would you like to be contacted at? [phone]    How would you like to obtain your AVS? Mail a copy    Subjective     Elizabeth Joy is a 80 year old female who presents to clinic today for the following health issues:    The patient has pmr and on prednisone, now down to 10mg as of last virtual visit 4/6/20.  No ha's, no significant aches.  No fevers, chills or night sweats.  No jaw darion.  No shoulder pains.  No chest pain or shortness of breath.    ASSESSMENT:  Pmr, doing well on prednisone    PLAN:  Taper to 8mg daily, call if side effects, call if aches or ha's.  Follow up 1 month    Marky Josue M.D.  Time 6:55      "

## 2020-06-08 DIAGNOSIS — I10 BENIGN ESSENTIAL HYPERTENSION: ICD-10-CM

## 2020-06-09 RX ORDER — VERAPAMIL HYDROCHLORIDE 240 MG/1
TABLET, FILM COATED, EXTENDED RELEASE ORAL
Qty: 90 TABLET | Refills: 1 | Status: ON HOLD | OUTPATIENT
Start: 2020-06-09 | End: 2020-09-19

## 2020-06-09 NOTE — TELEPHONE ENCOUNTER
Routing refill request to provider for review/approval because:  Labs not current:  Cr (1/2019)  ALT    Recent VV- would you like pt to visit for Lab Only?       Please review and authorize if appropriate,     Thank you,   Suzan VERMA RN

## 2020-06-30 ENCOUNTER — TELEPHONE (OUTPATIENT)
Dept: FAMILY MEDICINE | Facility: CLINIC | Age: 80
End: 2020-06-30

## 2020-06-30 ENCOUNTER — OFFICE VISIT (OUTPATIENT)
Dept: FAMILY MEDICINE | Facility: CLINIC | Age: 80
End: 2020-06-30
Payer: COMMERCIAL

## 2020-06-30 VITALS
SYSTOLIC BLOOD PRESSURE: 128 MMHG | WEIGHT: 215 LBS | HEART RATE: 70 BPM | BODY MASS INDEX: 34.55 KG/M2 | DIASTOLIC BLOOD PRESSURE: 84 MMHG | OXYGEN SATURATION: 99 % | TEMPERATURE: 98.6 F | HEIGHT: 66 IN

## 2020-06-30 DIAGNOSIS — R73.9 ELEVATED BLOOD SUGAR: ICD-10-CM

## 2020-06-30 DIAGNOSIS — I10 BENIGN ESSENTIAL HYPERTENSION: ICD-10-CM

## 2020-06-30 DIAGNOSIS — R35.0 URINARY FREQUENCY: Primary | ICD-10-CM

## 2020-06-30 DIAGNOSIS — M35.3 PMR (POLYMYALGIA RHEUMATICA) (H): ICD-10-CM

## 2020-06-30 LAB
ALBUMIN UR-MCNC: NEGATIVE MG/DL
APPEARANCE UR: ABNORMAL
BACTERIA #/AREA URNS HPF: ABNORMAL /HPF
BILIRUB UR QL STRIP: NEGATIVE
COLOR UR AUTO: YELLOW
CRP SERPL-MCNC: 7.2 MG/L (ref 0–8)
ERYTHROCYTE [DISTWIDTH] IN BLOOD BY AUTOMATED COUNT: 13.1 % (ref 10–15)
ERYTHROCYTE [SEDIMENTATION RATE] IN BLOOD BY WESTERGREN METHOD: 11 MM/H (ref 0–30)
GLUCOSE UR STRIP-MCNC: NEGATIVE MG/DL
HBA1C MFR BLD: 5.6 % (ref 0–5.6)
HCT VFR BLD AUTO: 39.4 % (ref 35–47)
HGB BLD-MCNC: 13.3 G/DL (ref 11.7–15.7)
HGB UR QL STRIP: ABNORMAL
KETONES UR STRIP-MCNC: NEGATIVE MG/DL
LEUKOCYTE ESTERASE UR QL STRIP: ABNORMAL
MCH RBC QN AUTO: 31.6 PG (ref 26.5–33)
MCHC RBC AUTO-ENTMCNC: 33.8 G/DL (ref 31.5–36.5)
MCV RBC AUTO: 94 FL (ref 78–100)
NITRATE UR QL: POSITIVE
NON-SQ EPI CELLS #/AREA URNS LPF: ABNORMAL /LPF
PH UR STRIP: 6 PH (ref 5–7)
PLATELET # BLD AUTO: 295 10E9/L (ref 150–450)
RBC # BLD AUTO: 4.21 10E12/L (ref 3.8–5.2)
RBC #/AREA URNS AUTO: ABNORMAL /HPF
SOURCE: ABNORMAL
SP GR UR STRIP: 1.02 (ref 1–1.03)
UROBILINOGEN UR STRIP-ACNC: 0.2 EU/DL (ref 0.2–1)
WBC # BLD AUTO: 13.9 10E9/L (ref 4–11)
WBC #/AREA URNS AUTO: ABNORMAL /HPF

## 2020-06-30 PROCEDURE — 99213 OFFICE O/P EST LOW 20 MIN: CPT | Performed by: INTERNAL MEDICINE

## 2020-06-30 PROCEDURE — 36415 COLL VENOUS BLD VENIPUNCTURE: CPT | Performed by: INTERNAL MEDICINE

## 2020-06-30 PROCEDURE — 86140 C-REACTIVE PROTEIN: CPT | Performed by: INTERNAL MEDICINE

## 2020-06-30 PROCEDURE — 80048 BASIC METABOLIC PNL TOTAL CA: CPT | Performed by: INTERNAL MEDICINE

## 2020-06-30 PROCEDURE — 87088 URINE BACTERIA CULTURE: CPT | Performed by: INTERNAL MEDICINE

## 2020-06-30 PROCEDURE — 84443 ASSAY THYROID STIM HORMONE: CPT | Performed by: INTERNAL MEDICINE

## 2020-06-30 PROCEDURE — 87086 URINE CULTURE/COLONY COUNT: CPT | Performed by: INTERNAL MEDICINE

## 2020-06-30 PROCEDURE — 85027 COMPLETE CBC AUTOMATED: CPT | Performed by: INTERNAL MEDICINE

## 2020-06-30 PROCEDURE — 85652 RBC SED RATE AUTOMATED: CPT | Performed by: INTERNAL MEDICINE

## 2020-06-30 PROCEDURE — 81001 URINALYSIS AUTO W/SCOPE: CPT | Performed by: INTERNAL MEDICINE

## 2020-06-30 PROCEDURE — 83036 HEMOGLOBIN GLYCOSYLATED A1C: CPT | Performed by: INTERNAL MEDICINE

## 2020-06-30 PROCEDURE — 87186 SC STD MICRODIL/AGAR DIL: CPT | Performed by: INTERNAL MEDICINE

## 2020-06-30 RX ORDER — SULFAMETHOXAZOLE/TRIMETHOPRIM 800-160 MG
1 TABLET ORAL 2 TIMES DAILY
Qty: 6 TABLET | Refills: 0 | Status: SHIPPED | OUTPATIENT
Start: 2020-06-30 | End: 2020-08-06

## 2020-06-30 ASSESSMENT — MIFFLIN-ST. JEOR: SCORE: 1461.73

## 2020-06-30 NOTE — TELEPHONE ENCOUNTER
Reason for Call:  Medication or medication refill:    Do you use a [facility]?  Name of the pharmacy and phone number for the current request:       [id number] - [place], [place] - [address]      Name of the medication requested: antibiotics    Other request: pt has an appt for UTI on Thursday. Pt wants the ABX now. Pt told that this was unlikely, but requested that the request be made anyway    Can we leave a detailed message on this number? YES    Phone number patient can be reached at: Home number on file [phone] (home)    Best Time: any    Call taken on 6/30/2020 at 12:20 PM by Sukhwinder Kumar

## 2020-06-30 NOTE — TELEPHONE ENCOUNTER
Called and left detailed message for patient with the information from Dr Josue.      Asked patient to call back if she has any questions---and/or if symptoms do not resolve with the abx.     Elizabeth LOPEZ RN,BSN

## 2020-06-30 NOTE — PROGRESS NOTES
The patient is a vague historian but since last Thursday he has had what sounds like a bit of a urine change.  It sounds like she is going a bit more frequently and has shivers when she does go.  She does not have burning blood or discharge and there is no vaginal symptoms.  No fevers or night sweats.  No abdominal pain or nausea or vomiting.  No bowel changes.    She also has polymyalgia rheumatica.  In early May and lowered her prednisone to 8 mg a day and unfortunately she did not follow-up.  She is taking that.  She denies headaches, jaw claudication or shoulder pains.  Her aches are improved.    She has hypertension and hyperglycemia.  Her weight is up as noted.    She has had quite a bit of stress recently due to her  who is not doing well and is currently in a nursing home.    Past Medical History:   Diagnosis Date     Elevated blood sugar      HTN (hypertension) 35     Hx of colonoscopy 2008    bx collagenous colitis     Hypercholesteremia      Insomnia     on ambien 10mg 1/2 daily for long time     Lichen sclerosus et atrophicus of the vulva 02/2017    dx by Dr. Weldon, had bx     Lymphocytic colitis 2008    on colon exam [facility]     PMR (polymyalgia rheumatica) (H) 01/31/2019     Syncope 08/2016    echo trace lvh, nl ef; ziopatch with one 5 beat run of vtach, seen by ep Dr. Allen and nothing found     Ventricular tachycardia (H) 8/16    seen on ziopatch done for syncope, just one 5 beat run     Past Surgical History:   Procedure Laterality Date     HYSTERECTOMY, PAP NO LONGER INDICATED  1990    had bso also, not for cancer     KERATOTOMY ARCUATE WITH FEMTOSECOND LASER/IMAGING FOR ATIOL Right 7/13/2015    Procedure: KERATOTOMY ARCUATE WITH FEMTOSECOND LASER/IMAGING FOR ATIOL;  Surgeon: Lionel Reza MD;  Location: [facility]     KERATOTOMY ARCUATE WITH FEMTOSECOND LASER/IMAGING FOR ATIOL Right 7/13/2015    Procedure: KERATOTOMY ARCUATE WITH FEMTOSECOND LASER/IMAGING FOR ATIOL;  Surgeon: Juaquin  Lionel LUNA MD;  Location: [facility]     PHACOEMULSIFICATION CLEAR CORNEA WITH STANDARD INTRAOCULAR LENS IMPLANT Right 2015    Procedure: PHACOEMULSIFICATION CLEAR CORNEA WITH STANDARD INTRAOCULAR LENS IMPLANT;  Surgeon: Lionel Reza MD;  [facility]     pilonidal cyst removed  30's     Social History     Socioeconomic History     Marital status:      Spouse name: Not on file     Number of children: 3     Years of education: Not on file     Highest education level: Not on file   Occupational History     Not on file   Social Needs     Financial resource strain: Not on file     Food insecurity     Worry: Not on file     Inability: Not on file     Transportation needs     Medical: Not on file     Non-medical: Not on file   Tobacco Use     Smoking status: Former Smoker     Packs/day: 1.00     Years: 42.00     Pack years: 42.00     Types: Cigarettes     Start date:      Last attempt to quit: 1997     Years since quittin.7     Smokeless tobacco: Never Used   Substance and Sexual Activity     Alcohol use: No     Alcohol/week: 0.0 standard drinks     Frequency: Never     Drug use: No     Sexual activity: Never   Lifestyle     Physical activity     Days per week: Not on file     Minutes per session: Not on file     Stress: Not on file   Relationships     Social connections     Talks on phone: Not on file     Gets together: Not on file     Attends Nondenominational service: Not on file     Active member of club or organization: Not on file     Attends meetings of clubs or organizations: Not on file     Relationship status: Not on file     Intimate partner violence     Fear of current or ex partner: Not on file     Emotionally abused: Not on file     Physically abused: Not on file     Forced sexual activity: Not on file   Other Topics Concern     Parent/sibling w/ CABG, MI or angioplasty before 65F 55M? Not Asked   Social History Narrative     Not on file     Current Outpatient Medications   Medication Sig  "Dispense Refill     celecoxib (CELEBREX) 100 MG capsule Take 1 capsule (100 mg) by mouth 2 times daily as needed for moderate pain 50 capsule 3     predniSONE (DELTASONE) 1 MG tablet Take 3 of these along with one 5mg for a total daily dose of 8mg 100 tablet 4     predniSONE (DELTASONE) 10 MG tablet Take 1.5 pills daily for total dose of 15mg po qam    Marky Josue M.D. 60 tablet 1     predniSONE (DELTASONE) 5 MG tablet Take one of these along with 3 of the 1mg pills for a total daily dose of 8mg 90 tablet 0     verapamil ER (CALAN-SR) 240 MG CR tablet TAKE 1 TABLET BY MOUTH DAILY 90 tablet 1     Allergies   Allergen Reactions     Ace Inhibitors Cough     FAMILY HISTORY NOTED AND REVIEWED    REVIEW OF SYSTEMS: above    PHYSICAL EXAM    /84 (Patient Position: Sitting)   Pulse 70   Temp 98.6  F (37  C) (Tympanic)   Ht 1.676 m (5' 5.98\")   Wt 97.5 kg (215 lb)   SpO2 99%   BMI 34.72 kg/m      Patient appears non toxic  Lungs clear  cv reglar rate and rhythm, no murmer, rub or gallop, no jvp, trace bilat ankle edema  Abdomen, normal active bowel sounds, soft non-tender, no mgr, no hepatosplenomegaly    ASSESSMENT:  1. ?uti, could be sugar, doubt vag, pyelo, gi  2. Elevated sugar, follow up labs  3. Pmr, to lower pred dose to 7mg  4. Hypertension, controlled    PLAN:  Labs and urine    Marky Josue M.D.        "

## 2020-06-30 NOTE — TELEPHONE ENCOUNTER
Please call the patient whom I saw today.  I got her urine back and it does appear she likely has a urinary tract infection.  I sent in Bactrim and I like her to start that as soon as possible twice a day for 3 days.  Call me if this does not fix the issue.    Thank you.    Pg

## 2020-07-01 LAB
ANION GAP SERPL CALCULATED.3IONS-SCNC: 10 MMOL/L (ref 3–14)
BUN SERPL-MCNC: 21 MG/DL (ref 7–30)
CALCIUM SERPL-MCNC: 9.2 MG/DL (ref 8.5–10.1)
CHLORIDE SERPL-SCNC: 104 MMOL/L (ref 94–109)
CO2 SERPL-SCNC: 24 MMOL/L (ref 20–32)
CREAT SERPL-MCNC: 1.02 MG/DL (ref 0.52–1.04)
GFR SERPL CREATININE-BSD FRML MDRD: 52 ML/MIN/{1.73_M2}
GLUCOSE SERPL-MCNC: 109 MG/DL (ref 70–99)
POTASSIUM SERPL-SCNC: 3.7 MMOL/L (ref 3.4–5.3)
SODIUM SERPL-SCNC: 138 MMOL/L (ref 133–144)
TSH SERPL DL<=0.005 MIU/L-ACNC: 0.74 MU/L (ref 0.4–4)

## 2020-07-02 ENCOUNTER — TELEPHONE (OUTPATIENT)
Dept: FAMILY MEDICINE | Facility: CLINIC | Age: 80
End: 2020-07-02

## 2020-07-02 LAB
BACTERIA SPEC CULT: ABNORMAL
BACTERIA SPEC CULT: ABNORMAL
SPECIMEN SOURCE: ABNORMAL

## 2020-07-02 NOTE — TELEPHONE ENCOUNTER
Please call patient, her uc is positive, she is on ab and should be much better, call if not.  Her other labs look great.  Please have her lower her prednisone dose to 7mg and follow up for office visit 1 month.    Thanks    Marky Josue M.D.

## 2020-07-02 NOTE — TELEPHONE ENCOUNTER
Called pt    She appreciated the call and is already feeling better. She will lower her prednisone dose to 7mg    Gutierrez SHULTZ RN

## 2020-08-02 ENCOUNTER — TELEPHONE (OUTPATIENT)
Dept: FAMILY MEDICINE | Facility: CLINIC | Age: 80
End: 2020-08-02

## 2020-08-02 NOTE — TELEPHONE ENCOUNTER
Please call patient.  She was supposed to have office visit but no showed.  I believe she is on prednisone 7mg.  Please confirm this.  If that is correct and she is feeling well let's lower to 6mg.  If not feeling well or any headaches or fever let me know.  Otherwise follow up office visit 4 weeks    Marky Josue M.D.

## 2020-08-03 NOTE — TELEPHONE ENCOUNTER
Left detailed message asking her to call back if she is not feeling better. Advised that she could lower dose of prednisone to 6 mg daily if feeling better.   She rescheduled her follow up appt.to 8/6/20 (per Epic).    I reminded her of this appt.    Nevaeh Grover RN on 8/3/2020 at 9:03 AM

## 2020-08-06 ENCOUNTER — VIRTUAL VISIT (OUTPATIENT)
Dept: FAMILY MEDICINE | Facility: CLINIC | Age: 80
End: 2020-08-06
Payer: COMMERCIAL

## 2020-08-06 DIAGNOSIS — M35.3 PMR (POLYMYALGIA RHEUMATICA) (H): Primary | ICD-10-CM

## 2020-08-06 PROCEDURE — 99213 OFFICE O/P EST LOW 20 MIN: CPT | Mod: 95 | Performed by: INTERNAL MEDICINE

## 2020-08-06 NOTE — PROGRESS NOTES
"Elizabeth Joy is a 80 year old female who is being evaluated via a billable video visit.      The patient has been notified of following:     \"This video visit will be conducted via a call between you and your physician/provider. We have found that certain health care needs can be provided without the need for an in-person physical exam.  This service lets us provide the care you need with a video conversation.  If a prescription is necessary we can send it directly to your pharmacy.  If lab work is needed we can place an order for that and you can then stop by our lab to have the test done at a later time.    Video visits are billed at different rates depending on your insurance coverage.  Please reach out to your insurance provider with any questions.    If during the course of the call the physician/provider feels a video visit is not appropriate, you will not be charged for this service.\"    Patient has given verbal consent for Video visit? Yes  How would you like to obtain your AVS? Mail a copy  If you are dropped from the video visit, the video invite should be resent to: Text to cell phone: [phone]  Will anyone else be joining your video visit? No    Subjective     Elizabeth Joy is a 80 year old female who presents today via video visit for the following health issues:    The patient has stress related to her  who is now in care center.  She feels well.  No c/o.  No ha or chest pain or shortness of breath.  No jaw darion.  No fevers, chills or night sweats.  No urine issues.  She is taking the prednisone now at 7mg a day.      Past Medical History:   Diagnosis Date     Elevated blood sugar      HTN (hypertension) 35     Hx of colonoscopy 2008    bx collagenous colitis     Hypercholesteremia      Insomnia     on ambien 10mg 1/2 daily for long time     Lichen sclerosus et atrophicus of the vulva 02/2017    dx by Dr. Weldon, had bx     Lymphocytic colitis 2008    on colon exam [facility]     PMR " (polymyalgia rheumatica) (H) 2019     Syncope 2016    echo trace lvh, nl ef; ziopatch with one 5 beat run of vtach, seen by ep Dr. Allen and nothing found     Ventricular tachycardia (H)     seen on ziopatch done for syncope, just one 5 beat run     Past Surgical History:   Procedure Laterality Date     HYSTERECTOMY, PAP NO LONGER INDICATED  1990    had bso also, not for cancer     KERATOTOMY ARCUATE WITH FEMTOSECOND LASER/IMAGING FOR ATIOL Right 2015    Procedure: KERATOTOMY ARCUATE WITH FEMTOSECOND LASER/IMAGING FOR ATIOL;  Surgeon: Lionel Reza MD;  Location: [facility]     KERATOTOMY ARCUATE WITH FEMTOSECOND LASER/IMAGING FOR ATIOL Right 2015    Procedure: KERATOTOMY ARCUATE WITH FEMTOSECOND LASER/IMAGING FOR ATIOL;  Surgeon: Lionel Reza MD;  Location: [facility]     PHACOEMULSIFICATION CLEAR CORNEA WITH STANDARD INTRAOCULAR LENS IMPLANT Right 2015    Procedure: PHACOEMULSIFICATION CLEAR CORNEA WITH STANDARD INTRAOCULAR LENS IMPLANT;  Surgeon: Lionel Reza MD;  Location:  [facility]     pilonidal cyst removed  30's     Social History     Socioeconomic History     Marital status:      Spouse name: Not on file     Number of children: 3     Years of education: Not on file     Highest education level: Not on file   Occupational History     Not on file   Social Needs     Financial resource strain: Not on file     Food insecurity     Worry: Not on file     Inability: Not on file     Transportation needs     Medical: Not on file     Non-medical: Not on file   Tobacco Use     Smoking status: Former Smoker     Packs/day: 1.00     Years: 42.00     Pack years: 42.00     Types: Cigarettes     Start date:      Last attempt to quit: 1997     Years since quittin.8     Smokeless tobacco: Never Used   Substance and Sexual Activity     Alcohol use: No     Alcohol/week: 0.0 standard drinks     Frequency: Never     Drug use: No     Sexual activity: Never   Lifestyle     Physical  activity     Days per week: Not on file     Minutes per session: Not on file     Stress: Not on file   Relationships     Social connections     Talks on phone: Not on file     Gets together: Not on file     Attends Sikh service: Not on file     Active member of club or organization: Not on file     Attends meetings of clubs or organizations: Not on file     Relationship status: Not on file     Intimate partner violence     Fear of current or ex partner: Not on file     Emotionally abused: Not on file     Physically abused: Not on file     Forced sexual activity: Not on file   Other Topics Concern     Parent/sibling w/ CABG, MI or angioplasty before 65F 55M? Not Asked   Social History Narrative     Not on file     Current Outpatient Medications   Medication Sig Dispense Refill     celecoxib (CELEBREX) 100 MG capsule Take 1 capsule (100 mg) by mouth 2 times daily as needed for moderate pain 50 capsule 3     predniSONE (DELTASONE) 1 MG tablet Take 3 of these along with one 5mg for a total daily dose of 8mg 100 tablet 4     predniSONE (DELTASONE) 10 MG tablet Take 1.5 pills daily for total dose of 15mg po rafael Josue M.D. 60 tablet 1     predniSONE (DELTASONE) 5 MG tablet Take one of these along with 3 of the 1mg pills for a total daily dose of 8mg 90 tablet 0     verapamil ER (CALAN-SR) 240 MG CR tablet TAKE 1 TABLET BY MOUTH DAILY 90 tablet 1     Allergies   Allergen Reactions     Ace Inhibitors Cough     FAMILY HISTORY NOTED AND REVIEWED    REVIEW OF SYSTEMS: above    PHYSICAL EXAM    There were no vitals taken for this visit.    Video Start Time: 10:41 AM    Physical Exam     GENERAL: Healthy, alert and no distress  EYES: Eyes grossly normal to inspection.  No discharge or erythema, or obvious scleral/conjunctival abnormalities.  RESP: No audible wheeze, cough, or visible cyanosis.  No visible retractions or increased work of breathing.    SKIN: Visible skin clear. No significant rash, abnormal  pigmentation or lesions.  NEURO: Cranial nerves grossly intact.  Mentation and speech appropriate for age.  PSYCH: Mentation appears normal, affect normal/bright, judgement and insight intact, normal speech and appearance well-groomed.    ASSESSMENT:  Pmr, stable, to lower dose to 6mg and follow up 30 days, call if problems    Marky Josue M.D.        Diagnostic Test Results:  Labs reviewed in Epic      Video-Visit Details    Type of service:  Video Visit    Video End Time:10:49 AM    Originating Location (pt. Location): Home    Distant Location (provider location):  [facility]     Platform used for Video Visit: [facility]    No follow-ups on file.       Marky Josue MD

## 2020-08-17 ENCOUNTER — TELEPHONE (OUTPATIENT)
Dept: FAMILY MEDICINE | Facility: CLINIC | Age: 80
End: 2020-08-17

## 2020-08-17 ENCOUNTER — VIRTUAL VISIT (OUTPATIENT)
Dept: FAMILY MEDICINE | Facility: CLINIC | Age: 80
End: 2020-08-17
Payer: COMMERCIAL

## 2020-08-17 DIAGNOSIS — F32.0 CURRENT MILD EPISODE OF MAJOR DEPRESSIVE DISORDER WITHOUT PRIOR EPISODE (H): ICD-10-CM

## 2020-08-17 DIAGNOSIS — F32.0 MILD MAJOR DEPRESSION (H): Primary | ICD-10-CM

## 2020-08-17 PROCEDURE — 99214 OFFICE O/P EST MOD 30 MIN: CPT | Mod: 95 | Performed by: INTERNAL MEDICINE

## 2020-08-17 RX ORDER — CITALOPRAM HYDROBROMIDE 10 MG/1
10 TABLET ORAL DAILY
Qty: 30 TABLET | Refills: 1 | Status: ON HOLD | OUTPATIENT
Start: 2020-08-17 | End: 2020-12-03

## 2020-08-17 ASSESSMENT — PATIENT HEALTH QUESTIONNAIRE - PHQ9: SUM OF ALL RESPONSES TO PHQ QUESTIONS 1-9: 11

## 2020-08-17 NOTE — PROGRESS NOTES
"Elizabeth Joy is a 80 year old female who is being evaluated via a billable video visit.      The patient has been notified of following:     \"This video visit will be conducted via a call between you and your physician/provider. We have found that certain health care needs can be provided without the need for an in-person physical exam.  This service lets us provide the care you need with a video conversation.  If a prescription is necessary we can send it directly to your pharmacy.  If lab work is needed we can place an order for that and you can then stop by our lab to have the test done at a later time.    Video visits are billed at different rates depending on your insurance coverage.  Please reach out to your insurance provider with any questions.    If during the course of the call the physician/provider feels a video visit is not appropriate, you will not be charged for this service.\"    Patient has given verbal consent for Video visit? Yes  How would you like to obtain your AVS? Mail a copy  If you are dropped from the video visit, the video invite should be resent to: Text to cell phone: [phone]  Will anyone else be joining your video visit? No    Subjective     Elizabeth Joy is a 80 year old female who presents today via video visit for the following health issues:    HPI    Depression -- pt c/o being down since her  was admitted to [facility] x6-7 weeeks; pt's caregiver states she has contemplated suicide    I discussed with patient and caregiver Aaron.  She is feeling down, sad, crying at times, self isolating, worse in last 2 weeks.  Not sleeping well, not doing things.  Occasionally can feel dizzy.  Mostly when gets out of bed and stands up.  Has fallen 3x in last 3 weeks.  No syncope.  She has had suicidal thoughts but would not do it.  No chest pain or shortness of breath, no fever    Past Medical History:   Diagnosis Date     Elevated blood sugar      HTN (hypertension) 35     Hx of " colonoscopy 2008    bx collagenous colitis     Hypercholesteremia      Insomnia     on ambien 10mg 1/2 daily for long time     Lichen sclerosus et atrophicus of the vulva 02/2017    dx by Dr. Weldon, had bx     Lymphocytic colitis 2008    [facility]     PMR (polymyalgia rheumatica) (H) 01/31/2019     Syncope 08/2016    echo trace lvh, nl ef; ziopatch with one 5 beat run of vtach, seen by ep Dr. Allen and nothing found     Ventricular tachycardia (H) 8/16    seen on ziopatch done for syncope, just one 5 beat run     Past Surgical History:   Procedure Laterality Date     HYSTERECTOMY, PAP NO LONGER INDICATED  1990    had bso also, not for cancer     KERATOTOMY ARCUATE WITH FEMTOSECOND LASER/IMAGING FOR ATIOL Right 7/13/2015    Procedure: KERATOTOMY ARCUATE WITH FEMTOSECOND LASER/IMAGING FOR ATIOL;  Surgeon: Lionel Reza MD;  Location: [facility]     KERATOTOMY ARCUATE WITH FEMTOSECOND LASER/IMAGING FOR ATIOL Right 7/13/2015    Procedure: KERATOTOMY ARCUATE WITH FEMTOSECOND LASER/IMAGING FOR ATIOL;  Surgeon: Lionel Reza MD;  Location: [facility]     PHACOEMULSIFICATION CLEAR CORNEA WITH STANDARD INTRAOCULAR LENS IMPLANT Right 7/13/2015    Procedure: PHACOEMULSIFICATION CLEAR CORNEA WITH STANDARD INTRAOCULAR LENS IMPLANT;  Surgeon: Lionel Reza MD;  [facility]     pilonidal cyst removed  30's     Social History     Socioeconomic History     Marital status:      Spouse name: Not on file     Number of children: 3     Years of education: Not on file     Highest education level: Not on file   Occupational History     Not on file   Social Needs     Financial resource strain: Not on file     Food insecurity     Worry: Not on file     Inability: Not on file     Transportation needs     Medical: Not on file     Non-medical: Not on file   Tobacco Use     Smoking status: Former Smoker     Packs/day: 1.00     Years: 42.00     Pack years: 42.00     Types: Cigarettes     Start date: 1955     Last  attempt to quit: 1997     Years since quittin.8     Smokeless tobacco: Never Used   Substance and Sexual Activity     Alcohol use: No     Alcohol/week: 0.0 standard drinks     Frequency: Never     Drug use: No     Sexual activity: Never   Lifestyle     Physical activity     Days per week: Not on file     Minutes per session: Not on file     Stress: Not on file   Relationships     Social connections     Talks on phone: Not on file     Gets together: Not on file     Attends Caodaism service: Not on file     Active member of club or organization: Not on file     Attends meetings of clubs or organizations: Not on file     Relationship status: Not on file     Intimate partner violence     Fear of current or ex partner: Not on file     Emotionally abused: Not on file     Physically abused: Not on file     Forced sexual activity: Not on file   Other Topics Concern     Parent/sibling w/ CABG, MI or angioplasty before 65F 55M? Not Asked   Social History Narrative     Not on file     Current Outpatient Medications   Medication Sig Dispense Refill     predniSONE (DELTASONE) 1 MG tablet Take 3 of these along with one 5mg for a total daily dose of 8mg 100 tablet 4     predniSONE (DELTASONE) 10 MG tablet Take 1.5 pills daily for total dose of 15mg po rafael Josue M.D. 60 tablet 1     predniSONE (DELTASONE) 5 MG tablet Take one of these along with 3 of the 1mg pills for a total daily dose of 8mg 90 tablet 0     verapamil ER (CALAN-SR) 240 MG CR tablet TAKE 1 TABLET BY MOUTH DAILY 90 tablet 1     Allergies   Allergen Reactions     Ace Inhibitors Cough     FAMILY HISTORY NOTED AND REVIEWED    REVIEW OF SYSTEMS: above    PHYSICAL EXAM    There were no vitals taken for this visit.    Patient appears non toxic    I discussed with patient and Aaron    I would recommend counseling and starting mild dose of ssri.  She will need close follow up and will call if worsens or has side effects.  Patient  agrees.    PLAN:  Counseling  Add celexa 10mg  Call if worsens  Follow up by phone in 2 weeks  For the syncope cause not clear, call if more.    Marky Josue M.D.             Video Start Time: 1:00 PM    Reviewed and updated as needed this visit by Provider         Vitals:  No vitals were obtained today due to virtual visit.    Physical Exam     GENERAL: Healthy, alert and no distress  EYES: Eyes grossly normal to inspection.  No discharge or erythema, or obvious scleral/conjunctival abnormalities.  RESP: No audible wheeze, cough, or visible cyanosis.  No visible retractions or increased work of breathing.    SKIN: Visible skin clear. No significant rash, abnormal pigmentation or lesions.  NEURO: Cranial nerves grossly intact.  Mentation and speech appropriate for age.  PSYCH: Mentation appears normal, affect normal/bright, judgement and insight intact, normal speech and appearance well-groomed.      Diagnostic Test Results:  Labs reviewed in Epic        Video-Visit Details    Type of service:  Video Visit    Video End Time:1:22 PM    Originating Location (pt. Location): Home    Distant Location (provider location):  [facility]     Platform used for Video Visit: [employer]

## 2020-08-17 NOTE — TELEPHONE ENCOUNTER
I left a VM for the office of Yeimi Hyman, to call back regarding this patient that Dr. Josue would like to be seen for depression and suicidal thoughts asap.  Please lync me or Teams me if they call back.      Maryellen Wilks MA

## 2020-08-26 ENCOUNTER — TELEPHONE (OUTPATIENT)
Dept: FAMILY MEDICINE | Facility: CLINIC | Age: 80
End: 2020-08-26

## 2020-08-26 NOTE — TELEPHONE ENCOUNTER
Reason for Call:  Same Day Appointment, Requested Provider:  Marky Josue MD    PCP: Marky Josue    Reason for visit: F/U to depression     Duration of symptoms: Ongoing     Have you been treated for this in the past? Yes    Additional comments: Pt's caretaker called and had to cancel an appointment for her on 9/3 as there will  Be no transportation. There was not an appointment available until Oct for Dr. Josue. The caller reports that the Pt isn't doing well and is hoping to get an appointment sooner with her PCP. Caller declined other providers and was informed that DR. Josue will be unavailable for parts of Sept. The caretaker said that any day will work, but Wednesdays.     Can we leave a detailed message on this number? YES    Phone number patient can be reached at: Other phone number:  [phone]    Best Time: Any    Call taken on 8/26/2020 at 10:44 AM by Breana Green

## 2020-08-27 NOTE — TELEPHONE ENCOUNTER
Dr. Josue please see below.  Patient had to cancel her appointment with you on 09/03. Can you fit patient in in the next few weeks?

## 2020-08-28 ENCOUNTER — VIRTUAL VISIT (OUTPATIENT)
Dept: FAMILY MEDICINE | Facility: CLINIC | Age: 80
End: 2020-08-28
Payer: COMMERCIAL

## 2020-08-28 DIAGNOSIS — F32.0 MILD MAJOR DEPRESSION (H): Primary | ICD-10-CM

## 2020-08-28 PROCEDURE — 99212 OFFICE O/P EST SF 10 MIN: CPT | Mod: 95 | Performed by: INTERNAL MEDICINE

## 2020-08-28 PROCEDURE — 96127 BRIEF EMOTIONAL/BEHAV ASSMT: CPT | Mod: 95 | Performed by: INTERNAL MEDICINE

## 2020-08-28 ASSESSMENT — PATIENT HEALTH QUESTIONNAIRE - PHQ9: SUM OF ALL RESPONSES TO PHQ QUESTIONS 1-9: 5

## 2020-08-28 NOTE — PROGRESS NOTES
"Elizabeth Joy is a 80 year old female who is being evaluated via a billable video visit.      The patient has been notified of following:     \"This video visit will be conducted via a call between you and your physician/provider. We have found that certain health care needs can be provided without the need for an in-person physical exam.  This service lets us provide the care you need with a video conversation.  If a prescription is necessary we can send it directly to your pharmacy.  If lab work is needed we can place an order for that and you can then stop by our lab to have the test done at a later time.    Video visits are billed at different rates depending on your insurance coverage.  Please reach out to your insurance provider with any questions.    If during the course of the call the physician/provider feels a video visit is not appropriate, you will not be charged for this service.\"    Patient has given verbal consent for Video visit? Yes  How would you like to obtain your AVS? Mail a copy  If you are dropped from the video visit, the video invite should be resent to: Text to cell phone: [phone]  Will anyone else be joining your video visit? No      Subjective     Elizabeth Joy is a 80 year old female who presents today via video visit for the following health issues:    Video would not work.  I called patient.  Aaron was present as well.  The patient is doing counseling with Jaxon Washington and finds that helpful.   She is taking the celexa, no side effects with it.  She is feeling sad and lonely at night, but does not feel depressed.  She is not overly anxious.  She is sleeping fine, sometimes sleeps too much.  Today she went to meet a friend for lunch but the friend did not show up.  No new issues.    ASSESSMENT:  Depression, stable, not suicidal.  She will call if worsens, continue to see counseling.  Follow up with me in 3 weeks    Marky Josue M.D.    Time 9 minutes      "

## 2020-09-02 ENCOUNTER — TELEPHONE (OUTPATIENT)
Dept: FAMILY MEDICINE | Facility: CLINIC | Age: 80
End: 2020-09-02

## 2020-09-02 DIAGNOSIS — M35.3 PMR (POLYMYALGIA RHEUMATICA) (H): Primary | ICD-10-CM

## 2020-09-02 NOTE — TELEPHONE ENCOUNTER
Please call patient, she is due for follow up labs for the pmr, not fasting.      Marky Josue M.D.

## 2020-09-03 NOTE — TELEPHONE ENCOUNTER
Left message for patient to call and schedule non-fasting lab appointment.      Ok to talk with triage with any questions, otherwise, labs have been ordered.     Elizabeth LOPEZ RN,BSN

## 2020-09-04 ENCOUNTER — APPOINTMENT (OUTPATIENT)
Dept: CT IMAGING | Facility: CLINIC | Age: 80
End: 2020-09-04
Attending: EMERGENCY MEDICINE
Payer: COMMERCIAL

## 2020-09-04 ENCOUNTER — TRANSFERRED RECORDS (OUTPATIENT)
Dept: HEALTH INFORMATION MANAGEMENT | Facility: CLINIC | Age: 80
End: 2020-09-04

## 2020-09-04 ENCOUNTER — APPOINTMENT (OUTPATIENT)
Dept: GENERAL RADIOLOGY | Facility: CLINIC | Age: 80
End: 2020-09-04
Attending: EMERGENCY MEDICINE
Payer: COMMERCIAL

## 2020-09-04 ENCOUNTER — HOSPITAL ENCOUNTER (EMERGENCY)
Facility: CLINIC | Age: 80
Discharge: OTHER INSTITUTION | End: 2020-09-04
Attending: EMERGENCY MEDICINE | Admitting: EMERGENCY MEDICINE
Payer: COMMERCIAL

## 2020-09-04 VITALS
SYSTOLIC BLOOD PRESSURE: 142 MMHG | OXYGEN SATURATION: 96 % | BODY MASS INDEX: 34.15 KG/M2 | WEIGHT: 200 LBS | DIASTOLIC BLOOD PRESSURE: 81 MMHG | RESPIRATION RATE: 15 BRPM | HEART RATE: 83 BPM | TEMPERATURE: 98.5 F | HEIGHT: 64 IN

## 2020-09-04 DIAGNOSIS — R65.20 SEVERE SEPSIS (H): ICD-10-CM

## 2020-09-04 DIAGNOSIS — A41.9 SEVERE SEPSIS (H): ICD-10-CM

## 2020-09-04 DIAGNOSIS — E86.0 DEHYDRATION: ICD-10-CM

## 2020-09-04 DIAGNOSIS — I24.89 DEMAND ISCHEMIA (H): ICD-10-CM

## 2020-09-04 DIAGNOSIS — R41.82 ALTERED MENTAL STATUS, UNSPECIFIED ALTERED MENTAL STATUS TYPE: ICD-10-CM

## 2020-09-04 DIAGNOSIS — I48.0 PAROXYSMAL ATRIAL FIBRILLATION WITH RVR (H): ICD-10-CM

## 2020-09-04 LAB
ALBUMIN SERPL-MCNC: 4.1 G/DL (ref 3.4–5)
ALBUMIN UR-MCNC: 100 MG/DL
ALP SERPL-CCNC: 117 U/L (ref 40–150)
ALT SERPL W P-5'-P-CCNC: 38 U/L (ref 0–50)
ANION GAP SERPL CALCULATED.3IONS-SCNC: 12 MMOL/L (ref 3–14)
APPEARANCE UR: ABNORMAL
AST SERPL W P-5'-P-CCNC: 41 U/L (ref 0–45)
BACTERIA #/AREA URNS HPF: ABNORMAL /HPF
BASOPHILS # BLD AUTO: 0 10E9/L (ref 0–0.2)
BASOPHILS NFR BLD AUTO: 0.1 %
BILIRUB SERPL-MCNC: 1.4 MG/DL (ref 0.2–1.3)
BILIRUB UR QL STRIP: ABNORMAL
BUN SERPL-MCNC: 33 MG/DL (ref 7–30)
CALCIUM SERPL-MCNC: 9.3 MG/DL (ref 8.5–10.1)
CHLORIDE SERPL-SCNC: 101 MMOL/L (ref 94–109)
CK SERPL-CCNC: 397 U/L (ref 30–225)
CO2 SERPL-SCNC: 26 MMOL/L (ref 20–32)
COLOR UR AUTO: YELLOW
CREAT SERPL-MCNC: 1.21 MG/DL (ref 0.52–1.04)
DIFFERENTIAL METHOD BLD: ABNORMAL
EOSINOPHIL # BLD AUTO: 0 10E9/L (ref 0–0.7)
EOSINOPHIL NFR BLD AUTO: 0 %
ERYTHROCYTE [DISTWIDTH] IN BLOOD BY AUTOMATED COUNT: 13.2 % (ref 10–15)
GFR SERPL CREATININE-BSD FRML MDRD: 42 ML/MIN/{1.73_M2}
GLUCOSE SERPL-MCNC: 119 MG/DL (ref 70–99)
GLUCOSE UR STRIP-MCNC: NEGATIVE MG/DL
HCT VFR BLD AUTO: 46 % (ref 35–47)
HGB BLD-MCNC: 15.9 G/DL (ref 11.7–15.7)
HGB UR QL STRIP: ABNORMAL
IMM GRANULOCYTES # BLD: 0.1 10E9/L (ref 0–0.4)
IMM GRANULOCYTES NFR BLD: 0.4 %
INTERPRETATION ECG - MUSE: NORMAL
INTERPRETATION ECG - MUSE: NORMAL
KETONES UR STRIP-MCNC: 40 MG/DL
LACTATE BLD-SCNC: 2.7 MMOL/L (ref 0.7–2)
LACTATE BLD-SCNC: 3.1 MMOL/L (ref 0.7–2)
LEUKOCYTE ESTERASE UR QL STRIP: NEGATIVE
LYMPHOCYTES # BLD AUTO: 1.7 10E9/L (ref 0.8–5.3)
LYMPHOCYTES NFR BLD AUTO: 8.4 %
MCH RBC QN AUTO: 31.6 PG (ref 26.5–33)
MCHC RBC AUTO-ENTMCNC: 34.6 G/DL (ref 31.5–36.5)
MCV RBC AUTO: 92 FL (ref 78–100)
MONOCYTES # BLD AUTO: 1.6 10E9/L (ref 0–1.3)
MONOCYTES NFR BLD AUTO: 7.6 %
MUCOUS THREADS #/AREA URNS LPF: PRESENT /LPF
NEUTROPHILS # BLD AUTO: 17.3 10E9/L (ref 1.6–8.3)
NEUTROPHILS NFR BLD AUTO: 83.5 %
NITRATE UR QL: NEGATIVE
NRBC # BLD AUTO: 0 10*3/UL
NRBC BLD AUTO-RTO: 0 /100
PH UR STRIP: 6 PH (ref 5–7)
PLATELET # BLD AUTO: 411 10E9/L (ref 150–450)
POTASSIUM SERPL-SCNC: 3.3 MMOL/L (ref 3.4–5.3)
PROT SERPL-MCNC: 8 G/DL (ref 6.8–8.8)
RBC # BLD AUTO: 5.03 10E12/L (ref 3.8–5.2)
RBC #/AREA URNS AUTO: >182 /HPF (ref 0–2)
SARS-COV-2 RNA SPEC QL NAA+PROBE: NOT DETECTED
SODIUM SERPL-SCNC: 139 MMOL/L (ref 133–144)
SOURCE: ABNORMAL
SP GR UR STRIP: 1.02 (ref 1–1.03)
SPECIMEN SOURCE: NORMAL
TROPONIN I SERPL-MCNC: 0.09 UG/L (ref 0–0.04)
TSH SERPL DL<=0.005 MIU/L-ACNC: 0.64 MU/L (ref 0.4–4)
UROBILINOGEN UR STRIP-MCNC: >12 MG/DL (ref 0–2)
WBC # BLD AUTO: 20.7 10E9/L (ref 4–11)
WBC #/AREA URNS AUTO: 27 /HPF (ref 0–5)

## 2020-09-04 PROCEDURE — 80053 COMPREHEN METABOLIC PANEL: CPT | Performed by: EMERGENCY MEDICINE

## 2020-09-04 PROCEDURE — 70450 CT HEAD/BRAIN W/O DYE: CPT

## 2020-09-04 PROCEDURE — 72125 CT NECK SPINE W/O DYE: CPT

## 2020-09-04 PROCEDURE — 84443 ASSAY THYROID STIM HORMONE: CPT | Performed by: EMERGENCY MEDICINE

## 2020-09-04 PROCEDURE — C9803 HOPD COVID-19 SPEC COLLECT: HCPCS

## 2020-09-04 PROCEDURE — 83605 ASSAY OF LACTIC ACID: CPT | Performed by: EMERGENCY MEDICINE

## 2020-09-04 PROCEDURE — 87088 URINE BACTERIA CULTURE: CPT | Performed by: EMERGENCY MEDICINE

## 2020-09-04 PROCEDURE — 96366 THER/PROPH/DIAG IV INF ADDON: CPT

## 2020-09-04 PROCEDURE — 25000128 H RX IP 250 OP 636: Performed by: EMERGENCY MEDICINE

## 2020-09-04 PROCEDURE — 82550 ASSAY OF CK (CPK): CPT | Performed by: EMERGENCY MEDICINE

## 2020-09-04 PROCEDURE — 87086 URINE CULTURE/COLONY COUNT: CPT | Performed by: EMERGENCY MEDICINE

## 2020-09-04 PROCEDURE — U0003 INFECTIOUS AGENT DETECTION BY NUCLEIC ACID (DNA OR RNA); SEVERE ACUTE RESPIRATORY SYNDROME CORONAVIRUS 2 (SARS-COV-2) (CORONAVIRUS DISEASE [COVID-19]), AMPLIFIED PROBE TECHNIQUE, MAKING USE OF HIGH THROUGHPUT TECHNOLOGIES AS DESCRIBED BY CMS-2020-01-R: HCPCS | Performed by: EMERGENCY MEDICINE

## 2020-09-04 PROCEDURE — 25800030 ZZH RX IP 258 OP 636: Performed by: EMERGENCY MEDICINE

## 2020-09-04 PROCEDURE — 96367 TX/PROPH/DG ADDL SEQ IV INF: CPT

## 2020-09-04 PROCEDURE — 87186 SC STD MICRODIL/AGAR DIL: CPT | Performed by: EMERGENCY MEDICINE

## 2020-09-04 PROCEDURE — 25000125 ZZHC RX 250: Performed by: EMERGENCY MEDICINE

## 2020-09-04 PROCEDURE — 81001 URINALYSIS AUTO W/SCOPE: CPT | Performed by: EMERGENCY MEDICINE

## 2020-09-04 PROCEDURE — 96365 THER/PROPH/DIAG IV INF INIT: CPT | Mod: 59

## 2020-09-04 PROCEDURE — 96375 TX/PRO/DX INJ NEW DRUG ADDON: CPT | Mod: 59

## 2020-09-04 PROCEDURE — 96376 TX/PRO/DX INJ SAME DRUG ADON: CPT | Mod: 59

## 2020-09-04 PROCEDURE — 87040 BLOOD CULTURE FOR BACTERIA: CPT | Performed by: EMERGENCY MEDICINE

## 2020-09-04 PROCEDURE — 93005 ELECTROCARDIOGRAM TRACING: CPT

## 2020-09-04 PROCEDURE — 99285 EMERGENCY DEPT VISIT HI MDM: CPT | Mod: 25

## 2020-09-04 PROCEDURE — 85025 COMPLETE CBC W/AUTO DIFF WBC: CPT | Performed by: EMERGENCY MEDICINE

## 2020-09-04 PROCEDURE — 71260 CT THORAX DX C+: CPT

## 2020-09-04 PROCEDURE — 71045 X-RAY EXAM CHEST 1 VIEW: CPT

## 2020-09-04 PROCEDURE — 96361 HYDRATE IV INFUSION ADD-ON: CPT

## 2020-09-04 PROCEDURE — 84484 ASSAY OF TROPONIN QUANT: CPT | Performed by: EMERGENCY MEDICINE

## 2020-09-04 RX ORDER — ACETAMINOPHEN 325 MG/1
650 TABLET ORAL EVERY 4 HOURS PRN
Status: CANCELLED | OUTPATIENT
Start: 2020-09-04

## 2020-09-04 RX ORDER — POLYETHYLENE GLYCOL 3350 17 G/17G
17 POWDER, FOR SOLUTION ORAL DAILY PRN
Status: CANCELLED | OUTPATIENT
Start: 2020-09-04

## 2020-09-04 RX ORDER — NALOXONE HYDROCHLORIDE 0.4 MG/ML
.1-.4 INJECTION, SOLUTION INTRAMUSCULAR; INTRAVENOUS; SUBCUTANEOUS
Status: CANCELLED | OUTPATIENT
Start: 2020-09-04

## 2020-09-04 RX ORDER — SODIUM CHLORIDE 9 MG/ML
INJECTION, SOLUTION INTRAVENOUS CONTINUOUS
Status: CANCELLED | OUTPATIENT
Start: 2020-09-04

## 2020-09-04 RX ORDER — PROCHLORPERAZINE MALEATE 5 MG
5 TABLET ORAL EVERY 6 HOURS PRN
Status: CANCELLED | OUTPATIENT
Start: 2020-09-04

## 2020-09-04 RX ORDER — PROCHLORPERAZINE 25 MG
12.5 SUPPOSITORY, RECTAL RECTAL EVERY 12 HOURS PRN
Status: CANCELLED | OUTPATIENT
Start: 2020-09-04

## 2020-09-04 RX ORDER — IOPAMIDOL 755 MG/ML
101 INJECTION, SOLUTION INTRAVASCULAR ONCE
Status: COMPLETED | OUTPATIENT
Start: 2020-09-04 | End: 2020-09-04

## 2020-09-04 RX ORDER — HALOPERIDOL 5 MG/ML
2 INJECTION INTRAMUSCULAR ONCE
Status: DISCONTINUED | OUTPATIENT
Start: 2020-09-04 | End: 2020-09-04 | Stop reason: HOSPADM

## 2020-09-04 RX ORDER — PIPERACILLIN SODIUM, TAZOBACTAM SODIUM 4; .5 G/20ML; G/20ML
4.5 INJECTION, POWDER, LYOPHILIZED, FOR SOLUTION INTRAVENOUS ONCE
Status: COMPLETED | OUTPATIENT
Start: 2020-09-04 | End: 2020-09-04

## 2020-09-04 RX ORDER — ONDANSETRON 2 MG/ML
4 INJECTION INTRAMUSCULAR; INTRAVENOUS EVERY 6 HOURS PRN
Status: CANCELLED | OUTPATIENT
Start: 2020-09-04

## 2020-09-04 RX ORDER — DILTIAZEM HYDROCHLORIDE 5 MG/ML
15 INJECTION INTRAVENOUS ONCE
Status: COMPLETED | OUTPATIENT
Start: 2020-09-04 | End: 2020-09-04

## 2020-09-04 RX ORDER — ONDANSETRON 4 MG/1
4 TABLET, ORALLY DISINTEGRATING ORAL EVERY 6 HOURS PRN
Status: CANCELLED | OUTPATIENT
Start: 2020-09-04

## 2020-09-04 RX ADMIN — SODIUM CHLORIDE 1000 ML: 9 INJECTION, SOLUTION INTRAVENOUS at 09:51

## 2020-09-04 RX ADMIN — DILTIAZEM HYDROCHLORIDE 5 MG/HR: 5 INJECTION INTRAVENOUS at 15:17

## 2020-09-04 RX ADMIN — IOPAMIDOL 101 ML: 755 INJECTION, SOLUTION INTRAVENOUS at 11:34

## 2020-09-04 RX ADMIN — SODIUM CHLORIDE 1000 ML: 9 INJECTION, SOLUTION INTRAVENOUS at 10:57

## 2020-09-04 RX ADMIN — PIPERACILLIN SODIUM AND TAZOBACTAM SODIUM 4.5 G: 4; .5 INJECTION, POWDER, LYOPHILIZED, FOR SOLUTION INTRAVENOUS at 11:07

## 2020-09-04 RX ADMIN — VANCOMYCIN HYDROCHLORIDE 2000 MG: 5 INJECTION, POWDER, LYOPHILIZED, FOR SOLUTION INTRAVENOUS at 11:50

## 2020-09-04 RX ADMIN — DILTIAZEM HYDROCHLORIDE 15 MG: 5 INJECTION INTRAVENOUS at 14:39

## 2020-09-04 RX ADMIN — SODIUM CHLORIDE 69 ML: 9 INJECTION, SOLUTION INTRAVENOUS at 11:34

## 2020-09-04 RX ADMIN — SODIUM CHLORIDE 1000 ML: 9 INJECTION, SOLUTION INTRAVENOUS at 14:38

## 2020-09-04 ASSESSMENT — MIFFLIN-ST. JEOR: SCORE: 1362.19

## 2020-09-04 NOTE — ED PROVIDER NOTES
History     Chief Complaint:  Fall    HPI   Elizabeth Joy is a 80 year old female with a history of hypertension, hyperlipidemia, and chronic kidney disease among other conditions who presents after a fall. Per nurse report, the patient's daughter hadn't heard from her for 3 days and so she called 911. The emergency responders couldn't get in but they could hear her so they broke the door down. They found her on the floor covered in blood and feces. They are unsure of how long she was down potentially up to 3 days. The patient states that she is feeling okay. She denies abdominal pain, chest pain, shortness of breath, headache, or any other pain.     Allergies:  Ace Inhibitors    Medications:    Celexa  Calan  Deltasone    Past Medical History:    Depression  Hypertension   Hyperlipidemia   Insomnia  Lichen sclerosus et atrophicus of the vulva  Lymphocytic colitis  Polymyalgia rheumatica  Ventricular tachycardia  Chronic kidney disease, stage III  Fung-Figueroa syncope    Past Surgical History:    Hysterectomy  Keratotomy arcuate x2  Phacoemulsification clear cornea with standard intraocular lens implant  Pilonidal cyst removal    Family History:    Mother- breast cancer    Social History:  Smoking status: Former, quit 1997  Alcohol use: No  Drug use: No  PCP: Marky Josue  Presents to the ED by herself  Marital Status:   [2]    Review of Systems   Unable to perform ROS: Acuity of condition   Musculoskeletal:        Fall         Physical Exam     Patient Vitals for the past 24 hrs:   BP Temp Temp src Pulse Resp SpO2 Height Weight   09/04/20 1530 (!) 144/85 -- -- 112 12 96 % -- --   09/04/20 1515 (!) 177/108 -- -- 115 12 96 % -- --   09/04/20 1500 (!) 155/104 -- -- 104 15 96 % -- --   09/04/20 1445 134/75 -- -- 108 16 96 % -- --   09/04/20 1430 (!) 125/98 -- -- 124 15 95 % -- --   09/04/20 1415 (!) 138/96 -- -- 168 26 -- -- --   09/04/20 1400 (!) 157/79 -- -- 107 10 -- -- --   09/04/20 1345 (!) 173/81 -- --  "98 16 -- -- --   09/04/20 1330 (!) 182/88 -- -- 108 16 -- -- --   09/04/20 1315 (!) 176/83 -- -- 101 17 -- -- --   09/04/20 1300 (!) 194/89 -- -- 108 17 99 % -- --   09/04/20 1245 (!) 195/85 -- -- 101 -- 98 % -- --   09/04/20 1230 (!) 192/83 -- -- 96 -- 97 % -- --   09/04/20 1215 (!) 178/76 -- -- 57 -- 97 % -- --   09/04/20 1200 (!) 163/88 -- -- 92 -- 97 % -- --   09/04/20 1145 136/88 -- -- -- -- 95 % -- --   09/04/20 1115 (!) 194/136 -- -- 98 -- 98 % -- --   09/04/20 1100 (!) 204/124 -- -- 87 -- 97 % -- --   09/04/20 1045 (!) 160/118 -- -- 94 -- 97 % -- --   09/04/20 1030 -- -- -- 96 -- 97 % -- --   09/04/20 1015 -- -- -- 93 -- 97 % -- --   09/04/20 0945 (!) 166/124 -- -- 99 -- 96 % -- --   09/04/20 0933 (!) 82/51 98.5  F (36.9  C) Oral 106 16 94 % 1.626 m (5' 4\") 90.7 kg (200 lb)      Physical Exam  General: Mildly confused, appears frail, elderly and dehydrated.  Covered in dried blood and stool. Cooperative. Pleasant. In mild distress  HEENT:  Head:  Atraumatic  Ears:  External ears are normal  Mouth/Throat:  Oropharynx is without erythema or exudate and mucous membranes are dry. No dental trauma.   Eyes:   Conjunctivae normal and EOM are normal. No scleral icterus.    Pupils are equal, round, and reactive to light.   Neck:   Normal range of motion. Neck supple.  CV:  Normal rate, regular rhythm, normal heart sounds and radial pulses are 2+ and symmetric.  No murmur.  Resp:  Breath sounds are clear bilaterally    Non-labored, no retractions or accessory muscle use  GI:  Abdomen is soft, no distension, no tenderness. No rebound or guarding.  No CVA tenderness bilaterally  MS:  Normal range of motion. No edema.    Normal strength in all 4 extremities.     Back atraumatic.    No midline cervical, thoracic, or lumbar tenderness  Skin:  Warm and dry.  Bruises to left arm and left lower leg.  New appearing abrasions to left elbow and left shin.   Neuro: Mildly confused.  Sensation intact in all 4 extremities. GCS: " 14  Psych:  Normal mood and affect.    Emergency Department Course   ECG (09:46:17):  Rate 106 bpm. NC interval 132. QRS duration 88. QT/QTc 370/491. P-R-T axes 70 17 58. Sinus tachycardia with premature supraventricular complexes. Nonspecific ST abnormality. Abnormal ECG. Interpreted at 0949 by Johann Cunha MD.     ECG (14:21:19):  Rate 138 bpm. NC interval *. QRS duration 90. QT/QTc 334/506. P-R-T axes * 18 -84. Atrial fibrillation with rapid ventricular response. ST & T wave abnormality, consider lateral ischemia. Abnormal ECG. Interpreted at 1429 by Johann Cunha MD.     Imaging:  Radiographic findings were communicated with the patient who voiced understanding of the findings.  CT Head w/o Contrast  Diffuse cerebral volume loss and cerebral white matter   changes consistent with chronic small vessel ischemic disease. No   evidence for acute intracranial pathology.   As read by Radiology.     CT Cervical Spine w/o Contrast  There is mild degenerative anterolisthesis of C2 upon C3,   of C3 upon C4 and of C7 upon T1. Alignment of the cervical vertebrae   is otherwise normal; however, there is straightening of normal   cervical lordosis. Vertebral body heights of the cervical spine are   normal. Craniocervical alignment is normal. There are no fractures of   the cervical spine. There is loss of intervertebral disc space height   and degenerative endplate spurring at the C3-C4, C4-C5, C5-C6 and   C6-C7 levels. There is mild degenerative spinal canal narrowing at the   C4-C5 level. There is no prevertebral soft tissue swelling.   As read by Radiology.     XR Chest Port 1 View  No acute consolidation.   As read by Radiology.    CT Chest/Abdomen/Pelvis w Contrast  1.  Some esophageal wall thickening suggesting esophagitis.     2.  Trace right pleural effusion.     3.  Small lung nodules.     4.  Additional findings discussed above.   As read by radiology.     Laboratory:  CBC: WBC 20.7 (H), HGB 15.9 (H),   CMP:  glucose 119 (H), potassium 3.3. (L), bun 33 (H), creatinine 1.21 (H), GFR 42 (L), bilirubin 1.4 (H) o/w WNL     Troponin I (Collected 0946): 0.086 (H)    CK total: 397 (H)    Lactic acid (0946): 2.7 (H)   Lactic acid (1258): 3.1 (H)    TSH with free T4 reflex: 0.64    UA: urinebilin small (A), urineketon 40 (A), blood small (A), protein albumin 100 (A), urobilinogen >12.0 (H), WBC/HPF 27 (H), RBC/HPF >182 (H), bacteria many (A), mucous present (A), o/w Negative   Urine culture: pending    Blood Culture x2: Pending     Asymptomatic COVID-19 Virus (Coronavirus) by PCR: Pending     Interventions:  0951: NS 1L IV Bolus      Emergency Department Course:  Past medical records, nursing notes, and vitals reviewed.  0935: I performed an exam of the patient and obtained history, as documented above.     0945: IV inserted and blood drawn.     0945: The patient provided a urine sample here in the emergency department. This was sent for laboratory testing, findings above.     0952: EKG performed, results above.     0954: The patient was sent for a head CT and cervical spine CT while in the emergency department, findings above.    1037: The patient was sent for a chest x-ray while in the emergency department, findings above.      1039: I spoke with the patient's daughter.     Findings and plan explained to the Patient.    1251: Patient will be transferred to [facility] via EMS. Discussed the case with an initial hospitalist, Dr. Faust.     Impression & Plan      CMS Diagnoses:   The patient has signs of Severe Sepsis REMINDER: Please use septic shock SmartPhrase for Lactate > 4 or a patient requiring vasopressors after initial fluid bolus (meaning persistent hypotension)      If one the following conditions is present, a 30 mL/kg bolus is recommended as part of the 6 hour bundle (IBW can be used for BMI >30, or document refusal/contraindication):      1.   Initial hypotension  defined as 2 bps < 90 or map < 65 in the 6hrs before or  6hrs after time zero.     2.  Lactate >4.     The patient has signs of Severe Sepsis as evidenced by:    1. 2 SIRS criteria, AND  2. Suspected infection, AND   3. Organ dysfunction: Lactic Acid > 2.0    Time severe sepsis diagnosis confirmed: 0946 09/04/20 as this was the time when Lactate resulted, and the level was > 2.0    3 Hour Severe Sepsis Bundle Completion:    1. Initial Lactic Acid Result:   Recent Labs   Lab Test 09/04/20  1258 09/04/20  0946   LACT 3.1* 2.7*     2. Blood Cultures before Antibiotics: Yes  3. Broad Spectrum Antibiotics Administered:  yes       Anti-infectives (From admission through now)    Start     Dose/Rate Route Frequency Ordered Stop    09/04/20 1055  vancomycin 2000 mg in 0.9% NaCl 500 ml intermittent infusion 2,000 mg      2,000 mg  over 90 Minutes Intravenous ONCE 09/04/20 1051 09/04/20 1320    09/04/20 1030  piperacillin-tazobactam (ZOSYN) 4.5 g vial to attach to  mL bag      4.5 g  over 30 Minutes Intravenous ONCE 09/04/20 1026 09/04/20 1150          4. Fluid volume administered in ED:  Full 30 mL/kg bolus given (see amount below).    BMI Readings from Last 1 Encounters:   09/04/20 34.33 kg/m      30 mL/kg fluids based on weight: 2,720 mL  30 mL/kg fluids based on IBW (must be >= 60 inches tall): 1,640 mL                Severe Sepsis reassessment:  1. Repeat Lactic Acid Level: 3.1  2. MAP>65 after initial IVF bolus, will continue to monitor fluid status and vital signs    I attest to having performed a repeat sepsis exam and assessment of perfusion at 1300 and the results demonstrate no change.    Medical Decision Making:  Patient is a 80-year-old female who presents after an unwitnessed fall at her home.  Patient was last seen normal approximately 3 days prior.  I do have a concern for potential infectious etiologies versus a primary cardiac issue versus rhabdomyolysis versus sepsis.  Unfortunately patient's initial lactate was elevated and white blood cell count  significantly elevated at 20.7.  Urinalysis mildly suspicious for UTI.  She was given broad-spectrum antibiotics with Zosyn and Vancomycin on initial recognition of severe sepsis.  There is a mild troponin elevation secondary to likely demand ischemia in the setting of severe sepsis.  CT scan of the chest abdomen pelvis was obtained and reassuringly negative for acute infectious source.  There were incidental lung nodules noted.  CT scan of the head and cervical spine were obtained as well due to unwitnessed fall.  Reassuringly, these were negative for acute fractures or intracranial hemorrhage.  We did speak with family over the phone who unfortunately are out of town at this time.  Unfortunately, no beds are available here at [facility] and family preference to stay in the primary [place] area.  They elected to transfer to [facility] for inpatient admission.  I spoke with the hospitalist service at [facility] who agreed to transfer and admission.    While awaiting transfer and admission to [facility] patient did convert into atrial fibrillation with rapid ventricular rate.  Patient does have some ST segment depression in the lateral leads on this new EKG.  This atrial fibrillation does appear to be new for the patient and ultimately we did trial dose of IV diltiazem with improvement in rate control.  Ultimately as patient is still having elevated atrial fibrillation with rapid ventricular rate we will plan to place on a diltiazem drip.  Patient continues to receive IV fluids and unfortunately her lactate appears to be elevating in the inappropriate direction.  She thankfully has already received broad-spectrum antibiotics with Zosyn and vancomycin prior to transfer.  We will continue to resuscitate as needed with additional fluids and diltiazem drip. She remains normotensive at this time, but we will continue to monitor with caution.  Patient remains critically ill  and we have modified bed placement at [facility].     Critical Care time:  was 75 minutes for this patient excluding procedures.    Covid-19  Elizabeth Joy was evaluated during a global COVID-19 pandemic, which necessitated consideration that the patient might be at risk for infection with the SARS-CoV-2 virus that causes COVID-19.   Applicable protocols for evaluation were followed during the patient's care.   COVID-19 was considered as part of the patient's evaluation. The plan for testing is:  a test was obtained during this visit.    Diagnosis:    ICD-10-CM    1. Severe sepsis (H)  A41.9 Urine Culture    R65.20 Blood culture     Blood culture     Lactic acid   2. Dehydration  E86.0    3. Altered mental status, unspecified altered mental status type  R41.82    4. Demand ischemia (H)  I24.8    5. Paroxysmal atrial fibrillation with RVR (H)  I48.0        Disposition:  Transferred to [facility].    Scribe Disclosure:  I, Caryl Bustos, am serving as a scribe at 10:39 AM on 9/4/2020 to document services personally performed by Johann Cunha MD based on my observations and the provider's statements to me.    I, Nikolay Leung, am serving as a scribe at 3:53 PM on 9/4/2020 to document services personally performed by Johann Cunha MD based on my observations and the provider's statements to me.      Johann Cunha MD  09/04/20 [id number]

## 2020-09-04 NOTE — ED NOTES
Report called to Marija @ [facility].  Patient will go to station [id number], cannot arrive until after 1600.

## 2020-09-04 NOTE — ED NOTES
Bed: ED04  Expected date:   Expected time:   Means of arrival:   Comments:  Brigid 1 Weakness confusion 80 f

## 2020-09-04 NOTE — ED TRIAGE NOTES
Patient's step daughter called 911 for well fare check because she had not heard from patient for 3 days.  Medics could hear patient, door was locked.  They broke in to find patient lying on the floor, naked, covered in blood and stool.  Patient does not know how long she was on the floor.  ABCs intact.  Confused to time and place.

## 2020-09-06 LAB — EJECTION FRACTION: 69 %

## 2020-09-07 LAB
BACTERIA SPEC CULT: ABNORMAL
BACTERIA SPEC CULT: ABNORMAL
SPECIMEN SOURCE: ABNORMAL

## 2020-09-10 ENCOUNTER — TELEPHONE (OUTPATIENT)
Dept: FAMILY MEDICINE | Facility: CLINIC | Age: 80
End: 2020-09-10

## 2020-09-10 NOTE — TELEPHONE ENCOUNTER
"Spoke with Pt's daughter:     She wants to make sure PCP is aware that Pt has a Hospital Stay and has been transferred to TCU ([facility])    Advised she work with the SW at [facility]- she has spoken with \"Paula\" and they are trying to make arrangements for \"both of them\" (Pt and Pt's spouse)- sounds like they have the plan in place     Family is concerned about her being discharged home     Discussed that Providers and therapies (PT/OT) will make assessments and recommendations about safe discharge plans as they work with her and see how she progresses through the transitional care program    Recommend to continue to work with the  at [facility]     Verbalized agreement to plan    Suzan BRUNER RN            "

## 2020-09-10 NOTE — TELEPHONE ENCOUNTER
Reason for call:  Patient reporting a symptom    Symptom or request: recent fall and dementia/ER and hospital stay    Duration (how long have symptoms been present):     Have you been treated for this before? Yes    Additional comments: pt daughter (c2c on file) would like discuss with care team. Pt is very high risk for falls and has worsening dementia.    Phone Number patient can be reached at:    [phone]    Best Time:  any    Can we leave a detailed message on this number:  YES    Call taken on 9/10/2020 at 3:50 PM by Sukhwinder Kumar

## 2020-09-11 LAB
BACTERIA SPEC CULT: NO GROWTH
BACTERIA SPEC CULT: NO GROWTH
SPECIMEN SOURCE: NORMAL
SPECIMEN SOURCE: NORMAL

## 2020-09-17 ENCOUNTER — APPOINTMENT (OUTPATIENT)
Dept: CT IMAGING | Facility: CLINIC | Age: 80
End: 2020-09-17
Attending: EMERGENCY MEDICINE
Payer: COMMERCIAL

## 2020-09-17 ENCOUNTER — HOSPITAL ENCOUNTER (OUTPATIENT)
Facility: CLINIC | Age: 80
Setting detail: OBSERVATION
Discharge: NURSING, REHAB OR LONG-TERM CARE | End: 2020-09-19
Attending: EMERGENCY MEDICINE | Admitting: STUDENT IN AN ORGANIZED HEALTH CARE EDUCATION/TRAINING PROGRAM
Payer: COMMERCIAL

## 2020-09-17 ENCOUNTER — APPOINTMENT (OUTPATIENT)
Dept: GENERAL RADIOLOGY | Facility: CLINIC | Age: 80
End: 2020-09-17
Attending: EMERGENCY MEDICINE
Payer: COMMERCIAL

## 2020-09-17 DIAGNOSIS — I10 BENIGN ESSENTIAL HYPERTENSION: Primary | ICD-10-CM

## 2020-09-17 DIAGNOSIS — R65.10 SIRS (SYSTEMIC INFLAMMATORY RESPONSE SYNDROME) (H): ICD-10-CM

## 2020-09-17 DIAGNOSIS — R55 SYNCOPE, UNSPECIFIED SYNCOPE TYPE: ICD-10-CM

## 2020-09-17 DIAGNOSIS — Z79.52 CURRENT USE OF STEROID MEDICATION: ICD-10-CM

## 2020-09-17 DIAGNOSIS — Z87.440 PERSONAL HISTORY OF URINARY TRACT INFECTION: ICD-10-CM

## 2020-09-17 PROBLEM — F32.0 MILD MAJOR DEPRESSION (H): Status: ACTIVE | Noted: 2020-08-01

## 2020-09-17 LAB
ALBUMIN UR-MCNC: 10 MG/DL
ANION GAP SERPL CALCULATED.3IONS-SCNC: 5 MMOL/L (ref 3–14)
APPEARANCE UR: CLEAR
BASOPHILS # BLD AUTO: 0 10E9/L (ref 0–0.2)
BASOPHILS NFR BLD AUTO: 0.2 %
BILIRUB UR QL STRIP: NEGATIVE
BUN SERPL-MCNC: 26 MG/DL (ref 7–30)
CALCIUM SERPL-MCNC: 9.2 MG/DL (ref 8.5–10.1)
CHLORIDE SERPL-SCNC: 107 MMOL/L (ref 94–109)
CO2 SERPL-SCNC: 28 MMOL/L (ref 20–32)
COLOR UR AUTO: YELLOW
CREAT SERPL-MCNC: 1.28 MG/DL (ref 0.52–1.04)
DIFFERENTIAL METHOD BLD: ABNORMAL
EOSINOPHIL # BLD AUTO: 0 10E9/L (ref 0–0.7)
EOSINOPHIL NFR BLD AUTO: 0.2 %
ERYTHROCYTE [DISTWIDTH] IN BLOOD BY AUTOMATED COUNT: 13.6 % (ref 10–15)
GFR SERPL CREATININE-BSD FRML MDRD: 39 ML/MIN/{1.73_M2}
GLUCOSE SERPL-MCNC: 112 MG/DL (ref 70–99)
GLUCOSE UR STRIP-MCNC: NEGATIVE MG/DL
HCT VFR BLD AUTO: 38.6 % (ref 35–47)
HGB BLD-MCNC: 12.5 G/DL (ref 11.7–15.7)
HGB UR QL STRIP: NEGATIVE
HYALINE CASTS #/AREA URNS LPF: 13 /LPF (ref 0–2)
IMM GRANULOCYTES # BLD: 0.1 10E9/L (ref 0–0.4)
IMM GRANULOCYTES NFR BLD: 0.6 %
KETONES UR STRIP-MCNC: NEGATIVE MG/DL
LACTATE BLD-SCNC: 2.3 MMOL/L (ref 0.7–2)
LACTATE BLD-SCNC: 2.4 MMOL/L (ref 0.7–2)
LEUKOCYTE ESTERASE UR QL STRIP: NEGATIVE
LYMPHOCYTES # BLD AUTO: 1.6 10E9/L (ref 0.8–5.3)
LYMPHOCYTES NFR BLD AUTO: 8.2 %
MCH RBC QN AUTO: 31.3 PG (ref 26.5–33)
MCHC RBC AUTO-ENTMCNC: 32.4 G/DL (ref 31.5–36.5)
MCV RBC AUTO: 97 FL (ref 78–100)
MONOCYTES # BLD AUTO: 1.3 10E9/L (ref 0–1.3)
MONOCYTES NFR BLD AUTO: 6.6 %
MUCOUS THREADS #/AREA URNS LPF: PRESENT /LPF
NEUTROPHILS # BLD AUTO: 16.6 10E9/L (ref 1.6–8.3)
NEUTROPHILS NFR BLD AUTO: 84.2 %
NITRATE UR QL: NEGATIVE
NRBC # BLD AUTO: 0 10*3/UL
NRBC BLD AUTO-RTO: 0 /100
NT-PROBNP SERPL-MCNC: 167 PG/ML (ref 0–1800)
PH UR STRIP: 5.5 PH (ref 5–7)
PLATELET # BLD AUTO: 358 10E9/L (ref 150–450)
POTASSIUM SERPL-SCNC: 4 MMOL/L (ref 3.4–5.3)
RBC # BLD AUTO: 4 10E12/L (ref 3.8–5.2)
RBC #/AREA URNS AUTO: 1 /HPF (ref 0–2)
SARS-COV-2 RNA SPEC QL NAA+PROBE: NORMAL
SODIUM SERPL-SCNC: 140 MMOL/L (ref 133–144)
SOURCE: ABNORMAL
SP GR UR STRIP: 1.02 (ref 1–1.03)
SPECIMEN SOURCE: NORMAL
SQUAMOUS #/AREA URNS AUTO: 1 /HPF (ref 0–1)
TROPONIN I SERPL-MCNC: <0.015 UG/L (ref 0–0.04)
UROBILINOGEN UR STRIP-MCNC: 2 MG/DL (ref 0–2)
WBC # BLD AUTO: 19.6 10E9/L (ref 4–11)
WBC #/AREA URNS AUTO: 2 /HPF (ref 0–5)

## 2020-09-17 PROCEDURE — 25800030 ZZH RX IP 258 OP 636: Performed by: EMERGENCY MEDICINE

## 2020-09-17 PROCEDURE — 96361 HYDRATE IV INFUSION ADD-ON: CPT

## 2020-09-17 PROCEDURE — 99220 ZZC INITIAL OBSERVATION CARE,LEVL III: CPT | Performed by: STUDENT IN AN ORGANIZED HEALTH CARE EDUCATION/TRAINING PROGRAM

## 2020-09-17 PROCEDURE — 83880 ASSAY OF NATRIURETIC PEPTIDE: CPT | Performed by: EMERGENCY MEDICINE

## 2020-09-17 PROCEDURE — 99285 EMERGENCY DEPT VISIT HI MDM: CPT | Mod: 25

## 2020-09-17 PROCEDURE — 96375 TX/PRO/DX INJ NEW DRUG ADDON: CPT

## 2020-09-17 PROCEDURE — U0003 INFECTIOUS AGENT DETECTION BY NUCLEIC ACID (DNA OR RNA); SEVERE ACUTE RESPIRATORY SYNDROME CORONAVIRUS 2 (SARS-COV-2) (CORONAVIRUS DISEASE [COVID-19]), AMPLIFIED PROBE TECHNIQUE, MAKING USE OF HIGH THROUGHPUT TECHNOLOGIES AS DESCRIBED BY CMS-2020-01-R: HCPCS | Performed by: EMERGENCY MEDICINE

## 2020-09-17 PROCEDURE — 83605 ASSAY OF LACTIC ACID: CPT | Performed by: EMERGENCY MEDICINE

## 2020-09-17 PROCEDURE — 87086 URINE CULTURE/COLONY COUNT: CPT | Performed by: EMERGENCY MEDICINE

## 2020-09-17 PROCEDURE — 25000128 H RX IP 250 OP 636: Performed by: EMERGENCY MEDICINE

## 2020-09-17 PROCEDURE — 25800030 ZZH RX IP 258 OP 636: Performed by: STUDENT IN AN ORGANIZED HEALTH CARE EDUCATION/TRAINING PROGRAM

## 2020-09-17 PROCEDURE — 96365 THER/PROPH/DIAG IV INF INIT: CPT

## 2020-09-17 PROCEDURE — 71046 X-RAY EXAM CHEST 2 VIEWS: CPT

## 2020-09-17 PROCEDURE — 70450 CT HEAD/BRAIN W/O DYE: CPT

## 2020-09-17 PROCEDURE — 87040 BLOOD CULTURE FOR BACTERIA: CPT | Performed by: EMERGENCY MEDICINE

## 2020-09-17 PROCEDURE — 85025 COMPLETE CBC W/AUTO DIFF WBC: CPT | Performed by: EMERGENCY MEDICINE

## 2020-09-17 PROCEDURE — C9803 HOPD COVID-19 SPEC COLLECT: HCPCS

## 2020-09-17 PROCEDURE — G0378 HOSPITAL OBSERVATION PER HR: HCPCS

## 2020-09-17 PROCEDURE — 80048 BASIC METABOLIC PNL TOTAL CA: CPT | Performed by: EMERGENCY MEDICINE

## 2020-09-17 PROCEDURE — 84484 ASSAY OF TROPONIN QUANT: CPT | Performed by: EMERGENCY MEDICINE

## 2020-09-17 PROCEDURE — 81001 URINALYSIS AUTO W/SCOPE: CPT | Performed by: EMERGENCY MEDICINE

## 2020-09-17 RX ORDER — PREDNISONE 5 MG/1
5 TABLET ORAL DAILY
Status: DISCONTINUED | OUTPATIENT
Start: 2020-09-18 | End: 2020-09-17

## 2020-09-17 RX ORDER — NALOXONE HYDROCHLORIDE 0.4 MG/ML
.1-.4 INJECTION, SOLUTION INTRAMUSCULAR; INTRAVENOUS; SUBCUTANEOUS
Status: DISCONTINUED | OUTPATIENT
Start: 2020-09-17 | End: 2020-09-19 | Stop reason: HOSPADM

## 2020-09-17 RX ORDER — ONDANSETRON 2 MG/ML
4 INJECTION INTRAMUSCULAR; INTRAVENOUS EVERY 6 HOURS PRN
Status: DISCONTINUED | OUTPATIENT
Start: 2020-09-17 | End: 2020-09-19 | Stop reason: HOSPADM

## 2020-09-17 RX ORDER — PROCHLORPERAZINE MALEATE 5 MG
5 TABLET ORAL EVERY 6 HOURS PRN
Status: DISCONTINUED | OUTPATIENT
Start: 2020-09-17 | End: 2020-09-19 | Stop reason: HOSPADM

## 2020-09-17 RX ORDER — PROCHLORPERAZINE 25 MG
12.5 SUPPOSITORY, RECTAL RECTAL EVERY 12 HOURS PRN
Status: DISCONTINUED | OUTPATIENT
Start: 2020-09-17 | End: 2020-09-19 | Stop reason: HOSPADM

## 2020-09-17 RX ORDER — ONDANSETRON 2 MG/ML
4 INJECTION INTRAMUSCULAR; INTRAVENOUS EVERY 6 HOURS PRN
Status: DISCONTINUED | OUTPATIENT
Start: 2020-09-17 | End: 2020-09-17

## 2020-09-17 RX ORDER — POTASSIUM CHLORIDE 750 MG/1
10 TABLET, EXTENDED RELEASE ORAL DAILY
COMMUNITY
End: 2021-08-06

## 2020-09-17 RX ORDER — TRAZODONE HYDROCHLORIDE 50 MG/1
25 TABLET, FILM COATED ORAL AT BEDTIME
Status: ON HOLD | COMMUNITY
End: 2020-12-03

## 2020-09-17 RX ORDER — ONDANSETRON 4 MG/1
4 TABLET, ORALLY DISINTEGRATING ORAL EVERY 6 HOURS PRN
Status: DISCONTINUED | OUTPATIENT
Start: 2020-09-17 | End: 2020-09-19 | Stop reason: HOSPADM

## 2020-09-17 RX ORDER — ONDANSETRON 4 MG/1
4 TABLET, ORALLY DISINTEGRATING ORAL EVERY 6 HOURS PRN
Status: DISCONTINUED | OUTPATIENT
Start: 2020-09-17 | End: 2020-09-17

## 2020-09-17 RX ORDER — SODIUM CHLORIDE, SODIUM LACTATE, POTASSIUM CHLORIDE, CALCIUM CHLORIDE 600; 310; 30; 20 MG/100ML; MG/100ML; MG/100ML; MG/100ML
INJECTION, SOLUTION INTRAVENOUS CONTINUOUS
Status: DISCONTINUED | OUTPATIENT
Start: 2020-09-17 | End: 2020-09-18

## 2020-09-17 RX ORDER — CITALOPRAM HYDROBROMIDE 10 MG/1
10 TABLET ORAL DAILY
Status: DISCONTINUED | OUTPATIENT
Start: 2020-09-18 | End: 2020-09-19 | Stop reason: HOSPADM

## 2020-09-17 RX ORDER — PIPERACILLIN SODIUM, TAZOBACTAM SODIUM 3; .375 G/15ML; G/15ML
3.38 INJECTION, POWDER, LYOPHILIZED, FOR SOLUTION INTRAVENOUS ONCE
Status: COMPLETED | OUTPATIENT
Start: 2020-09-17 | End: 2020-09-17

## 2020-09-17 RX ORDER — ACETAMINOPHEN 325 MG/1
650 TABLET ORAL EVERY 4 HOURS PRN
Status: DISCONTINUED | OUTPATIENT
Start: 2020-09-17 | End: 2020-09-19 | Stop reason: HOSPADM

## 2020-09-17 RX ORDER — AMLODIPINE BESYLATE 5 MG/1
5 TABLET ORAL DAILY
Status: ON HOLD | COMMUNITY
End: 2020-09-19

## 2020-09-17 RX ORDER — LIDOCAINE 40 MG/G
CREAM TOPICAL
Status: DISCONTINUED | OUTPATIENT
Start: 2020-09-17 | End: 2020-09-19 | Stop reason: HOSPADM

## 2020-09-17 RX ADMIN — SODIUM CHLORIDE, POTASSIUM CHLORIDE, SODIUM LACTATE AND CALCIUM CHLORIDE: 600; 310; 30; 20 INJECTION, SOLUTION INTRAVENOUS at 19:08

## 2020-09-17 RX ADMIN — SODIUM CHLORIDE 500 ML: 9 INJECTION, SOLUTION INTRAVENOUS at 13:32

## 2020-09-17 RX ADMIN — PIPERACILLIN SODIUM AND TAZOBACTAM SODIUM 3.38 G: 3; .375 INJECTION, POWDER, LYOPHILIZED, FOR SOLUTION INTRAVENOUS at 14:32

## 2020-09-17 RX ADMIN — VANCOMYCIN HYDROCHLORIDE 2000 MG: 5 INJECTION, POWDER, LYOPHILIZED, FOR SOLUTION INTRAVENOUS at 15:52

## 2020-09-17 RX ADMIN — SODIUM CHLORIDE 1000 ML: 9 INJECTION, SOLUTION INTRAVENOUS at 14:20

## 2020-09-17 ASSESSMENT — ENCOUNTER SYMPTOMS
DIAPHORESIS: 0
FEVER: 0
HEADACHES: 1
NAUSEA: 0
VOMITING: 0
FATIGUE: 1
ABDOMINAL PAIN: 0
FREQUENCY: 1
SHORTNESS OF BREATH: 0
WEAKNESS: 1
CHILLS: 0
COUGH: 0

## 2020-09-17 ASSESSMENT — MIFFLIN-ST. JEOR: SCORE: 1362.19

## 2020-09-17 NOTE — ED NOTES
Bed: ED13  Expected date:   Expected time:   Means of arrival:   Comments:  [facility] - 441 - 80 F syncope no covid eta 1219

## 2020-09-17 NOTE — ED PROVIDER NOTES
History   Chief Complaint:  Loss of Consciousness    HPI   Elizabeth Joy is a 80 year old female, currently at a TCU facility after being treated for sepsis and a fall, with a history of PMR, lymphocytic colitis, hypertension, and hyperlipidemia, who presents to the ED for evaluation of loss of consciousness. The TCU states the patient had a two minute syncopal episode this morning while at physical therapy this morning. They report the patient seemed slightly confused afterwards and was brought back to her room. As the patient was brought to her room, the patient complained of a headache and she was given Tylenol.  However, as the patient was in her room, they felt as if the patient had become weaker bilaterally and was fatigued. Here in the emergency department, the patient states she was in the bathroom, was brought to a chair, and received Tylenol for her minor headache and that was the last thing she remembered. She conveys she does not have a headache now, feel nauseous, nor has she had an emesis episode. She denies any cough, shortness of breath, chest pain, abdominal pain, or dizziness. She has only ambulated with assistance at the transitional care facility and has not ambulated since the incident. Of note, she mentions increased urinary frequency, but also conveys her PO fluid intake has greatly increased.     Allergies:  Ace inhibitors    Medications:    Celexa  Prednisone  Verapamil    Past Medical History:    Depression  Hypertension  Insomnia  Lymphocytic colitis  PMR  Ventricular tachycardia  Hyperlipidemia  Lichen sclerosus et atrophicus of the vulva    Past Surgical History:    Hysterectomy with bilateral salpingo-oophorectomy  Right keratotomy x2  Right  Intraocular lens implant  Pilonidal cyst removal    Family History:    Breast cancer    Social History:  Smoking status: Former-9/29/1997  Alcohol use: No  Drug use: No  PCP: Marky Josue  Marital Status:   [2]    Review of Systems  "  Constitutional: Positive for fatigue. Negative for chills, diaphoresis and fever.   Respiratory: Negative for cough and shortness of breath.    Cardiovascular: Negative for chest pain.   Gastrointestinal: Negative for abdominal pain, nausea and vomiting.   Genitourinary: Positive for frequency.   Neurological: Positive for syncope, weakness and headaches.   All other systems reviewed and are negative.    Physical Exam     Patient Vitals for the past 24 hrs:   BP Temp Temp src Pulse Resp SpO2 Height Weight   09/17/20 1430 106/72 -- -- 78 18 97 % -- --   09/17/20 1415 107/72 -- -- 73 -- -- -- --   09/17/20 1330 (!) 89/65 -- -- 64 -- -- -- --   09/17/20 1329 94/58 -- -- 65 -- -- -- --   09/17/20 1326 (!) 86/65 -- -- 65 -- -- -- --   09/17/20 1300 (!) 87/57 -- -- 68 -- 99 % -- --   09/17/20 1235 100/62 -- -- 70 -- -- -- --   09/17/20 1228 116/68 98.7  F (37.1  C) Oral 69 20 100 % 1.626 m (5' 4\") 90.7 kg (200 lb)       Physical Exam  Eyes:  Sclera white; Pupils are equal and round  ENT:    External ears and nares normal  CV:  Rate as above with regular rhythm   Resp:  Breath sounds clear and equal bilaterally    Non-labored, no retractions or accessory muscle use  GI:  Abdomen is soft, non-tender, non-distended    No rebound tenderness or peritoneal features  MS:  Moves all extremities, lifts both legs off the bed  Skin:  Warm and dry, no visualized rash  Neuro:  Speech is normal and fluent. No apparent deficit.        Emergency Department Course   ECG (12:46:44):  Rate 70 bpm. MS interval 176. QRS duration 88. QT/QTc 404/436. P-R-T axes 73 4 26. Normal sinus rhythm. Normal ECG. Interpreted at 1306 by Laura Ivy MD.    Imaging:  Radiology findings were communicated with the patient who voiced understanding of the findings.    XR Chest, 2 views:  Heart is normal in size. Lungs are clear.     Imaging independently reviewed and agree with radiologist interpretation.    CT Head without contrast:  No evidence " of acute intracranial hemorrhage, mass, or   herniation.     Imaging independently reviewed and agree with radiologist interpretation.     Laboratory:  Laboratory findings were communicated with the patient who voiced understanding of the findings.    CBC: WBC 19.6 (H), o/w WNL (HGB 12.5, )    BMP:  (H), Creatinine 1.28 (H), GFR 39 (L), o/w WNL    Troponin: <0.015    BNP: 167    Lactic Acid (1332): 2.4 (H)  Lactic Acid (1559): 2.3 (H)     UA: Protein Albumin 10, Mucous Present, Hyaline Casts 13 (H), o/w Negative     Urine Culture Aerobic Bacterial: Pending    Blood Culture x2: Pending    COVID-19 Virus PCR: Pending    Interventions:  1332: NS 1L IV Bolus   1420: NS 1L IV Bolus   1432: Zosyn 3.375 g IV Infusion  1545: Vancocin 2000 mg in NaCl 0.9% 500 mL IV Infusion    Emergency Department Course:  Past medical records, nursing notes, and vitals reviewed.    1245 I performed an exam of the patient as documented above.     EKG obtained in the ED, see results above.   IV was inserted and blood was drawn for laboratory testing, results above.  The patient provided a urine sample here in the emergency department. This was sent for laboratory testing, findings above.  The patient was sent for a chest x-ray and head CT while in the emergency department, results above.     1500 I rechecked the patient and discussed the results of her workup thus far.     Findings and plan explained to the Patient who consents to admission. Discussed the patient with Dr. Quiroz, who will admit the patient to a medical bed for further monitoring, evaluation, and treatment.    I personally reviewed the laboratory and imaging results with the Patient and answered all related questions prior to admission.     Impression & Plan   CMS Diagnoses:   The patient has signs of Severe Sepsis        If one the following conditions is present, a 30 mL/kg bolus is recommended as part of the 6 hour bundle (IBW can be used for BMI >30, or  document refusal/contraindication):      1.   Initial hypotension  defined as 2 bps < 90 or map < 65 in the 6hrs before or 6hrs after time zero.     2.  Lactate >4.     The patient has signs of Severe Sepsis as evidenced by:    1. 2 SIRS criteria, AND  2. Suspected infection, AND   3. Organ dysfunction: Lactic Acidosis with value >2.0    Time severe sepsis diagnosis confirmed: 1332  09/17/20 as this was the time when Lactate resulted, and the level was > 2.0    3 Hour Severe Sepsis Bundle Completion:    1. Initial Lactic Acid Result:   Recent Labs   Lab Test 09/17/20  1322 09/04/20  1258 09/04/20  0946   LACT 2.4* 3.1* 2.7*     2. Blood Cultures before Antibiotics: Yes  3. Broad Spectrum Antibiotics Administered:  yes       Anti-infectives (From admission through now)    Start     Dose/Rate Route Frequency Ordered Stop    09/17/20 1400  vancomycin 2000 mg in 0.9% NaCl 500 ml intermittent infusion 2,000 mg      2,000 mg  over 90 Minutes Intravenous ONCE 09/17/20 1356      09/17/20 1350  piperacillin-tazobactam (ZOSYN) 3.375 g vial to attach to  mL bag      3.375 g  over 1 Hours Intravenous ONCE 09/17/20 1349            4. Fluid volume administered in ED: 1.5L    BMI Readings from Last 1 Encounters:   09/17/20 34.33 kg/m      30 mL/kg fluids based on weight: 2,720 mL  30 mL/kg fluids based on IBW (must be >= 60 inches tall): 1,640 mL                Severe Sepsis reassessment:  1. Repeat Lactic Acid Level: 2.3  2. MAP>65 after initial IVF bolus, will continue to monitor fluid status and vital signs    Medical Decision Making:  Elizabeth Joy is an 80 year old female who presents for evaluation of a syncopal event at her TCU. EKG was obtained with no evidence of dysthymia or ischemia. Blood work shows an elevated white cell count and an elevated lactic both concerning for the possibility of severe sepsis. She was recently admitted with a urinary tract infection that grew multiple organisms and antibiotics were  dosed based on her culture results.  Chronic prednisone use can contribute to elevated WBC and decreased ability to fight infection.  CXR w/o pneumonia.  UA w/o clear UTI.  Had some low BP readings here that improved with additional IV fluids, total 1L.  Repeat lactic acid has not worsened.  Given concern for infection without clear source, will use isolation precautions for possible COVID infection.  Dehydration and cardiac abnormalities remain on the differential for elevated lactic acid but are felt less likely given the normal electrolytes, renal function, and cardiac enzymes.  Seizure could also cause an elevated WBC and lactic acid but description on scene is more suggestive of syncope.  Admit for ongoing cardiac and infection evaluation.    Diagnosis:    ICD-10-CM    1. Syncope, unspecified syncope type  R55 Lactic acid whole blood     Symptomatic COVID-19 Virus (Coronavirus) by PCR   2. SIRS (systemic inflammatory response syndrome) (H)  R65.10    3. Personal history of urinary tract infection  Z87.440    4. Current use of steroid medication  Z79.52        Disposition:  Admitted to a medical bed.    Scribe Disclosure:  Chalo SCOTT, am serving as a scribe at 12:45 PM on 9/17/2020 to document services personally performed by Laura Ivy MD based on my observations and the provider's statements to me.        Laura Ivy MD  09/17/20 [id number]

## 2020-09-17 NOTE — H&P
[facility]    History and Physical - Hospitalist Service       Date of Admission:  9/17/2020    Assessment & Plan   Elizabeth Joy is a 80 year old female admitted on 9/17/2020. She presents with possible syncope.       Syncope    Lactic acidosis    Assessment: has had syncope in the past. Had several episodes of syncope beginning in 2016.  There is no obvious initiating factor for her syncope. Cardiology felt maybe was sinus node dysfunction and Fung-Figueroa attack but Zio patch showed no acute findings. Now with another episode of likely syncope. Ddx includes vasovagal syncope / orthostatic hypotension / arrhythmia among others. WBC elevated but in setting of steroid use and no objective source of infection (CXR/UA unremarkable), CT head was negative.  Suspect that her elevated lactate is from her hypotension, and has now improved with IV fluids in the emergency department.  She was started on vancomycin and Zosyn emergency department for possible sepsis.    Plan:   - admit to obs  - ECHO  - IV fluids  - Check Orthostatics in AM  - Telemetry  - ECHO  - Hold further antibiotics  - Follow urine culture  - Follow vitals/temp, if patient has fever, would start IV ceftriaxone      Benign essential hypertension    Hyperlipidemia LDL goal <160    Assessment: PTA on Norvasc / Verapamil    Plan:   - hold anti-htns for now      Insomnia    Assessment/Plan: continue PTA Trazodone once verified      PMR (polymyalgia rheumatica) (H)    Assessment/Plan: continue PTA Prednisone       CKD (chronic kidney disease) stage 3, GFR 30-59 ml/min (H)    Assessment/Plan: Cr on admission, 1.28 within baseline. Avoid NSAIDs/nephrotoxins      Depression    Assessment/Plan: continue PTA Lexapro once verified         Diet: Regular Diet Adult    DVT Prophylaxis: Pneumatic Compression Devices  Ram Catheter: not present  Code Status: Full Code    Rule Out COVID-19 Handoff:  Elizabeth is a LOW SUSPICION PUI.  Follow these  instructions:    If COVID test positive -> continue isolation precautions    If COVID test negative -> discontinue COVID-specific isolation precautions       Disposition Plan   Expected discharge: Tomorrow, recommended to prior living arrangement once syncope work up completed.  Entered: David Quiroz MD 09/17/2020, 6:55 PM     The patient's care was discussed with the Patient and ED Provider.    David Quiroz MD  [facility]    ______________________________________________________________________    Chief Complaint     Syncope    History is obtained from the patient    History of Present Illness      Elizabeth Joy is a 80 year old female with PMH of polymyalgia rheumatica, major depression, hypertension, hyperlipidemia, who presents for evaluation of syncope.    Patient reports that she was at her living facility today when she was working with her physical therapist this morning and appeared to have an episode in which she thinks she may have lost consciousness.  As the patient was brought to her room after her episode, she complained of a headache, but otherwise no chest pain or shortness of breath.  She reports that the last remembered was that she was in the bathroom, was then brought to a chair, and the next thing she saw with paramedics at her side.  She otherwise denies any recent fevers or chills, she has no urinary complaints of urgency, no hematuria but she does endorse some increased urinary frequency.  She denies any recent weight loss or night sweats, no blood in her stool, no nausea/vomiting or abdominal pain.  She reports that she feels adequately hydrated and actually her fluid intake has increased over the past several days.  She otherwise has no localized weakness or numbness to her extremities, no slurred speech, no vision changes.  She has no significant joint pain, swelling.  She has no other complaints at this time, she feels mostly back to her baseline self.    Review of  Systems      The 10 point Review of Systems is negative other than noted in the HPI or here.     Past Medical History      I have reviewed this patient's medical history and updated it with pertinent information if needed.   Past Medical History:   Diagnosis Date     Depression 08/2020     Elevated blood sugar      HTN (hypertension) 35     Hx of colonoscopy 2008    bx collagenous colitis     Hypercholesteremia      Insomnia     on ambien 10mg 1/2 daily for long time     Lichen sclerosus et atrophicus of the vulva 02/2017    dx by Dr. Weldon, had bx     Lymphocytic colitis 2008    on colon exam at [facility]     PMR (polymyalgia rheumatica) (H) 01/31/2019     Syncope 08/2016    echo trace lvh, nl ef; ziopatch with one 5 beat run of vtach, seen by ep Dr. Allen and nothing found     Ventricular tachycardia (H) 8/16    seen on ziopatch done for syncope, just one 5 beat run     Past Surgical History     I have reviewed this patient's surgical history and updated it with pertinent information if needed.    Past Surgical History:   Procedure Laterality Date     HYSTERECTOMY, PAP NO LONGER INDICATED  1990    had bso also, not for cancer     KERATOTOMY ARCUATE WITH FEMTOSECOND LASER/IMAGING FOR ATIOL Right 7/13/2015    Procedure: KERATOTOMY ARCUATE WITH FEMTOSECOND LASER/IMAGING FOR ATIOL;  Surgeon: Lionel Reza MD;  Location:  [facility]     KERATOTOMY ARCUATE WITH FEMTOSECOND LASER/IMAGING FOR ATIOL Right 7/13/2015    Procedure: KERATOTOMY ARCUATE WITH FEMTOSECOND LASER/IMAGING FOR ATIOL;  Surgeon: Lionel Reza MD;  Location: [facility]     PHACOEMULSIFICATION CLEAR CORNEA WITH STANDARD INTRAOCULAR LENS IMPLANT Right 7/13/2015    Procedure: PHACOEMULSIFICATION CLEAR CORNEA WITH STANDARD INTRAOCULAR LENS IMPLANT;  Surgeon: Lionel Reza MD;  Location:  [facility]     pilonidal cyst removed  30's       Social History   I have reviewed this patient's social history and updated it with pertinent information if needed.  Social History      Tobacco Use     Smoking status: Former Smoker     Packs/day: 1.00     Years: 42.00     Pack years: 42.00     Types: Cigarettes     Start date:      Last attempt to quit: 1997     Years since quittin.9     Smokeless tobacco: Never Used   Substance Use Topics     Alcohol use: No     Alcohol/week: 0.0 standard drinks     Frequency: Never     Drug use: No       Family History   I have reviewed this patient's family history and updated it with pertinent information if needed.  Family History   Problem Relation Age of Onset     Breast Cancer Mother        Prior to Admission Medications   Prior to Admission Medications   Prescriptions Last Dose Informant Patient Reported? Taking?   amLODIPine (NORVASC) 5 MG tablet 2020 at 0944  Yes Yes   Sig: Take 5 mg by mouth daily   citalopram (CELEXA) 10 MG tablet 2020 at 0944  No Yes   Sig: Take 1 tablet (10 mg) by mouth daily   potassium chloride ER (KLOR-CON M) 20 MEQ CR tablet 2020 at 0944  Yes Yes   Sig: Take 20 mEq by mouth 2 times daily (with meals)   predniSONE (DELTASONE) 1 MG tablet 2020 at 0944  No Yes   Sig: Take 3 of these along with one 5mg for a total daily dose of 8mg   predniSONE (DELTASONE) 5 MG tablet 2020 at 0944  No Yes   Sig: Take one of these along with 3 of the 1mg pills for a total daily dose of 8mg   traZODone (DESYREL) 50 MG tablet 2020 at 1930  Yes Yes   Sig: Take 25 mg by mouth At Bedtime   verapamil ER (CALAN-SR) 240 MG CR tablet 2020 at 0944  No Yes   Sig: TAKE 1 TABLET BY MOUTH DAILY      Facility-Administered Medications Last Administration Doses Remaining   lidocaine 1 % injection 3 mL 2019 11:11 AM    lidocaine 1 % injection 4 mL 2/3/2020  9:16 AM    lidocaine 1 % injection 4 mL 2/3/2020  9:16 AM    triamcinolone (KENALOG-40) injection 40 mg 2/3/2020  9:16 AM    triamcinolone (KENALOG-40) injection 40 mg 2/3/2020  9:16 AM    triamcinolone (KENALOG-40) injection 80 mg 2019 11:11 AM          Allergies   Allergies   Allergen Reactions     Ace Inhibitors Cough       Physical Exam   Vital Signs: Temp: 98.2  F (36.8  C) Temp src: Oral BP: 128/62 Pulse: 59   Resp: 16 SpO2: 98 % O2 Device: None (Room air)    Weight: 200 lbs 0 oz    Constitutional: awake, alert, cooperative, no apparent distress.   Eyes: Lids and lashes normal, pupils equal, round and reactive to light   ENT: Normocephalic, without obvious abnormality, atraumatic, sinuses nontender on palpation   Hematologic / Lymphatic: no cervical lymphadenopathy   Respiratory: CTABL   Cardiovascular: RRR with no m/r/g   GI: Normal bowel sounds, soft, non-distended, non-tender.   Skin: normal skin color, texture, turgor   Musculoskeletal: There is no redness, warmth, or swelling of the joints. Full range of motion noted.   Neurologic: Awake, alert, oriented to name, place and time. Cranial nerves II-XII are grossly intact. Motor is 5 out of 5 bilaterally. Sensory is intact.   Neuropsychiatric: normal mood and affect    Data   Data reviewed today: I reviewed all medications, new labs and imaging results over the last 24 hours. I personally reviewed the EKG tracing showing NSR , the chest x-ray image(s) showing see below and the head CT image(s) showing see below.    XR Chest, 2 views:  Heart is normal in size. Lungs are clear.      CT Head without contrast:  No evidence of acute intracranial hemorrhage, mass, or   herniation.      Most Recent 3 CBC's:  Recent Labs   Lab Test 09/17/20  1322 09/04/20  0946 06/30/20  1520   WBC 19.6* 20.7* 13.9*   HGB 12.5 15.9* 13.3   MCV 97 92 94    411 295     Most Recent 3 BMP's:  Recent Labs   Lab Test 09/17/20  1322 09/04/20  0946 06/30/20  1520    139 138   POTASSIUM 4.0 3.3* 3.7   CHLORIDE 107 101 104   CO2 28 26 24   BUN 26 33* 21   CR 1.28* 1.21* 1.02   ANIONGAP 5 12 10   MICHELLE 9.2 9.3 9.2   * 119* 109*     Most Recent 2 LFT's:  Recent Labs   Lab Test 09/04/20  0946   AST 41   ALT 38   ALKPHOS 117    BILITOTAL 1.4*     Most Recent 3 INR's:No lab results found.  Most Recent 3 Troponin's:  Recent Labs   Lab Test 09/17/20  1322 09/04/20  0946 08/05/16  1715   TROPI <0.015 0.086* <0.015  The 99th percentile for upper reference range is 0.045 ug/L.  Troponin values in   the range of 0.045 - 0.120 ug/L may be associated with risks of adverse   clinical events.       Recent Results (from the past 24 hour(s))   CT Head w/o Contrast    Narrative    CT SCAN OF THE HEAD WITHOUT CONTRAST   9/17/2020 1:49 PM     HISTORY: Headache, syncopal, briefly confused after, now headache  resolving after Tylenol.    TECHNIQUE: Axial images of the head and coronal reformations without  IV contrast material. Radiation dose for this scan was reduced using  automated exposure control, adjustment of the mA and/or kV according  to patient size, or iterative reconstruction technique.    COMPARISON: Head CT 9/4/2020.    FINDINGS: There is no evidence of intracranial hemorrhage, mass, acute  infarct or anomaly. The ventricles are normal in size, shape and  configuration. Moderate diffuse parenchymal volume loss. Moderate  patchy periventricular white matter hypodensities which are  nonspecific, but likely related to chronic microvascular ischemic  disease. Chronic lacunar infarct in the right thalamus.    The visualized portions of the sinuses and mastoids appear normal. The  bony calvarium and bones of the skull base appear intact.       Impression    IMPRESSION: No evidence of acute intracranial hemorrhage, mass, or  herniation.      FABRIZIO INGRAM MD   XR Chest 2 Views    Narrative    XR CHEST 2 VW 9/17/2020 2:01 PM    HISTORY: syncope    COMPARISON: 9/4/2020    FINDINGS: Heart is normal in size. Lungs are clear.    POPEYE CHEW MD

## 2020-09-17 NOTE — PHARMACY-ADMISSION MEDICATION HISTORY
Pharmacy Medication History  Admission medication history interview status for the 9/17/2020  admission is complete. See EPIC admission navigator for prior to admission medications     Medication history sources: MAR ([place])  Medication history source reliability: Good  Adherence assessment: Good    Significant changes made to the medication list:  -Added most medications.    Additional medication history information:   -Trazodone was decreased from 50mg to 25mg at bedtime 9/14/20  -She completed course of penicillin 500mg QID on 9/16/20    Medication reconciliation completed by provider prior to medication history? No    Time spent in this activity: 10 min      Prior to Admission medications    Medication Sig Last Dose Taking? Auth Provider   amLODIPine (NORVASC) 5 MG tablet Take 5 mg by mouth daily 9/17/2020 at 0944 Yes Unknown, Entered By History   citalopram (CELEXA) 10 MG tablet Take 1 tablet (10 mg) by mouth daily 9/17/2020 at 0944 Yes Marky Josue MD   potassium chloride ER (KLOR-CON M) 20 MEQ CR tablet Take 20 mEq by mouth 2 times daily (with meals) 9/17/2020 at 0944 Yes Unknown, Entered By History   predniSONE (DELTASONE) 1 MG tablet Take 3 of these along with one 5mg for a total daily dose of 8mg 9/17/2020 at 0944 Yes Marky Josue MD   predniSONE (DELTASONE) 5 MG tablet Take one of these along with 3 of the 1mg pills for a total daily dose of 8mg 9/17/2020 at 0944 Yes Marky Josue MD   traZODone (DESYREL) 50 MG tablet Take 25 mg by mouth At Bedtime 9/16/2020 at 1930 Yes Unknown, Entered By History   verapamil ER (CALAN-SR) 240 MG CR tablet TAKE 1 TABLET BY MOUTH DAILY 9/17/2020 at 0944 Yes Janet Miller MD

## 2020-09-17 NOTE — PROGRESS NOTES
RECEIVING UNIT ED HANDOFF REVIEW    ED Nurse Handoff Report was reviewed by: Nita Barriga RN on September 17, 2020 at 5:01 PM

## 2020-09-17 NOTE — ED TRIAGE NOTES
At tcu after sepsis and a fall. Was at PT today and had a syncopal episode, LOC X 2 mins. Since then staff states pt seems generally weaker and c/o a HA. vss

## 2020-09-17 NOTE — ED NOTES
[facility]  ED Nurse Handoff Report    ED Chief complaint: Loss of Consciousness      ED Diagnosis:   Final diagnoses:   Syncope, unspecified syncope type   SIRS (systemic inflammatory response syndrome) (H)   Personal history of urinary tract infection   Current use of steroid medication       Code Status: See prior    Allergies:   Allergies   Allergen Reactions     Ace Inhibitors Cough       Patient Story: Pt lives at TCU post fall and sepsis. She had a syncope episode and was sent to ED.  Focused Assessment:    Pt alert, follows commands. Some mild confusion. Hypotensive initially now 108/59 after fluid. WBC 19. (source of infection unknown?) Normal UA. Lactate 2.4. Received Iv antibiotics.    Treatments and/or interventions provided: See mar  Patient's response to treatments and/or interventions: na    To be done/followed up on inpatient unit:  na    Does this patient have any cognitive concerns?: Forgetful    Activity level - Baseline/Home:  Unknown  Activity Level - Current:   Unknown    Patient's Preferred language: English   Needed?: No    Isolation: None and COVID r/o and special precautions  Infection: Not Applicable  COVID r/o and special precautions  Bariatric?: No    Vital Signs:   Vitals:    09/17/20 1430 09/17/20 1445 09/17/20 1500 09/17/20 1600   BP: 106/72 112/72 103/66 102/66   BP Location:       Pulse: 78 71 66 67   Resp: 18      Temp:       TempSrc:       SpO2: 97% 95% 97% 98%   Weight:       Height:           Cardiac Rhythm:     Was the PSS-3 completed:   Yes  What interventions are required if any?               Family Comments: na  OBS brochure/video discussed/provided to patient/family: Yes              Name of person given brochure if not patient: na              Relationship to patient: na    For the majority of the shift this patient's behavior was Green.   Behavioral interventions performed were na.    ED NURSE PHONE NUMBER: [id number]

## 2020-09-18 ENCOUNTER — APPOINTMENT (OUTPATIENT)
Dept: CARDIOLOGY | Facility: CLINIC | Age: 80
End: 2020-09-18
Attending: STUDENT IN AN ORGANIZED HEALTH CARE EDUCATION/TRAINING PROGRAM
Payer: COMMERCIAL

## 2020-09-18 ENCOUNTER — APPOINTMENT (OUTPATIENT)
Dept: PHYSICAL THERAPY | Facility: CLINIC | Age: 80
End: 2020-09-18
Attending: STUDENT IN AN ORGANIZED HEALTH CARE EDUCATION/TRAINING PROGRAM
Payer: COMMERCIAL

## 2020-09-18 LAB
ANION GAP SERPL CALCULATED.3IONS-SCNC: 4 MMOL/L (ref 3–14)
BACTERIA SPEC CULT: NO GROWTH
BASOPHILS # BLD AUTO: 0 10E9/L (ref 0–0.2)
BASOPHILS NFR BLD AUTO: 0.1 %
BUN SERPL-MCNC: 19 MG/DL (ref 7–30)
CALCIUM SERPL-MCNC: 8.8 MG/DL (ref 8.5–10.1)
CHLORIDE SERPL-SCNC: 111 MMOL/L (ref 94–109)
CO2 SERPL-SCNC: 26 MMOL/L (ref 20–32)
CREAT SERPL-MCNC: 1.09 MG/DL (ref 0.52–1.04)
DIFFERENTIAL METHOD BLD: ABNORMAL
EOSINOPHIL # BLD AUTO: 0 10E9/L (ref 0–0.7)
EOSINOPHIL NFR BLD AUTO: 0.1 %
ERYTHROCYTE [DISTWIDTH] IN BLOOD BY AUTOMATED COUNT: 13.5 % (ref 10–15)
GFR SERPL CREATININE-BSD FRML MDRD: 47 ML/MIN/{1.73_M2}
GLUCOSE SERPL-MCNC: 109 MG/DL (ref 70–99)
HCT VFR BLD AUTO: 33.9 % (ref 35–47)
HGB BLD-MCNC: 10.9 G/DL (ref 11.7–15.7)
IMM GRANULOCYTES # BLD: 0.1 10E9/L (ref 0–0.4)
IMM GRANULOCYTES NFR BLD: 0.3 %
LABORATORY COMMENT REPORT: NORMAL
LYMPHOCYTES # BLD AUTO: 1.8 10E9/L (ref 0.8–5.3)
LYMPHOCYTES NFR BLD AUTO: 11.8 %
Lab: NORMAL
MCH RBC QN AUTO: 30.5 PG (ref 26.5–33)
MCHC RBC AUTO-ENTMCNC: 32.2 G/DL (ref 31.5–36.5)
MCV RBC AUTO: 95 FL (ref 78–100)
MONOCYTES # BLD AUTO: 0.8 10E9/L (ref 0–1.3)
MONOCYTES NFR BLD AUTO: 5 %
NEUTROPHILS # BLD AUTO: 12.7 10E9/L (ref 1.6–8.3)
NEUTROPHILS NFR BLD AUTO: 82.7 %
NRBC # BLD AUTO: 0 10*3/UL
NRBC BLD AUTO-RTO: 0 /100
PLATELET # BLD AUTO: 327 10E9/L (ref 150–450)
POTASSIUM SERPL-SCNC: 3.5 MMOL/L (ref 3.4–5.3)
RBC # BLD AUTO: 3.57 10E12/L (ref 3.8–5.2)
SARS-COV-2 RNA SPEC QL NAA+PROBE: NEGATIVE
SODIUM SERPL-SCNC: 141 MMOL/L (ref 133–144)
SPECIMEN SOURCE: NORMAL
SPECIMEN SOURCE: NORMAL
WBC # BLD AUTO: 15.3 10E9/L (ref 4–11)

## 2020-09-18 PROCEDURE — 93306 TTE W/DOPPLER COMPLETE: CPT

## 2020-09-18 PROCEDURE — 25000132 ZZH RX MED GY IP 250 OP 250 PS 637: Performed by: STUDENT IN AN ORGANIZED HEALTH CARE EDUCATION/TRAINING PROGRAM

## 2020-09-18 PROCEDURE — 97112 NEUROMUSCULAR REEDUCATION: CPT | Mod: GP

## 2020-09-18 PROCEDURE — 25800030 ZZH RX IP 258 OP 636: Performed by: STUDENT IN AN ORGANIZED HEALTH CARE EDUCATION/TRAINING PROGRAM

## 2020-09-18 PROCEDURE — 93306 TTE W/DOPPLER COMPLETE: CPT | Mod: 26 | Performed by: INTERNAL MEDICINE

## 2020-09-18 PROCEDURE — 36415 COLL VENOUS BLD VENIPUNCTURE: CPT | Performed by: HOSPITALIST

## 2020-09-18 PROCEDURE — 96361 HYDRATE IV INFUSION ADD-ON: CPT

## 2020-09-18 PROCEDURE — 80048 BASIC METABOLIC PNL TOTAL CA: CPT | Performed by: HOSPITALIST

## 2020-09-18 PROCEDURE — 85025 COMPLETE CBC W/AUTO DIFF WBC: CPT | Performed by: HOSPITALIST

## 2020-09-18 PROCEDURE — 99225 ZZC SUBSEQUENT OBSERVATION CARE,LEVEL II: CPT | Performed by: HOSPITALIST

## 2020-09-18 PROCEDURE — 25000131 ZZH RX MED GY IP 250 OP 636 PS 637: Performed by: STUDENT IN AN ORGANIZED HEALTH CARE EDUCATION/TRAINING PROGRAM

## 2020-09-18 PROCEDURE — 97530 THERAPEUTIC ACTIVITIES: CPT | Mod: GP

## 2020-09-18 PROCEDURE — 25000132 ZZH RX MED GY IP 250 OP 250 PS 637: Performed by: HOSPITALIST

## 2020-09-18 PROCEDURE — G0378 HOSPITAL OBSERVATION PER HR: HCPCS

## 2020-09-18 PROCEDURE — 97161 PT EVAL LOW COMPLEX 20 MIN: CPT | Mod: GP

## 2020-09-18 RX ORDER — HYDRALAZINE HYDROCHLORIDE 10 MG/1
10 TABLET, FILM COATED ORAL 4 TIMES DAILY PRN
Status: DISCONTINUED | OUTPATIENT
Start: 2020-09-18 | End: 2020-09-19 | Stop reason: HOSPADM

## 2020-09-18 RX ORDER — AMLODIPINE BESYLATE 2.5 MG/1
2.5 TABLET ORAL DAILY
Status: DISCONTINUED | OUTPATIENT
Start: 2020-09-18 | End: 2020-09-19

## 2020-09-18 RX ORDER — OLANZAPINE 10 MG/2ML
5 INJECTION, POWDER, FOR SOLUTION INTRAMUSCULAR DAILY PRN
Status: DISCONTINUED | OUTPATIENT
Start: 2020-09-18 | End: 2020-09-19 | Stop reason: HOSPADM

## 2020-09-18 RX ADMIN — AMLODIPINE BESYLATE 2.5 MG: 2.5 TABLET ORAL at 16:06

## 2020-09-18 RX ADMIN — SODIUM CHLORIDE, POTASSIUM CHLORIDE, SODIUM LACTATE AND CALCIUM CHLORIDE: 600; 310; 30; 20 INJECTION, SOLUTION INTRAVENOUS at 08:58

## 2020-09-18 RX ADMIN — PREDNISONE 8 MG: 2.5 TABLET ORAL at 08:53

## 2020-09-18 NOTE — PROGRESS NOTES
Observation goals  PRIOR TO DISCHARGE      Comments: -diagnostic tests and consults completed and resulted: NOT MET: ECHO  -vital signs normal or at patient baseline: MET   -tolerating oral intake to maintain hydration: MET  -returns to baseline functional status: NOT MET   -safe disposition plan has been identified: NOT MET  Nurse to notify provider when observation goals have been met and patient is ready for discharge.

## 2020-09-18 NOTE — PLAN OF CARE
Observation goals  PRIOR TO DISCHARGE      Comments:     -Diagnostic tests and consults completed and resulted -Not met    -Vital signs normal or at patient baseline-Met     -Tolerating oral intake to maintain hydration -Met    -Returns to baseline functional status-Partially met     -Safe disposition plan has been identified -Not met    Nurse to notify provider when observation goals have been met and patient is ready for discharge.

## 2020-09-18 NOTE — PROGRESS NOTES
"   09/18/20 1525   Quick Adds   Type of Visit Initial PT Evaluation   Living Environment   Lives With alone   Living Environment Comment From TCU, before all this from her own apt.  moved into memory care just recently, still grieving.    Self-Care   Usual Activity Tolerance moderate   Current Activity Tolerance fair   Functional Level Prior   Ambulation 0-->independent   Transferring 0-->independent   Toileting 0-->independent   Bathing 0-->independent   Communication 0-->understands/communicates without difficulty   Swallowing 0-->swallows foods/liquids without difficulty   Cognition 0 - no cognition issues reported   Fall history within last six months yes   Number of times patient has fallen within last six months   (\"a few\" per pt)   Which of the above functional risks had a recent onset or change? ambulation;transferring;toileting;bathing   General Information   Onset of Illness/Injury or Date of Surgery - Date 09/17/20   Referring Physician David Quiroz MD    Patient/Family Goals Statement To know what to do to stay safe.    Precautions/Limitations fall precautions   Weight-Bearing Status - LUE full weight-bearing  (all)   Cognitive Status Examination   Level of Consciousness alert   Follows Commands and Answers Questions 100% of the time   Personal Safety and Judgment impaired   Memory impaired   Pain Assessment   Patient Currently in Pain No   Posture    Posture Comments WFL   Range of Motion (ROM)   ROM Comment WFL   Strength   Strength Comments Impaired activity tolerance, UE assist to stand.    Transfer Skills   Transfer Comments SBA stand, Sanam pivots no AD.   Gait   Gait Comments Sanam no AD, falls risk 200'. SBA . Stairs: pt declined, doesn't feel ready despite PT & 2 rails.   Balance   Balance Comments DGI 6/24 - high falls risk.    Coordination   Coordination Comments WFL   Muscle Tone   Muscle Tone Comments WFL   General Therapy Interventions   Planned Therapy Interventions balance " "training;bed mobility training;gait training;neuromuscular re-education;strengthening;transfer training;risk factor education;home program guidelines;progressive activity/exercise   Clinical Impression   Criteria for Skilled Therapeutic Intervention yes, treatment indicated   PT Diagnosis Impaired mobility   Influenced by the following impairments Impaired balance, mobility, activity tolerance    Functional limitations due to impairments Impaired mobility   Clinical Presentation Stable/Uncomplicated   Clinical Decision Making (Complexity) Low complexity   Therapy Frequency 5x/week   Predicted Duration of Therapy Intervention (days/wks) 3 days   Anticipated Equipment Needs at Discharge wheelchair;walker   Anticipated Discharge Disposition Transitional Care Facility   Risk & Benefits of therapy have been explained Yes   Patient, Family & other staff in agreement with plan of care Yes   [facility] TM \"6 Clicks\"   2016, Trustees of [facility], under license to [employer].  All rights reserved.   6 Clicks Short Forms Basic Mobility Inpatient Short Form   [facility]  \"6 Clicks\" V.2 Basic Mobility Inpatient Short Form   1. Turning from your back to your side while in a flat bed without using bedrails? 4 - None   2. Moving from lying on your back to sitting on the side of a flat bed without using bedrails? 4 - None   3. Moving to and from a bed to a chair (including a wheelchair)? 3 - A Little   4. Standing up from a chair using your arms (e.g., wheelchair, or bedside chair)? 3 - A Little   5. To walk in hospital room? 3 - A Little   6. Climbing 3-5 steps with a railing? 3 - A Little   Basic Mobility Raw Score (Score out of 24.Lower scores equate to lower levels of function) 20   Total Evaluation Time   Total Evaluation Time (Minutes) 5     "

## 2020-09-18 NOTE — CONSULTS
Care Transition Initial Assessment - SW     Met with: Patient. Spoke with son, Philip.    Principal Problem:    Syncope  Active Problems:    Benign essential hypertension    Insomnia    Hyperlipidemia LDL goal <160    Fung-Figueroa syncope    PMR (polymyalgia rheumatica) (H)    CKD (chronic kidney disease) stage 3, GFR 30-59 ml/min (H)    Depression       DATA  Lives With: alone, apt   Quality of Family Relationships: involved, supportive  Description of Support System: Involved, Supportive  Who is your support system?: Children  Support Assessment: Adequate family and caregiver support.   Identified issues/concerns regarding health management: [facility] since [facility]20. She admitted there following a stay at [facility]. Her spouse lives in memory care on the same campus. Prior to TCU pt lived independently in her own apartment. Pt is not holding her bed at [facility] and does not want to return. Pt's son Philip called. Pt gave verbal permission for SW to talk with him and add his name to the face sheet. He lives in [place]. Pt has 3 sons, all live out of state. Merlene on the face sheet is her step daughter. Philip reported he would be calling [facility] to discuss concerns with the facility and to arrange having pt's belongings picked up. Pt would like to go to [facility] or [place]. She reports she will need transportation arranged to get there and SW informed her she will receive a bill for this. Reviewed no visitor policy at U and typical isolation policy with both.      Quality of Family Relationships: involved, supportive     ASSESSMENT  Cognitive Status: Appears alert and oriented. Needs reminders. Family appears involved.   Concerns to be addressed: On-going discharge planning.     PLAN  Financial costs for the patient includes: Transportation.  Patient given options and choices for discharge: Yes.  Patient/family is agreeable to the plan? YES  Patient Goals and  Preferences: TCU.  Patient anticipates discharging to: TCU.    PRIMO Proctor, [facility]  [phone]  [facility]    Addendum: Pt accepted at [facility] for Saturday. SW updated both pt and son Philip. Pt asked son to make the decision. [facility] has a $20 per day private room fee. Philip would like pt to go to [facility].

## 2020-09-18 NOTE — PLAN OF CARE
A&O, Forgetful, calm and pleasant, BP elevated, HTN medication given, Hydralazine PRN available, Tele SR, denies pain, on Regular diet, with fair appetite, needs to be encouraged for intake, continent, transfers with SBA GB and walker, Echo done today, Pt. Refused to go back from previous TCU, SW worker following, patient prefers to discharge before lunch tomorrow.

## 2020-09-18 NOTE — PLAN OF CARE
COVID result: pending. Aox4 w/ forgetfulness. VSS on RA. A1+GB+W. Tele: SR. Regular diet: tolerating. Voiding adequately. Pt denies SOB, pain, N/V/D. Limited recall of events leading to admission to ED. ECHO pending. PT consult pending. Continue to monitor.

## 2020-09-18 NOTE — PROGRESS NOTES
Observation goals  PRIOR TO DISCHARGE      Comments:      -Diagnostic tests and consults completed and resulted -Not met, echo scheduled    -Vital signs normal or at patient baseline-Met     -Tolerating oral intake to maintain hydration -Met    -Returns to baseline functional status-Partially met     -Safe disposition plan has been identified -Not met    Nurse to notify provider when observation goals have been met and patient is ready for discharge.

## 2020-09-18 NOTE — PROGRESS NOTES
"Care Coordination:    Noted plan of care note indicating \"Pt expressed [facility]'t [facility] to [facility] to [facility], she just want to get her stuff out of there, PM shift placed a sticky note for that.\"    As Care Coordination RN & SW cannot see sticky notes, I placed a \"Care Transition RN / SW IP Consult\" per nursing scope of practice so that this is not missed.     Per 9/4 note in Care Everywhere, not sure home is safe for patient:   \"admitted to [facility] on 9/4/20 after presenting to [facility] ED. Patient lives alone and her family had not heard from her for 3 days, so called 911 for a welfare check. Subsequently found down at home naked on the floor covered with blood and stool with AMS.\"  And per 9/9 note,            Kizzy Duff RN, BSN, PHN  [facility]   Mobile: [phone]    "

## 2020-09-18 NOTE — PROGRESS NOTES
[facility]    Medicine Progress Note - Hospitalist Service       Date of Admission:  9/17/2020  Assessment & Plan       Elizabeth Joy is a 80 year old female admitted on 9/17/2020. She presents with possible syncope.        Syncope, suspect due to postural hypotension    Lactic acidosis    Assessment: has had syncope in the past. Had several episodes of syncope beginning in 2016.  There is no obvious initiating factor for her syncope. Cardiology felt maybe was sinus node dysfunction and Fung-Figueroa attack but Zio patch showed no acute findings. Now with another episode of likely syncope.   - Most likely related postural hypotension, Has had work up in past, but ECHO this admission does not show any gross abnormal finding to contribute to syncope. Telemetry unremarkable so far.   -Hypotensive soon after presentation, resolved and now hypertensive. IVF discontinued.   -PTA is on amlodipine 5 mg daily, resumed at 2.5 mg daily  - PRN hydralazine PO available  - needs orthostatic precaution at discharge, ie getting up slowly, wedge bed, willow hose   - Lactic acidosis has resolved, leucocytosis better, no concern for infection, abx not continued.          Benign essential hypertension    Hyperlipidemia LDL goal <160    Assessment: PTA on Norvasc / Verapamil  -Norvasc resumed at lower dose.       Insomnia    Assessment/Plan: continue PTA Trazodone        PMR (polymyalgia rheumatica) (H)    Assessment/Plan: continue PTA Prednisone        CKD (chronic kidney disease) stage 3, GFR 30-59 ml/min (H)    Assessment/Plan: Cr on admission, 1.28 within baseline.   - Slightly better with IV hydration, fluid discontinued.   -Avoid NSAIDs/nephrotoxins       Depression    Assessment/Plan: continue PTA Lexapro           Diet: Regular Diet Adult    DVT Prophylaxis: Pneumatic Compression Devices  Ram Catheter: not present  Code Status: Full Code           Disposition Plan   Expected discharge: Tomorrow, recommended  to transitional care unit once safe discharge plan made.  Entered: Sheryl Villaseñor MD 2020, 3:13 PM       The patient's care was discussed with the Bedside Nurse, Patient and .    Sheryl Villaseñor MD  Hospitalist Service  [facility]    ______________________________________________________________________    Interval History   Up in chair, denies headache or nausea. No dyspnea. Hypotension resolved and now hypertensive. No dizziness. 2    Data reviewed today: I reviewed all medications, new labs and imaging results over the last 24 hours. I personally reviewed 2D ECHO result as above.    Physical Exam   Vital Signs: Temp: 96.6  F (35.9  C) Temp src: Oral BP: (!) 174/79 Pulse: 80   Resp: 18 SpO2: 98 % O2 Device: None (Room air)    Weight: 200 lbs 0 oz    General: AAOx3, up in chair, appears comfortable.  HEENT: PERRLA EOMI. Mucosa moist.   Lungs: Bilateral equal air entry. Clear to auscultation, normal work of breathing.   CVS: S1S2 regular, no tachycardia or murmur.   Abdomen: Soft, NT, ND. BS heard.  MSK: bilateral leg edema noted, swelling at IV site Rt forearm from saline infiltration.   Neuro: AAOX3. CN 2-12 normal. Strength symmetrical.  Skin: No rash.       Data   Recent Labs   Lab 20  1159 20  1322   WBC 15.3* 19.6*   HGB 10.9* 12.5   MCV 95 97    358    140   POTASSIUM 3.5 4.0   CHLORIDE 111* 107   CO2 26 28   BUN 19 26   CR 1.09* 1.28*   ANIONGAP 4 5   MICHELLE 8.8 9.2   * 112*   TROPI  --  <0.015     Recent Results (from the past 24 hour(s))   Echocardiogram Complete    Narrative    [id number]  [id number]  [id number]  [id number]^LUTHER^BIJAL           [facility]  Echocardiography Laboratory  [address] [place] [id number]        Name: AMELIA LEE  MRN: [id number]  : 1940  Study Date: 2020 11:44 AM  Age: 80 yrs  Gender: Female  Patient Location: [facility]  Reason For Study: Syncope  Ordering Physician: LUTHER  NICK  Referring Physician: Marky Josue  Performed By: Fred Wilson     BSA: 2.0 m2  Height: 64 in  Weight: 200 lb  HR: 81  BP: 102/66 mmHg  _____________________________________________________________________________  __        Procedure  Complete Portable Echo Adult.  _____________________________________________________________________________  __        Interpretation Summary     The left ventricle is normal in size.  There is mild concentric left ventricular hypertrophy.  The visual ejection fraction is estimated at 65-70%.  Grade I or early diastolic dysfunction.  No regional wall motion abnormalities noted.  IVC diameter <2.1 cm collapsing >50% with sniff suggests a normal RA pressure  of 3 mmHg.  No significant valvular heart disease.  _____________________________________________________________________________  __        Left Ventricle  The left ventricle is normal in size. There is mild concentric left  ventricular hypertrophy. The visual ejection fraction is estimated at 65-70%.  Grade I or early diastolic dysfunction. No regional wall motion abnormalities  noted.     Right Ventricle  The right ventricle is normal in size and function.     Atria  The left atrium is mildly dilated. Right atrial size is normal. There is no  color Doppler evidence of an atrial shunt.     Mitral Valve  The mitral valve leaflets are mildly thickened. There is trace mitral  regurgitation.        Tricuspid Valve  There is trace tricuspid regurgitation. The right ventricular systolic  pressure is approximated at 27.2 mmHg plus the right atrial pressure. IVC  diameter <2.1 cm collapsing >50% with sniff suggests a normal RA pressure of 3  mmHg.     Aortic Valve  There is trivial trileaflet aortic sclerosis. There is trace aortic  regurgitation.     Pulmonic Valve  There is trace pulmonic valvular regurgitation.     Vessels  Normal size aorta. The aortic root is normal size.     Pericardium  There is no pericardial effusion.         Rhythm  Sinus rhythm was noted.  _____________________________________________________________________________  __  MMode/2D Measurements & Calculations  IVSd: 1.5 cm     LVIDd: 4.5 cm  LVIDs: 1.5 cm  LVPWd: 1.2 cm  FS: 67.3 %  LV mass(C)d: 224.0 grams  LV mass(C)dI: 114.5 grams/m2  Ao root diam: 2.8 cm  asc Aorta Diam: 3.4 cm  LVOT diam: 2.0 cm  LVOT area: 3.2 cm2  LA Volume (BP): 66.3 ml  LA Volume Index (BP): 33.8 ml/m2  RWT: 0.51           Doppler Measurements & Calculations  MV E max shalom: 68.4 cm/sec  MV A max shalom: 105.7 cm/sec  MV E/A: 0.65  MV dec slope: 257.1 cm/sec2  PA acc time: 0.10 sec  TR max shalom: 260.6 cm/sec  TR max P.2 mmHg  Pulm Sys Hsalom: 54.8 cm/sec  Pulm Kessler Shalom: 38.3 cm/sec  Pulm S/D: 1.4  E/E' av.2  Lateral E/e': 12.1  Medial E/e': 14.3           _____________________________________________________________________________  __           Report approved by: Sofy Woodall 2020 03:09 PM        Medications       amLODIPine  2.5 mg Oral Daily     citalopram  10 mg Oral Daily     predniSONE  8 mg Oral Daily     sodium chloride (PF)  3 mL Intracatheter Q8H

## 2020-09-18 NOTE — PLAN OF CARE
Observation goals  PRIOR TO DISCHARGE      Comments:     -Diagnostic tests and consults completed and resulted -Not met    -Vital signs normal or at patient baseline-Met     -Tolerating oral intake to maintain hydration -Met    -Returns to baseline functional status-Partially met     -Safe disposition plan has been identified -Not met    Nurse to notify provider when observation goals have been met and patient is ready for discharge.       Pt is A&OX4,slightly elevated B.P otherwise VSS on RA.Up with AX1 GB and walker, amb to bathroom and voiding o.k, emilia reg diet.Cardiology following, Echo and PT pending.Pt [facility], she just want to get her stuff out of there, PM shift placed a sticky note for that.

## 2020-09-18 NOTE — PROGRESS NOTES
Discharge Planner PT   Patient plan for discharge: TBD  Current status: standby assist transfers, min assist no assistive device 200' and standby assist 100' rolling walker. Not safe enough for stairs yet.   Barriers to return to prior living situation: Unsafe: high falls risk, poor safe decision-making (fallen trying to leave TCU AMA), impaired mobility.  Recommendations for discharge: TCU  Rationale for recommendations: Safe mobility needs to be achieve prior to returning home alone.       Entered by: Brittany Dressler 09/18/2020 3:57 PM

## 2020-09-18 NOTE — PROGRESS NOTES
[facility]      OUTPATIENT PHYSICAL THERAPY EVALUATION  PLAN OF TREATMENT FOR OUTPATIENT REHABILITATION  (COMPLETE FOR INITIAL CLAIMS ONLY)  Patient's Last Name, First Name, M.I.  YOB: 1940  Elizabeth Joy                        Provider's Name  [facility] Medical Record No.  [id number]                               Onset Date:  09/17/20   Start of Care Date:    9/18/2020      Type:     _X_PT   ___OT   ___SLP Medical Diagnosis:   Weakness/Physical Deconditioning                        PT Diagnosis:  Impaired mobility   Visits from SOC:  1   _________________________________________________________________________________  Plan of Treatment/Functional Goals    Planned Interventions:  ,    balance training, bed mobility training, gait training, neuromuscular re-education, strengthening, transfer training, risk factor education, home program guidelines, progressive activity/exercise,       Goals: See Physical Therapy Goals on Care Plan in [facility] electronic health record.    Therapy Frequency: 5x/week  Predicted Duration of Therapy Intervention: 3 days  _________________________________________________________________________________    I CERTIFY THE NEED FOR THESE SERVICES FURNISHED UNDER        THIS PLAN OF TREATMENT AND WHILE UNDER MY CARE     (Physician co-signature of this document indicates review and certification of the therapy plan).                 ,      Referring Physician: David Quiroz MD             Initial Assessment        See Physical Therapy evaluation dated   in Epic electronic health record.

## 2020-09-19 VITALS
OXYGEN SATURATION: 96 % | TEMPERATURE: 97.6 F | RESPIRATION RATE: 18 BRPM | HEART RATE: 68 BPM | WEIGHT: 203.48 LBS | SYSTOLIC BLOOD PRESSURE: 163 MMHG | BODY MASS INDEX: 34.74 KG/M2 | DIASTOLIC BLOOD PRESSURE: 89 MMHG | HEIGHT: 64 IN

## 2020-09-19 PROCEDURE — 99217 ZZC OBSERVATION CARE DISCHARGE: CPT | Performed by: HOSPITALIST

## 2020-09-19 PROCEDURE — G0378 HOSPITAL OBSERVATION PER HR: HCPCS

## 2020-09-19 PROCEDURE — 25000131 ZZH RX MED GY IP 250 OP 636 PS 637: Performed by: STUDENT IN AN ORGANIZED HEALTH CARE EDUCATION/TRAINING PROGRAM

## 2020-09-19 PROCEDURE — 25000132 ZZH RX MED GY IP 250 OP 250 PS 637: Performed by: STUDENT IN AN ORGANIZED HEALTH CARE EDUCATION/TRAINING PROGRAM

## 2020-09-19 PROCEDURE — 25000132 ZZH RX MED GY IP 250 OP 250 PS 637: Performed by: HOSPITALIST

## 2020-09-19 RX ORDER — AMLODIPINE BESYLATE 2.5 MG/1
2.5 TABLET ORAL 2 TIMES DAILY
Status: ON HOLD | DISCHARGE
Start: 2020-09-19 | End: 2020-11-30

## 2020-09-19 RX ORDER — HYDRALAZINE HYDROCHLORIDE 10 MG/1
10 TABLET, FILM COATED ORAL 4 TIMES DAILY PRN
Status: ON HOLD | DISCHARGE
Start: 2020-09-19 | End: 2020-11-30

## 2020-09-19 RX ORDER — AMLODIPINE BESYLATE 2.5 MG/1
2.5 TABLET ORAL 2 TIMES DAILY
Status: DISCONTINUED | OUTPATIENT
Start: 2020-09-19 | End: 2020-09-19 | Stop reason: HOSPADM

## 2020-09-19 RX ADMIN — AMLODIPINE BESYLATE 2.5 MG: 2.5 TABLET ORAL at 10:31

## 2020-09-19 RX ADMIN — PREDNISONE 8 MG: 2.5 TABLET ORAL at 10:31

## 2020-09-19 RX ADMIN — CITALOPRAM HYDROBROMIDE 10 MG: 10 TABLET ORAL at 10:32

## 2020-09-19 RX ADMIN — HYDRALAZINE HYDROCHLORIDE 10 MG: 10 TABLET ORAL at 03:02

## 2020-09-19 ASSESSMENT — MIFFLIN-ST. JEOR: SCORE: 1378

## 2020-09-19 NOTE — PLAN OF CARE
Observation Goals:    -diagnostic tests and consults completed and resulted - met  -vital signs normal or at patient baseline - met, high BP sometimes  -tolerating oral intake to maintain hydration - met  -returns to baseline functional status - partially met, forgetful  -safe disposition plan has been identified - not met  Nurse to notify provider when observation goals have been met and patient is ready for discharge.

## 2020-09-19 NOTE — PLAN OF CARE
Physical Therapy Discharge Summary    Reason for therapy discharge:    Discharged to transitional care facility.    Progress towards therapy goal(s). See goals on Care Plan in [facility] electronic health record for goal details.  Goals not met.  Barriers to achieving goals:   limited tolerance for therapy and discharge from facility.    Therapy recommendation(s):    Patient is below baseline for functional mobility & would benefit from continued physical therapy in the setting of a TCU for improving functional mobility, LE strength and tolerance for functional activities in order to return to baseline of functioning.

## 2020-09-19 NOTE — PROGRESS NOTES
Observation goals  PRIOR TO DISCHARGE      Comments: -diagnostic tests and consults completed and resulted: met  -vital signs normal or at patient baseline: met   -tolerating oral intake to maintain hydration: met  -returns to baseline functional status: partially met  -safe disposition plan has been identified: not met  Nurse to notify provider when observation goals have been met and patient is ready for discharge.

## 2020-09-19 NOTE — DISCHARGE SUMMARY
[facility]  Hospitalist Discharge Summary      Date of Admission:  9/17/2020  Date of Discharge:  9/19/2020  Discharging Provider: Sheryl Villaseñor MD      Discharge Diagnoses     Syncope, suspect due to postural hypotension     Lactic acidosis    See hospital course below for additional chronic medical conditions.    Follow-ups Needed After Discharge   Follow-up Appointments     Follow Up and recommended labs and tests      Follow up with Nursing home physician in 3-4 days               Unresulted Labs Ordered in the Past 30 Days of this Admission     Date and Time Order Name Status Description    9/17/2020 1349 Blood culture Preliminary     9/17/2020 1349 Blood culture Preliminary       These results will be followed up by hosptialist/TCU MD    Discharge Disposition   Discharged to TCU  Condition at discharge: Stable     Hospital Course         Elizabeth Joy is a 80 year old female admitted on 9/17/2020. She presents with possible syncope.        Syncope, suspect due to postural hypotension    Lactic acidosis  Patient has had syncope in the past. Had several episodes of syncope beginning in 2016.  There is no obvious initiating factor for her syncope. Cardiology felt maybe was sinus node dysfunction and Fung-Figueroa attack but Zio patch showed no acute findings. Now with another episode of likely syncope.   - Most likely related postural hypotension, Has had work up in past, but ECHO this admission does not show any gross abnormal finding to contribute to syncope. Telemetry unremarkable so far. ECHO this admission also did not reveal any significant valvular abnormality to cause syncope or any wall motion abnormalities.  -Hypotensive soon after presentation, resolved and now hypertensive. IVF discontinued.   -PTA is on amlodipine 5 mg daily and Cardizem 240 mg p.o. daily, amlodipine resumed at 2.5 mg daily.  Her blood pressure remains elevated with systolic up to 170s.  Given presentation with  hypotension and significant orthostatic hypotension despite elevated blood pressure, will not a.m. normal blood pressure for her.  Given blood pressure is acceptable for her.  At time of discharge, amlodipine was changed to 2.5 mg p.o. twice daily.  - PRN hydralazine PO ordered.  - needs orthostatic precaution at discharge, ie getting up slowly, wedge bed, willow hose   - Lactic acidosis has resolved, leucocytosis better, no concern for infection, abx not continued.          Benign essential hypertension    Hyperlipidemia LDL goal <160    Assessment: PTA on Norvasc / Verapamil  -Norvasc resumed and adjusted as above  -PRN p.o. hydralazine available even as outpatient.       Insomnia    Continued PTA Trazodone        PMR (polymyalgia rheumatica) (H)   Continue PTA Prednisone        CKD (chronic kidney disease) stage 3, GFR 30-59 ml/min (H)    Assessment/Plan: Cr on admission, 1.28 within baseline.   - Slightly better with IV hydration, fluid discontinued.   -Avoid NSAIDs/nephrotoxins       Depression    Assessment/Plan: continue PTA Lexapro        Consultations This Hospital Stay   PHARMACY TO DOSE VANCO  PHYSICAL THERAPY ADULT IP CONSULT  CARE TRANSITION RN/SW IP CONSULT  SOCIAL WORK IP CONSULT  PHYSICAL THERAPY ADULT IP CONSULT  OCCUPATIONAL THERAPY ADULT IP CONSULT    Code Status   Full Code    Time Spent on this Encounter   I, Sheryl Villaseñor MD, personally saw the patient today and spent greater than 30 minutes discharging this patient.       Sheryl Villaseñor MD  [facility]  ______________________________________________________________________    Physical Exam   Vital Signs: Temp: 96.6  F (35.9  C) Temp src: Oral BP: (!) 177/86 Pulse: 75   Resp: 18 SpO2: 97 % O2 Device: None (Room air)    Weight: 203 lbs 7.75 oz    General: AAOx3, appears comfortable.  HEENT: PERRLA EOMI. Mucosa moist.   Lungs: Bilateral equal air entry. Clear to auscultation, normal work of breathing.   CVS: S1S2 regular, no  tachycardia or murmur.   Abdomen: Soft, NT, ND. BS heard.  MSK: No edema or deformities.  Neuro: AAOX3. CN 2-12 normal. Strength symmetrical.  Skin: No rash.          Primary Care Physician   Marky Josue    Discharge Orders      General info for SNF    Length of Stay Estimate: Short Term Care: Estimated # of Days <30  Condition at Discharge: Improving  Level of care:skilled   Rehabilitation Potential: Good  Admission H&P remains valid and up-to-date: Yes  Recent Chemotherapy: N/A  Use Nursing Home Standing Orders: Yes     Mantoux instructions    Give two-step Mantoux (PPD) Per Facility Policy Yes     Reason for your hospital stay    Syncope due to orthostatic hypotension     Activity - Up with assistive device     Activity - Up with nursing assistance     Follow Up and recommended labs and tests    Follow up with Nursing home physician in 3-4 days     Full Code     Physical Therapy Adult Consult    Evaluate and treat as clinically indicated.    Reason:  Fall, orthostatic hypotension     Occupational Therapy Adult Consult    Evaluate and treat as clinically indicated.    Reason:  Fall, orthostatic hypotension     Fall precautions     Advance Diet as Tolerated    Follow this diet upon discharge: Orders Placed This Encounter      Regular Diet Adult       Significant Results and Procedures   Most Recent 3 CBC's:  Recent Labs   Lab Test 09/18/20  1159 09/17/20  1322 09/04/20  0946   WBC 15.3* 19.6* 20.7*   HGB 10.9* 12.5 15.9*   MCV 95 97 92    358 411     Most Recent 3 BMP's:  Recent Labs   Lab Test 09/18/20  1159 09/17/20  1322 09/04/20  0946    140 139   POTASSIUM 3.5 4.0 3.3*   CHLORIDE 111* 107 101   CO2 26 28 26   BUN 19 26 33*   CR 1.09* 1.28* 1.21*   ANIONGAP 4 5 12   MICHELLE 8.8 9.2 9.3   * 112* 119*     Most Recent 2 LFT's:  Recent Labs   Lab Test 09/04/20  0946   AST 41   ALT 38   ALKPHOS 117   BILITOTAL 1.4*     Most Recent 3 Troponin's:  Recent Labs   Lab Test 09/17/20  1322 09/04/20  0946  08/05/16  1715   TROPI <0.015 0.086* <0.015  The 99th percentile for upper reference range is 0.045 ug/L.  Troponin values in   the range of 0.045 - 0.120 ug/L may be associated with risks of adverse   clinical events.       Most Recent D-dimer:No lab results found.  Most Recent 6 Bacteria Isolates From Any Culture (See EPIC Reports for Culture Details):  Recent Labs   Lab Test 09/17/20  1418 09/17/20  1322 09/04/20  1058 09/04/20  0946 06/30/20  1521   CULT No growth after 2 days  No growth No growth after 2 days No growth No growth  >100,000 colonies/mL  Aerococcus sanguinicola  Identification obtained by MALDI-TOF mass spectrometry research use only database. Test   characteristics determined and verified by the [facility] Diagnostic Laboratory   ([facility]) [place], [place].  *  >100,000 colonies/mL  Aerococcus urinae  Identification obtained by MALDI-TOF mass spectrometry research use only database. Test   characteristics determined and verified by the [facility] Diagnostic Laboratory   ([facility]) [place], [place].  * >100,000 colonies/mL  Klebsiella pneumoniae  *  10,000 to 50,000 colonies/mL  mixed urogenital vishnu  Susceptibility testing not routinely done       Most Recent TSH and T4:  Recent Labs   Lab Test 09/04/20  0946   TSH 0.64     Most Recent 6 glucoses:  Recent Labs   Lab Test 09/18/20  1159 09/17/20  1322 09/04/20  0946 06/30/20  1520 11/25/19  0814 07/16/19  1038   * 112* 119* 109* 87 100*     Most Recent Urinalysis:  Recent Labs   Lab Test 09/17/20  1418  06/30/20  1521   COLOR Yellow   < > Yellow   APPEARANCE Clear   < > Cloudy   URINEGLC Negative   < > Negative   URINEBILI Negative   < > Negative   URINEKETONE Negative   < > Negative   SG 1.017   < > 1.025   UBLD Negative   < > Trace*   URINEPH 5.5   < > 6.0   PROTEIN 10*   < > Negative   UROBILINOGEN  --   --  0.2   NITRITE Negative   < > Positive*   LEUKEST Negative   < > Trace*   RBCU 1   < > O - 2   WBCU 2   < > *     < > = values in this interval not displayed.   ,   Results for orders placed or performed during the hospital encounter of 20   XR Chest 2 Views    Narrative    XR CHEST 2 VW 2020 2:01 PM    HISTORY: syncope    COMPARISON: 2020    FINDINGS: Heart is normal in size. Lungs are clear.    POPEYE CHEW MD   CT Head w/o Contrast    Narrative    CT SCAN OF THE HEAD WITHOUT CONTRAST   2020 1:49 PM     HISTORY: Headache, syncopal, briefly confused after, now headache  resolving after Tylenol.    TECHNIQUE: Axial images of the head and coronal reformations without  IV contrast material. Radiation dose for this scan was reduced using  automated exposure control, adjustment of the mA and/or kV according  to patient size, or iterative reconstruction technique.    COMPARISON: Head CT 2020.    FINDINGS: There is no evidence of intracranial hemorrhage, mass, acute  infarct or anomaly. The ventricles are normal in size, shape and  configuration. Moderate diffuse parenchymal volume loss. Moderate  patchy periventricular white matter hypodensities which are  nonspecific, but likely related to chronic microvascular ischemic  disease. Chronic lacunar infarct in the right thalamus.    The visualized portions of the sinuses and mastoids appear normal. The  bony calvarium and bones of the skull base appear intact.       Impression    IMPRESSION: No evidence of acute intracranial hemorrhage, mass, or  herniation.      FABRIZIO INGRAM MD   Echocardiogram Complete    Narrative    [id number]  [id number]  [id number]  [id number]^LUTHER^BIJAL           [facility]  Echocardiography Laboratory  [address] [place] [address]        Name: AMELIA LEE  MRN: [id number]  : 1940  Study Date: 2020 11:44 AM  Age: 80 yrs  Gender: Female  Patient Location: [facility]  Reason For Study: Syncope  Ordering Physician: BIJAL SLOAN  Referring Physician: Marky Josue  Performed By: Fred Wilson     BSA: 2.0  m2  Height: 64 in  Weight: 200 lb  HR: 81  BP: 102/66 mmHg  _____________________________________________________________________________  __        Procedure  Complete Portable Echo Adult.  _____________________________________________________________________________  __        Interpretation Summary     The left ventricle is normal in size.  There is mild concentric left ventricular hypertrophy.  The visual ejection fraction is estimated at 65-70%.  Grade I or early diastolic dysfunction.  No regional wall motion abnormalities noted.  IVC diameter <2.1 cm collapsing >50% with sniff suggests a normal RA pressure  of 3 mmHg.  No significant valvular heart disease.  _____________________________________________________________________________  __        Left Ventricle  The left ventricle is normal in size. There is mild concentric left  ventricular hypertrophy. The visual ejection fraction is estimated at 65-70%.  Grade I or early diastolic dysfunction. No regional wall motion abnormalities  noted.     Right Ventricle  The right ventricle is normal in size and function.     Atria  The left atrium is mildly dilated. Right atrial size is normal. There is no  color Doppler evidence of an atrial shunt.     Mitral Valve  The mitral valve leaflets are mildly thickened. There is trace mitral  regurgitation.        Tricuspid Valve  There is trace tricuspid regurgitation. The right ventricular systolic  pressure is approximated at 27.2 mmHg plus the right atrial pressure. IVC  diameter <2.1 cm collapsing >50% with sniff suggests a normal RA pressure of 3  mmHg.     Aortic Valve  There is trivial trileaflet aortic sclerosis. There is trace aortic  regurgitation.     Pulmonic Valve  There is trace pulmonic valvular regurgitation.     Vessels  Normal size aorta. The aortic root is normal size.     Pericardium  There is no pericardial effusion.        Rhythm  Sinus rhythm was  noted.  _____________________________________________________________________________  __  MMode/2D Measurements & Calculations  IVSd: 1.5 cm     LVIDd: 4.5 cm  LVIDs: 1.5 cm  LVPWd: 1.2 cm  FS: 67.3 %  LV mass(C)d: 224.0 grams  LV mass(C)dI: 114.5 grams/m2  Ao root diam: 2.8 cm  asc Aorta Diam: 3.4 cm  LVOT diam: 2.0 cm  LVOT area: 3.2 cm2  LA Volume (BP): 66.3 ml  LA Volume Index (BP): 33.8 ml/m2  RWT: 0.51           Doppler Measurements & Calculations  MV E max shalom: 68.4 cm/sec  MV A max shalom: 105.7 cm/sec  MV E/A: 0.65  MV dec slope: 257.1 cm/sec2  PA acc time: 0.10 sec  TR max shalom: 260.6 cm/sec  TR max P.2 mmHg  Pulm Sys Shalom: 54.8 cm/sec  Pulm Kessler Shalom: 38.3 cm/sec  Pulm S/D: 1.4  E/E' av.2  Lateral E/e': 12.1  Medial E/e': 14.3           _____________________________________________________________________________  __           Report approved by: Sofy Woodall 2020 03:09 PM            Discharge Medications   Current Discharge Medication List      START taking these medications    Details   hydrALAZINE (APRESOLINE) 10 MG tablet Take 1 tablet (10 mg) by mouth 4 times daily as needed (SBP>170)  Qty:      Associated Diagnoses: Benign essential hypertension         CONTINUE these medications which have CHANGED    Details   amLODIPine (NORVASC) 2.5 MG tablet Take 1 tablet (2.5 mg) by mouth 2 times daily  Qty:      Associated Diagnoses: Benign essential hypertension         CONTINUE these medications which have NOT CHANGED    Details   citalopram (CELEXA) 10 MG tablet Take 1 tablet (10 mg) by mouth daily  Qty: 30 tablet, Refills: 1    Associated Diagnoses: Mild major depression (H)      potassium chloride ER (KLOR-CON M) 20 MEQ CR tablet Take 20 mEq by mouth 2 times daily (with meals)      !! predniSONE (DELTASONE) 1 MG tablet Take 3 of these along with one 5mg for a total daily dose of 8mg  Qty: 100 tablet, Refills: 4    Associated Diagnoses: PMR (polymyalgia rheumatica) (H)      !! predniSONE  (DELTASONE) 5 MG tablet Take one of these along with 3 of the 1mg pills for a total daily dose of 8mg  Qty: 90 tablet, Refills: 0    Associated Diagnoses: PMR (polymyalgia rheumatica) (H)      traZODone (DESYREL) 50 MG tablet Take 25 mg by mouth At Bedtime       !! - Potential duplicate medications found. Please discuss with provider.      STOP taking these medications       verapamil ER (CALAN-SR) 240 MG CR tablet Comments:   Reason for Stopping:             Allergies   Allergies   Allergen Reactions     Ace Inhibitors Cough

## 2020-09-19 NOTE — PLAN OF CARE
Patient has been discharged September 19, 2020 12:14 PM. Discharge instructions and education reviewed, medication schedule and follow up appts discussed. Pt had no questions. All belongings sent with pt.

## 2020-09-19 NOTE — PROGRESS NOTES
D: SW following for discharge planning.   I: Pt will discharge today to [facility] via [facility] at 1215. Orders faxed and facility updated. Nursing aware. PAS done 9/9/20, [id number], at [facility], still valid. Attempted to call pt in her room and meet with her but she did not answer and said she did not want visitors. Per [facility], pt is aware of ride time and has personally spoken with facility. Son updated and in agreement.   P: Discharge.     Cristina Altman, MSW, LGSW  [phone]  [facility]

## 2020-09-19 NOTE — PLAN OF CARE
COVID neg. Aox4 with forgetfulness and confusion. VSS on RA ex htn. Tele: SR. SBA + GB + W. Pt became increasingly agitated and confused towards end of shift: verbally aggressive towards staff, refusing cares: MD notified: PRN IM zyprexa ordered. Pt refusing tele monitoring. Recommendation to TCU for further care. SW following. Discharge pending. Continue to monitor.

## 2020-09-19 NOTE — PLAN OF CARE
A&Ox4. Drowsy. BP elevated, AM meds given. Orthostatics positive, MD aware. Ambulating assist x1 with walker and GB. Tele- SR.  Accepted at [facility] TCU, plan for discharge at 12:15 PM.

## 2020-09-19 NOTE — PLAN OF CARE
Observation Goals:    -diagnostic tests and consults completed and resulted - met  -vital signs normal or at patient baseline - met, high BP sometimes  -tolerating oral intake to maintain hydration - met  -returns to baseline functional status - partially met, forgetful  -safe disposition plan has been identified - not met  Nurse to notify provider when observation goals have been met and patient is ready for discharge.         Covid neg. A/Ox4, confused and agitated sometimes. VSS ex HTN. Gave 1 dose PRN Hydralazine today and BP decreased. Denies pain, dizziness, chest pain, and numbness/tingling. Pt is refusing tele while she sleeps, told RN she would allow RN to put it on in the morning. SBA and up to BR voiding well. IM Zyprexa available if becomes agitated. Tcu is being recommended, continue to monitor.

## 2020-09-21 ENCOUNTER — PATIENT OUTREACH (OUTPATIENT)
Dept: CARE COORDINATION | Facility: CLINIC | Age: 80
End: 2020-09-21

## 2020-09-21 NOTE — PROGRESS NOTES
Clinic Care Coordination Contact  Care Coordination Transition Communication    Referral Source: IP Report    Clinical Data:   [facility]  Hospitalist Discharge Summary       Date of Admission:  9/17/2020  Date of Discharge:  9/19/2020  Discharging Provider: Sheryl Villaseñor MD     Discharge Diagnoses       Syncope, suspect due to postural hypotension     Lactic acidosis      Transition to Facility:              Facility [facility]: [facility]              Contact name and phone number/fax: [phone]    Plan: RN/SW Care Coordinator will await notification from facility staff informing RN/SW Care Coordinator of patient's discharge plans/needs. RN/SW Care Coordinator will review chart and outreach to facility staff every 4 weeks and as needed. Fax sent to TCU with my contact information requesting notification upon discharge.    PRIMO Caputo, [facility]  Clinic Care Coordinator  [facility] Children's [facility] Women's [facility]  [phone]  [email or web address]

## 2020-09-21 NOTE — LETTER
[facility]   To:   [facility]          Please give to facility    From:   OLAMIDE Caputo Care Coordinator [facility]     Patient Name:  Elizabeth Joy YOB: 1940   Admit date: 9/19/2020      *Information Needed:  Please contact me when the patient will discharge (or if they will move to long term care)- include the discharge date, disposition, and main diagnosis   - If the patient is discharged with home care services, please provide the name of the agency    Also- Please inform me if a care conference is being held.   Phone, Fax or Email with information       Thank You,   RIC Caputo, MSW  Clinic Care Coordinator  [facility]  Ph: [phone]  [email or web address]

## 2020-10-10 DIAGNOSIS — M35.3 PMR (POLYMYALGIA RHEUMATICA) (H): ICD-10-CM

## 2020-10-12 RX ORDER — PREDNISONE 5 MG/1
TABLET ORAL
Qty: 90 TABLET | Refills: 0 | Status: ON HOLD | OUTPATIENT
Start: 2020-10-12 | End: 2021-06-07

## 2020-10-12 NOTE — TELEPHONE ENCOUNTER
Routing refill request to provider for review/approval because:  Drug not on the [facility] refill protocol     Last Written Prescription Date:  5-8-2020  Last Fill Quantity: 90,  # refills: 0   Last appointment  8- with prescribing provider:  Dr Josue.    Next 5 appointments (look out 90 days)    Oct 13, 2020  3:30 PM  Office Visit with ZUHAIR [facility] [address] [place] [id number]  [phone]          Elizabeth LOPEZ RN,BSN      Elizabeth LOPEZ RN,BSN

## 2020-10-13 ENCOUNTER — TELEPHONE (OUTPATIENT)
Dept: FAMILY MEDICINE | Facility: CLINIC | Age: 80
End: 2020-10-13

## 2020-10-13 NOTE — TELEPHONE ENCOUNTER
Reason for Call: Request for an order or referral:    Order or referral being requested: Renew PT for patient. PT 2x a week for 3 weeks, and then 1x a week for 2 weeks for strengthening balance and gait training. PT recommends the use of a FWW but patient is resisting. Orders for OT for ADL assessment and cognitive testing if patient is willing. Home health aid 1x a week for 5 weeks for safety with showers.     Date needed: by next week    Has the patient been seen by the PCP for this problem? YES    Additional comments:     Phone number Patient can be reached at:  Other phone number:  [phone]    Best Time:  ANY    Can we leave a detailed message on this number?  YES    Call taken on 10/13/2020 at 2:36 PM by Mildred Michele

## 2020-10-14 NOTE — TELEPHONE ENCOUNTER
Returned call. OK'd requested orders.  Orders will be faxed to PCP for review and signature.   Elizabeth Isabel RN

## 2020-10-20 ENCOUNTER — TELEPHONE (OUTPATIENT)
Dept: FAMILY MEDICINE | Facility: CLINIC | Age: 80
End: 2020-10-20

## 2020-10-20 NOTE — TELEPHONE ENCOUNTER
Reason for Call:  Home Health Care    jarrett with [facility] called regarding (reason for call):  Verbal order    Orders are needed for this patient.      PT:    OT:  2x per wk for 2 wks 1x per wk for 2 wks     Skilled Nursing:     Pt Provider:     Phone Number Homecare Nurse can be reached at:  [phone]    Can we leave a detailed message on this number? YES    Phone number patient can be reached at: Antoine number on file [phone] (home)    Best Time: any    Call taken on 10/20/2020 at 9:39 AM by Abdiel Laboy

## 2020-10-21 ENCOUNTER — PATIENT OUTREACH (OUTPATIENT)
Dept: NURSING | Facility: CLINIC | Age: 80
End: 2020-10-21
Payer: COMMERCIAL

## 2020-10-21 ENCOUNTER — PATIENT OUTREACH (OUTPATIENT)
Dept: CARE COORDINATION | Facility: CLINIC | Age: 80
End: 2020-10-21

## 2020-10-21 SDOH — ECONOMIC STABILITY: TRANSPORTATION INSECURITY
IN THE PAST 12 MONTHS, HAS THE LACK OF TRANSPORTATION KEPT YOU FROM MEDICAL APPOINTMENTS OR FROM GETTING MEDICATIONS?: NO

## 2020-10-21 SDOH — SOCIAL STABILITY: SOCIAL NETWORK: HOW OFTEN DO YOU ATTEND CHURCH OR RELIGIOUS SERVICES?: NEVER

## 2020-10-21 SDOH — SOCIAL STABILITY: SOCIAL NETWORK
DO YOU BELONG TO ANY CLUBS OR ORGANIZATIONS SUCH AS CHURCH GROUPS UNIONS, FRATERNAL OR ATHLETIC GROUPS, OR SCHOOL GROUPS?: NO

## 2020-10-21 SDOH — ECONOMIC STABILITY: FOOD INSECURITY: WITHIN THE PAST 12 MONTHS, THE FOOD YOU BOUGHT JUST DIDN'T LAST AND YOU DIDN'T HAVE MONEY TO GET MORE.: NEVER TRUE

## 2020-10-21 SDOH — SOCIAL STABILITY: SOCIAL NETWORK: HOW OFTEN DO YOU ATTENT MEETINGS OF THE CLUB OR ORGANIZATION YOU BELONG TO?: NEVER

## 2020-10-21 SDOH — ECONOMIC STABILITY: TRANSPORTATION INSECURITY
IN THE PAST 12 MONTHS, HAS LACK OF TRANSPORTATION KEPT YOU FROM MEETINGS, WORK, OR FROM GETTING THINGS NEEDED FOR DAILY LIVING?: NO

## 2020-10-21 SDOH — ECONOMIC STABILITY: INCOME INSECURITY: HOW HARD IS IT FOR YOU TO PAY FOR THE VERY BASICS LIKE FOOD, HOUSING, MEDICAL CARE, AND HEATING?: NOT HARD AT ALL

## 2020-10-21 SDOH — SOCIAL STABILITY: SOCIAL NETWORK
IN A TYPICAL WEEK, HOW MANY TIMES DO YOU TALK ON THE PHONE WITH FAMILY, FRIENDS, OR NEIGHBORS?: MORE THAN THREE TIMES A WEEK

## 2020-10-21 SDOH — ECONOMIC STABILITY: FOOD INSECURITY: WITHIN THE PAST 12 MONTHS, YOU WORRIED THAT YOUR FOOD WOULD RUN OUT BEFORE YOU GOT MONEY TO BUY MORE.: NEVER TRUE

## 2020-10-21 SDOH — HEALTH STABILITY: MENTAL HEALTH
STRESS IS WHEN SOMEONE FEELS TENSE, NERVOUS, ANXIOUS, OR CAN'T SLEEP AT NIGHT BECAUSE THEIR MIND IS TROUBLED. HOW STRESSED ARE YOU?: NOT AT ALL

## 2020-10-21 SDOH — SOCIAL STABILITY: SOCIAL NETWORK: HOW OFTEN DO YOU GET TOGETHER WITH FRIENDS OR RELATIVES?: NEVER

## 2020-10-21 SDOH — SOCIAL STABILITY: SOCIAL NETWORK: ARE YOU MARRIED, WIDOWED, DIVORCED, SEPARATED, NEVER MARRIED, OR LIVING WITH A PARTNER?: MARRIED

## 2020-10-21 ASSESSMENT — ACTIVITIES OF DAILY LIVING (ADL): DEPENDENT_IADLS:: INDEPENDENT

## 2020-10-21 NOTE — PROGRESS NOTES
Clinic Care Coordination Contact  [facility]/Voicemail       Clinical Data: Care Coordinator Outreach    Outreach attempted x 1.  Left message on patient's voicemail with call back information and requested return call.    Plan: Care Coordinator will try to reach patient again in 1-2 business days.    PRIMO Caputo, [facility]  Clinic Care Coordinator  [facility] Children[facility] Wom[facility]  [phone]  [email or web address]

## 2020-10-21 NOTE — PROGRESS NOTES
Clinic Care Coordination Contact  [facility]/Voicemail       Clinical Data: Care Coordinator Outreach    Outreach attempted x 1.  Left message on JEANETTE Dutton with [facility] voicemail with call back information and requested return call.    Plan: Care Coordinator will try to reach patient again in 1-2 business days.    PRIMO Caputo, [facility]  Clinic Care Coordinator  [facility] Childrens [facility]om[facility]  [phone]  [email or web address]

## 2020-10-21 NOTE — PROGRESS NOTES
Clinic Care Coordination Contact  Care Team Conversations    CRISS QUIÑONEZ spoke with Marija from [facility]. Marija shared that she has met with pt 2x now. Both times pt's son was in town from [place] but he has returned home now.     Pt has shown resistance to work with HC and is in general somewhat hostile toward caregivers. She did complete the SLUMS assessment with Marija and scored 20 out of 30. This shows moderate cognitive impairment. Marija will be completing additional testing to get a better sense of her needs.    Pt does currently have a caregiver, Aaron, that was previously her 's caregiver prior to his move to memory care facility. Marija provided phone number Aaron.    Pt's son arranged for grab bars, shower chair, and shower hose to be installed. He stated that he planned to have a PCA in the morning and evening everyday. It is unclear if this is currently arranged with previous caregiver.    Plan: CRISS QUIÑONEZ will outreach to pt's son Philip and caregiver Aaron to introduce self and support services.    PRIMO Caputo, [facility]  Clinic Care Coordinator  [facility] Children's [facility] Women's [facility]  [phone]  [email or web address]

## 2020-10-21 NOTE — PROGRESS NOTES
Clinic Care Coordination Contact  [facility]/Voicemail       Clinical Data: Care Coordinator Outreach    Outreach attempted x 1.  Left message on pt's caregiver, Aaron, curtis with call back information and requested return call.    Plan: Care Coordinator will try to reach patient again in 3-5 business days.    PRIMO Caputo, [facility]  Clinic Care Coordinator  [facility] Childrens [facility]  [phone]  [email or web address]

## 2020-10-21 NOTE — PROGRESS NOTES
Clinic Care Coordination Contact    Clinic Care Coordination Contact  OUTREACH    Referral Information:  Referral Source: IP Report    Primary Diagnosis: Other (include Comment box)(postural hypotension)    Chief Complaint   Patient presents with     Clinic Care Coordination - Initial        Universal Utilization: Pt has had multiple ED and hospital stays in the past 90 days  Clinic Utilization  Difficulty keeping appointments:: No  Compliance Concerns: No  No-Show Concerns: No  No PCP office visit in Past Year: No  Utilization    Last refreshed: 10/21/2020  3:38 PM: Hospital Admissions 1           Last refreshed: 10/21/2020  3:38 PM: ED Visits 2           Last refreshed: 10/21/2020  3:38 PM: No Show Count (past year) 6              Current as of: 10/21/2020  3:38 PM            Clinical Concerns:    CRISS QUIÑONEZ spoke with pt's son, Philip, regarding pt's overall wellbeing.    Philip feels that the hospital and [facility] were not very helpful. Pt was transferred to [facility] and he feels this was a better fit for PT and OT.     He has added many adaptive devices around her condo including risers under her furniture and grab bars.    Ultimately, he doesn't believe her cognitive decline is as severe as indicated by SLUMS. He did acknowledge that she occasionally believes that  is still there in home and she gets confused. He also explained her reluctance to complete ADLs like getting dressed or showering. He also shared that she seems to be sundowning in the evening    Pt's caregiver, Aaron, was fired yesterday because pt no longer wanted to work with him. They do not currently have a caregiver but Philip would like to get one. He would like someone for 4 hours in the morning and 4 in the evening. CRISS QUIÑONEZ will provide agencies via email.    Would like lifeline for pt.    Pt's main goal is to live with Tim but can't while he is in memory care. The current plan is for pt and her  to move to an [facility] in [place] for the winter.  Merlene, pt's 's daughter, is facilitating this move.    Philip has pt's Advanced Directive and will email it to CRISS QUIÑONEZ.    Current Medical Concerns:  None at this time    Current Behavioral Concerns: Memory decline    Education Provided to patient: CRISS QUIÑONEZ    Pain  Pain (GOAL):: No  Health Maintenance Reviewed: Due/Overdue: CRISS QUIÑONEZ encouraged pt's son to make a virtual appointment for pt with PCP  Clinical Pathway: None    Medication Management:  Philip reported that pt is not taking any medication at this time due to disinterest in taking. CRISS QUIÑONEZ did inform Philip that she has been prescribed medication she should be taking.     Functional Status:  Dependent ADLs:: Independent  Dependent IADLs:: Independent  Bed or wheelchair confined:: No  Mobility Status: Independent  Fallen 2 or more times in the past year?: No  Any fall with injury in the past year?: No    Living Situation:  Current living arrangement:: I live alone  Type of residence:: Apartment    Lifestyle & Psychosocial Needs:  Lifestyle     Physical activity     Days per week: Not on file     Minutes per session: Not on file     Stress: Not at all     Social Needs     Financial resource strain: Not hard at all     Food insecurity     Worry: Never true     Inability: Never true     Transportation needs     Medical: No     Non-medical: No     Diet:: Regular  Inadequate nutrition (GOAL):: No  Tube Feeding: No  Inadequate activity/exercise (GOAL):: No  Significant changes in sleep pattern (GOAL): No        Samaritan or spiritual beliefs that impact treatment:: No  Mental health DX:: Yes  Mental health DX how managed:: None  Mental health management concern (GOAL):: No  Informal Support system:: Children, Family, Spouse   Socioeconomic History     Marital status:      Spouse name: Not on file     Number of children: 3     Years of education: Not on file     Highest education level: Not on file   Relationships     Social connections     Talks on phone: More than  three times a week     Gets together: Never     Attends Congregation service: Never     Active member of club or organization: No     Attends meetings of clubs or organizations: Never     Relationship status:      Intimate partner violence     Fear of current or ex partner: Not on file     Emotionally abused: Not on file     Physically abused: Not on file     Forced sexual activity: Not on file     Tobacco Use     Smoking status: Former Smoker     Packs/day: 1.00     Years: 42.00     Pack years: 42.00     Types: Cigarettes     Start date:      Quit date: 1997     Years since quittin.0     Smokeless tobacco: Never Used   Substance and Sexual Activity     Alcohol use: No     Alcohol/week: 0.0 standard drinks     Frequency: Never     Drug use: No     Sexual activity: Not Currently      Resources and Interventions:  Current Resources:   List of home care services:: Occupational Therapy  Community Resources: Home Care  Supplies used at home:: None  Equipment Currently Used at Home: grab bar, toilet, grab bar, tub/shower, shower chair  Employment Status: retired)   )    Advance Care Plan/Directive  Advanced Care Plans/Directives on file:: No  Advanced Care Plan/Directive Status: Other (comment field)(Son will send it to clinic)    Referrals Placed: Lifeline, PCA     Goals:   Goals        General    1. Psychosocial     Notes - Note created  10/21/2020  3:53 PM by Kimberly Durant LGSW    Goal Statement: Over the next 2 months, Elizabeth's son, Philip, would like to ensure Angies safety within her home by establishing in-home care.  Date Goal Set: 10/21/2020  Barriers: Elizabeth's reluctance for care  Strengths: Strong support system  Date to Achieve By: 2020  Patient expressed understanding of goal: Philip verbally stated understanding of goal  Action steps to achieve this goal:  1. Philip will review PCA resources sent via email by Care Coordination   2. Philip will outreach to appropriate  agencies  3. Philip will outreach to Care Coordination  for further questions or concerns         Patient/Caregiver understanding: Pt's son reports understanding and denies any additional questions or concerns at this times. OLAMIDE CC engaged in AIDET communication during encounter.    Outreach Frequency: 2 weeks    Plan: CC SW will email Philip resources for PCA services and Lifeline. CC OLAMIDE will outreach to Philip in 1-2 weeks.    PRIMO Caputo, [facility]  Clinic Care Coordinator  [facility] Children's [facility] Women's [facility]  [phone]  [email or web address]

## 2020-10-21 NOTE — PROGRESS NOTES
Clinic Care Coordination Contact  [facility]/Voicemail       Clinical Data: Care Coordinator Outreach    Voicemail received from Marija. CC SW returned call.    Outreach attempted x 2.  Left message on Marija's voicemail with call back information and requested return call.    Plan: Care Coordinator will try to reach patient again in 1-2 business days.    PRIMO Caputo, [facility]  Clinic Care Coordinator  [facility]  [phone]  [email or web address]

## 2020-10-21 NOTE — PROGRESS NOTES
Clinic Care Coordination Contact  [facility]/Voicemail       Clinical Data: Care Coordinator Outreach    Outreach attempted x 1.  Left message on pt's son, Philip's, voicemail with call back information and requested return call.    Plan: Care Coordinator will try to reach patient again in 3-5 business days.    PRIMO Caputo, [facility]  Clinic Care Coordinator  [facility] Children's [facility] Women's [facility]  [phone]  [email or web address]

## 2020-10-22 ENCOUNTER — DOCUMENTATION ONLY (OUTPATIENT)
Dept: OTHER | Facility: CLINIC | Age: 80
End: 2020-10-22

## 2020-10-22 DIAGNOSIS — Z53.9 DIAGNOSIS NOT YET DEFINED: Primary | ICD-10-CM

## 2020-10-22 PROCEDURE — G0180 MD CERTIFICATION HHA PATIENT: HCPCS | Performed by: INTERNAL MEDICINE

## 2020-10-28 ENCOUNTER — TELEPHONE (OUTPATIENT)
Dept: FAMILY MEDICINE | Facility: CLINIC | Age: 80
End: 2020-10-28

## 2020-10-28 ENCOUNTER — PATIENT OUTREACH (OUTPATIENT)
Dept: CARE COORDINATION | Facility: CLINIC | Age: 80
End: 2020-10-28

## 2020-10-28 ASSESSMENT — ACTIVITIES OF DAILY LIVING (ADL): DEPENDENT_IADLS:: INDEPENDENT

## 2020-10-28 NOTE — PROGRESS NOTES
Clinic Care Coordination Contact  [facility]/Voicemail    Referral Source: IP Report  Clinical Data: Care Coordinator Outreach    CC SW received a voice message for pt's son, Philip, requesting an appointment for pt to pair with her husbands appointment in 11/9 at 2pm. He explained that pt and her  will be moving to [place] before Thanksgiving and need to have COVID and TB testing completed 1 week before moving into [facility].     Outreach attempted x 1.  Left message on Philip's voicemail with call back information and requested return call.  CC OLAMIDE explained that there are currently no openings for Dr. Josue at that time. CC OL[facility]IDE [facility] and explained that both will need to be tested there and pt could likely do her TB testing there as well.    Plan: Care Coordinator will await return call.    PRIMO Caputo, [facility]  Clinic Care Coordinator  [facility] Children's [facility] Women's [facility]  [phone]  [email or web address]

## 2020-10-28 NOTE — TELEPHONE ENCOUNTER
Reason for Call:  Home Health Care    Teresa with [facility] called regarding (reason for call):     Orders are needed for this patient.     Home Health: discharge effective 10.28.2020. Pt is refusing treatment.    Phone Number Homecare Nurse can be reached at: [phone]    Can we leave a detailed message on this number? YES    Phone number patient can be reached at: [phone]    Best Time: any    Call taken on 10/28/2020 at 8:19 AM by Alcira Abreu

## 2020-10-28 NOTE — TELEPHONE ENCOUNTER
Returned call. OK'd/noted requested orders.  Orders will be faxed to PCP for review and signature.   Elizabeth Isabel RN

## 2020-11-04 ENCOUNTER — MEDICAL CORRESPONDENCE (OUTPATIENT)
Dept: HEALTH INFORMATION MANAGEMENT | Facility: CLINIC | Age: 80
End: 2020-11-04

## 2020-11-16 ENCOUNTER — PATIENT OUTREACH (OUTPATIENT)
Dept: CARE COORDINATION | Facility: CLINIC | Age: 80
End: 2020-11-16

## 2020-11-16 NOTE — PROGRESS NOTES
Clinic Care Coordination Contact  [facility]/Voicemail       Clinical Data: Care Coordinator Outreach    Outreach attempted x 1.  Left message on pt's son, Philip's, voicemail with call back information and requested return call.    Plan: Care Coordinator will try to reach patient again in 3-5 business days.    PRIMO Caputo, [facility]  Clinic Care Coordinator  [facility]'s [facility] Women's [facility]  [phone]  [email or web address]

## 2020-11-16 NOTE — LETTER
[place] CARE COORDINATION  [address] [address]    November 16, 2020        Elizabeth Lee  [address]  [place] [address]      [facility]  Complex Care Plan  About Me:    Patient Name:  Elizabeth Lee    YOB: 1940  Age:         80 year old   [facility] MRN:    [id number] Telephone Information:  Home Phone [phone]   Mobile [phone]       Address:  [address]  [place] [address] Email address:  No e-mail address on record      Emergency Contact(s)    Name Relationship Lgl Grd Work Phone Home Phone Mobile Phone   1. TITO LEE Daughter No   [phone]   2. LAVERNE MELO Son No   [phone]   3. AMY SOUSA Other No  [phone]           Health Maintenance  Health Maintenance Reviewed:      My Access Plan  Medical Emergency 911   Primary Clinic Line [facility] - [phone]   24 Hour Appointment Line [phone] or  [phone]) (toll-free)   24 Hour Nurse Line [phone] (toll-free)   Preferred Urgent Care     Preferred Hospital     Preferred Pharmacy [facility] - [place], [place] - [address]     Behavioral Health Crisis Line The National Suicide Prevention Lifeline at 1-555.414.9496 or 911       My Care Team Members  Patient Care Team       Relationship Specialty Notifications Start End    Marky Josue MD PCP - General Internal Medicine  12/15/11     Phone: [phone] Fax: [phone] [address] [place] [address]    Marky Josue MD Assigned PCP   10/4/12     Phone: [phone] Fax: [phone] [address] [place] [address]    Kimberly Durant LGSW Lead Care Coordinator Primary Care - CC  9/21/20     Winston Disla MD Assigned Musculoskeletal Provider   10/23/20     Phone: [phone] Fax: [phone] [address] [facility] [address]     Camelia Mendez MA Community Health Worker   10/28/20             My Care Plans  Self Management and Treatment Plan  Goals and (Comments)  Goals        General    1. Psychosocial     Notes - Note created  10/21/2020  3:53 PM by Kimberly Durant LGSW    Goal Statement: Over the next 2 months, Elizabeth's son, Philip, would like to ensure Angies safety within her home by establishing in-home care.  Date Goal Set: 10/21/2020  Barriers: Elizabeth's reluctance for care  Strengths: Strong support system  Date to Achieve By: 12/21/2020  Patient expressed understanding of goal: Philip verbally stated understanding of goal  Action steps to achieve this goal:  1. Philip will review PCA resources sent via email by Care Coordination   2. Philip will outreach to appropriate agencies  3. Philip will outreach to Care Coordination  for further questions or concerns                   My Medical and Care Information  Problem List   Patient Active Problem List   Diagnosis     Benign essential hypertension     Insomnia     Hyperlipidemia LDL goal <160     Elevated blood sugar     Fung-Figueroa syncope     Lymphocytic colitis     Lichen sclerosus et atrophicus of the vulva     PMR (polymyalgia rheumatica) (H)     CKD (chronic kidney disease) stage 3, GFR 30-59 ml/min     Depression     Syncope     Mild major depression (H)        Care Coordination Start Date: 10/21/2020   Frequency of Care Coordination:     Form Last Updated: 11/16/2020

## 2020-11-17 ENCOUNTER — TELEPHONE (OUTPATIENT)
Dept: FAMILY MEDICINE | Facility: CLINIC | Age: 80
End: 2020-11-17

## 2020-11-17 NOTE — LETTER
[facility]  [address] [facility] [address]  Phone: [phone]    11/19/20    Elizabeth LIN Benita  [address] [place] [address]      To whom it may concern:     I do not believe Mrs. Joy is able to make financial decisions on her own.    Sincerely,      Marky Josue MD

## 2020-11-17 NOTE — TELEPHONE ENCOUNTER
Reason for Call:  Form, our goal is to have forms completed with 72 hours, however, some forms may require a visit or additional information.    Type of letter, form or note:  medical    Who is the form from?: please create (if other please explain)    Where did the form come from: please create    What clinic location was the form placed at?: [facility]    Where the form was placed: please create    What number is listed as a contact on the form?: Merlene Joy - [phone]  *C2C verified       Additional comments: Pt's daughter is requesting a letter that states that the Pt is unable to make financial decisions on their own so that POA son can have access to Pt's bank account to pay bills    Once created, please mail to:   Erich Diggs  [address] [place] [address] [place] [address]  Ph: [phone]  *C2C verified    Call taken on 11/17/2020 at 1:03 PM by Alcira Abreu

## 2020-11-25 ENCOUNTER — PATIENT OUTREACH (OUTPATIENT)
Dept: NURSING | Facility: CLINIC | Age: 80
End: 2020-11-25
Payer: COMMERCIAL

## 2020-11-25 NOTE — PROGRESS NOTES
Clinic Care Coordination Contact    Follow Up Progress Note      Assessment: CC SW spoke with pt's son, Philip, regarding pt's overall wellbeing. Pt has refused to go to medical appointments for COVID and TB test and therefore was unable to go to [place]. Her  left on 11/24 with his daughter. Pt can still go but at this time she is refusing to complete requirements of the CAREY.    Pt has resumed the working with past caregiver, Aaron, 3-4 days/week. Aaron is familiar with pt's family and will notify Philip if there are any serious concerns for pt. Pt's son is taking care of monthly bills.     CC SW discussed APS and when it would be appropriate to involve them. CC SW explained the limitations of what they can do for pt.    Goals addressed this encounter:   Goals Addressed                 This Visit's Progress      1. Psychosocial   20%     Goal Statement: Over the next 6 months, Elizabeth's son, Philip, would like to ensure Elizabeth's safety within her home by establishing in-home care.  Date Goal Set: 10/21/2020  Barriers: Elizabeth's reluctance for care  Strengths: Strong support system  Date to Achieve By: 4/21/2020  Patient expressed understanding of goal: Philip verbally stated understanding of goal  Action steps to achieve this goal:  1. Philip will review PCA resources sent via email by Care Coordination   2. Philip will outreach to appropriate agencies  3. Philip will outreach to Care Coordination  for further questions or concerns         Outreach Frequency: monthly    Plan: CHW will outreach next month to discuss pt's overall wellbeing. CC OLAMIDE will do ongoing chart review for ongoing needs.    PRIMO Caputo, [facility]  Clinic Care Coordinator  [facility] Children's [facility] Women's [facility]  [phone]  [email or web address]

## 2020-11-27 ENCOUNTER — HOSPITAL ENCOUNTER (INPATIENT)
Facility: CLINIC | Age: 80
LOS: 6 days | Discharge: NURSING, REHAB OR LONG-TERM CARE | DRG: 690 | End: 2020-12-03
Attending: EMERGENCY MEDICINE | Admitting: INTERNAL MEDICINE
Payer: COMMERCIAL

## 2020-11-27 DIAGNOSIS — M62.81 GENERALIZED MUSCLE WEAKNESS: ICD-10-CM

## 2020-11-27 DIAGNOSIS — B99.9 INFECTIOUS ENCEPHALOPATHY: ICD-10-CM

## 2020-11-27 DIAGNOSIS — M35.3 PMR (POLYMYALGIA RHEUMATICA) (H): ICD-10-CM

## 2020-11-27 DIAGNOSIS — F32.0 CURRENT MILD EPISODE OF MAJOR DEPRESSIVE DISORDER, UNSPECIFIED WHETHER RECURRENT (H): Primary | ICD-10-CM

## 2020-11-27 DIAGNOSIS — G93.49 INFECTIOUS ENCEPHALOPATHY: ICD-10-CM

## 2020-11-27 DIAGNOSIS — I10 BENIGN ESSENTIAL HYPERTENSION: ICD-10-CM

## 2020-11-27 DIAGNOSIS — N30.00 ACUTE CYSTITIS WITHOUT HEMATURIA: ICD-10-CM

## 2020-11-27 LAB
ALBUMIN SERPL-MCNC: 4 G/DL (ref 3.4–5)
ALBUMIN UR-MCNC: 10 MG/DL
ALP SERPL-CCNC: 104 U/L (ref 40–150)
ALT SERPL W P-5'-P-CCNC: 23 U/L (ref 0–50)
ANION GAP SERPL CALCULATED.3IONS-SCNC: 8 MMOL/L (ref 3–14)
APPEARANCE UR: ABNORMAL
AST SERPL W P-5'-P-CCNC: 17 U/L (ref 0–45)
BACTERIA #/AREA URNS HPF: ABNORMAL /HPF
BASOPHILS # BLD AUTO: 0 10E9/L (ref 0–0.2)
BASOPHILS NFR BLD AUTO: 0.2 %
BILIRUB SERPL-MCNC: 0.7 MG/DL (ref 0.2–1.3)
BILIRUB UR QL STRIP: NEGATIVE
BUN SERPL-MCNC: 20 MG/DL (ref 7–30)
CALCIUM SERPL-MCNC: 9.4 MG/DL (ref 8.5–10.1)
CHLORIDE SERPL-SCNC: 104 MMOL/L (ref 94–109)
CO2 SERPL-SCNC: 27 MMOL/L (ref 20–32)
COLOR UR AUTO: YELLOW
CREAT SERPL-MCNC: 0.92 MG/DL (ref 0.52–1.04)
DIFFERENTIAL METHOD BLD: ABNORMAL
EOSINOPHIL # BLD AUTO: 0.2 10E9/L (ref 0–0.7)
EOSINOPHIL NFR BLD AUTO: 1.2 %
ERYTHROCYTE [DISTWIDTH] IN BLOOD BY AUTOMATED COUNT: 12.7 % (ref 10–15)
GFR SERPL CREATININE-BSD FRML MDRD: 58 ML/MIN/{1.73_M2}
GLUCOSE SERPL-MCNC: 98 MG/DL (ref 70–99)
GLUCOSE UR STRIP-MCNC: NEGATIVE MG/DL
HCT VFR BLD AUTO: 40.2 % (ref 35–47)
HGB BLD-MCNC: 13.3 G/DL (ref 11.7–15.7)
HGB UR QL STRIP: ABNORMAL
IMM GRANULOCYTES # BLD: 0 10E9/L (ref 0–0.4)
IMM GRANULOCYTES NFR BLD: 0.2 %
INTERPRETATION ECG - MUSE: NORMAL
KETONES UR STRIP-MCNC: 5 MG/DL
LEUKOCYTE ESTERASE UR QL STRIP: ABNORMAL
LYMPHOCYTES # BLD AUTO: 3.3 10E9/L (ref 0.8–5.3)
LYMPHOCYTES NFR BLD AUTO: 25.5 %
MCH RBC QN AUTO: 30.5 PG (ref 26.5–33)
MCHC RBC AUTO-ENTMCNC: 33.1 G/DL (ref 31.5–36.5)
MCV RBC AUTO: 92 FL (ref 78–100)
MONOCYTES # BLD AUTO: 1.2 10E9/L (ref 0–1.3)
MONOCYTES NFR BLD AUTO: 9.4 %
MUCOUS THREADS #/AREA URNS LPF: PRESENT /LPF
NEUTROPHILS # BLD AUTO: 8.1 10E9/L (ref 1.6–8.3)
NEUTROPHILS NFR BLD AUTO: 63.5 %
NITRATE UR QL: NEGATIVE
NRBC # BLD AUTO: 0 10*3/UL
NRBC BLD AUTO-RTO: 0 /100
PH UR STRIP: 5.5 PH (ref 5–7)
PLATELET # BLD AUTO: 340 10E9/L (ref 150–450)
POTASSIUM SERPL-SCNC: 3.4 MMOL/L (ref 3.4–5.3)
PROT SERPL-MCNC: 7.5 G/DL (ref 6.8–8.8)
RBC # BLD AUTO: 4.36 10E12/L (ref 3.8–5.2)
RBC #/AREA URNS AUTO: 2 /HPF (ref 0–2)
SARS-COV-2 RNA SPEC QL NAA+PROBE: NORMAL
SODIUM SERPL-SCNC: 139 MMOL/L (ref 133–144)
SOURCE: ABNORMAL
SP GR UR STRIP: 1.02 (ref 1–1.03)
SPECIMEN SOURCE: NORMAL
TROPONIN I SERPL-MCNC: <0.015 UG/L (ref 0–0.04)
UROBILINOGEN UR STRIP-MCNC: NORMAL MG/DL (ref 0–2)
WBC # BLD AUTO: 12.8 10E9/L (ref 4–11)
WBC #/AREA URNS AUTO: 129 /HPF (ref 0–5)

## 2020-11-27 PROCEDURE — 87186 SC STD MICRODIL/AGAR DIL: CPT | Performed by: EMERGENCY MEDICINE

## 2020-11-27 PROCEDURE — 99207 PR CDG-CODE CATEGORY CHANGED: CPT | Performed by: INTERNAL MEDICINE

## 2020-11-27 PROCEDURE — 258N000003 HC RX IP 258 OP 636: Performed by: INTERNAL MEDICINE

## 2020-11-27 PROCEDURE — 93005 ELECTROCARDIOGRAM TRACING: CPT

## 2020-11-27 PROCEDURE — G0378 HOSPITAL OBSERVATION PER HR: HCPCS

## 2020-11-27 PROCEDURE — 99285 EMERGENCY DEPT VISIT HI MDM: CPT | Mod: 25

## 2020-11-27 PROCEDURE — 81001 URINALYSIS AUTO W/SCOPE: CPT | Performed by: EMERGENCY MEDICINE

## 2020-11-27 PROCEDURE — 84484 ASSAY OF TROPONIN QUANT: CPT | Performed by: EMERGENCY MEDICINE

## 2020-11-27 PROCEDURE — 250N000011 HC RX IP 250 OP 636: Performed by: EMERGENCY MEDICINE

## 2020-11-27 PROCEDURE — 80053 COMPREHEN METABOLIC PANEL: CPT | Performed by: EMERGENCY MEDICINE

## 2020-11-27 PROCEDURE — U0003 INFECTIOUS AGENT DETECTION BY NUCLEIC ACID (DNA OR RNA); SEVERE ACUTE RESPIRATORY SYNDROME CORONAVIRUS 2 (SARS-COV-2) (CORONAVIRUS DISEASE [COVID-19]), AMPLIFIED PROBE TECHNIQUE, MAKING USE OF HIGH THROUGHPUT TECHNOLOGIES AS DESCRIBED BY CMS-2020-01-R: HCPCS | Performed by: EMERGENCY MEDICINE

## 2020-11-27 PROCEDURE — 87086 URINE CULTURE/COLONY COUNT: CPT | Performed by: EMERGENCY MEDICINE

## 2020-11-27 PROCEDURE — 96365 THER/PROPH/DIAG IV INF INIT: CPT

## 2020-11-27 PROCEDURE — 99223 1ST HOSP IP/OBS HIGH 75: CPT | Mod: AI | Performed by: INTERNAL MEDICINE

## 2020-11-27 PROCEDURE — 85025 COMPLETE CBC W/AUTO DIFF WBC: CPT | Performed by: EMERGENCY MEDICINE

## 2020-11-27 PROCEDURE — 250N000013 HC RX MED GY IP 250 OP 250 PS 637: Performed by: INTERNAL MEDICINE

## 2020-11-27 PROCEDURE — 120N000001 HC R&B MED SURG/OB

## 2020-11-27 PROCEDURE — 87088 URINE BACTERIA CULTURE: CPT | Performed by: EMERGENCY MEDICINE

## 2020-11-27 PROCEDURE — C9803 HOPD COVID-19 SPEC COLLECT: HCPCS

## 2020-11-27 RX ORDER — AMOXICILLIN 250 MG
1 CAPSULE ORAL 2 TIMES DAILY
Status: DISCONTINUED | OUTPATIENT
Start: 2020-11-27 | End: 2020-12-03 | Stop reason: HOSPADM

## 2020-11-27 RX ORDER — ONDANSETRON 4 MG/1
4 TABLET, ORALLY DISINTEGRATING ORAL EVERY 6 HOURS PRN
Status: DISCONTINUED | OUTPATIENT
Start: 2020-11-27 | End: 2020-12-03 | Stop reason: HOSPADM

## 2020-11-27 RX ORDER — CEFTRIAXONE 1 G/1
1 INJECTION, POWDER, FOR SOLUTION INTRAMUSCULAR; INTRAVENOUS EVERY 24 HOURS
Status: DISCONTINUED | OUTPATIENT
Start: 2020-11-28 | End: 2020-11-30

## 2020-11-27 RX ORDER — ONDANSETRON 2 MG/ML
4 INJECTION INTRAMUSCULAR; INTRAVENOUS EVERY 6 HOURS PRN
Status: DISCONTINUED | OUTPATIENT
Start: 2020-11-27 | End: 2020-12-03 | Stop reason: HOSPADM

## 2020-11-27 RX ORDER — POLYETHYLENE GLYCOL 3350 17 G/17G
17 POWDER, FOR SOLUTION ORAL DAILY
Status: DISCONTINUED | OUTPATIENT
Start: 2020-11-27 | End: 2020-12-03 | Stop reason: HOSPADM

## 2020-11-27 RX ORDER — AMLODIPINE BESYLATE 2.5 MG/1
2.5 TABLET ORAL 2 TIMES DAILY
Status: DISCONTINUED | OUTPATIENT
Start: 2020-11-27 | End: 2020-11-28

## 2020-11-27 RX ORDER — AMOXICILLIN 250 MG
2 CAPSULE ORAL 2 TIMES DAILY
Status: DISCONTINUED | OUTPATIENT
Start: 2020-11-27 | End: 2020-12-03 | Stop reason: HOSPADM

## 2020-11-27 RX ORDER — CITALOPRAM HYDROBROMIDE 10 MG/1
10 TABLET ORAL DAILY
Status: DISCONTINUED | OUTPATIENT
Start: 2020-11-27 | End: 2020-12-02

## 2020-11-27 RX ORDER — ACETAMINOPHEN 650 MG/1
650 SUPPOSITORY RECTAL EVERY 4 HOURS PRN
Status: DISCONTINUED | OUTPATIENT
Start: 2020-11-27 | End: 2020-12-03 | Stop reason: HOSPADM

## 2020-11-27 RX ORDER — CEFTRIAXONE 1 G/1
1 INJECTION, POWDER, FOR SOLUTION INTRAMUSCULAR; INTRAVENOUS ONCE
Status: COMPLETED | OUTPATIENT
Start: 2020-11-27 | End: 2020-11-27

## 2020-11-27 RX ORDER — ACETAMINOPHEN 325 MG/1
650 TABLET ORAL EVERY 4 HOURS PRN
Status: DISCONTINUED | OUTPATIENT
Start: 2020-11-27 | End: 2020-12-03 | Stop reason: HOSPADM

## 2020-11-27 RX ORDER — HYDRALAZINE HYDROCHLORIDE 10 MG/1
10 TABLET, FILM COATED ORAL 4 TIMES DAILY PRN
Status: DISCONTINUED | OUTPATIENT
Start: 2020-11-27 | End: 2020-11-29

## 2020-11-27 RX ORDER — PREDNISONE 5 MG/1
5 TABLET ORAL DAILY
Status: DISCONTINUED | OUTPATIENT
Start: 2020-11-27 | End: 2020-11-27

## 2020-11-27 RX ORDER — POTASSIUM CHLORIDE 1500 MG/1
20 TABLET, EXTENDED RELEASE ORAL 2 TIMES DAILY WITH MEALS
Status: DISCONTINUED | OUTPATIENT
Start: 2020-11-27 | End: 2020-12-03 | Stop reason: HOSPADM

## 2020-11-27 RX ADMIN — POTASSIUM CHLORIDE 20 MEQ: 1500 TABLET, EXTENDED RELEASE ORAL at 18:01

## 2020-11-27 RX ADMIN — TRAZODONE HYDROCHLORIDE 25 MG: 50 TABLET ORAL at 21:13

## 2020-11-27 RX ADMIN — SODIUM CHLORIDE 1000 ML: 9 INJECTION, SOLUTION INTRAVENOUS at 16:19

## 2020-11-27 RX ADMIN — AMLODIPINE BESYLATE 2.5 MG: 2.5 TABLET ORAL at 21:13

## 2020-11-27 RX ADMIN — CEFTRIAXONE SODIUM 1 G: 1 INJECTION, POWDER, FOR SOLUTION INTRAMUSCULAR; INTRAVENOUS at 14:08

## 2020-11-27 RX ADMIN — CITALOPRAM HYDROBROMIDE 10 MG: 10 TABLET ORAL at 18:01

## 2020-11-27 ASSESSMENT — ENCOUNTER SYMPTOMS
HEADACHES: 0
COUGH: 0
SHORTNESS OF BREATH: 0
WEAKNESS: 1
ABDOMINAL PAIN: 0
DYSURIA: 0
FEVER: 0
FREQUENCY: 1

## 2020-11-27 NOTE — PROGRESS NOTES
RECEIVING UNIT ED HANDOFF REVIEW    ED Nurse Handoff Report was reviewed by: Hiral Lewis RN on November 27, 2020 at 3:32 PM

## 2020-11-27 NOTE — ED NOTES
Bed: ED13  Expected date:   Expected time:   Means of arrival:   Comments:  Brigid 1 Poss UTI 80 f

## 2020-11-27 NOTE — ED NOTES
Pt ambulated with walker.  Pt denies any dizziness or lightheadedness.  Pt did state having some soreness in her hip area prior to ambulation.  Steady gait upon ambulation.  MD notified.

## 2020-11-27 NOTE — H&P
Admitted:     11/27/2020      CHIEF COMPLAINT:  Found down.      HISTORY OF PRESENT ILLNESS:  Ms. Joy is a pleasant 80-year-old female with a questionable history of mild underlying dementia who was hospitalized in September of this year with what was thought related to postural hypotension.  She was discharged to transitional care and reportedly since then has been at home for the last 3 weeks where she has been having daily assistance by her own report.  She was brought into the emergency room today as she was found to be on the floor next to her bed without any clothes on.  She was evaluated in the emergency room by Dr. Trierweiler.  She was found to have generally stable vital signs, a slightly elevated white blood cell count and a grossly abnormal urinalysis.      The patient lives independently with assistance.  Per her reports shortly after her last hospitalization, her in-laws came to take her  away, to move him down to [place], as he apparently has severe dementia and they did not think she was being adequately cared for.  The veracity of this history is not certain.  An attempt was made to ambulate the patient as she was able to walk a few steps with a walker, but has not felt that she was safe to discharge home.  As such, an observation admission was requested.  At the time of my interview, the patient complains of upper thigh pain but denies any shortness of breath, recent cough, recent fevers, chills, nausea or vomiting.      REVIEW OF SYSTEMS:  A 10-point review of systems was conducted and is negative except as noted above in the history of present illness.      PAST MEDICAL HISTORY:   1.  History of depression.   2.  Chronic kidney disease, stage III.   3.  History of postural hypotension.   4.  History of (PMR) polymyalgia rheumatica.  She is on chronic low-dose steroids.   5.  History of nonsustained ventricular tachycardia.   6.  Hypertension.   7.  History of hysterectomy.      SOCIAL  HISTORY:  The patient has a distant smoking history, quit in 1997.  Rare to no alcohol use.  She lives currently independently with assistance from a hired service per her reports.      FAMILY HISTORY:  Reviewed and noncontributory.      MEDICATIONS:  Prior to this admission:   1.  Amlodipine.   2.  Hydralazine.   3.  Celexa.   4.  Potassium chloride.   5.  Prednisone.   6.  Trazodone.      PHYSICAL EXAMINATION:   VITAL SIGNS:  Temperature 98.7, heart rate 75-84 respirations, satting at 99% on room air, blood pressure ranging from 133/99 to 170/90.  She has not taken her blood pressure medications today.   GENERAL:  This is a well-nourished, elderly female.  She is anxious, but otherwise cooperative and appropriate.   NEUROLOGIC:  She is appropriate, oriented x2-1/2.  She is able to state that she was in the hospital and how she got here and why she got here and missed the date by 2 days.  She was able to state the president.   HEENT:  Moist mucous membranes, sclerae are anicteric.   LUNGS:  Generally clear to auscultation.   HEART:  Rhythm is regular. A 1/6 systolic murmur.   ABDOMEN:  Soft, nontender, nondistended.   EXTREMITIES:  No substantial peripheral edema is noted.   ABDOMEN:  Soft, nontender, nondistended.      LABORATORY DATA:  Sodium 139, potassium 3.4, BUN 20, creatinine 0.92.  Troponin less than 0.015.  White blood cell count 12.8, hemoglobin 11.3, platelets 340.  Urinalysis shows large leukocyte esterase and greater than 120 white blood cells.      ASSESSMENT AND PLAN:  This is an 80-year-old female presenting after a fall and inability to ambulate with a urinary tract infection, to be admitted to observation.   1.  Urinary tract infection.  The patient only meets 1 criteria for systemic inflammatory response syndrome.  However, she does likely have perhaps some mild infectious encephalopathy.  She has received a dose of Rocephin in the emergency room, which we will continue.   2.  Fall.  Again, the  historical details of this are not clear, but it seems like the patient was either trying to get out of bed or was trying to change clothes and fell.  Again, this is likely related to an infectious encephalopathy picture.  Her vital signs have otherwise been normal.   3.  History of polymyalgia rheumatica.  The patient can continue her low-dose prednisone.  I suspect this is the reason for her slight leukocytosis.   4.  Depression.  I suspect this may be contributing more to the patient's symptoms than she lets on.  It sounds like she has had some disturbing family events happen in the last couple of months and she may be having a hard time dealing with it.  For right now, plan on continuing her Celexa.   5.  HTN.  Continue Amlodipine and hydralazine as BP tolerates, she has not taken her meds today.     DISPOSITION:  The patient will be admitted to observation with PT, OT consult.  I suspect she is unsafe to discharge home currently.        CODE STATUS:  This was discussed with the patient.  She has a clear and sound mind and she wishes to be a DNR/DNI.  Please note this reflects a change from her prior code status.      TOTAL TIME SPENT:  Greater than 50% of which involved counseling and coordination of care, is 50 minutes.         CADEN FARIA MD             D: 2020   T: 2020   MT: DAPHNIE      Name:     AMELIA LEE   MRN:      [id number]        Account:      [id number]   :      1940        Admitted:     2020                   Document: [id number]       cc: Marky Josue MD

## 2020-11-27 NOTE — ED NOTES
[facility]  ED Nurse Handoff Report    ED Chief complaint: Rule out Urinary Tract Infection, Altered Mental Status, and Fall      ED Diagnosis:   Final diagnoses:   Infectious encephalopathy   Acute cystitis without hematuria   Generalized muscle weakness       Code Status: Full Code    Allergies:   Allergies   Allergen Reactions     Ace Inhibitors Cough       Patient Story: From home. Lives independently. History of dementia. Caregiver went to patient's home and found patient down on the ground, patient unable to recall how she fell or how long she had been on ground. Patient reports her legs were too weak to get up. Patient reports she had multiple episodes of incontinence of urine while on ground. Uses walker at home.   Focused Assessment:    Cardiac WDL  Resp WDL  Neuro WDL except some confusion of situation/forgetful of events leading to ED visit; baseline dementia   Frequency, UTI    Treatments and/or interventions provided: IV abx, see MAR  Patient's response to treatments and/or interventions: Tolerated well    To be done/followed up on inpatient unit:  Social work consult, IV abx    Does this patient have any cognitive concerns?: Baseline dementia and Disoriented to situation    Activity level - Baseline/Home:  Walker  Activity Level - Current:   Stand with Assist and Walker    Patient's Preferred language: English   Needed?: No    Isolation: None  Infection: Not Applicable  Patient tested for COVID 19 prior to admission: YES  Bariatric?: No    Vital Signs:   Vitals:    11/27/20 1119 11/27/20 1130 11/27/20 1200 11/27/20 1406   BP: (!) 147/96 (!) 133/99 (!) 170/90 (!) 166/102   Pulse: 84 82 75 75   Resp: 16 13 11 14   Temp:    98.7  F (37.1  C)   TempSrc:    Oral   SpO2: 94% 99% 99% 99%       Cardiac Rhythm:     Was the PSS-3 completed:   Yes  What interventions are required if any?       Required Interventions: Provider notified       Family Comments: Cameron Hughes [phone]  OBS  brochure/video discussed/provided to patient/family: N/A              Name of person given brochure if not patient:                Relationship to patient:      For the majority of the shift this patient's behavior was Green.   Behavioral interventions performed were  .    ED NURSE PHONE NUMBER: *[phone]

## 2020-11-27 NOTE — ED TRIAGE NOTES
Lives at home.  has a caregiver who comes to care for him, and when they arrived today the patient was laying on the ground, with no clear memory of how she got there or how long she was there. EMS called, VSS, but some confusion en route. Hx UTI, reports increased urination.

## 2020-11-27 NOTE — PROGRESS NOTES
See dictated H&P.  Also note standard Covid screening for admission.  She is considered low-risk.  Alexis Cash MD

## 2020-11-27 NOTE — ED PROVIDER NOTES
History     Chief Complaint:  Urinary frequency    HPI   Elizabeth Joy is a 80 year old female with a history of PMR, syncope, and CKD who presents with urinary frequency.  EMS reports the patient was found by a caregiver this morning lying naked on her living floor.  The patient is unsure how she ended up on the floor.  She adamantly denies falling and thinks she must have just sat down.  She recalls that yesterday was Thanksgiving and states she had company over that stayed until about 9 pm.  It was sometime later than night that she ended up on the floor as she did not go to bed.  She was unable to get up on her own due to leg weakness.  She denies hitting her head, any injuries, or any areas of pain from being on the floor.  She has been urinating more frequently than usual but that is her only complain.  She denies fever, cough, shortness of breath, chest pain, or abdominal pain.  On review of her medical record, she was admitted in September 2020 for syncope suspected to be due to postural hypotension.  She did have a lactic acidosis but no infection was found at that time.  Her Amlodipine dose was reduced at that time and orthostatic precautions were recommended at discharge.      Allergies:  ACE inhibitors - cough     Medications:    Amlodipine   Citalopram   Hydralazine   Potassium chloride   Prednisone   Trazodone     Past Medical History:    Hypertension   Syncope   Ventricular tachycardia   Hyperlipidemia   Lymphocytic colitis   Lichen sclerosus   Polymyalgia rheumatica   Chronic kidney disease   Major depressive disorder     Past Surgical History:    Keratotomy, Phacoemulsification clear cornea with intraocular lens implantation, right 2015  Hysterectomy, bilateral salpingo-oophorectomy 1990  Pilonidal cyst excision    Family History:    Breast cancer - mother     Social History:  Presents to the ED alone  Tobacco Use: Former smoker, 42 pack year history, quit 1997.   Alcohol Use: No alcohol use.    PCP: Marky Josue     Review of Systems   Constitutional: Negative for fever.   Respiratory: Negative for cough and shortness of breath.    Cardiovascular: Negative for chest pain.   Gastrointestinal: Negative for abdominal pain.   Genitourinary: Positive for frequency. Negative for dysuria.   Neurological: Positive for weakness. Negative for headaches.   All other systems reviewed and are negative.    Physical Exam   First Vitals:  BP: (!) 147/96  Pulse: 84  Temp: 98.7  F (37.1  C)  Resp: 16  SpO2: 94 %      Physical Exam  Eye:  Pupils are equal, round, and reactive.  Extraocular movements intact.    ENT:  No rhinorrhea.  Moist mucus membranes.  Normal tongue and tonsil.    Cardiac:  Regular rate and rhythm.  No murmurs, gallops, or rubs.    Pulmonary:  Clear to auscultation bilaterally.  No wheezes, rales, or rhonchi.    Abdomen:  Positive bowel sounds.  Abdomen is soft and non-distended, without focal tenderness.    Musculoskeletal:  Normal movement of all extremities without evidence for deficit.    Skin:  Warm and dry without rashes.    Neurologic:  Non-focal exam without asymmetric weakness or numbness.     Psychiatric:  Patient is delightfully demented with no clear recollection of the events of last night.    Emergency Department Course   ECG:  @ 1152  Indication: possible syncope  Vent. Rate 80 bpm. AK interval 148 ms. QRS duration 84 ms. QT/QTc 404/465 ms. P-R-T axis 48 9 8.   Normal sinus rhythm. Normal ECG.    Read @ 1153 by Dr. Trierweiler.     Laboratory:  CBC: WBC 12.8 (H), otherwise WNL (HGB 13.3, )   CMP: GFR 58 (L), otherwise WNL (Creatinine 0.92)   Troponin I: <0.015     Cath UA: Slightly cloudy yellow urine, Ketones 5, Blood trace, Protein 10, Leukocyte Esterase large, WBC/ (H), Bacteria many, Mucous present, otherwise WNL  Urine Culture: pending      Asymptomatic COVID19 Virus PCR, nasopharyngeal: pending      Interventions:  (5375) Ceftriaxone, 1 g, IV infusion      Emergency  Department Course:  Nursing notes and vitals reviewed.  I performed an exam of the patient as documented above.     A peripheral IV was established.  Blood was drawn and sent for laboratory testing, results as above. The patient was placed on continuous cardiac monitoring and pulse oximetry.      EKG was done, interpretation as above.    Urine sample was obtained via straight catheterization and sent for laboratory analysis, findings above.      Patient ambulated in the department under ED staff observation.  She was able to ambulate with a walker however could not get into bed without assistance.      The patient's nose was swabbed and this sample was sent for COVID testing.    Findings and plan explained to the patient who consents to observation.   (1415) I discussed the patient with Dr. Cash of the hospitalist service, who will place the patient in observation in the observation  area.      Impression & Plan      Covid-19  Elizabeth Joy was evaluated during a global COVID-19 pandemic, which necessitated consideration that the patient might be at risk for infection with the SARS-CoV-2 virus that causes COVID-19.   Applicable protocols for evaluation were followed during the patient's care.   COVID-19 was considered as part of the patient's evaluation. The plan for testing is:  a test was obtained during this visit.     Medical Decision Making:  This delightfully demented 80-year-old woman presents to us from her apartment where she was found on the floor.  The patient is not exactly clear how she ended on the floor, though believes it was when she slid off of her bed.  She denies striking her head or injuring herself in any way.  However, she states that she was too weak to be able to get up on her own.  A caregiver checks on her each morning, and upon finding her on the floor, called EMS.    The patient appears clinically well.  Her vital signs are reassuring.  Head to toe exam does not show any  evidence for trauma.  Laboratory investigation was pursued which shows an elevated white blood cell count, though in looking through her records, she consistently has a leukocytosis.  Chemistries and kidney function are reassuring.  Her urinalysis is concerning for signs of urinary tract infection.  She has had issues in the past with UTIs with associated change in mental state.    At this juncture, I believe the patient requires admission to the hospital.  She failed a walking challenge, unable to get off of the bed on her own and it is inappropriate for her to be living independently with this level of weakness.  She also seems to be more confused than what is her baseline, likely secondary to an infectious encephalopathy.  She was given a dose of antibiotics here and I will admit her under observation status for infection treatment, evaluation by physical therapy, and possible placement.  The patient is comfortable with this plan as is the admitting hospitalist service.    Diagnosis:    ICD-10-CM    1. Infectious encephalopathy  G93.49 Asymptomatic COVID-19 Virus (Coronavirus) by PCR    B99.9    2. Acute cystitis without hematuria  N30.00    3. Generalized muscle weakness  M62.81        Disposition:  Observation       I, Yeimi Kenyon, am serving as a scribe on 11/27/2020 at 11:50 AM to personally document services performed by Trierweiler, Chad A, MD based on my observations and the provider's statements to me.     Yeimi Garciaju  11/27/2020   [facility] EMERGENCY DEPT       Trierweiler, Chad A, MD  11/28/20 0849

## 2020-11-27 NOTE — PHARMACY-ADMISSION MEDICATION HISTORY
Pharmacy Medication History  Admission medication history interview status for the 11/27/2020  admission is complete. See EPIC admission navigator for prior to admission medications       Medication history sources: Patient and Surescripts  Location of interview: Patient room  Medication history source reliability: Good    Medication reconciliation completed by provider prior to medication history? No    Time spent in this activity: 40 minutes      Prior to Admission medications    Medication Sig Last Dose Taking? Auth Provider   amLODIPine (NORVASC) 2.5 MG tablet Take 1 tablet (2.5 mg) by mouth 2 times daily 11/26/2020 at pm Yes Sheryl Villaseñor MD   citalopram (CELEXA) 10 MG tablet Take 1 tablet (10 mg) by mouth daily 11/26/2020 at Unknown time Yes Marky Josue MD   hydrALAZINE (APRESOLINE) 10 MG tablet Take 1 tablet (10 mg) by mouth 4 times daily as needed (SBP>170) as needed Yes Sheryl Villaseñor MD   potassium chloride ER (KLOR-CON M) 20 MEQ CR tablet Take 20 mEq by mouth 2 times daily (with meals) 11/26/2020 at Unknown time Yes Unknown, Entered By History   predniSONE (DELTASONE) 1 MG tablet Take 3 of these along with one 5mg for a total daily dose of 8mg 11/26/2020 at Unknown time Yes Marky Josue MD   predniSONE (DELTASONE) 5 MG tablet TAKE 1 TABLET BY MOUTH DAILY ALONG WITH 3 ONE MG TABLETS FOR A TOTAL DAILY DOSE OF 8MG 11/26/2020 at Unknown time Yes Marky Josue MD   traZODone (DESYREL) 50 MG tablet Take 25 mg by mouth At Bedtime 11/26/2020 at Unknown time Yes Unknown, Entered By History

## 2020-11-28 ENCOUNTER — APPOINTMENT (OUTPATIENT)
Dept: PHYSICAL THERAPY | Facility: CLINIC | Age: 80
DRG: 690 | End: 2020-11-28
Attending: INTERNAL MEDICINE
Payer: COMMERCIAL

## 2020-11-28 LAB
ANION GAP SERPL CALCULATED.3IONS-SCNC: 7 MMOL/L (ref 3–14)
BACTERIA SPEC CULT: ABNORMAL
BUN SERPL-MCNC: 26 MG/DL (ref 7–30)
CALCIUM SERPL-MCNC: 8.6 MG/DL (ref 8.5–10.1)
CHLORIDE SERPL-SCNC: 112 MMOL/L (ref 94–109)
CO2 SERPL-SCNC: 24 MMOL/L (ref 20–32)
CREAT SERPL-MCNC: 0.98 MG/DL (ref 0.52–1.04)
ERYTHROCYTE [DISTWIDTH] IN BLOOD BY AUTOMATED COUNT: 12.9 % (ref 10–15)
GFR SERPL CREATININE-BSD FRML MDRD: 54 ML/MIN/{1.73_M2}
GLUCOSE SERPL-MCNC: 82 MG/DL (ref 70–99)
HCT VFR BLD AUTO: 35.2 % (ref 35–47)
HGB BLD-MCNC: 11.7 G/DL (ref 11.7–15.7)
LABORATORY COMMENT REPORT: NORMAL
LACTATE BLD-SCNC: 1.4 MMOL/L (ref 0.7–2)
MCH RBC QN AUTO: 31 PG (ref 26.5–33)
MCHC RBC AUTO-ENTMCNC: 33.2 G/DL (ref 31.5–36.5)
MCV RBC AUTO: 93 FL (ref 78–100)
PLATELET # BLD AUTO: 290 10E9/L (ref 150–450)
POTASSIUM SERPL-SCNC: 3.6 MMOL/L (ref 3.4–5.3)
RBC # BLD AUTO: 3.77 10E12/L (ref 3.8–5.2)
SARS-COV-2 RNA SPEC QL NAA+PROBE: NEGATIVE
SODIUM SERPL-SCNC: 143 MMOL/L (ref 133–144)
SPECIMEN SOURCE: ABNORMAL
SPECIMEN SOURCE: NORMAL
WBC # BLD AUTO: 10.8 10E9/L (ref 4–11)

## 2020-11-28 PROCEDURE — 250N000012 HC RX MED GY IP 250 OP 636 PS 637

## 2020-11-28 PROCEDURE — 97530 THERAPEUTIC ACTIVITIES: CPT | Mod: GP

## 2020-11-28 PROCEDURE — 80048 BASIC METABOLIC PNL TOTAL CA: CPT | Performed by: INTERNAL MEDICINE

## 2020-11-28 PROCEDURE — 250N000013 HC RX MED GY IP 250 OP 250 PS 637: Performed by: INTERNAL MEDICINE

## 2020-11-28 PROCEDURE — 83605 ASSAY OF LACTIC ACID: CPT | Performed by: PHYSICIAN ASSISTANT

## 2020-11-28 PROCEDURE — 250N000013 HC RX MED GY IP 250 OP 250 PS 637: Performed by: PHYSICIAN ASSISTANT

## 2020-11-28 PROCEDURE — 99207 PR MOONLIGHTING INDICATOR: CPT | Performed by: PHYSICIAN ASSISTANT

## 2020-11-28 PROCEDURE — 120N000001 HC R&B MED SURG/OB

## 2020-11-28 PROCEDURE — G0378 HOSPITAL OBSERVATION PER HR: HCPCS

## 2020-11-28 PROCEDURE — 250N000011 HC RX IP 250 OP 636: Performed by: INTERNAL MEDICINE

## 2020-11-28 PROCEDURE — 99207 PR CDG-CODE CATEGORY CHANGED: CPT | Performed by: PHYSICIAN ASSISTANT

## 2020-11-28 PROCEDURE — 85027 COMPLETE CBC AUTOMATED: CPT | Performed by: INTERNAL MEDICINE

## 2020-11-28 PROCEDURE — 36415 COLL VENOUS BLD VENIPUNCTURE: CPT | Performed by: INTERNAL MEDICINE

## 2020-11-28 PROCEDURE — 97161 PT EVAL LOW COMPLEX 20 MIN: CPT | Mod: GP

## 2020-11-28 PROCEDURE — 99232 SBSQ HOSP IP/OBS MODERATE 35: CPT | Performed by: PHYSICIAN ASSISTANT

## 2020-11-28 PROCEDURE — 36415 COLL VENOUS BLD VENIPUNCTURE: CPT | Performed by: PHYSICIAN ASSISTANT

## 2020-11-28 RX ORDER — PREDNISONE 5 MG/1
5 TABLET ORAL DAILY
Status: DISCONTINUED | OUTPATIENT
Start: 2020-11-28 | End: 2020-11-28

## 2020-11-28 RX ORDER — PREDNISONE 1 MG/1
3 TABLET ORAL DAILY
Status: DISCONTINUED | OUTPATIENT
Start: 2020-11-28 | End: 2020-11-28

## 2020-11-28 RX ORDER — LABETALOL HYDROCHLORIDE 5 MG/ML
10 INJECTION, SOLUTION INTRAVENOUS
Status: DISCONTINUED | OUTPATIENT
Start: 2020-11-28 | End: 2020-12-03 | Stop reason: HOSPADM

## 2020-11-28 RX ORDER — AMLODIPINE BESYLATE 5 MG/1
5 TABLET ORAL 2 TIMES DAILY
Status: DISCONTINUED | OUTPATIENT
Start: 2020-11-28 | End: 2020-12-03 | Stop reason: HOSPADM

## 2020-11-28 RX ADMIN — AMLODIPINE BESYLATE 5 MG: 5 TABLET ORAL at 21:30

## 2020-11-28 RX ADMIN — CEFTRIAXONE SODIUM 1 G: 1 INJECTION, POWDER, FOR SOLUTION INTRAMUSCULAR; INTRAVENOUS at 14:13

## 2020-11-28 RX ADMIN — TRAZODONE HYDROCHLORIDE 25 MG: 50 TABLET ORAL at 21:30

## 2020-11-28 RX ADMIN — HYDRALAZINE HYDROCHLORIDE 10 MG: 10 TABLET ORAL at 16:57

## 2020-11-28 RX ADMIN — CITALOPRAM HYDROBROMIDE 10 MG: 10 TABLET ORAL at 09:06

## 2020-11-28 RX ADMIN — PREDNISONE 8 MG: 1 TABLET ORAL at 09:06

## 2020-11-28 RX ADMIN — AMLODIPINE BESYLATE 2.5 MG: 2.5 TABLET ORAL at 09:06

## 2020-11-28 RX ADMIN — POTASSIUM CHLORIDE 20 MEQ: 1500 TABLET, EXTENDED RELEASE ORAL at 17:24

## 2020-11-28 RX ADMIN — POTASSIUM CHLORIDE 20 MEQ: 1500 TABLET, EXTENDED RELEASE ORAL at 09:06

## 2020-11-28 NOTE — UTILIZATION REVIEW
"[facility] Utilization Review  Admission Status; Secondary Review Determination     Admission Date: 11/27/2020 11:17 AM      Under the authority of the Utilization Management Committee, the utilization review process indicated a secondary review on the above patient.  The review outcome is based on review of the medical records, discussions with staff, and applying clinical experience noted on the date of the review.        (X) Observation Status Appropriate - This patient does not meet hospital inpatient criteria and is placed in observation status. If this patient's primary payer is Medicare and was admitted as an inpatient, Condition Code 44 should be used and patient status changed to \"observation\".   () Observation Status concurrent Review           RATIONALE FOR DETERMINATION   Elizabeth Joy is a 80 year old female with past medical history of , who presented to the emergency room with complaints of fall.  She is registered to observation with suspected urinary tract infection without leukocytosis, fever.  She is empirically started on IV Rocephin, supportive cares, PT/OT while awaiting urine culture results.Based on severity of illness and intensity of service, patient does not meet criteria for inpatient admission.  No fever, leukocytosis or systemic signs of infection or complicating factors.  Currently awaiting culture results and disposition planning.  Observation cares are appropriate for care as noted above.        The information on this document is developed by the utilization review team in order for the business office to ensure compliance.  This only denotes the appropriateness of proper admission status and does not reflect the quality of care rendered.              Sincerely,       Marya Medel MD, MS  Physician Advisor  Utilization Review-[place]    Phone: [phone]   "

## 2020-11-28 NOTE — CONSULTS
Care Management Initial Consult    General Information  Assessment completed with: Elie, Saul  Type of CM/SW Visit: Initial Assessment  Primary Care Provider verified and updated as needed:     Readmission within the last 30 days:        Reason for Consult: discharge planning;facility placement  Advance Care Planning:     not addressed       Communication Assessment  Patient's communication style: spoken language (English or Bilingual)    Hearing Difficulty or Deaf: no   Wear Glasses or Blind: no    Cognitive  Cognitive/Neuro/Behavioral: .WDL except  Level of Consciousness: alert  Arousal Level: opens eyes spontaneously  Orientation: disoriented to;time  Mood/Behavior: cooperative;calm  Best Language: 0 - No aphasia  Speech: clear    Living Environment:   People in home: alone     Current living Arrangements: apartment      Able to return to prior arrangements: no       Family/Social Support:  Care provided by: other (see comments)  Provides care for: no one  Marital Status:   Children;PCA          Description of Support System: Involved;Supportive    Support Assessment: Adequate family and caregiver support    Current Resources:   Skilled Home Care Services:    Community Resources:    Equipment currently used at home: grab bar, toilet;grab bar, tub/shower;shower chair  Supplies currently used at home:      Employment/Financial:  Employment Status: retired        Financial Concerns: No concerns identified [employer] for TCU coverage. Per Saul, applying for Elderly Waiver.          Lifestyle & Psychosocial Needs:  Lifestyle     Physical activity     Days per week: Not on file     Minutes per session: Not on file     Stress: Not at all     Social Needs     Financial resource strain: Not hard at all     Food insecurity     Worry: Never true     Inability: Never true     Transportation needs     Medical: No     Non-medical: No     Socioeconomic History     Marital status:      Spouse name: Not on file      Received request for insurance verification for IV antibiotic coverage. Request sent to [facility] for this information.     Per [facility], patient h  coverage for home IV antibiotics.  Insurance confirmed a preferred provider is as [facility] and the following coverage is ded $500, cov 80%, oopm $4000. No further information was made available to [facility].  Gavi Acevedo RN, MSN, Home Care Services Liaison, [phone].   Number of children: 3     Years of education: Not on file     Highest education level: Not on file   Relationships     Social connections     Talks on phone: More than three times a week     Gets together: Never     Attends Scientologist service: Never     Active member of club or organization: No     Attends meetings of clubs or organizations: Never     Relationship status:      Intimate partner violence     Fear of current or ex partner: Not on file     Emotionally abused: Not on file     Physically abused: Not on file     Forced sexual activity: Not on file     Tobacco Use     Smoking status: Former Smoker     Packs/day: 1.00     Years: 42.00     Pack years: 42.00     Types: Cigarettes     Start date:      Quit date: 1997     Years since quittin.1     Smokeless tobacco: Never Used   Substance and Sexual Activity     Alcohol use: No     Alcohol/week: 0.0 standard drinks     Frequency: Never     Drug use: No     Sexual activity: Not Currently       Functional Status:  Prior to admission patient needed assistance:              Mental Health Status:  Mental Health Status: No Current Concerns       Chemical Dependency Status:  Chemical Dependency Status: No Current Concerns             Values/Beliefs:  Spiritual, Cultural Beliefs, Uatsdin Practices, Values that affect care: no               Additional Information:  PT/OT recommend TCU. Family in agreement. Family believes pt can no longer live alone. She has had multiple falls and has been found on the floor. She has been resistive to all forms of home care. Saul describes her memory as very poor. She forgets her spouse has moved to [place] or that she has not talked to Saul for 8 weeks. When reminded, Saul reports she is very good at covering up her memory loss. Family is planning to move her to [facility] upon discharge from TCU. Referrals sent for TCU.    PRIMO Proctor, LGSW  [phone]  [facility]  [facility]

## 2020-11-28 NOTE — PLAN OF CARE
A&Ox4 but forgetful. VSS on RA ex HTN. Int abx for UTI. PIV SL. Denies pain. Up SBA, voiding in bathroom. Regular diet, good appetite. Discharge is pending improvement-possibly to TCU.

## 2020-11-28 NOTE — PROGRESS NOTES
"MD Notification    Notified Person: PA    Notified Person Name: Dee    Notification Date/Time: 11/28 1613    Notification Interaction: page    Purpose of Notification: FYI all day long pt has been unwilling to get OOB despite many attempts at getting pt up, states \"I just want to sleep, leave me alone.\" I saw you might order psych consult?    Orders Received: have OT see pt - unable to reach OT after 4pm    Comments:  "

## 2020-11-28 NOTE — PLAN OF CARE
Per PT, pt can defer OT at this time. Per PT pt is high falls risk and lives alone, anticipate short hospital stay due to OBS status, will defer to PT for discharge rec of TCU at this time. Will complete OT order.

## 2020-11-28 NOTE — PLAN OF CARE
AO x4 but forgetful.  On V rocephin for UTI.  IV fluids infusing.  Denied pain.  Good appetite.  Void in bathroom.  Skin intact.  Ambulated with IV pole and Standby assistance.  Nursing will continue to monitor.

## 2020-11-28 NOTE — PROGRESS NOTES
[facility]  Hospitalist Progress Note  Date of Service (when I saw the patient): 11/28/2020    Summary:  Elizabeth Joy is a 80 year old year old female who has a PMH significant for HTN with History of postural hypotension, polymyalgia rheumatica on chronic low dose steroids, depression, history of mild underlying dementia, CKD stage III, . Pt was admitted to [facility] observation unit on 11/27/2020 after being brought to the emergency department after a fall and inability to ambulate with a urinary tract infection.  The following problems were addressed during her hospitalization:    Assessment & Plan:  Urinary tract infection.  The patient only meets 1 criteria for systemic inflammatory response syndrome.  However, she does likely have perhaps some mild infectious encephalopathy.  She received a dose of Rocephin in the emergency room. UC preliminary for Klebsiella Pneumoniae. Had a Klebsiella UTI in 6/2020 that was pan sensitive other than ampicillin resistance  -Continue IV Rocephin  -Follow culture and sensitivities  -Transition to PO antibiotics once sensitivities are returned  -OT consulted, can perform cognitive assessment    Fall, likely mechanical   The historical details of this are not clear, but it seems like the patient was either trying to get out of bed or was trying to change clothes and fell.  Again, this is likely related to baseline mild mobility restrictions complicated by an infectious encephalopathy picture.  Of note, pt was hospitalized in September of this year with what was thought related to postural hypotension.  She was discharged to transitional care and reportedly since then has been at home for the last 3 weeks where she has been having daily assistance   -PT/OT consulted   -RNCC/Social work consulted for discharge planning    History of polymyalgia rheumatica  Suspect this is the reason for her slight leukocytosis upon admission, which has  resolved  -Continue her PTA low-dose prednisone 8mg daily     Depression  Memory loss  Suspect this may be contributing more to the patient's symptoms than she lets on.  It sounds like she has had some disturbing family events happen in the last couple of months and she may be having a hard time dealing with it.    -Continue prior to admission Celexa  -Consider Psychiatry consult if she remains uncooperative or refuses essential cares    HTN    Per son, likely hasn't been taking medications appropriately at home since discharge from TCU. Will restart home medications and titrate.   -Continue PTA Amlodipine and PTA PRN hydralazine   -PRN IV Labetalol for SBP >180mmHg  -Continue to monitor  -Consider increasing Amlodipine if she remains Hypertensive      DVT Prophylaxis: Pneumatic Compression Devices  Code Status: No CPR- Do NOT Intubate  Disposition: Expected discharge in 1-2 days once urine culture sensitivities have returned and safe disposition plan is in place.    The patient's care was discussed with the Attending Physician, Dr. Lopez, Bedside Nurse, Care Coordinator/, patient's son, and Patient.    Ramonita TILLMAN-CLARISSA      Interval History   Patient found resting in bed. Patient doesn't want to be disturbed and is tired. Doesn't think she's having any weakness and doesn't think she needs TCU. Seems amenable to working with PT to test this. Poor historian, but does not acknowledge this. Denies fevers, chills, nausea, vomiting, chest pain, difficulty breathing, headache, confusion, or changes to bowel and bladder habits.     -Data reviewed today: I reviewed all new labs and imaging results over the last 24 hours. I personally reviewed no images or EKG's today.    Physical Exam   Temp: 97.3  F (36.3  C) Temp src: Oral BP: (!) 164/96 Pulse: 84   Resp: 16 SpO2: 96 % O2 Device: None (Room air)    There were no vitals filed for this visit.  Vital Signs with Ranges  Temp:  [97.1  F (36.2  C)-98.7  F  (37.1  C)] 97.3  F (36.3  C)  Pulse:  [74-89] 84  Resp:  [8-18] 16  BP: (102-170)/() 164/96  SpO2:  [94 %-99 %] 96 %  I/O last 3 completed shifts:  In: 200 [P.O.:200]  Out: -     Constitutional: alert to voice, no acute distress  Eyes: No scleral icterus or injection  ENT: Normocephalic, atraumatic, mucous membranes moist  Respiratory: No secondary muscle use or labored breathing. Lungs clear to auscultation bilaterally without wheezes or rhonchi   Cardiovascular: RRR without murmur  GI: Abdomen soft, non-tender to palpation, non-distended.  Bowel sounds active  MSK: able to move extremities on command without new or unilateral weakness,   Skin/Integumen: warm, dry, in tact without rash or hives  Lymph: No calf tenderness, no unilateral LE edema  Psych: irritable, oriented to self, not to situation or time.     Medications       amLODIPine  2.5 mg Oral BID     cefTRIAXone  1 g Intravenous Q24H     citalopram  10 mg Oral Daily     polyethylene glycol  17 g Oral Daily     potassium chloride ER  20 mEq Oral BID w/meals     predniSONE  8 mg Oral Daily     senna-docusate  1 tablet Oral BID    Or     senna-docusate  2 tablet Oral BID     sodium chloride (PF)  3 mL Intracatheter Q8H     traZODone  25 mg Oral At Bedtime       Data   Recent Labs   Lab 11/28/20  0738 11/27/20  1140   WBC 10.8 12.8*   HGB 11.7 13.3   MCV 93 92    340    139   POTASSIUM 3.6 3.4   CHLORIDE 112* 104   CO2 24 27   BUN 26 20   CR 0.98 0.92   ANIONGAP 7 8   MICHELLE 8.6 9.4   GLC 82 98   ALBUMIN  --  4.0   PROTTOTAL  --  7.5   BILITOTAL  --  0.7   ALKPHOS  --  104   ALT  --  23   AST  --  17   TROPI  --  <0.015       No results found for this or any previous visit (from the past 24 hour(s)).

## 2020-11-28 NOTE — PROGRESS NOTES
11/28/20 1330   Quick Adds   Type of Visit Initial PT Evaluation   Living Environment   People in home alone   Current Living Arrangements apartment   Home Accessibility no concerns   Transportation Anticipated car, drives self   Living Environment Comments Patient states she used to live with her  but now she lives alone.    Self-Care   Usual Activity Tolerance moderate   Current Activity Tolerance fair   Regular Exercise No   Equipment Currently Used at Home grab bar, toilet;grab bar, tub/shower;shower chair   Activity/Exercise/Self-Care Comment Patient reports she is independent with all mobility and ADLs at baseline. She inconsiststently reports that she has intermittent assist in the house but unable to get further details due to increased agitation when trying to obtain information.    Disability/Function   Hearing Difficulty or Deaf no   Wear Glasses or Blind no   Concentrating, Remembering or Making Decisions Difficulty yes   Difficulty Communicating no   Difficulty Eating/Swallowing no   Walking or Climbing Stairs Difficulty no   Dressing/Bathing Difficulty yes   Dressing/Bathing bathing difficulty, requires equipment   Dressing/Bathing Management shower chair and grab bars   Fall history within last six months yes   Number of times patient has fallen within last six months 1   Change in Functional Status Since Onset of Current Illness/Injury yes   General Information   Onset of Illness/Injury or Date of Surgery 11/27/20   Referring Physician Alexis Cash MD   Patient/Family Therapy Goals Statement (PT) to go home as soon as possible.    Pertinent History of Current Problem (include personal factors and/or comorbidities that impact the POC) Patient is 79 YO F admitted after she was found on the floor at home, diagnosed with UTI. Patient denies any injuries or pain during eval. PMH: dementia, depression, HTN, polymyalgia rheumatica   Existing Precautions/Restrictions fall   General  Observations Patient in supine upon arrival    Cognition   Orientation Status (Cognition) refused to attempt  (patient irritated with orientation questions)   Affect/Mental Status (Cognition) agitated   Follows Commands (Cognition) follows one-step commands;50-74% accuracy   Behavioral Issues uncooperative   Safety Deficit (Cognition) at risk behavior observed;awareness of need for assistance;judgment;problem-solving   Memory Deficit (Cognition) recall, biographical information;recall, recent events   Cognitive Status Comments Patient intermittently agitated during session. Difficult to re-diret to task.    Pain Assessment   Patient Currently in Pain No   Posture    Posture Scoliosis;Forward head position;Protracted shoulders   Range of Motion (ROM)   ROM Quick Adds ROM WFL   ROM Comment Patient able to don/doff socks in supine   Strength   Manual Muscle Testing Quick Adds Deficits observed during functional mobility   Strength Comments Multiple attempts needed to reach edge of bed and to initiate transfers   Bed Mobility   Bed Mobility scooting/bridging;supine-sit;sit-supine   Scooting/Bridging [place] (Bed Mobility) unable to perform   Supine-Sit [place] (Bed Mobility) supervision   Sit-Supine [place] (Bed Mobility) supervision   Comment (Bed Mobility) Supine <> sit from head of bed elevated, incresed time needed to complete, difficulty noted with reaching upright sitting on bed   Transfers   Transfers sit-stand transfer;toilet transfer   Sit-Stand Transfer   Sit-Stand [place] (Transfers) contact guard;verbal cues   Sit/Stand Transfer Comments Sit <>stand from bed with no assistive device but CGA for safety, patient needed to attempt multiple times before reaching upright standing   Toilet Transfer   Type (Toilet Transfer) sit-stand;stand-sit   [place] Level (Toilet Transfer) contact guard;verbal cues   Assistive Device (Toilet Transfer) grab bars/safety frame   Gait/Stairs (Locomotion)    [place] Level (Gait) minimum assist (75% patient effort)   Distance in Feet (Required for LE Total Joints) 5' with no device during eval; 15' + 20' with FWW during treatment   Pattern (Gait) step-through   Deviations/Abnormal Patterns (Gait) other (see comments)   Comment (Gait/Stairs) Patient ambulates with right and forward flexed posture, loss of balance when turning requiring min assist, appears to have leg length discrepancy in standing or just appears that way due to postural impairments   Balance   Balance other (describe)   Sitting Balance: Static good balance  (Able to perform hygiene on toilet without loss of balance)   Standing Balance: Static fair balance   Standing Balance: Dynamic fair balance   Sensory Examination   Sensory Perception patient reports no sensory changes   Clinical Impression   Criteria for Skilled Therapeutic Intervention yes, treatment indicated   PT Diagnosis (PT) impaired mobility   Influenced by the following impairments impaired posture, generalized weakness, decreased activity tolerance, impaired balance   Functional limitations due to impairments increased difficulty with bed mobility, transfers and gait   Clinical Presentation Stable/Uncomplicated   Clinical Presentation Rationale OBS status with stable medical presentation during eval   Clinical Decision Making (Complexity) low complexity   Therapy Frequency (PT) 5x/week   Predicted Duration of Therapy Intervention (days/wks) 1 week   Planned Therapy Interventions (PT) balance training;bed mobility training;gait training;home exercise program;patient/family education;postural re-education;strengthening;transfer training   Risk & Benefits of therapy have been explained evaluation/treatment results reviewed;care plan/treatment goals reviewed;risks/benefits reviewed;current/potential barriers reviewed;participants voiced agreement with care plan;participants included;patient   Clinical Impression Comments Patient  intermittently agitated with questioning, unable to obtain thorough history   PT Discharge Planning    PT Discharge Recommendation (DC Rec) Transitional Care Facility   PT Rationale for DC Rec Lives alone, falls risk, below patient's reported baseline level of independece   Therapy Certification   Start of care date 11/28/20   Certification date from 11/28/20   Certification date to 12/04/20   Medical Diagnosis Generalized muscle weakness   Total Evaluation Time   Total Evaluation Time (Minutes) 16

## 2020-11-28 NOTE — PLAN OF CARE
"8622-1095: Pt alert, disoriented to date, forgetful, VSS on RA. Denies pain. Refusing to get OOB, shouting \"just let me sleep!\" and \"leave me alone\" on multiple attempts at getting pt up. Regular diet. IV SL. IV atbx. Plan to see therapies this afternoon. SW to see. Continue to monitor.     [id number]: Much more cooperative this evening. Got up to use BR. Up A1 GB and walker. BP elevated, got Hydralazine x 1 with improvement.   "

## 2020-11-28 NOTE — PROGRESS NOTES
[facility]      OUTPATIENT PHYSICAL THERAPY EVALUATION  PLAN OF TREATMENT FOR OUTPATIENT REHABILITATION  (COMPLETE FOR INITIAL CLAIMS ONLY)  Patient's Last Name, First Name, M.I.  YOB: 1940  Elizabeth Joy                        Provider's Name  [facility] Medical Record No.  [id number]                               Onset Date:  11/27/20   Start of Care Date:  11/28/20      Type:     _X_PT   ___OT   ___SLP Medical Diagnosis:  Generalized muscle weakness                        PT Diagnosis:  impaired mobility   Visits from SOC:  1   _________________________________________________________________________________  Plan of Treatment/Functional Goals    Planned Interventions: balance training, bed mobility training, gait training, home exercise program, patient/family education, postural re-education, strengthening, transfer training     Goals: See Physical Therapy Goals on Care Plan in [employer] electronic health record.    Therapy Frequency: 5x/week  Predicted Duration of Therapy Intervention: 1 week  _________________________________________________________________________________    I CERTIFY THE NEED FOR THESE SERVICES FURNISHED UNDER        THIS PLAN OF TREATMENT AND WHILE UNDER MY CARE     (Physician co-signature of this document indicates review and certification of the therapy plan).                Certification date from: 11/28/20, Certification date to: 12/04/20    Referring Physician: Alexis Cash MD            Initial Assessment        See Physical Therapy evaluation dated 11/28/20 in Epic electronic health record.

## 2020-11-29 ENCOUNTER — APPOINTMENT (OUTPATIENT)
Dept: OCCUPATIONAL THERAPY | Facility: CLINIC | Age: 80
DRG: 690 | End: 2020-11-29
Payer: COMMERCIAL

## 2020-11-29 LAB
ANION GAP SERPL CALCULATED.3IONS-SCNC: 9 MMOL/L (ref 3–14)
BUN SERPL-MCNC: 20 MG/DL (ref 7–30)
CALCIUM SERPL-MCNC: 9 MG/DL (ref 8.5–10.1)
CHLORIDE SERPL-SCNC: 111 MMOL/L (ref 94–109)
CO2 SERPL-SCNC: 23 MMOL/L (ref 20–32)
CREAT SERPL-MCNC: 0.89 MG/DL (ref 0.52–1.04)
ERYTHROCYTE [DISTWIDTH] IN BLOOD BY AUTOMATED COUNT: 12.9 % (ref 10–15)
GFR SERPL CREATININE-BSD FRML MDRD: 61 ML/MIN/{1.73_M2}
GLUCOSE SERPL-MCNC: 143 MG/DL (ref 70–99)
HCT VFR BLD AUTO: 39.2 % (ref 35–47)
HGB BLD-MCNC: 12.7 G/DL (ref 11.7–15.7)
MCH RBC QN AUTO: 30.3 PG (ref 26.5–33)
MCHC RBC AUTO-ENTMCNC: 32.4 G/DL (ref 31.5–36.5)
MCV RBC AUTO: 94 FL (ref 78–100)
PLATELET # BLD AUTO: 334 10E9/L (ref 150–450)
POTASSIUM SERPL-SCNC: 3.6 MMOL/L (ref 3.4–5.3)
RBC # BLD AUTO: 4.19 10E12/L (ref 3.8–5.2)
SODIUM SERPL-SCNC: 143 MMOL/L (ref 133–144)
WBC # BLD AUTO: 10 10E9/L (ref 4–11)

## 2020-11-29 PROCEDURE — 250N000013 HC RX MED GY IP 250 OP 250 PS 637: Performed by: INTERNAL MEDICINE

## 2020-11-29 PROCEDURE — 97535 SELF CARE MNGMENT TRAINING: CPT | Mod: GO

## 2020-11-29 PROCEDURE — 99207 PR MOONLIGHTING INDICATOR: CPT | Performed by: PHYSICIAN ASSISTANT

## 2020-11-29 PROCEDURE — 97167 OT EVAL HIGH COMPLEX 60 MIN: CPT | Mod: GO

## 2020-11-29 PROCEDURE — 36415 COLL VENOUS BLD VENIPUNCTURE: CPT | Performed by: PHYSICIAN ASSISTANT

## 2020-11-29 PROCEDURE — 250N000012 HC RX MED GY IP 250 OP 636 PS 637

## 2020-11-29 PROCEDURE — 85027 COMPLETE CBC AUTOMATED: CPT | Performed by: PHYSICIAN ASSISTANT

## 2020-11-29 PROCEDURE — 250N000013 HC RX MED GY IP 250 OP 250 PS 637: Performed by: PHYSICIAN ASSISTANT

## 2020-11-29 PROCEDURE — 80048 BASIC METABOLIC PNL TOTAL CA: CPT | Performed by: PHYSICIAN ASSISTANT

## 2020-11-29 PROCEDURE — 120N000001 HC R&B MED SURG/OB

## 2020-11-29 PROCEDURE — 250N000011 HC RX IP 250 OP 636: Performed by: INTERNAL MEDICINE

## 2020-11-29 PROCEDURE — 99232 SBSQ HOSP IP/OBS MODERATE 35: CPT | Performed by: PHYSICIAN ASSISTANT

## 2020-11-29 PROCEDURE — G0378 HOSPITAL OBSERVATION PER HR: HCPCS

## 2020-11-29 RX ORDER — HYDRALAZINE HYDROCHLORIDE 10 MG/1
10 TABLET, FILM COATED ORAL EVERY 8 HOURS SCHEDULED
Status: DISCONTINUED | OUTPATIENT
Start: 2020-11-29 | End: 2020-12-03 | Stop reason: HOSPADM

## 2020-11-29 RX ADMIN — CEFTRIAXONE SODIUM 1 G: 1 INJECTION, POWDER, FOR SOLUTION INTRAMUSCULAR; INTRAVENOUS at 14:17

## 2020-11-29 RX ADMIN — AMLODIPINE BESYLATE 5 MG: 5 TABLET ORAL at 09:38

## 2020-11-29 RX ADMIN — CITALOPRAM HYDROBROMIDE 10 MG: 10 TABLET ORAL at 09:37

## 2020-11-29 RX ADMIN — HYDRALAZINE HYDROCHLORIDE 10 MG: 10 TABLET ORAL at 14:16

## 2020-11-29 RX ADMIN — POTASSIUM CHLORIDE 20 MEQ: 1500 TABLET, EXTENDED RELEASE ORAL at 17:02

## 2020-11-29 RX ADMIN — HYDRALAZINE HYDROCHLORIDE 10 MG: 10 TABLET ORAL at 22:56

## 2020-11-29 RX ADMIN — POTASSIUM CHLORIDE 20 MEQ: 1500 TABLET, EXTENDED RELEASE ORAL at 09:38

## 2020-11-29 RX ADMIN — AMLODIPINE BESYLATE 5 MG: 5 TABLET ORAL at 20:32

## 2020-11-29 RX ADMIN — TRAZODONE HYDROCHLORIDE 25 MG: 50 TABLET ORAL at 22:57

## 2020-11-29 RX ADMIN — PREDNISONE 8 MG: 1 TABLET ORAL at 09:37

## 2020-11-29 NOTE — PLAN OF CARE
A&Ox4. Forgetful. VSS on RA. Up with SBA with walker. Regular diet. Denies pain. Voiding bathroom. IV-SL. OT consulted to see for cognitive eval. Discharge pending. PT recommending TCU. Continue to monitor.         Observation Goals: PRIOR TO DISCHARGE     -vital signs normal or at patient baseline: MET    -adequate pain control on oral analgesics: MET    -safe disposition plan has been identified: NOT MET

## 2020-11-29 NOTE — PROGRESS NOTES
[facility]  Hospitalist Progress Note  Date of Service (when I saw the patient): 11/29/2020    Summary:  Elizabeth Joy is a 80 year old year old female who has a PMH significant for HTN with History of postural hypotension, polymyalgia rheumatica on chronic low dose steroids, depression, history of mild underlying dementia, CKD stage III, . Pt was admitted to [facility] observation unit on 11/27/2020 after being brought to the emergency department after a fall and inability to ambulate with a urinary tract infection.  The following problems were addressed during her hospitalization:     Assessment & Plan:  Urinary tract infection.  The patient only meets 1 criteria for systemic inflammatory response syndrome.  However, she does likely have perhaps some mild infectious encephalopathy.  She received a dose of Rocephin in the emergency room. UC preliminary for Klebsiella Pneumoniae. Had a Klebsiella UTI in 6/2020 that was pan sensitive other than ampicillin resistance  -Continue IV Rocephin daily  -Follow culture and sensitivities  -Transition to PO antibiotics once sensitivities are returned  -OT consulted, can perform cognitive assessment     Fall, likely mechanical   The historical details of this are not clear, but it seems like the patient was either trying to get out of bed or was trying to change clothes and fell.  Again, this is likely related to baseline mild mobility restrictions complicated by an infectious encephalopathy picture.  Of note, pt was hospitalized in September of this year with what was thought related to postural hypotension.  She was discharged to transitional care and reportedly since then has been at home for the last 3 weeks where she has been having daily assistance   -PT/OT consulted. PT recommending TCU. OT initially deferred consult, but re-consulted for cognitive assessment  -RNCC/Social work consulted for discharge planning     History of  polymyalgia rheumatica  Suspect this is the reason for her slight leukocytosis upon admission, which has resolved  -Continue her PTA low-dose prednisone 8mg daily      Depression  Memory loss  Suspect this may be contributing more to the patient's symptoms than she lets on.  It sounds like she has had some disturbing family events happen in the last couple of months and she may be having a hard time dealing with it.    -Continue prior to admission Celexa  -OT to see for cognitive assessment  -Consider Psychiatry consult if she refuses TCU or refuses essential cares while in hospital. More cooperative and pleasant today     HTN    Per son, likely hasn't been taking medications appropriately at home since previous discharge from TCU. Restaredt home medications and titrating.   -Increased PTA Amlodipine from 2.5mg BID to 5mg BID late 11/29 and changed PTA PRN hydralazine 10mg [id number] to scheduled 10mg TID on 11/29  -PRN IV Labetalol for SBP >180mmHg  -Continue to monitor      DVT Prophylaxis: Pneumatic Compression Devices  Code Status: No CPR- Do NOT Intubate  Disposition: Expected discharge in 1-2 days once urine culture sensitivities have returned and safe disposition plan is in place.    The patient's care was discussed with the Attending Physician, Dr. Lopez, Bedside Nurse, Care Coordinator/, and Patient.    Ramonita Dee PA-C      Interval History   Talked with son Saul 11/28, he is fine just working with SW for placement without specific medical update unless there is a significant medical change (his  is a MD, so he has a good understanding of the process). Pt is still rather resistant to the idea of a TCU, is insistent she is fine and can go home. Reminded pt of recent falls at home and need for UTI and HTN treatment and she was slightly more agreeable to TCU placement. Denies fevers, chills, nausea, vomiting, chest pain, abdominal pain, weakness, difficulty breathing, headache, or  confusion. Denies changes to bowel and bladder habits.     -Data reviewed today: I reviewed all new labs and imaging results over the last 24 hours. I personally reviewed no images or EKG's today.    Physical Exam   Temp: 97.9  F (36.6  C) Temp src: Oral BP: (!) 164/79 Pulse: 71   Resp: 16 SpO2: 97 % O2 Device: None (Room air)    There were no vitals filed for this visit.  Vital Signs with Ranges  Temp:  [96.4  F (35.8  C)-97.9  F (36.6  C)] 97.9  F (36.6  C)  Pulse:  [68-91] 71  Resp:  [14-17] 16  BP: (111-175)/(61-91) 164/79  SpO2:  [94 %-98 %] 97 %  I/O last 3 completed shifts:  In: 120 [P.O.:120]  Out: -     Constitutional: alert to voice, no acute distress  Eyes: No scleral icterus or injection  ENT: Normocephalic, atraumatic, mucous membranes moist  Respiratory: No secondary muscle use or labored breathing. Lungs clear to auscultation bilaterally without wheezes or rhonchi   Cardiovascular: RRR without murmur  GI: Abdomen soft, non-tender to palpation, non-distended.  Bowel sounds active  MSK: able to move extremities on command without new or unilateral weakness,   Skin/Integumen: warm, dry, in tact without rash or hives  Lymph: No calf tenderness, no unilateral LE edema  Psych: oriented to self, not to situation or time. More pleasant today, though still irritable at times and resistant to help      Medications       amLODIPine  5 mg Oral BID     cefTRIAXone  1 g Intravenous Q24H     citalopram  10 mg Oral Daily     hydrALAZINE  10 mg Oral Q8H [facility]     polyethylene glycol  17 g Oral Daily     potassium chloride ER  20 mEq Oral BID w/meals     predniSONE  8 mg Oral Daily     senna-docusate  1 tablet Oral BID    Or     senna-docusate  2 tablet Oral BID     sodium chloride (PF)  3 mL Intracatheter Q8H     traZODone  25 mg Oral At Bedtime       Data   Recent Labs   Lab 11/28/20  0738 11/27/20  1140   WBC 10.8 12.8*   HGB 11.7 13.3   MCV 93 92    340    139   POTASSIUM 3.6 3.4   CHLORIDE 112* 104    CO2 24 27   BUN 26 20   CR 0.98 0.92   ANIONGAP 7 8   MICHELLE 8.6 9.4   GLC 82 98   ALBUMIN  --  4.0   PROTTOTAL  --  7.5   BILITOTAL  --  0.7   ALKPHOS  --  104   ALT  --  23   AST  --  17   TROPI  --  <0.015       No results found for this or any previous visit (from the past 24 hour(s)).

## 2020-11-29 NOTE — PROGRESS NOTES
Care Management Follow Up    Length of Stay (days): 1    Expected Discharge Date: 11/30/20     Concerns to be Addressed: discharge planning     Patient plan of care discussed at interdisciplinary rounds: Yes    Anticipated Discharge Disposition: Transitional Care     Anticipated Discharge Services:  TCU  Anticipated Discharge DME:  none    Patient/family educated on Medicare website which has current facility and service quality ratings: yes  Education Provided on the Discharge Plan:  yes  Patient/Family in Agreement with the Plan: yes    Referrals Placed by CM/SW: Post Acute Facilities  Private pay costs discussed: Not applicable    Additional Information:  No TCU bed found today. Additional referrals sent. [facility] updated. Answered questions related to guardianship.     PRIMO Proctor, LGSW  [phone]  [facility]

## 2020-11-29 NOTE — PROGRESS NOTES
Observation Goals:     -vital signs normal or at patient baseline: MET    -adequate pain control on oral analgesics: MET    -safe disposition plan has been identified: NOT MET

## 2020-11-29 NOTE — PLAN OF CARE
A&Ox4. Forgetful with dementia. VSS on RA. Up with SBA with walker. Intermittent rocephin for UTI.  Regular diet, fair appetite. Denies pain. Voiding bathroom. IV infiltrated.  OT consulted to see for cognitive eval. Discharge pending but family would like TCU. Needs encouragement to ambulate.  Nursing will continue to monitor.

## 2020-11-29 NOTE — PROGRESS NOTES
[facility]      OUTPATIENT OCCUPATIONAL THERAPY  EVALUATION  PLAN OF TREATMENT FOR OUTPATIENT REHABILITATION  (COMPLETE FOR INITIAL CLAIMS ONLY)  Patient's Last Name, First Name, M.I.  YOB: 1940  BenitaElizabeth  RILEY                          Provider's Name  [facility] Medical Record No.  [id number]                               Onset Date:  11/27/20   Start of Care Date:  11/29/20     Type:     ___PT   _X_OT   ___SLP Medical Diagnosis:  fall and inability to ambulate with a urinary tract infection                        OT Diagnosis:  Decreased independence with ADL/IADL, functional mobility, and functional cognition    Visits from SOC:  1   _________________________________________________________________________________  Plan of Treatment/Functional Goals    Planned Interventions: ADL retraining, IADL retraining, cognition, strengthening, transfer training, progressive activity/exercise   Goals: See Occupational Therapy Goals on Care Plan in [employer] electronic health record.    Therapy Frequency:    Predicted Duration of Therapy Intervention:    _________________________________________________________________________________    I CERTIFY THE NEED FOR THESE SERVICES FURNISHED UNDER        THIS PLAN OF TREATMENT AND WHILE UNDER MY CARE     (Physician co-signature of this document indicates review and certification of the therapy plan).                Certification date from: 11/29/20, Certification date to: 11/29/20    Referring Physician: Ramonita Dee PA-C            Initial Assessment        See Occupational Therapy evaluation dated 11/29/20 in Epic electronic health record.

## 2020-11-29 NOTE — PROGRESS NOTES
Observation Goals: PRIOR TO DISCHARGE     -vital signs normal or at patient baseline: MET    -adequate pain control on oral analgesics: MET    -safe disposition plan has been identified: NOT MET

## 2020-11-30 ENCOUNTER — PATIENT OUTREACH (OUTPATIENT)
Dept: CARE COORDINATION | Facility: CLINIC | Age: 80
End: 2020-11-30

## 2020-11-30 ENCOUNTER — DOCUMENTATION ONLY (OUTPATIENT)
Dept: OTHER | Facility: CLINIC | Age: 80
End: 2020-11-30

## 2020-11-30 LAB
ANION GAP SERPL CALCULATED.3IONS-SCNC: 8 MMOL/L (ref 3–14)
BUN SERPL-MCNC: 26 MG/DL (ref 7–30)
CALCIUM SERPL-MCNC: 9 MG/DL (ref 8.5–10.1)
CHLORIDE SERPL-SCNC: 109 MMOL/L (ref 94–109)
CO2 SERPL-SCNC: 23 MMOL/L (ref 20–32)
CREAT SERPL-MCNC: 0.98 MG/DL (ref 0.52–1.04)
ERYTHROCYTE [DISTWIDTH] IN BLOOD BY AUTOMATED COUNT: 12.8 % (ref 10–15)
GFR SERPL CREATININE-BSD FRML MDRD: 54 ML/MIN/{1.73_M2}
GLUCOSE SERPL-MCNC: 136 MG/DL (ref 70–99)
HCT VFR BLD AUTO: 37.8 % (ref 35–47)
HGB BLD-MCNC: 12.5 G/DL (ref 11.7–15.7)
LACTATE BLD-SCNC: 1.3 MMOL/L (ref 0.7–2)
MCH RBC QN AUTO: 30.6 PG (ref 26.5–33)
MCHC RBC AUTO-ENTMCNC: 33.1 G/DL (ref 31.5–36.5)
MCV RBC AUTO: 93 FL (ref 78–100)
PLATELET # BLD AUTO: 323 10E9/L (ref 150–450)
POTASSIUM SERPL-SCNC: 4.1 MMOL/L (ref 3.4–5.3)
RBC # BLD AUTO: 4.08 10E12/L (ref 3.8–5.2)
SODIUM SERPL-SCNC: 140 MMOL/L (ref 133–144)
WBC # BLD AUTO: 10.9 10E9/L (ref 4–11)

## 2020-11-30 PROCEDURE — 250N000013 HC RX MED GY IP 250 OP 250 PS 637: Performed by: INTERNAL MEDICINE

## 2020-11-30 PROCEDURE — 36415 COLL VENOUS BLD VENIPUNCTURE: CPT | Performed by: INTERNAL MEDICINE

## 2020-11-30 PROCEDURE — 83605 ASSAY OF LACTIC ACID: CPT | Performed by: INTERNAL MEDICINE

## 2020-11-30 PROCEDURE — 120N000001 HC R&B MED SURG/OB

## 2020-11-30 PROCEDURE — 250N000013 HC RX MED GY IP 250 OP 250 PS 637: Performed by: PHYSICIAN ASSISTANT

## 2020-11-30 PROCEDURE — 80048 BASIC METABOLIC PNL TOTAL CA: CPT | Performed by: PHYSICIAN ASSISTANT

## 2020-11-30 PROCEDURE — 250N000012 HC RX MED GY IP 250 OP 636 PS 637

## 2020-11-30 PROCEDURE — 36415 COLL VENOUS BLD VENIPUNCTURE: CPT | Performed by: PHYSICIAN ASSISTANT

## 2020-11-30 PROCEDURE — G0378 HOSPITAL OBSERVATION PER HR: HCPCS

## 2020-11-30 PROCEDURE — 85027 COMPLETE CBC AUTOMATED: CPT | Performed by: PHYSICIAN ASSISTANT

## 2020-11-30 PROCEDURE — 99232 SBSQ HOSP IP/OBS MODERATE 35: CPT | Performed by: INTERNAL MEDICINE

## 2020-11-30 RX ORDER — AMLODIPINE BESYLATE 2.5 MG/1
5 TABLET ORAL 2 TIMES DAILY
Status: ON HOLD | DISCHARGE
Start: 2020-11-30 | End: 2021-06-07

## 2020-11-30 RX ORDER — HYDRALAZINE HYDROCHLORIDE 10 MG/1
10 TABLET, FILM COATED ORAL 3 TIMES DAILY
DISCHARGE
Start: 2020-11-30 | End: 2021-06-02

## 2020-11-30 RX ADMIN — POTASSIUM CHLORIDE 20 MEQ: 1500 TABLET, EXTENDED RELEASE ORAL at 08:17

## 2020-11-30 RX ADMIN — AMOXICILLIN AND CLAVULANATE POTASSIUM 1 TABLET: 875; 125 TABLET, FILM COATED ORAL at 20:52

## 2020-11-30 RX ADMIN — ACETAMINOPHEN 650 MG: 325 TABLET, FILM COATED ORAL at 23:46

## 2020-11-30 RX ADMIN — CITALOPRAM HYDROBROMIDE 10 MG: 10 TABLET ORAL at 08:16

## 2020-11-30 RX ADMIN — AMOXICILLIN AND CLAVULANATE POTASSIUM 1 TABLET: 875; 125 TABLET, FILM COATED ORAL at 11:34

## 2020-11-30 RX ADMIN — HYDRALAZINE HYDROCHLORIDE 10 MG: 10 TABLET ORAL at 14:40

## 2020-11-30 RX ADMIN — HYDRALAZINE HYDROCHLORIDE 10 MG: 10 TABLET ORAL at 07:45

## 2020-11-30 RX ADMIN — HYDRALAZINE HYDROCHLORIDE 10 MG: 10 TABLET ORAL at 20:53

## 2020-11-30 RX ADMIN — PREDNISONE 8 MG: 1 TABLET ORAL at 08:16

## 2020-11-30 RX ADMIN — POTASSIUM CHLORIDE 20 MEQ: 1500 TABLET, EXTENDED RELEASE ORAL at 18:07

## 2020-11-30 RX ADMIN — AMLODIPINE BESYLATE 5 MG: 5 TABLET ORAL at 08:17

## 2020-11-30 RX ADMIN — AMLODIPINE BESYLATE 5 MG: 5 TABLET ORAL at 20:52

## 2020-11-30 NOTE — PLAN OF CARE
[id number]: MIKE BOONE. A&Ox3, disoriented to situation, forgetful. Denies pain. Up SBA walker. Tolerating regular diet. Discharge pending UC sensitivities and safe disposition, TCU referrals made, 1-2 days.

## 2020-11-30 NOTE — PLAN OF CARE
Shift note: VSS. RA. A&Ox3, disoriented to situation, forgetful. Denies pain. Up SBA walker. Tolerating regular diet. Discharge pending, TCU referrals made.

## 2020-11-30 NOTE — PROGRESS NOTES
Care Management Follow Up    Length of Stay (days): 1    Expected Discharge Date: 11/28/20     Concerns to be Addressed: discharge planning     Patient plan of care discussed at interdisciplinary rounds: Yes    Anticipated Discharge Disposition: Transitional Care     Anticipated Discharge Services:    Anticipated Discharge DME:      Patient/family educated on Medicare website which has current facility and service quality ratings: yes  Education Provided on the Discharge Plan:    Patient/Family in Agreement with the Plan: yes    Referrals Placed by CM/SW: Post Acute Facilities  Private pay costs discussed: TBD    Additional Information:  Patient ready to discharge to TCU today. SW called and left messages to referrals sent;  [facility]: Waiting for a call back  [facility]: Waiting for a call back  [facility]: Not sure if they are accepting admissions this week, writer to call after noon when admissions will have more information    11:06: [facility] cannot accept patient   1:38: [facility] cannot accept patient  2:59: Spoke with Sasha at [facility] who stated they might be able to take one patient on Tuesday and they are reviewing patients referral.   3:39: New referrals sent to [facility], [facility] and [facility]

## 2020-11-30 NOTE — PROGRESS NOTES
Clinic Care Coordination Contact  Ambulatory Care Coordination to Inpatient Care Management   Hand-In Communication    Date:  November 30, 2020  Name: Elizabeth Joy is enrolled in Ambulatory Care Coordination program and I am the Lead Care Coordinator.  CC Contact Information: Epic InBasket + phone  [facility] Source: [facility] / Plan: ARE INDIVIDUAL FAMILY PLANS / Product Type: HMO /   Current services in place:     Please see the CC Snaphot and Care Management Flowsheets for specific  details of this Elizabeth Joy care plan.   Additional details/specific concerns r/t this admission:    Home Safety Pt lives alone. She has a caregiver that comes 2-3x/week. Pt's 3 sons live out of state. I have been speaking with Philip regarding pt's care. Philip is concerned about pt's ability to remain safe at home.    I will follow this admission in Epic. Please feel free to contact me with questions or for further collaboration in discharge planning.

## 2020-11-30 NOTE — PROGRESS NOTES
[facility]  Hospitalist Progress Note  Date of Service (when I saw the patient): 11/30/2020    Summary:  Elizabeth Joy is a 80 year old year old female who has a PMH significant for HTN with History of postural hypotension, polymyalgia rheumatica on chronic low dose steroids, depression, history of mild underlying dementia, CKD stage III, . Pt was admitted to [facility] observation unit on 11/27/2020 after being brought to the emergency department after a fall and inability to ambulate with a urinary tract infection.  The following problems were addressed during her hospitalization:     Assessment & Plan:  Urinary tract infection.  The patient only meets 1 criteria for systemic inflammatory response syndrome.  However, she does likely have perhaps some mild infectious encephalopathy.  She received a dose of Rocephin in the emergency room. UC preliminary for Klebsiella Pneumoniae. Had a Klebsiella UTI in 6/2020 that was pan sensitive other than ampicillin resistance  - urine culture has > 100,000 colonies/ml Klesiella pneumoniae, see sensitivities  - discontinue Ceftriaxone, begin Augmentin       Fall, likely mechanical   The historical details of this are not clear, but it seems like the patient was either trying to get out of bed or was trying to change clothes and fell.  Again, this is likely related to baseline mild mobility restrictions complicated by an infectious encephalopathy picture.  Of note, pt was hospitalized in September of this year with what was thought related to postural hypotension.  She was discharged to transitional care and reportedly since then has been at home for the last 3 weeks where she has been having daily assistance   -PT/OT consulted. PT recommending TCU. OT initially deferred consult, but re-consulted for cognitive assessment  -RNCC/Social work consulted for discharge planning     History of polymyalgia rheumatica  Suspect this is the reason for  "her slight leukocytosis upon admission, which has resolved  -Continue her PTA low-dose prednisone 8mg daily      Depression  Memory loss  Suspect this may be contributing more to the patient's symptoms than she lets on.  It sounds like she has had some disturbing family events happen in the last couple of months and she may be having a hard time dealing with it.    -Continue prior to admission Celexa  -OT to see for cognitive assessment       HTN    Per son, likely hasn't been taking medications appropriately at home since previous discharge from TCU. Restaredt home medications and titrating.   -Increased PTA Amlodipine from 2.5mg BID to 5mg BID late 11/29 and changed PTA PRN hydralazine 10mg [id number] to scheduled 10mg TID on 11/29  -PRN IV Labetalol for SBP >180mmHg  -Continue to monitor      DVT Prophylaxis: Pneumatic Compression Devices  Code Status: No CPR- Do NOT Intubate  Disposition: medically ready for discharge when appropriate facility is available        Interval History   \"This isn't the worst place to be.\"  Patient says she is happy with her hospital stay, she enjoys the food (\"my compliments to the \"), says nursing staff is excellent.  She denies pain, no respiratory or GI complaints.      -Data reviewed today: I reviewed all new labs and imaging results over the last 24 hours. I personally reviewed no images or EKG's today.    Physical Exam   Temp: 95.7  F (35.4  C) Temp src: Oral BP: (!) 142/82 Pulse: 75   Resp: 18 SpO2: 97 % O2 Device: None (Room air)    There were no vitals filed for this visit.  Vital Signs with Ranges  Temp:  [95.7  F (35.4  C)-97.9  F (36.6  C)] 95.7  F (35.4  C)  Pulse:  [71-85] 75  Resp:  [16-18] 18  BP: (108-196)/() 142/82  SpO2:  [96 %-99 %] 97 %  I/O last 3 completed shifts:  In: 360 [P.O.:360]  Out: -     Constitutional: alert to voice, no acute distress  Eyes: No scleral icterus   ENT: Normocephalic, atraumatic, mucous membranes moist  Respiratory: No accesory " muscle use or labored breathing. Lungs clear to auscultation bilaterally without wheezes or rhonchi   Cardiovascular: RRR without murmur  GI: Abdomen soft, non-tender to palpation, non-distended.  Bowel sounds active  MSK: moves both arms, both leges  Skin/Integumen: no rash on exposed skin  Lymph: No calf tenderness, no unilateral LE edema  Psych: oriented to self, place, remarks are appropriate      Medications       amLODIPine  5 mg Oral BID     cefTRIAXone  1 g Intravenous Q24H     citalopram  10 mg Oral Daily     hydrALAZINE  10 mg Oral Q8H TRINH     polyethylene glycol  17 g Oral Daily     potassium chloride ER  20 mEq Oral BID w/meals     predniSONE  8 mg Oral Daily     senna-docusate  1 tablet Oral BID    Or     senna-docusate  2 tablet Oral BID     sodium chloride (PF)  3 mL Intracatheter Q8H     traZODone  25 mg Oral At Bedtime       Data   Recent Labs   Lab 11/29/20  1231 11/28/20  0738 11/27/20  1140   WBC 10.0 10.8 12.8*   HGB 12.7 11.7 13.3   MCV 94 93 92    290 340    143 139   POTASSIUM 3.6 3.6 3.4   CHLORIDE 111* 112* 104   CO2 23 24 27   BUN 20 26 20   CR 0.89 0.98 0.92   ANIONGAP 9 7 8   MICHELLE 9.0 8.6 9.4   * 82 98   ALBUMIN  --   --  4.0   PROTTOTAL  --   --  7.5   BILITOTAL  --   --  0.7   ALKPHOS  --   --  104   ALT  --   --  23   AST  --   --  17   TROPI  --   --  <0.015       No results found for this or any previous visit (from the past 24 hour(s)).

## 2020-11-30 NOTE — PLAN OF CARE
VSS. RA. A&Ox3, disoriented to situation, forgetful. Denies pain. Up SBA walker. Tolerating regular diet. Discharge pending UC sensitivities and safe disposition, TCU referrals made, 1-2 days.

## 2020-12-01 ENCOUNTER — PATIENT OUTREACH (OUTPATIENT)
Dept: NURSING | Facility: CLINIC | Age: 80
End: 2020-12-01
Payer: COMMERCIAL

## 2020-12-01 PROCEDURE — 250N000013 HC RX MED GY IP 250 OP 250 PS 637: Performed by: INTERNAL MEDICINE

## 2020-12-01 PROCEDURE — 120N000001 HC R&B MED SURG/OB

## 2020-12-01 PROCEDURE — 99233 SBSQ HOSP IP/OBS HIGH 50: CPT | Performed by: INTERNAL MEDICINE

## 2020-12-01 PROCEDURE — 250N000013 HC RX MED GY IP 250 OP 250 PS 637: Performed by: PHYSICIAN ASSISTANT

## 2020-12-01 RX ORDER — QUETIAPINE FUMARATE 25 MG/1
25 TABLET, FILM COATED ORAL ONCE
Status: DISCONTINUED | OUTPATIENT
Start: 2020-12-01 | End: 2020-12-03 | Stop reason: HOSPADM

## 2020-12-01 RX ADMIN — AMOXICILLIN AND CLAVULANATE POTASSIUM 1 TABLET: 875; 125 TABLET, FILM COATED ORAL at 20:29

## 2020-12-01 RX ADMIN — HYDRALAZINE HYDROCHLORIDE 10 MG: 10 TABLET ORAL at 22:06

## 2020-12-01 RX ADMIN — AMLODIPINE BESYLATE 5 MG: 5 TABLET ORAL at 20:29

## 2020-12-01 RX ADMIN — TRAZODONE HYDROCHLORIDE 25 MG: 50 TABLET ORAL at 22:06

## 2020-12-01 ASSESSMENT — ACTIVITIES OF DAILY LIVING (ADL)
DEPENDENT_IADLS:: INDEPENDENT
ADLS_ACUITY_SCORE: 14
ADLS_ACUITY_SCORE: 14

## 2020-12-01 NOTE — PLAN OF CARE
VSS, except elevated blood pressure. Pt is confused and agitated at times that she has to remain in the hospital. Plan is for discharge to TCU when a place is found.

## 2020-12-01 NOTE — PLAN OF CARE
Oriented to self only. Lethargic throughout the day, easily agitated. VSS on RA. Denies pain. No IV access, MD aware. Incontneint of urine. Refused all morning medications. Turned and repositioned q2h as tolerated.SBA with walker.

## 2020-12-01 NOTE — PLAN OF CARE
Oriented only to self overnight, very confused w/ frequent agitation, somewhat redirectable. Very impulsive. C/o pain to R knee, gave tylenol x1 w/ ice packs. Up frequently out of bed SBA to BR, voiding adequately. Discharge pending to TCU, referrals sent.

## 2020-12-01 NOTE — PROGRESS NOTES
Care Management Follow Up    Length of Stay (days): 4    Expected Discharge Date: 12/03/20(tcu when sitter free )     Concerns to be Addressed: discharge planning     Patient plan of care discussed at interdisciplinary rounds: Yes    Anticipated Discharge Disposition: Transitional Care     Anticipated Discharge Services:    Anticipated Discharge DME:      Patient/family educated on Medicare website which has current facility and service quality ratings: yes  Education Provided on the Discharge Plan:    Patient/Family in Agreement with the Plan: yes    Referrals Placed by CM/SW: Post Acute Facilities  Private pay costs discussed: Not applicable    Additional Information:    SW placed calls to TCU facilities to see if they are able to accept patient when medically ready for discharge.     - [facility]: No beds available until the weekend  - [facility]: Not taking admissions until Thursday possibly  -[facility]: Left VM    SW received call from patient's son Saul who was inquiring about acceptance from facilities.  SW discussed that we had not confirmed a facility yet, but discussed additional choices.  Son is okay with wherever we can find a bed for patient as he lives in [place] and isn't familiar with facilities in the area.  His biggest concern is that patient does not discharge home as they do not feel patient is able to care for herself safely and independently any longer and are working at a long term plan for patient.  Discussed with son that we would continue to pursue TCU facilities for patient and update when discharge plan in place.     MARTHA Robbins

## 2020-12-01 NOTE — PROGRESS NOTES
Clinic Care Coordination Contact    Follow Up Progress Note      Assessment: CC OLAMIDE spoke with pt's son regarding questions he left on CC SW voicemail. Philip inquired about TCU and memory care options. Discussed process for this and encouraged him to stay in touch with SW at TCU and then the Memory Care unit to goes to. Discussed concerns for pt's recurrent UTI's, depression, and medication compliance. CC OLAMIDE shared that improvement in all areas should improve with consistent support from medical staff in taking her medication daily.    Goals addressed this encounter:   Goals Addressed                 This Visit's Progress      1. Psychosocial   20%     Goal Statement: Over the next 6 months, Elizabeth's son, Philip, would like to ensure Elizabeth's safety within her home by establishing in-home care.  Date Goal Set: 10/21/2020  Barriers: Elizabeth's reluctance for care  Strengths: Strong support system  Date to Achieve By: 4/21/2020  Patient expressed understanding of goal: Philip verbally stated understanding of goal  Action steps to achieve this goal:  1. Philip will review PCA resources sent via email by Care Coordination   2. Philip will outreach to appropriate agencies  3. Philip will outreach to Care Coordination  for further questions or concerns         Outreach Frequency: monthly    Plan: CC OLAMIDE will outreach to TCU upon placement to inform of involvement and continue to support pt's family in ongoing decision making.    PRIMO Caputo, LGSW  Clinic Care Coordinator  [facility]  [phone]  [email or web address]

## 2020-12-01 NOTE — UTILIZATION REVIEW
Admission Status; Secondary Review Determination    Under the authority of the Utilization Management Committee, the utilization review process indicated a secondary review on the above patient. The review outcome is based on review of the medical records, discussions with staff, and applying clinical experience noted on the date of the review.    (x) Inpatient Status Appropriate - This patient's medical care is consistent with medical management for inpatient care and reasonable inpatient medical practice.    RATIONALE FOR DETERMINATION:80-year-old female with mild underlying dementia hospitalized 2 and half months ago for postural hypotension now found 9 ambulatory, history of PMR, hypertension and significant acute UTI.  Due to patient's new nonambulatory state with associated acute UTI patient admitted to the hospital.  Patient treated with several days of IV antibiotics but has developed significant acute encephalopathy with worsening mental status compared to the day of admission documented the evening of 11/30/2020 and confirmed by the admitting physician who evaluated the patient again today.  Patient unsafe for discharge with significant increased confusion, intermittent agitation requiring overnight sitter.  Inpatient status appropriate due to the duration and worsening of patient's admitting deficits.    At the time of admission with the information available to the attending physician more than 2 nights Hospital complex care was anticipated, based on patient risk of adverse outcome if treated as outpatient and complex care required. Inpatient admission is appropriate based on the Medicare guidelines.    This document was produced using voice recognition software    The information on this document is developed by the utilization review team in order for the business office to ensure compliance. This only denotes the appropriateness of proper admission status and does not reflect the quality of care  rendered.    The definitions of Inpatient Status and Observation Status used in making the determination above are those provided in the CMS Coverage Manual, Chapter 1 and Chapter 6, section 70.4.    Sincerely,    Aaron Arriaga MD  Utilization Review  Physician Advisor  [facility].

## 2020-12-01 NOTE — PROGRESS NOTES
[facility]  Hospitalist Progress Note  Date of Service (when I saw the patient): 12/01/2020    Summary:  Elizabeth Joy is a 80 year old year old female who has a PMH significant for HTN with History of postural hypotension, polymyalgia rheumatica on chronic low dose steroids, depression, history of mild underlying dementia, CKD stage III, . Pt was admitted to [facility] observation unit on 11/27/2020 after being brought to the emergency department after a fall and inability to ambulate with a urinary tract infection.  The following problems were addressed during her hospitalization:     Assessment & Plan:  Urinary tract infection.  The patient only meets 1 criteria for systemic inflammatory response syndrome.  However, she does likely have perhaps some mild infectious encephalopathy.  She received a dose of Rocephin in the emergency room. UC preliminary for Klebsiella Pneumoniae. Had a Klebsiella UTI in 6/2020 that was pan sensitive other than ampicillin resistance  - urine culture has > 100,000 colonies/ml Klesiella pneumoniae, see sensitivities  - discontinue Ceftriaxone, begin Augmentin   - Mental status worsened today. Perhaps worsening mood combined with ongoing infectious encephalopathy.  See discussion below.       Fall, likely mechanical   The historical details of this are not clear, but it seems like the patient was either trying to get out of bed or was trying to change clothes and fell.  Again, this is likely related to baseline mild mobility restrictions complicated by an infectious encephalopathy picture.  Of note, pt was hospitalized in September of this year with what was thought related to postural hypotension.  She was discharged to transitional care and reportedly since then has been at home for the last 3 weeks where she has been having daily assistance   -PT/OT consulted. PT recommending TCU. OT initially deferred consult, but re-consulted for cognitive  assessment  -RNCC/Social work consulted for discharge planning     History of polymyalgia rheumatica  Suspect this is the reason for her slight leukocytosis upon admission, which has resolved  -Continue her PTA low-dose prednisone 8mg daily      Depression  Memory loss  Suspect this may be contributing more to the patient's symptoms than she lets on.  It sounds like she has had some disturbing family events happen in the last couple of months and she may be having a hard time dealing with it.    -Continue prior to admission Celexa  -OT has seen, SLUMS score 20/30  -Seems to be acutely worse today without any other metabolic or vital sign changes. May consider a dose of seroquel.  May need to get psychiatry involved but she'll need to be more cooperative.       HTN    Per son, likely hasn't been taking medications appropriately at home since previous discharge from TCU. Restaredt home medications and titrating.   -Increased PTA Amlodipine from 2.5mg BID to 5mg BID late 11/29 and changed PTA PRN hydralazine 10mg [id number] to scheduled 10mg TID on 11/29  -PRN IV Labetalol for SBP >180mmHg  -Continue to monitor      DVT Prophylaxis: Pneumatic Compression Devices  Code Status: No CPR- Do NOT Intubate  Disposition: Was medically improved but mood has deteriorated. Needs TCU but I suspect 1-2 more days at least given mood change.        Interval History   Interval history reviewed. Per nursing patient was more disoriented last evening and ultimately required a bedside attendant.  This AM she is giving one word responses to some questions, generally non-cooperative, but gets verbally annoyed with sternal rub.      -Data reviewed today: I reviewed all new labs and imaging results over the last 24 hours. I personally reviewed no images or EKG's today.    Physical Exam   Temp: 97.2  F (36.2  C) Temp src: Oral BP: (!) 153/81 Pulse: 83   Resp: 18 SpO2: 96 % O2 Device: None (Room air)    There were no vitals filed for this  visit.  Vital Signs with Ranges  Temp:  [96.8  F (36  C)-97.2  F (36.2  C)] 97.2  F (36.2  C)  Pulse:  [83-92] 83  Resp:  [18] 18  BP: (143-183)/(71-99) 153/81  SpO2:  [96 %-99 %] 96 %  I/O last 3 completed shifts:  In: 540 [P.O.:540]  Out: -     Constitutional: alert to voice, no acute distress, not cooperative with interview but responds to noxious stimuli (sternal rub)  Eyes: No scleral icterus   ENT: Normocephalic, atraumatic, mucous membranes moist  Respiratory: No accesory muscle use or labored breathing. Lungs clear to auscultation bilaterally without wheezes or rhonchi   Cardiovascular: RRR without murmur  GI: Abdomen soft, non-tender to palpation, non-distended.  Bowel sounds active  MSK: moves both arms, both leges  Skin/Integumen: no rash on exposed skin  Lymph: No calf tenderness, no unilateral LE edema  Psych: oriented to self, place, remarks are appropriate      Medications       amLODIPine  5 mg Oral BID     amoxicillin-clavulanate  1 tablet Oral Q12H TRINH     citalopram  10 mg Oral Daily     hydrALAZINE  10 mg Oral Q8H TRINH     polyethylene glycol  17 g Oral Daily     potassium chloride ER  20 mEq Oral BID w/meals     predniSONE  8 mg Oral Daily     senna-docusate  1 tablet Oral BID    Or     senna-docusate  2 tablet Oral BID     traZODone  25 mg Oral At Bedtime       Data   Recent Labs   Lab 11/30/20  1157 11/29/20  1231 11/28/20  0738 11/27/20  1140   WBC 10.9 10.0 10.8 12.8*   HGB 12.5 12.7 11.7 13.3   MCV 93 94 93 92    334 290 340    143 143 139   POTASSIUM 4.1 3.6 3.6 3.4   CHLORIDE 109 111* 112* 104   CO2 23 23 24 27   BUN 26 20 26 20   CR 0.98 0.89 0.98 0.92   ANIONGAP 8 9 7 8   MICHELLE 9.0 9.0 8.6 9.4   * 143* 82 98   ALBUMIN  --   --   --  4.0   PROTTOTAL  --   --   --  7.5   BILITOTAL  --   --   --  0.7   ALKPHOS  --   --   --  104   ALT  --   --   --  23   AST  --   --   --  17   TROPI  --   --   --  <0.015       No results found for this or any previous visit (from the past  24 hour(s)).

## 2020-12-02 ENCOUNTER — APPOINTMENT (OUTPATIENT)
Dept: PHYSICAL THERAPY | Facility: CLINIC | Age: 80
DRG: 690 | End: 2020-12-02
Payer: COMMERCIAL

## 2020-12-02 PROCEDURE — 250N000013 HC RX MED GY IP 250 OP 250 PS 637: Performed by: PHYSICIAN ASSISTANT

## 2020-12-02 PROCEDURE — 99222 1ST HOSP IP/OBS MODERATE 55: CPT | Performed by: PSYCHIATRY & NEUROLOGY

## 2020-12-02 PROCEDURE — 97530 THERAPEUTIC ACTIVITIES: CPT | Mod: GP

## 2020-12-02 PROCEDURE — 250N000013 HC RX MED GY IP 250 OP 250 PS 637: Performed by: INTERNAL MEDICINE

## 2020-12-02 PROCEDURE — 99232 SBSQ HOSP IP/OBS MODERATE 35: CPT | Performed by: INTERNAL MEDICINE

## 2020-12-02 PROCEDURE — 120N000001 HC R&B MED SURG/OB

## 2020-12-02 PROCEDURE — 250N000013 HC RX MED GY IP 250 OP 250 PS 637: Performed by: PSYCHIATRY & NEUROLOGY

## 2020-12-02 PROCEDURE — 250N000012 HC RX MED GY IP 250 OP 636 PS 637

## 2020-12-02 RX ORDER — QUETIAPINE FUMARATE 25 MG/1
25 TABLET, FILM COATED ORAL AT BEDTIME
Status: DISCONTINUED | OUTPATIENT
Start: 2020-12-02 | End: 2020-12-03 | Stop reason: HOSPADM

## 2020-12-02 RX ADMIN — HYDRALAZINE HYDROCHLORIDE 10 MG: 10 TABLET ORAL at 22:47

## 2020-12-02 RX ADMIN — DOCUSATE SODIUM 50 MG AND SENNOSIDES 8.6 MG 1 TABLET: 8.6; 5 TABLET, FILM COATED ORAL at 08:41

## 2020-12-02 RX ADMIN — AMLODIPINE BESYLATE 5 MG: 5 TABLET ORAL at 20:27

## 2020-12-02 RX ADMIN — ACETAMINOPHEN 650 MG: 325 TABLET, FILM COATED ORAL at 14:36

## 2020-12-02 RX ADMIN — POTASSIUM CHLORIDE 20 MEQ: 1500 TABLET, EXTENDED RELEASE ORAL at 17:47

## 2020-12-02 RX ADMIN — PREDNISONE 8 MG: 1 TABLET ORAL at 08:41

## 2020-12-02 RX ADMIN — AMLODIPINE BESYLATE 5 MG: 5 TABLET ORAL at 08:41

## 2020-12-02 RX ADMIN — POLYETHYLENE GLYCOL 3350 17 G: 17 POWDER, FOR SOLUTION ORAL at 09:01

## 2020-12-02 RX ADMIN — HYDRALAZINE HYDROCHLORIDE 10 MG: 10 TABLET ORAL at 06:16

## 2020-12-02 RX ADMIN — POTASSIUM CHLORIDE 20 MEQ: 1500 TABLET, EXTENDED RELEASE ORAL at 08:41

## 2020-12-02 RX ADMIN — AMOXICILLIN AND CLAVULANATE POTASSIUM 1 TABLET: 875; 125 TABLET, FILM COATED ORAL at 08:42

## 2020-12-02 RX ADMIN — QUETIAPINE 25 MG: 25 TABLET ORAL at 22:15

## 2020-12-02 RX ADMIN — AMOXICILLIN AND CLAVULANATE POTASSIUM 1 TABLET: 875; 125 TABLET, FILM COATED ORAL at 20:27

## 2020-12-02 RX ADMIN — CITALOPRAM HYDROBROMIDE 10 MG: 10 TABLET ORAL at 08:42

## 2020-12-02 ASSESSMENT — ACTIVITIES OF DAILY LIVING (ADL)
ADLS_ACUITY_SCORE: 18
ADLS_ACUITY_SCORE: 14
ADLS_ACUITY_SCORE: 18
ADLS_ACUITY_SCORE: 14
ADLS_ACUITY_SCORE: 14
ADLS_ACUITY_SCORE: 18

## 2020-12-02 NOTE — PROGRESS NOTES
Care Management Follow Up    Length of Stay (days): 5    Expected Discharge Date: 12/03/20(tcu when sitter free )     Concerns to be Addressed: discharge planning     Patient plan of care discussed at interdisciplinary rounds: Yes    Anticipated Discharge Disposition: Transitional Care     Anticipated Discharge Services:    Anticipated Discharge DME:      Patient/family educated on Medicare website which has current facility and service quality ratings: yes  Education Provided on the Discharge Plan:    Patient/Family in Agreement with the Plan: yes    Referrals Placed by CM/SW: Post Acute Facilities  Private pay costs discussed: Not applicable    Additional Information:  SW received call from [facility] who is reviewing patient for potential for admission. Admissions is questioning patient's placement for after TCU stay.  SW spoke with patient's son Saul and he stated that patient has been accepted by [facility] and they are just waiting to see patient's ability after TCU to determine where she is going to be placed within the facility based on her need for care.  Sons have also been working on getting patient Elderly Waiver and are aware they will need to come up with a deposit.  OLAMIDE returned call back to admissions and left  with updated information.       PRIMO Robbins, LGSW  [phone]  [facility]

## 2020-12-02 NOTE — PLAN OF CARE
Pt arose from sleeping all day around 2000. Ambulated A1 w/gb to BR, continent. Mostly pleasant and cooperative. VSS on RA. Med compliant. T/R while lethargic. No IV access.

## 2020-12-02 NOTE — PLAN OF CARE
Oriented to self, lethargic overnight. VSS on RA. Denies pain. Incontinent at times. Up SBA but not out of bed overnight. Slight blanchable redness to coccyx, T/R as tolerated.  Discharge plan pending, possibly to TCU pending. Slept well overnight.

## 2020-12-02 NOTE — CONSULTS
"Consult Date:  12/02/2020      PSYCHIATRY CONSULTATION      REASON FOR CONSULTATION:  Possible depression.      REQUESTING PHYSICIAN:  Dr. Cash      IDENTIFYING DATA:  The patient is an 80-year-old   female admitted to the hospital after she was found down in her residence.  She likely has some underlying dementia and is convalescing on station 88.  She was recently in Transitional Care.  She has not been doing well at home and has been somewhat resistant to care here in the hospital with concerns about dementia and depression.      CHIEF COMPLAINT:  \"Wouldn't you be depressed if you were in the hospital?\"      HISTORY OF PRESENT ILLNESS:  The patient is an 80-year-old   female admitted to the hospital with the above concerns.  She had a recent hospitalization with postural hypotension.  She has been living on her own, but was found on the floor next to her bed without any clothes on.  Her  was apparently moved down to [place] at the request of his daughter.  He has severe dementia.  She has been frustrated by that.  She presents as somewhat irritable and mildly sarcastic with me.  She minimizes symptoms of depression, saying she does not want to be on medication for depression.  She is sleeping and eating fine and denies any suicidal thinking.  It looks like prior to admission she was taking prednisone, trazodone and citalopram.  When I asked her about these details and the reasons for the antidepressant, she really could not give me a clear understanding of this.  She has only been taking 10 mg daily, according to the record, along with 25 mg of trazodone at bedtime.  She is still on 8 mg of prednisone and had been offered 25 mg of Seroquel yesterday, which she refused.  She has still been getting a small amount of Celexa in the hospital along with the trazodone.      On interview, the patient is confused.  She knew she was in the hospital.  She thought it was Sunday, but " "could not give me the name of the hospital.  Thought it was \"[facility].\"  At first glance, she appears reasonably well groomed, was sitting in bed, but as I try to engage her in conversation, she has a somewhat irritable, dismissive demeanor with a slightly sarcastic undertone.  She focuses on concerns that her  is in [place] and she is unhappy about this.  She does not seem to have much insight into her cognitive limitations and minimizes any and all psychiatric symptoms at this time.  Her routine laboratory studies do not show any gross abnormalities, with normal electrolytes and no signs of infection.  Clinically, though her urinalysis showed 129 WBCs and many bacteria.  Her urine culture is also positive for greater than 100,000 colonies of Klebsiella pneumoniae, so she is being treated with an antibiotic.      PAST PSYCHIATRIC HISTORY:  Limited details, but includes possible dementia and a historical diagnosis of depression.      PAST CHEMICAL DEPENDENCY HISTORY:  Negative.      PAST MEDICAL HISTORY:  Per chart review includes UTI this admission, chronic kidney disease, postural hypotension, polymyalgia rheumatica, ventricular tachycardia, hypertension, hysterectomy.      PRIOR TO ADMISSION MEDICATIONS:   1. Celexa 10 mg daily.   2. Trazodone 25 mg at bedtime.   3. Prednisone 8 mg daily.   4. Potassium chloride.   5. Hydralazine.   6. Amlodipine.      FAMILY HISTORY:  Unknown.      SOCIAL HISTORY:  I know the patient is .  Her  has a daughter from his first marriage and he is living in [place] at the request of the daughter, according to review of records, because of concerns that he has severe dementia and needs more support.  The patient tells me she has 1 sibling, 3 grown children of her own, and used to be an executive for a nonprofit.  The veracity of these statements is not entirely clear to me.  Currently, a TCU is being recommended for placement.      REVIEW OF " "SYSTEMS:  A 10-point review of systems is currently negative.      MOST RECENT VITAL SIGNS:  Blood pressure 138/78, temperature 98, pulse 81, respiratory rate 17, oxygen saturation 96%.      MENTAL STATUS EXAMINATION:  Appearance:  The patient is a well-dressed, elderly woman.  She is sitting in bed, initially engaging and smiling.  She does not seem to be a reliable historian.  Speech is nonpressured.  Use of language appropriate.  Motor exam is calm.  Coordination, station, gait not tested.  Muscle strength and tone adequate.  Affect is somewhat irritable and dismissive.  Mood:  \"Fine.\"  Thought process:  Generally logical, coherent and goal directed without loosening of associations, flight of ideas or formal thought disorder.  Thought content:  Negative for current hallucinations, delusions, paranoia, suicidal or homicidal ideation.  Insight and judgment appears impaired.  Cognitive exam:  The patient is alert.  She is oriented to self.  She knows she is in the hospital, but thought it was Sunday and thought she was at [facility].  She is unable to give any account of what brought her into the hospital that makes any sense; hence, her recent memory appears impaired.  Remote memory grossly intact.  General fund of knowledge delayed.      IMPRESSION:  The patient is an 80-year-old woman presenting with a recent fall, found on the ground.  She is being treated for a urinary tract infection.  The clinical presentation seemed consistent with a major neurocognitive disorder, possible underlying delirium.  At this juncture, because of the irritability, I think I would hold off on the citalopram and the trazodone, especially in the presence of a steroid.  I do not think she is decisional and they should include the family in decisions regarding disposition.  I talked with Dr. Cash about whether she will need a transitional care unit and possibly memory care.  Utilizing a small dose of quetiapine " at bedtime would be helpful, as she is having difficulty with sleep and agitation, per review of her record.      DIAGNOSES:   1. Delirium, possibly secondary to urinary tract infection.   2. Rule out major neurocognitive disorder with behavioral disturbance, unspecified.   3. Rule out unspecified depressive disorder by history.   4. History of polymyalgia rheumatica, currently on prednisone.      PLAN:   1. Would recommend discontinuation of Celexa and trazodone due to risk of mood dysregulation.   2. Utilize a small dose of quetiapine 25 mg at bedtime to target sundowning and sleep.   3. Enlist the support of family for disposition given concerns that she probably is not decisional and appears to have significant cognitive impairment.  A TCU may be appropriate so further observation can be undertaken.      Continuation #[id number] added lg 20            JANEEN NELSON MD             D: 2020   T: 2020   MT: RADHA      Name:     AMELIA LEE   MRN:      [id number]        Account:       [id number]   :      1940           Consult Date:  2020      Document: [id number]

## 2020-12-02 NOTE — PROGRESS NOTES
[facility]  Hospitalist Progress Note  Date of Service (when I saw the patient): 12/02/2020    Summary:  Elizabeth Joy is a 80 year old year old female who has a PMH significant for HTN with History of postural hypotension, polymyalgia rheumatica on chronic low dose steroids, depression, history of mild underlying dementia, CKD stage III, . Pt was admitted to [facility] observation unit on 11/27/2020 after being brought to the emergency department after a fall and inability to ambulate with a urinary tract infection.  The following problems were addressed during her hospitalization:     Assessment & Plan:  Urinary tract infection.  The patient only meets 1 criteria for systemic inflammatory response syndrome.  However, she does likely have perhaps some mild infectious encephalopathy.  She received a dose of Rocephin in the emergency room. UC preliminary for Klebsiella Pneumoniae. Had a Klebsiella UTI in 6/2020 that was pan sensitive other than ampicillin resistance  - urine culture has > 100,000 colonies/ml Klesiella pneumoniae, see sensitivities  - discontinue Ceftriaxone, begin Augmentin   - Mental status worsened today. Perhaps worsening mood combined with ongoing infectious encephalopathy.  See discussion below.       Fall, likely mechanical   The historical details of this are not clear, but it seems like the patient was either trying to get out of bed or was trying to change clothes and fell.  Again, this is likely related to baseline mild mobility restrictions complicated by an infectious encephalopathy picture.  Of note, pt was hospitalized in September of this year with what was thought related to postural hypotension.  She was discharged to transitional care and reportedly since then has been at home for the last 3 weeks where she has been having daily assistance   -PT/OT consulted. PT recommending TCU. OT initially deferred consult, but re-consulted for cognitive  assessment  -RNCC/Social work consulted for discharge planning     History of polymyalgia rheumatica  Suspect this is the reason for her slight leukocytosis upon admission, which has resolved  -Continue her PTA low-dose prednisone 8mg daily      Depression  Memory loss  Suspect this may be contributing more to the patient's symptoms than she lets on.  It sounds like she has had some disturbing family events happen in the last couple of months and she may be having a hard time dealing with it.    -Continue prior to admission Celexa  -OT has seen, SLUMS score 20/30  -Patient has become more uncooperative since admission, refusing to participate in activities and exam.  Nothing has changed from a metabolic or vital standpoint.  Recent severe social stressors noted.  Will request psychiatry consult.       HTN    Per son, likely hasn't been taking medications appropriately at home since previous discharge from TCU. Restaredt home medications and titrating.   -Increased PTA Amlodipine from 2.5mg BID to 5mg BID late 11/29 and changed PTA PRN hydralazine 10mg [id number] to scheduled 10mg TID on 11/29  -PRN IV Labetalol for SBP >180mmHg  -Continue to monitor      DVT Prophylaxis: Pneumatic Compression Devices  Code Status: No CPR- Do NOT Intubate  Disposition: Medically stable but mood is altered.  Psychiatry consult requested. Discharge to TCU or memory care pending psychiatric evaluation.  Has been without sitter since yesterday.        Interval History   Uncooperative this morning.  Denies pain but otherwise does not participate in exam/interview.      -Data reviewed today: I reviewed all new labs and imaging results over the last 24 hours. I personally reviewed no images or EKG's today.    Physical Exam   Temp: 98  F (36.7  C) Temp src: Oral BP: 130/78 Pulse: 81   Resp: 17 SpO2: 96 % O2 Device: None (Room air)    There were no vitals filed for this visit.  Vital Signs with Ranges  Temp:  [95.2  F (35.1  C)-98.1  F (36.7   C)] 98  F (36.7  C)  Pulse:  [70-83] 81  Resp:  [16-18] 17  BP: (128-164)/(70-92) 130/78  SpO2:  [94 %-96 %] 96 %  No intake/output data recorded.    Constitutional: alert to voice, no acute distress, not cooperative with interview but responds to noxious stimuli (sternal rub)  Eyes: No scleral icterus   ENT: Normocephalic, atraumatic, mucous membranes moist  Respiratory: No accesory muscle use or labored breathing. Lungs clear to auscultation bilaterally without wheezes or rhonchi   Cardiovascular: RRR without murmur  GI: Abdomen soft, non-tender to palpation, non-distended.  Bowel sounds active  MSK: moves both arms, both leges  Skin/Integumen: no rash on exposed skin  Lymph: No calf tenderness, no unilateral LE edema  Psych: oriented to self, place, remarks are appropriate      Medications       amLODIPine  5 mg Oral BID     amoxicillin-clavulanate  1 tablet Oral Q12H TRINH     citalopram  10 mg Oral Daily     hydrALAZINE  10 mg Oral Q8H TRINH     polyethylene glycol  17 g Oral Daily     potassium chloride ER  20 mEq Oral BID w/meals     predniSONE  8 mg Oral Daily     QUEtiapine  25 mg Oral Once     senna-docusate  1 tablet Oral BID    Or     senna-docusate  2 tablet Oral BID     traZODone  25 mg Oral At Bedtime       Data   Recent Labs   Lab 11/30/20  1157 11/29/20  1231 11/28/20  0738 11/27/20  1140   WBC 10.9 10.0 10.8 12.8*   HGB 12.5 12.7 11.7 13.3   MCV 93 94 93 92    334 290 340    143 143 139   POTASSIUM 4.1 3.6 3.6 3.4   CHLORIDE 109 111* 112* 104   CO2 23 23 24 27   BUN 26 20 26 20   CR 0.98 0.89 0.98 0.92   ANIONGAP 8 9 7 8   MICHELLE 9.0 9.0 8.6 9.4   * 143* 82 98   ALBUMIN  --   --   --  4.0   PROTTOTAL  --   --   --  7.5   BILITOTAL  --   --   --  0.7   ALKPHOS  --   --   --  104   ALT  --   --   --  23   AST  --   --   --  17   TROPI  --   --   --  <0.015       No results found for this or any previous visit (from the past 24 hour(s)).

## 2020-12-02 NOTE — PROVIDER NOTIFICATION
MD Notification    Notified Person: MD    Notified Person Name: Shailesh     Notification Date/Time: 12/02/20, 3:59 PM    Notification Interaction: Webpage    Purpose of Notification: FYI: pt voiding only little at a time frequently, PVR for 426, would you like to straight cath, if so, could you put in orders? Thanks!    Orders Received: PRN straight caths for PVR >400    Comments:    Repaged at 4:47 PM  repaged at 5:44 PM

## 2020-12-03 VITALS
SYSTOLIC BLOOD PRESSURE: 160 MMHG | HEART RATE: 80 BPM | DIASTOLIC BLOOD PRESSURE: 87 MMHG | TEMPERATURE: 97.5 F | RESPIRATION RATE: 16 BRPM | OXYGEN SATURATION: 96 %

## 2020-12-03 PROCEDURE — 250N000013 HC RX MED GY IP 250 OP 250 PS 637: Performed by: INTERNAL MEDICINE

## 2020-12-03 PROCEDURE — 99238 HOSP IP/OBS DSCHRG MGMT 30/<: CPT | Performed by: INTERNAL MEDICINE

## 2020-12-03 PROCEDURE — 250N000013 HC RX MED GY IP 250 OP 250 PS 637: Performed by: PHYSICIAN ASSISTANT

## 2020-12-03 PROCEDURE — 250N000012 HC RX MED GY IP 250 OP 636 PS 637

## 2020-12-03 RX ORDER — QUETIAPINE FUMARATE 25 MG/1
25 TABLET, FILM COATED ORAL AT BEDTIME
Status: ON HOLD | DISCHARGE
Start: 2020-12-03 | End: 2021-08-17

## 2020-12-03 RX ADMIN — HYDRALAZINE HYDROCHLORIDE 10 MG: 10 TABLET ORAL at 15:54

## 2020-12-03 RX ADMIN — HYDRALAZINE HYDROCHLORIDE 10 MG: 10 TABLET ORAL at 06:12

## 2020-12-03 RX ADMIN — PREDNISONE 8 MG: 1 TABLET ORAL at 08:24

## 2020-12-03 RX ADMIN — POTASSIUM CHLORIDE 20 MEQ: 1500 TABLET, EXTENDED RELEASE ORAL at 08:24

## 2020-12-03 RX ADMIN — AMOXICILLIN AND CLAVULANATE POTASSIUM 1 TABLET: 875; 125 TABLET, FILM COATED ORAL at 08:24

## 2020-12-03 RX ADMIN — AMLODIPINE BESYLATE 5 MG: 5 TABLET ORAL at 08:24

## 2020-12-03 ASSESSMENT — ACTIVITIES OF DAILY LIVING (ADL)
ADLS_ACUITY_SCORE: 14

## 2020-12-03 NOTE — PROGRESS NOTES
[facility]  Hospitalist Progress Note  Date of Service (when I saw the patient): 12/03/2020    Summary:  Elizabeth Joy is a 80 year old year old female who has a PMH significant for HTN with History of postural hypotension, polymyalgia rheumatica on chronic low dose steroids, depression, history of mild underlying dementia, CKD stage III, . Pt was admitted to [facility] observation unit on 11/27/2020 after being brought to the emergency department after a fall and inability to ambulate with a urinary tract infection.  The following problems were addressed during her hospitalization:     Assessment & Plan:  Urinary tract infection.  The patient only meets 1 criteria for systemic inflammatory response syndrome.  However, she does likely have perhaps some mild infectious encephalopathy.  She received a dose of Rocephin in the emergency room. UC preliminary for Klebsiella Pneumoniae. Had a Klebsiella UTI in 6/2020 that was pan sensitive other than ampicillin resistance  - urine culture has > 100,000 colonies/ml Klesiella pneumoniae, see sensitivities  - started on prednisone, continue Augmentin for total 7 day course.  -More alert this AM, though does not remember me from admission, nor previous visits.  Seen by psychiatry, appreciate input.  Will hold off on citalopram and trazodone.       Fall, likely mechanical   The historical details of this are not clear, but it seems like the patient was either trying to get out of bed or was trying to change clothes and fell.  Again, this is likely related to baseline mild mobility restrictions complicated by an infectious encephalopathy picture.  Of note, pt was hospitalized in September of this year with what was thought related to postural hypotension.  She was discharged to transitional care and reportedly since then has been at home for the last 3 weeks where she has been having daily assistance   -PT/OT consulted. PT recommending TCU. OT  initially deferred consult, but re-consulted for cognitive assessment  -RNCC/Social work consulted for discharge planning     History of polymyalgia rheumatica  Suspect this is the reason for her slight leukocytosis upon admission, which has resolved  -Continue her PTA low-dose prednisone 8mg daily      Depression  Memory loss  Suspect this may be contributing more to the patient's symptoms than she lets on.  It sounds like she has had some disturbing family events happen in the last couple of months and she may be having a hard time dealing with it.    -Continue prior to admission Celexa  -OT has seen, SLUMS score 20/30  -See above re: psychiatric consult.  Citalopram, trazodone held.  Started on nightly seroquel.       HTN    Per son, likely hasn't been taking medications appropriately at home since previous discharge from TCU. Restaredt home medications and titrating.   -Increased PTA Amlodipine from 2.5mg BID to 5mg BID late 11/29 and changed PTA PRN hydralazine 10mg [id number] to scheduled 10mg TID on 11/29  -PRN IV Labetalol for SBP >180mmHg  -Continue to monitor      DVT Prophylaxis: Pneumatic Compression Devices  Code Status: No CPR- Do NOT Intubate  Disposition:  Is ok for TCU, may need memory care down the road.        Interval History   Much more alert today, however did not remember me.  Very sarcastic and annoyed responses.  Appears she slept better.  Denies pain, f/c, n/v.  Has ambulated with assistance.      -Data reviewed today: I reviewed all new labs and imaging results over the last 24 hours. I personally reviewed no images or EKG's today.    Physical Exam   Temp: 97  F (36.1  C) Temp src: Oral BP: (!) 156/84 Pulse: 85   Resp: 16 SpO2: 99 % O2 Device: None (Room air)    There were no vitals filed for this visit.  Vital Signs with Ranges  Temp:  [97  F (36.1  C)-98  F (36.7  C)] 97  F (36.1  C)  Pulse:  [76-89] 85  Resp:  [16-17] 16  BP: (106-156)/(70-84) 156/84  SpO2:  [96 %-99 %] 99 %  I/O last 3  completed shifts:  In: -   Out: 675 [Urine:675]    Constitutional: much more alert, oriented to self and hospital.  Eyes: No scleral icterus   ENT: Normocephalic, atraumatic, mucous membranes moist  Respiratory: No accesory muscle use or labored breathing. Lungs clear to auscultation bilaterally without wheezes or rhonchi   Cardiovascular: RRR without murmur  GI: Abdomen soft, non-tender to palpation, non-distended.  Bowel sounds active  MSK: moves both arms, both leges  Skin/Integumen: no rash on exposed skin  Lymph: No calf tenderness, no unilateral LE edema  Psych: oriented to self, place, remarks are appropriate      Medications       amLODIPine  5 mg Oral BID     amoxicillin-clavulanate  1 tablet Oral Q12H TRINH     hydrALAZINE  10 mg Oral Q8H TRINH     polyethylene glycol  17 g Oral Daily     potassium chloride ER  20 mEq Oral BID w/meals     predniSONE  8 mg Oral Daily     QUEtiapine  25 mg Oral At Bedtime     QUEtiapine  25 mg Oral Once     senna-docusate  1 tablet Oral BID    Or     senna-docusate  2 tablet Oral BID       Data   Recent Labs   Lab 11/30/20  1157 11/29/20  1231 11/28/20  0738 11/27/20  1140   WBC 10.9 10.0 10.8 12.8*   HGB 12.5 12.7 11.7 13.3   MCV 93 94 93 92    334 290 340    143 143 139   POTASSIUM 4.1 3.6 3.6 3.4   CHLORIDE 109 111* 112* 104   CO2 23 23 24 27   BUN 26 20 26 20   CR 0.98 0.89 0.98 0.92   ANIONGAP 8 9 7 8   MICHELLE 9.0 9.0 8.6 9.4   * 143* 82 98   ALBUMIN  --   --   --  4.0   PROTTOTAL  --   --   --  7.5   BILITOTAL  --   --   --  0.7   ALKPHOS  --   --   --  104   ALT  --   --   --  23   AST  --   --   --  17   TROPI  --   --   --  <0.015       No results found for this or any previous visit (from the past 24 hour(s)).

## 2020-12-03 NOTE — PLAN OF CARE
Pt alert to self. VSS on RA ex soft BP, scheduled BP meds on held this evening. C/o pain to ankles and wrist, PRN tylenol given. Up A1 GB/W to BSC. Multiple soft/liquid BM. Pt retaining urine even with many attempts of getting up to BSC, and bedpan. PVR for 400+ after voiding 10mL, and require straight cath. Blanchable redness to coccyx, T/R as pt tolerates. Discharge pending placement.

## 2020-12-03 NOTE — PROGRESS NOTES
Addendum to daily note.  Plan for discharge today to TCU and when I saw patient this AM she was agreeable, now she is refusing and insisting to go home.    Based on history and therapy reports she is clearly not safe to go home independently.  Given that she is refusing appropriate recommendations, will request a psychiatry follow up to assess decisional capacity.  I do not feel she is competent to make her own decisions currently.    Alexis Cash MD

## 2020-12-03 NOTE — PLAN OF CARE
DATE & TIME: 12.20.20 1900-0700    Cognitive Concerns/ Orientation : AO3 Disoriented to place.   BEHAVIOR & AGGRESSION TOOL COLOR: Green  ABNL VS/O2: VSS ex HTN on RA  MOBILITY: A1 GBW  PAIN MANAGMENT: Denies  DIET: Regular  BOWEL/BLADDER: Straight cath 1x 475. Loose stool.  ABNL LAB/BG: NA  DRAIN/DEVICES: No PIV md aware  TELEMETRY RHYTHM: NA  SKIN: Intact  TESTS/PROCEDURES: NA  D/C DAY/GOALS/PLACE: Pending, PT & psych recommends TCU.  OTHER IMPORTANT INFO: 3x void since straight cath. Post void residual 138mL

## 2020-12-03 NOTE — PROGRESS NOTES
[facility] Progress Note  [facility]    Brief visit with pt, per lengthy hospital stay.  When I arrived, pt was angry, and requested that I contact the  on her behalf.  Note plan for pt to discharge today in chart.   team available if needs arise.                                                                                                                                                 Effie Grove M.A.  Staff   Pager [phone]  Phone [phone]

## 2020-12-03 NOTE — DISCHARGE SUMMARY
Patient discharged at 5:42 PM to [facility] TCU.  IV was discontinued. Pain at time of discharge was 0/10. Belongings returned to patient.  No discharge instructions and medications reviewed with patient.  Patient verbalized understanding and all questions were answered.  No prescriptions given to patient.  At time of discharge, patient condition was stable and left the unit in wheelchair escorted by [facility].

## 2020-12-03 NOTE — DISCHARGE SUMMARY
[facility]    Discharge Summary  Hospitalist    Date of Admission:  11/27/2020  Date of Discharge:  12/3/2020  Discharging Provider: Alexis Cash MD    Discharge Diagnoses   Infectious encephalopathy related to UTI  Fall  Dementia  Depression.  PMR    History of Present Illness   Ms. Joy is a pleasant 80-year-old female with a questionable history of mild underlying dementia who was hospitalized in September of this year with what was thought related to postural hypotension.  She was discharged to transitional care and reportedly since then has been at home for the last 3 weeks where she has been having daily assistance by her own report.  She was brought into the emergency room today as she was found to be on the floor next to her bed without any clothes on.  She was evaluated in the emergency room by Dr. Trierweiler.  She was found to have generally stable vital signs, a slightly elevated white blood cell count and a grossly abnormal urinalysis.      The patient lives independently with assistance.  Per her reports shortly after her last hospitalization, her in-laws came to take her  away, to move him down to [place], as he apparently has severe dementia and they did not think she was being adequately cared for.  The veracity of this history is not certain.  An attempt was made to ambulate the patient as she was able to walk a few steps with a walker, but has not felt that she was safe to discharge home.  As such, an observation admission was requested.  At the time of my interview, the patient complains of upper thigh pain but denies any shortness of breath, recent cough, recent fevers, chills, nausea or vomiting.     Hospital Course   Summary:  Elizabeth Joy is a 80 year old year old female who has a PMH significant for HTN with History of postural hypotension, polymyalgia rheumatica on chronic low dose steroids, depression, history of mild underlying dementia, CKD stage  III, . Pt was admitted to [facility] on 11/27/2020 after being brought to the emergency department after a fall and inability to ambulate with a urinary tract infection.  The following problems were addressed during her hospitalization:     Assessment & Plan:  Urinary tract infection.  The patient only meets 1 criteria for systemic inflammatory response syndrome.  However, she does likely have perhaps some mild infectious encephalopathy.  She received a dose of Rocephin in the emergency room. UC preliminary for Klebsiella Pneumoniae. Had a Klebsiella UTI in 6/2020 that was pan sensitive other than ampicillin resistance  - urine culture has > 100,000 colonies/ml Klesiella pneumoniae, see sensitivities  - Was on Rocephin initially, switched to Augmentin, should continue for 4 more days after discharge.           Fall, likely mechanical   The historical details of this are not clear, but it seems like the patient was either trying to get out of bed or was trying to change clothes and fell.  Again, this is likely related to baseline mild mobility restrictions complicated by an infectious encephalopathy picture.  Of note, pt was hospitalized in September of this year with what was thought related to postural hypotension.  She was discharged to transitional care and reportedly since then has been at home for the last 3 weeks where she has been having daily assistance   Discharge to TCU, suspect LTC is appropriate and possibly memory care.     History of polymyalgia rheumatica  Suspect this is the reason for her slight leukocytosis upon admission, which has resolved  -Continue her PTA low-dose prednisone 8mg daily      Depression  Memory loss  Suspect this may be contributing more to the patient's symptoms than she lets on.  It sounds like she has had some disturbing family events happen in the last couple of months and she may be having a hard time dealing with it.    -As hospital course  progressed patient became more withdrawn and resistant to care, despite otherwise clinical improvement.  Psychiatry consulted, recommending discontinuing celexa and trazodone in favor of nightly seroquel.           Alexis Cash MD      Unresulted Labs Ordered in the Past 30 Days of this Admission     No orders found from 10/28/2020 to 11/28/2020.          Code Status   DNR / DNI       Primary Care Physician   Marky Josue    Physical Exam   Temp: 97.5  F (36.4  C) Temp src: Oral BP: (!) 147/76(167/92 right arm) Pulse: 80   Resp: 16 SpO2: 96 % O2 Device: None (Room air)    There were no vitals filed for this visit.  Vital Signs with Ranges  Temp:  [97  F (36.1  C)-98  F (36.7  C)] 97.5  F (36.4  C)  Pulse:  [76-89] 80  Resp:  [16-17] 16  BP: (106-156)/(70-84) 147/76  SpO2:  [96 %-99 %] 96 %  I/O last 3 completed shifts:  In: -   Out: 675 [Urine:675]    Constitutional: Awake, alert, oriented to self, hospital  Eyes: Conjunctiva and pupils examined and normal.  HEENT: Moist mucous membranes, normal dentition.  Respiratory: Clear to auscultation bilaterally, no crackles or wheezing.  Cardiovascular: Regular rate and rhythm, normal S1 and S2  GI: Soft, non-distended, non-tender  Skin: No rashes, no cyanosis, no edema.        Discharge Disposition   Discharged to short-term care facility  Condition at discharge: Stable    Consultations This Hospital Stay   PHYSICAL THERAPY ADULT IP CONSULT  OCCUPATIONAL THERAPY ADULT IP CONSULT  CARE MANAGEMENT / SOCIAL WORK IP CONSULT  OCCUPATIONAL THERAPY ADULT IP CONSULT  PHYSICAL THERAPY ADULT IP CONSULT  OCCUPATIONAL THERAPY ADULT IP CONSULT  PSYCHIATRY IP CONSULT    Time Spent on this Encounter   I, Alexis Cash MD, personally saw the patient today and spent less than or equal to 30 minutes discharging this patient.    Discharge Orders      General info for SNF    Length of Stay Estimate: Short Term Care: Estimated # of Days <30  Condition at Discharge: Stable  Level of  care:skilled   Rehabilitation Potential: Good  Admission H&P remains valid and up-to-date: Yes  Recent Chemotherapy: N/A  Use Nursing Home Standing Orders: Yes     Mantoux instructions    Give two-step Mantoux (PPD) Per Facility Policy Yes     Reason for your hospital stay    You had a fall.     Follow Up and recommended labs and tests    Follow up with Nursing home physician.  No follow up labs or test are needed.     Activity - Up with nursing assistance     No CPR- Do NOT Intubate     Physical Therapy Adult Consult    Evaluate and treat as clinically indicated.    Reason:  deconditioning     Occupational Therapy Adult Consult    Evaluate and treat as clinically indicated.    Reason:  deconditioning     Fall precautions     Advance Diet as Tolerated    Follow this diet upon discharge: Orders Placed This Encounter      Regular Diet Adult     Discharge Medications   Current Discharge Medication List      START taking these medications    Details   amoxicillin-clavulanate (AUGMENTIN) 875-125 MG tablet Take 1 tablet by mouth every 12 hours for 4 days    Associated Diagnoses: Acute cystitis without hematuria      QUEtiapine (SEROQUEL) 25 MG tablet Take 1 tablet (25 mg) by mouth At Bedtime  Qty:      Associated Diagnoses: Current mild episode of major depressive disorder, unspecified whether recurrent (H)         CONTINUE these medications which have CHANGED    Details   amLODIPine (NORVASC) 2.5 MG tablet Take 2 tablets (5 mg) by mouth 2 times daily    Associated Diagnoses: Benign essential hypertension      hydrALAZINE (APRESOLINE) 10 MG tablet Take 1 tablet (10 mg) by mouth 3 times daily    Associated Diagnoses: Benign essential hypertension         CONTINUE these medications which have NOT CHANGED    Details   potassium chloride ER (KLOR-CON M) 20 MEQ CR tablet Take 20 mEq by mouth 2 times daily (with meals)      !! predniSONE (DELTASONE) 1 MG tablet Take 3 of these along with one 5mg for a total daily dose of  8mg  Qty: 100 tablet, Refills: 4    Associated Diagnoses: PMR (polymyalgia rheumatica) (H)      !! predniSONE (DELTASONE) 5 MG tablet TAKE 1 TABLET BY MOUTH DAILY ALONG WITH 3 ONE MG TABLETS FOR A TOTAL DAILY DOSE OF 8MG  Qty: 90 tablet, Refills: 0    Associated Diagnoses: PMR (polymyalgia rheumatica) (H)       !! - Potential duplicate medications found. Please discuss with provider.      STOP taking these medications       citalopram (CELEXA) 10 MG tablet Comments:   Reason for Stopping:         traZODone (DESYREL) 50 MG tablet Comments:   Reason for Stopping:             Allergies   Allergies   Allergen Reactions     Ace Inhibitors Cough     Data   Most Recent 3 CBC's:  Recent Labs   Lab Test 11/30/20  1157 11/29/20  1231 11/28/20  0738   WBC 10.9 10.0 10.8   HGB 12.5 12.7 11.7   MCV 93 94 93    334 290      Most Recent 3 BMP's:  Recent Labs   Lab Test 11/30/20  1157 11/29/20  1231 11/28/20  0738    143 143   POTASSIUM 4.1 3.6 3.6   CHLORIDE 109 111* 112*   CO2 23 23 24   BUN 26 20 26   CR 0.98 0.89 0.98   ANIONGAP 8 9 7   MICHELLE 9.0 9.0 8.6   * 143* 82     Most Recent 2 LFT's:  Recent Labs   Lab Test 11/27/20  1140 09/04/20  0946   AST 17 41   ALT 23 38   ALKPHOS 104 117   BILITOTAL 0.7 1.4*     Most Recent INR's and Anticoagulation Dosing History:  Anticoagulation Dose History     There is no flowsheet data to display.        Most Recent 3 Troponin's:  Recent Labs   Lab Test 11/27/20  1140 09/17/20  1322 09/04/20  0946   TROPI <0.015 <0.015 0.086*     Most Recent Cholesterol Panel:No lab results found.  Most Recent 6 Bacteria Isolates From Any Culture (See EPIC Reports for Culture Details):  Recent Labs   Lab Test 11/27/20  1140 09/17/20  1418 09/17/20  1322 09/04/20  1058 09/04/20  0946 06/30/20  1521   CULT >100,000 colonies/mL  Klebsiella pneumoniae  * No growth  No growth No growth No growth No growth  >100,000 colonies/mL  Aerococcus sanguinicola  Identification obtained by MALDI-TOF mass  spectrometry research use only database. Test   characteristics determined and verified by the [facility]   ([facility]) [place], [place].  *  >100,000 colonies/mL  Aerococcus urinae  Identification obtained by MALDI-TOF mass spectrometry research use only database. Test   characteristics determined and verified by the [facility]   ([facility]) [place], [place].  * >100,000 colonies/mL  Klebsiella pneumoniae  *  10,000 to 50,000 colonies/mL  mixed urogenital vishnu  Susceptibility testing not routinely done       Most Recent TSH, T4 and A1c Labs:  Recent Labs   Lab Test 09/04/20  0946 06/30/20  1520   TSH 0.64 0.74   A1C  --  5.6

## 2020-12-03 NOTE — PROGRESS NOTES
Care Management Follow Up    Length of Stay (days): 6    Expected Discharge Date: 12/03/20(tcu pending medical clearance. )  Expected Time of Departure: 14:30  Concerns to be Addressed: all concerns addressed in this encounter     Patient plan of care discussed at interdisciplinary rounds: Yes    Anticipated Discharge Disposition: Skilled Nursing Facilty;Transitional Care [facility]     Anticipated Discharge Services: Transportation Services  Anticipated Discharge DME:  Wheelchair ride    Patient/family educated on Medicare website which has current facility and service quality ratings: yes  Education Provided on the Discharge Plan:  Yes  Patient/Family in Agreement with the Plan: yes    Referrals Placed by CM/SW: Senior Linkage Line;Post Acute Facilities;Transportation  Private pay costs discussed: transportation costs    Additional Information:  Received discharge orders for our patient. Bed available at [facility] TCU today, 12/3/20. Patient to transport at 14:30 via [facility] wheelchair ride. Skilled nursing facility notified of patients discharge time, MD orders sent via DOD. PAS is on front of patient chart. Patient and family are aware and in agreement with this plan.     PAS-RR    D: Per DHS regulation, SW completed and submitted PAS-RR to [place] [employer] on [employer]ging Direct Connect via the Senior LinkAge Line.  PAS-RR confirmation # is : [id number]    I: SW spoke with patient and they are aware a PAS-RR has been submitted.  SW reviewed with patient that they may be contacted for a follow up appointment within 10 days of hospital discharge if their SNF stay is < 30 days.  Contact information for [facility] Line was also provided.    A: Patient verbalized understanding.    P: Further questions may be directed to [facility] Line at #[phone], option #4 for PAS-RR staff.    15:16: Patient ride has been changed from 2:30 to 5:30. Call placed and message left with [facility] regarding ride change.        PRIMO Ramirez

## 2020-12-04 ENCOUNTER — PATIENT OUTREACH (OUTPATIENT)
Dept: CARE COORDINATION | Facility: CLINIC | Age: 80
End: 2020-12-04

## 2020-12-04 DIAGNOSIS — N39.0 URINARY TRACT INFECTION: Primary | ICD-10-CM
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
## 2020-12-04 ASSESSMENT — ACTIVITIES OF DAILY LIVING (ADL): DEPENDENT_IADLS:: INDEPENDENT

## 2020-12-04 NOTE — LETTER
To:             Please give to facility    From:   Mery Gillis RN, Care Coordinator   [facility] -Care Coordination  [facility]   E-mail [email or web address]   [phone]    Patient Name:  Elizabeth Joy Date of Birth: [birth year]   Admit date: 12/3/2020      *Information Needed:  Please contact me when the patient will discharge (or if they will move to long term care)- include the discharge date, disposition, and main diagnosis   - If the patient is discharged with home care services, please provide the name of the agency    Also- Please inform me if a care conference is being held.   Lory QUIÑONEZ at [phone]                            Thank you

## 2020-12-18 ENCOUNTER — TRANSFERRED RECORDS (OUTPATIENT)
Dept: HEALTH INFORMATION MANAGEMENT | Facility: CLINIC | Age: 80
End: 2020-12-18

## 2020-12-18 ENCOUNTER — TELEPHONE (OUTPATIENT)
Dept: FAMILY MEDICINE | Facility: CLINIC | Age: 80
End: 2020-12-18

## 2020-12-18 ENCOUNTER — NURSE TRIAGE (OUTPATIENT)
Dept: FAMILY MEDICINE | Facility: CLINIC | Age: 80
End: 2020-12-18

## 2020-12-18 NOTE — TELEPHONE ENCOUNTER
I am covering for Dr Josue today.  It sounds to me as though Elizabeth should be evaluated in the ED , other conditions ruled out and psychiatric stabilization important in order to make the transition successfully  Please bring her the the ED

## 2020-12-18 NOTE — TELEPHONE ENCOUNTER
"TO PROVIDER:     Spoke with Jaki nurse at [facility]      This AM - had issue was trying to elope/very confused - thought she was in [place] and nurses had difficulty re-directing     Not orienting at all, tearful. Very aggressive     Was given seroquel in TCU - and had prescribed her it in the hospital - was screaming in the halls there    Has been there since yesterday - very tearful, missing . Paranoid. Thinks she is in a hotel. Tried leaving. Going to put in memory care locked unit. Walking down the halls banging on doors. Saying she doesn't want to live anymore without seeing  but he is in [place] currently.     Requesting:   INCREASED DOSE of Seroquel    OR    Order to admit to geriatric psych unit  They would accept a verbal okay or a written statement: \"okay to transport to geriatric psych unit via EMS due to behaviors\"     Please advise    Kizzy LERMA RN    "

## 2020-12-18 NOTE — TELEPHONE ENCOUNTER
Reason for Call:  Other call back    Detailed comments: Nurse said called in and spoke with someone who was supposed to transfer them to a nurse then the phone was disconnected. I did not see an number to transfer her to directly so I offered to send her to the nurse line and she said she does not have time to wait and is requesting a call back.     Phone Number Patient can be reached at: Cell number on file:    Telephone Information:   [phone]        Best Time: Today    Can we leave a detailed message on this number? NO    Call taken on 12/18/2020 at 1:32 PM by Rut Avalos

## 2020-12-18 NOTE — TELEPHONE ENCOUNTER
Called ULISSES Pollack with providers message. She verbalized understanding and agreement with plan.

## 2020-12-18 NOTE — TELEPHONE ENCOUNTER
"Jaki, nurse at [facility], calling. Patient moved into [facility] yesterday (updated address). Mental status not stable. Not oriented at all. Very aggressive. Elopement risk very high. Tried to leave building this AM with no shoes or jacket on. Right now they have orders for 25 mg Seroquel once a day, plus half tab BID PRN. Listed differently on our med list. Wanting a STAT order to increase that. And wants an order to send patient to teo psych unit if needed to get her stabilized if needed. They do not have resources at [facility] to stabilize her. Says dementia and depression on her list. No other neurological symptoms or physical symptoms patient is exhibiting right now per Jaki. Patient is disoriented and does not know where she is. Family \"whisked her\" in there.    Please advise or do you feel patient needs to be evaluated in ER?    Jaki #[phone]  Fax [phone]  "

## 2020-12-18 NOTE — TELEPHONE ENCOUNTER
Called facility to discuss - no answer. LVM for facility nurse to call back     Quetiapine not previously prescribed by PCP - is historical on pt's list, last Rx from [facility] 12/16    Note: PCP is not in the clinic this week, back next week     Please forward to Brigid GTZ Triage (Su BURNETT RN) at [phone] when patient calls back     Kizzy LERMA RN

## 2020-12-19 ENCOUNTER — TRANSFERRED RECORDS (OUTPATIENT)
Dept: HEALTH INFORMATION MANAGEMENT | Facility: CLINIC | Age: 80
End: 2020-12-19

## 2021-01-06 ENCOUNTER — TRANSFERRED RECORDS (OUTPATIENT)
Dept: HEALTH INFORMATION MANAGEMENT | Facility: CLINIC | Age: 81
End: 2021-01-06

## 2021-01-06 ENCOUNTER — TELEPHONE (OUTPATIENT)
Dept: FAMILY MEDICINE | Facility: CLINIC | Age: 81
End: 2021-01-06
Payer: COMMERCIAL

## 2021-01-06 ENCOUNTER — TELEPHONE (OUTPATIENT)
Dept: FAMILY MEDICINE | Facility: CLINIC | Age: 81
End: 2021-01-06

## 2021-01-06 DIAGNOSIS — Z53.9 ERRONEOUS ENCOUNTER--DISREGARD: Primary | ICD-10-CM

## 2021-01-06 NOTE — TELEPHONE ENCOUNTER
[facility] for orders/home care.  Fax no.given for PCP signature as needed.  Nevaeh Grover RN on 1/6/2021 at 9:52 AM

## 2021-01-06 NOTE — TELEPHONE ENCOUNTER
Reason for Call:  Home Health Care    [facility] called regarding (reason for call): Orders delayed health care. Care started 01/06/21    Orders are needed for this patient. PT OT    PT: Evaluate and treat     OT: Evaluate and treat      Pt Provider: Dr Josue    Phone Number Homecare Nurse can be reached at:   [facility] (HOME CARE) [phone]       Can we leave a detailed message on this number? YES    Phone number patient can be reached at: Home number on file [phone] (home)    Best Time: ASAP    Call taken on 1/6/2021 at 9:19 AM by Tomeka Lazo

## 2021-01-07 ENCOUNTER — PATIENT OUTREACH (OUTPATIENT)
Dept: CARE COORDINATION | Facility: CLINIC | Age: 81
End: 2021-01-07

## 2021-01-07 NOTE — PROGRESS NOTES
Clinic Care Coordination Contact  [facility]/Voicemail       Clinical Data: Care Coordinator Outreach    Outreach attempted x 1.  Left message on pt's son, kurtis, voicemail with call back information and requested return call.    Plan: Care Coordinator will try to reach patient again in 3-5 business days.    Lory Durant [facility]  Clinic Care Coordinator  [facility]  [facility]  [phone]  [email or web address]

## 2021-01-11 ENCOUNTER — TELEPHONE (OUTPATIENT)
Dept: FAMILY MEDICINE | Facility: CLINIC | Age: 81
End: 2021-01-11

## 2021-01-11 NOTE — PROGRESS NOTES
Clinic Care Coordination Contact    Situation: Patient chart reviewed by care coordinator.    Background: CRISS QUIÑONEZ left voicemail for pt's son, kurtis, regarding pt's overall wellbeing.    Assessment: CRISS QUIÑONEZ received voicemail on 1/9 from Kurtis sharing that pt is doing well. She was recently in the hospital in [place] for depression and SI. She received very good care and has returned to [facility]. She is reasonable happy there at this time. She is received good care from the staff and facility.     Plan/Recommendations: CRISS QUIÑONEZ has moved pt to maintenance. CRISS SW will outreach to Kurtis in 1-2 months to follow up regarding pt's overall wellbeing and plan to close pt to CC at that time.    Lory Duarnt, [facility]  Clinic Care Coordinator  [facility] [place]  [facility]  [phone]  [email or web address]

## 2021-01-11 NOTE — TELEPHONE ENCOUNTER
Tried to callx2, rings no answer, recall tomorrow  Belen Garcia, RN  [facility] RN Triage Team

## 2021-01-11 NOTE — TELEPHONE ENCOUNTER
TO PCP:     Call from     [facility]   [facility]     Call back [phone]     Asking if okay for Verbal order 2x/1 week 1x/3 weeks OT?     Noted pt has no showed last 3/3  visits so wanting to verify okay? No future visit is scheduled. Home care reports concerns with memory regarding patient     Kizzy LERMA RN

## 2021-02-03 ENCOUNTER — TRANSFERRED RECORDS (OUTPATIENT)
Dept: HEALTH INFORMATION MANAGEMENT | Facility: CLINIC | Age: 81
End: 2021-02-03

## 2021-02-04 ENCOUNTER — RECORDS - HEALTHEAST (OUTPATIENT)
Dept: LAB | Facility: CLINIC | Age: 81
End: 2021-02-04

## 2021-02-04 LAB
ANION GAP SERPL CALCULATED.3IONS-SCNC: 12 MMOL/L (ref 5–18)
BUN SERPL-MCNC: 17 MG/DL (ref 8–28)
CALCIUM SERPL-MCNC: 9.4 MG/DL (ref 8.5–10.5)
CHLORIDE BLD-SCNC: 103 MMOL/L (ref 98–107)
CO2 SERPL-SCNC: 24 MMOL/L (ref 22–31)
CREAT SERPL-MCNC: 1.18 MG/DL (ref 0.6–1.1)
GFR SERPL CREATININE-BSD FRML MDRD: 44 ML/MIN/1.73M2
GLUCOSE BLD-MCNC: 100 MG/DL (ref 70–125)
POTASSIUM BLD-SCNC: 4.6 MMOL/L (ref 3.5–5)
SODIUM SERPL-SCNC: 139 MMOL/L (ref 136–145)

## 2021-02-12 ENCOUNTER — PATIENT OUTREACH (OUTPATIENT)
Dept: CARE COORDINATION | Facility: CLINIC | Age: 81
End: 2021-02-12

## 2021-02-12 DIAGNOSIS — Z53.9 DIAGNOSIS NOT YET DEFINED: Primary | ICD-10-CM

## 2021-02-12 PROCEDURE — 99207 PR MD CERTIFICATION HHA PATIENT: CPT | Performed by: INTERNAL MEDICINE

## 2021-02-12 NOTE — PROGRESS NOTES
Clinic Care Coordination Contact  [facility]/Voicemail       Clinical Data: Care Coordinator Outreach    Outreach attempted x 1.  Left message on pt's son, Eulas, voicemail with call back information. CC SW informed no more calls will be made but was encouraged to call back with update or if there are any questions or concerns.    Plan: Care Coordinator will do no further outreaches at this time.    Lory Durant [facility]  Clinic Care Coordinator  [facility]  [facility]  [phone]  [email or web address]

## 2021-02-22 ENCOUNTER — MEDICAL CORRESPONDENCE (OUTPATIENT)
Dept: HEALTH INFORMATION MANAGEMENT | Facility: CLINIC | Age: 81
End: 2021-02-22

## 2021-02-22 ENCOUNTER — TELEPHONE (OUTPATIENT)
Dept: FAMILY MEDICINE | Facility: CLINIC | Age: 81
End: 2021-02-22

## 2021-02-22 NOTE — TELEPHONE ENCOUNTER
Reason for Call:  Home Health Care    Joanne with [facility] called regarding (reason for call): Orders    Orders are needed for this patient. PT    PT: Out patient PT for Balance training     Phone Number Homecare Nurse can be reached at: [phone]    Can we leave a detailed message on this number? YES      Call taken on 2/22/2021 at 3:47 PM by Nkechi Morel

## 2021-02-22 NOTE — TELEPHONE ENCOUNTER
Dr. Josue are you ok for Outpt PT.  This should be different than homecare PT.  We can call back w verbal, but if they are needing actual order, will pend for you.

## 2021-02-23 NOTE — TELEPHONE ENCOUNTER
Verbal approval given for the homecare request below. Homecare/Hospice agency to fax orders for provider signature.  Belen Garcia RN  [facility]

## 2021-02-27 ENCOUNTER — MEDICAL CORRESPONDENCE (OUTPATIENT)
Dept: HEALTH INFORMATION MANAGEMENT | Facility: CLINIC | Age: 81
End: 2021-02-27

## 2021-03-01 ENCOUNTER — MEDICAL CORRESPONDENCE (OUTPATIENT)
Dept: HEALTH INFORMATION MANAGEMENT | Facility: CLINIC | Age: 81
End: 2021-03-01

## 2021-03-10 ENCOUNTER — RECORDS - HEALTHEAST (OUTPATIENT)
Dept: LAB | Facility: CLINIC | Age: 81
End: 2021-03-10

## 2021-03-11 LAB — POTASSIUM BLD-SCNC: 3.8 MMOL/L (ref 3.5–5)

## 2021-03-31 ENCOUNTER — APPOINTMENT (OUTPATIENT)
Dept: CT IMAGING | Facility: CLINIC | Age: 81
End: 2021-03-31
Attending: EMERGENCY MEDICINE
Payer: COMMERCIAL

## 2021-03-31 ENCOUNTER — HOSPITAL ENCOUNTER (EMERGENCY)
Facility: CLINIC | Age: 81
Discharge: HOME | End: 2021-03-31
Attending: EMERGENCY MEDICINE | Admitting: EMERGENCY MEDICINE
Payer: COMMERCIAL

## 2021-03-31 VITALS
BODY MASS INDEX: 30.73 KG/M2 | HEART RATE: 101 BPM | TEMPERATURE: 99 F | SYSTOLIC BLOOD PRESSURE: 144 MMHG | HEIGHT: 64 IN | WEIGHT: 180 LBS | DIASTOLIC BLOOD PRESSURE: 99 MMHG | RESPIRATION RATE: 16 BRPM | OXYGEN SATURATION: 96 %

## 2021-03-31 DIAGNOSIS — S00.03XA HEMATOMA OF SCALP, INITIAL ENCOUNTER: ICD-10-CM

## 2021-03-31 PROCEDURE — 70450 CT HEAD/BRAIN W/O DYE: CPT

## 2021-03-31 PROCEDURE — 99284 EMERGENCY DEPT VISIT MOD MDM: CPT | Mod: 25

## 2021-03-31 ASSESSMENT — ENCOUNTER SYMPTOMS
NUMBNESS: 0
WEAKNESS: 0
BACK PAIN: 0
NECK PAIN: 0
VOMITING: 0
HEADACHES: 1
NAUSEA: 0

## 2021-03-31 ASSESSMENT — MIFFLIN-ST. JEOR: SCORE: 1266.47

## 2021-03-31 NOTE — ED NOTES
Pt presents to ED via EMS from [facility] in [place] with c/o fall. Pt was getting into bed, lost her footing and fell onto a hard floor, striking the back of her head. Swelling noted to the back of her head and Pt c/o pain there. She denies any other pain, no loss of consciousness, not on blood thinners. Pt is AOx4 and ambulates on her own.     Pt son Erich is her POA.

## 2021-03-31 NOTE — ED NOTES
Pt dressed for transport back home. She is resting in bed, awaiting [facility] for transport. ETA around 1830. Pt provided with juice and crackers per request.

## 2021-03-31 NOTE — ED PROVIDER NOTES
"  History   Chief Complaint:  Fall    The history is provided by the patient.     Elizabeth Joy is a 81 year old, not anticoagulated female who presents via EMS for evaluation of a head injury after a fall. Today, the patient reports falling while trying to get into bed; she reports losing her balance after her feet became tangled in the blankets and denies any loss of consciousness. She does report hitting the back of her head onto the linoleum-type floor. Given the noted head injury, EMS was called to present her to the ED. Here, she reports only the headache. She denies neck pain, back pain, chest pain, or extremity pains. She also denies upper and lower extremity numbness/tingling and she has not vomited since the fall.     Allergies:  Ace Inhibitors    Medications:    Amlodipine   Hydralazine   Seroquel  Prednisone     Past Medical History:    Depression   Dementia  Hypertension  Hyperlipidemia  Insomnia  Lichen sclerosus   Lymphocytic colitis  Polymyalgia rheumatica  CKD, stage 3   History of ventricular tachycardia  Fung-Figueroa syncope     Past Surgical History:    Hysterectomy, bilateral salpingo-oophorectomy   Pilonidal cyst removal      Family History:    Mother: Breast cancer     Social History:  Presents with EMS  Lives at the [facility]    Review of Systems   Cardiovascular: Negative for chest pain.   Gastrointestinal: Negative for nausea and vomiting.   Musculoskeletal: Negative for back pain and neck pain.   Neurological: Positive for headaches. Negative for syncope, weakness and numbness.   All other systems reviewed and are negative.    Physical Exam     Patient Vitals for the past 24 hrs:   BP Temp Temp src Pulse Resp SpO2 Height Weight   03/31/21 1600 -- -- -- -- -- 96 % -- --   03/31/21 1545 -- -- -- -- -- 97 % -- --   03/31/21 1530 -- -- -- -- -- 96 % -- --   03/31/21 1515 (!) 143/83 99  F (37.2  C) Oral 71 16 98 % 1.626 m (5' 4\") 81.6 kg (180 lb)        Physical " Exam  General: Alert and cooperative with exam. Patient in mild distress. Baseline mentation; history of dementia.   Head:  Superior occipital scalp hematoma.   Eyes:  No scleral icterus, PERRL  ENT:  The external nose and ears are normal. The oropharynx is normal and without erythema; mucus membranes are moist. Uvula midline  Neck:  Normal range of motion without rigidity.  CV:  Regular rate and rhythm    No pathologic murmur   Resp:  Breath sounds are clear bilaterally    Non-labored, no retractions or accessory muscle use  GI:  Abdomen is soft, no distension, no tenderness. No peritoneal signs  MS:  No lower extremity edema   Skin:  Warm and dry, No rash or lesions noted.  Neuro: Oriented x 3. No gross motor deficits.    Emergency Department Course     Imaging  Head CT w/o contrast:  1. Posterior scalp contusion/hematoma without calvarial fracture. No evidence for intracranial hemorrhage.  2. Brain parenchymal volume loss with moderate ventricular dilatation and morphologic features that may indicate normal pressure hydrocephalus. Correlate clinically.  Reading per radiology.      Emergency Department Course:    Reviewed:  I reviewed the patient's nursing notes, vitals, past medical records, and Care Everywhere.     Assessments:  1538: I performed an exam of the patient, as documented above. History obtained and plan for ED work up discussed as well.   1646: I reassessed the patient and discussed the results of the work up and recommendations for home.     Disposition:  The patient was discharged to home.     Impression & Plan      Medical Decision Making:   Elizabeth Joy is a 81 year old female who presents for a head injury after a fall. On my exam, she has a superior occipital scalp hematoma without laceration. The differential diagnosis includes skull fracture, epidural hematoma, subdural hematoma, intracerebral hemorrhage, and traumatic subarachnoid hemorrhage; these diagnoses were not detected during this  "visit on CT imaging.  Despite the normal neuroimaging,  the patient understands that they must return if any \"red flags\" appear/develop in the coming hours/days, as this may represent an indication to perform another CT scan.  I have discussed these \"red flags\" with the patient.  I recommended close follow-up with PCP as needed.  Supportive care discussed.    Diagnosis:     ICD-10-CM    1. Hematoma of scalp, initial encounter  S00.03XA        Scribe Disclosure:  I, Teresa Muniz, am serving as a scribe on 3/31/2021 at 3:38 PM to personally document services performed by Maurice Damico DO based on my observations and the provider's statements to me.      3/31/2021   EMERGENCY DEPARTMENT     Maurice Damico DO  04/01/21 0125    "

## 2021-04-14 ENCOUNTER — RECORDS - HEALTHEAST (OUTPATIENT)
Dept: LAB | Facility: CLINIC | Age: 81
End: 2021-04-14

## 2021-04-15 LAB — POTASSIUM BLD-SCNC: 3.7 MMOL/L (ref 3.5–5)

## 2021-04-23 ENCOUNTER — APPOINTMENT (OUTPATIENT)
Dept: CT IMAGING | Facility: CLINIC | Age: 81
End: 2021-04-23
Attending: EMERGENCY MEDICINE
Payer: COMMERCIAL

## 2021-04-23 ENCOUNTER — HOSPITAL ENCOUNTER (OUTPATIENT)
Facility: CLINIC | Age: 81
Setting detail: OBSERVATION
Discharge: NURSING, REHAB OR LONG-TERM CARE | End: 2021-04-28
Attending: EMERGENCY MEDICINE | Admitting: EMERGENCY MEDICINE
Payer: COMMERCIAL

## 2021-04-23 ENCOUNTER — APPOINTMENT (OUTPATIENT)
Dept: GENERAL RADIOLOGY | Facility: CLINIC | Age: 81
End: 2021-04-23
Attending: EMERGENCY MEDICINE
Payer: COMMERCIAL

## 2021-04-23 DIAGNOSIS — S82.001A CLOSED NONDISPLACED FRACTURE OF RIGHT PATELLA, UNSPECIFIED FRACTURE MORPHOLOGY, INITIAL ENCOUNTER: ICD-10-CM

## 2021-04-23 DIAGNOSIS — S01.01XA LACERATION OF SCALP, INITIAL ENCOUNTER: ICD-10-CM

## 2021-04-23 DIAGNOSIS — N17.9 AKI (ACUTE KIDNEY INJURY) (H): ICD-10-CM

## 2021-04-23 DIAGNOSIS — H04.123 DRY EYES: ICD-10-CM

## 2021-04-23 DIAGNOSIS — E87.20 LACTIC ACIDOSIS: ICD-10-CM

## 2021-04-23 DIAGNOSIS — R52 PAIN: Primary | ICD-10-CM

## 2021-04-23 DIAGNOSIS — N39.0 URINARY TRACT INFECTION WITHOUT HEMATURIA, SITE UNSPECIFIED: ICD-10-CM

## 2021-04-23 LAB
ALBUMIN UR-MCNC: 10 MG/DL
ANION GAP SERPL CALCULATED.3IONS-SCNC: 5 MMOL/L (ref 3–14)
APPEARANCE UR: CLEAR
BASOPHILS # BLD AUTO: 0 10E9/L (ref 0–0.2)
BASOPHILS NFR BLD AUTO: 0.3 %
BILIRUB UR QL STRIP: NEGATIVE
BUN SERPL-MCNC: 37 MG/DL (ref 7–30)
CALCIUM SERPL-MCNC: 9.5 MG/DL (ref 8.5–10.1)
CHLORIDE SERPL-SCNC: 106 MMOL/L (ref 94–109)
CO2 SERPL-SCNC: 29 MMOL/L (ref 20–32)
COLOR UR AUTO: ABNORMAL
CREAT SERPL-MCNC: 1.18 MG/DL (ref 0.52–1.04)
DIFFERENTIAL METHOD BLD: ABNORMAL
EOSINOPHIL # BLD AUTO: 0 10E9/L (ref 0–0.7)
EOSINOPHIL NFR BLD AUTO: 0.1 %
ERYTHROCYTE [DISTWIDTH] IN BLOOD BY AUTOMATED COUNT: 13.3 % (ref 10–15)
GFR SERPL CREATININE-BSD FRML MDRD: 43 ML/MIN/{1.73_M2}
GLUCOSE SERPL-MCNC: 111 MG/DL (ref 70–99)
GLUCOSE UR STRIP-MCNC: NEGATIVE MG/DL
HCT VFR BLD AUTO: 38.3 % (ref 35–47)
HGB BLD-MCNC: 12.5 G/DL (ref 11.7–15.7)
HGB UR QL STRIP: NEGATIVE
IMM GRANULOCYTES # BLD: 0.1 10E9/L (ref 0–0.4)
IMM GRANULOCYTES NFR BLD: 0.6 %
INR PPP: 1.07 (ref 0.86–1.14)
INTERPRETATION ECG - MUSE: NORMAL
KETONES UR STRIP-MCNC: NEGATIVE MG/DL
LABORATORY COMMENT REPORT: NORMAL
LACTATE BLD-SCNC: 1.7 MMOL/L (ref 0.7–2)
LACTATE BLD-SCNC: 3 MMOL/L (ref 0.7–2)
LEUKOCYTE ESTERASE UR QL STRIP: ABNORMAL
LYMPHOCYTES # BLD AUTO: 2.1 10E9/L (ref 0.8–5.3)
LYMPHOCYTES NFR BLD AUTO: 13.7 %
MCH RBC QN AUTO: 30.7 PG (ref 26.5–33)
MCHC RBC AUTO-ENTMCNC: 32.6 G/DL (ref 31.5–36.5)
MCV RBC AUTO: 94 FL (ref 78–100)
MONOCYTES # BLD AUTO: 0.7 10E9/L (ref 0–1.3)
MONOCYTES NFR BLD AUTO: 4.8 %
MUCOUS THREADS #/AREA URNS LPF: PRESENT /LPF
NEUTROPHILS # BLD AUTO: 12.3 10E9/L (ref 1.6–8.3)
NEUTROPHILS NFR BLD AUTO: 80.5 %
NITRATE UR QL: NEGATIVE
NRBC # BLD AUTO: 0 10*3/UL
NRBC BLD AUTO-RTO: 0 /100
PH UR STRIP: 6 PH (ref 5–7)
PLATELET # BLD AUTO: 323 10E9/L (ref 150–450)
POTASSIUM SERPL-SCNC: 4.3 MMOL/L (ref 3.4–5.3)
PROCALCITONIN SERPL-MCNC: <0.05 NG/ML
RBC # BLD AUTO: 4.07 10E12/L (ref 3.8–5.2)
RBC #/AREA URNS AUTO: 2 /HPF (ref 0–2)
SARS-COV-2 RNA RESP QL NAA+PROBE: NEGATIVE
SODIUM SERPL-SCNC: 140 MMOL/L (ref 133–144)
SOURCE: ABNORMAL
SP GR UR STRIP: 1.03 (ref 1–1.03)
SPECIMEN SOURCE: NORMAL
SQUAMOUS #/AREA URNS AUTO: <1 /HPF (ref 0–1)
UROBILINOGEN UR STRIP-MCNC: 0 MG/DL (ref 0–2)
WBC # BLD AUTO: 15.3 10E9/L (ref 4–11)
WBC #/AREA URNS AUTO: 9 /HPF (ref 0–5)

## 2021-04-23 PROCEDURE — 87040 BLOOD CULTURE FOR BACTERIA: CPT | Performed by: EMERGENCY MEDICINE

## 2021-04-23 PROCEDURE — 87635 SARS-COV-2 COVID-19 AMP PRB: CPT | Performed by: EMERGENCY MEDICINE

## 2021-04-23 PROCEDURE — 85025 COMPLETE CBC W/AUTO DIFF WBC: CPT | Performed by: EMERGENCY MEDICINE

## 2021-04-23 PROCEDURE — 87186 SC STD MICRODIL/AGAR DIL: CPT | Performed by: EMERGENCY MEDICINE

## 2021-04-23 PROCEDURE — G0378 HOSPITAL OBSERVATION PER HR: HCPCS

## 2021-04-23 PROCEDURE — 250N000011 HC RX IP 250 OP 636: Performed by: EMERGENCY MEDICINE

## 2021-04-23 PROCEDURE — 70450 CT HEAD/BRAIN W/O DYE: CPT

## 2021-04-23 PROCEDURE — 85610 PROTHROMBIN TIME: CPT | Performed by: EMERGENCY MEDICINE

## 2021-04-23 PROCEDURE — 250N000013 HC RX MED GY IP 250 OP 250 PS 637: Performed by: EMERGENCY MEDICINE

## 2021-04-23 PROCEDURE — 71046 X-RAY EXAM CHEST 2 VIEWS: CPT

## 2021-04-23 PROCEDURE — 81001 URINALYSIS AUTO W/SCOPE: CPT | Performed by: EMERGENCY MEDICINE

## 2021-04-23 PROCEDURE — 87086 URINE CULTURE/COLONY COUNT: CPT | Performed by: EMERGENCY MEDICINE

## 2021-04-23 PROCEDURE — 96365 THER/PROPH/DIAG IV INF INIT: CPT

## 2021-04-23 PROCEDURE — 258N000003 HC RX IP 258 OP 636: Performed by: EMERGENCY MEDICINE

## 2021-04-23 PROCEDURE — 84145 PROCALCITONIN (PCT): CPT | Performed by: EMERGENCY MEDICINE

## 2021-04-23 PROCEDURE — 73562 X-RAY EXAM OF KNEE 3: CPT | Mod: RT

## 2021-04-23 PROCEDURE — 36415 COLL VENOUS BLD VENIPUNCTURE: CPT

## 2021-04-23 PROCEDURE — 99285 EMERGENCY DEPT VISIT HI MDM: CPT | Mod: 25

## 2021-04-23 PROCEDURE — 83605 ASSAY OF LACTIC ACID: CPT | Mod: 91 | Performed by: EMERGENCY MEDICINE

## 2021-04-23 PROCEDURE — 12001 RPR S/N/AX/GEN/TRNK 2.5CM/<: CPT

## 2021-04-23 PROCEDURE — 99220 PR INITIAL OBSERVATION CARE,LEVEL III: CPT | Performed by: PHYSICIAN ASSISTANT

## 2021-04-23 PROCEDURE — 93005 ELECTROCARDIOGRAM TRACING: CPT | Mod: 59

## 2021-04-23 PROCEDURE — C9803 HOPD COVID-19 SPEC COLLECT: HCPCS

## 2021-04-23 PROCEDURE — 80048 BASIC METABOLIC PNL TOTAL CA: CPT | Performed by: EMERGENCY MEDICINE

## 2021-04-23 PROCEDURE — 96361 HYDRATE IV INFUSION ADD-ON: CPT

## 2021-04-23 PROCEDURE — 87088 URINE BACTERIA CULTURE: CPT | Performed by: EMERGENCY MEDICINE

## 2021-04-23 RX ORDER — NALOXONE HYDROCHLORIDE 0.4 MG/ML
0.4 INJECTION, SOLUTION INTRAMUSCULAR; INTRAVENOUS; SUBCUTANEOUS
Status: DISCONTINUED | OUTPATIENT
Start: 2021-04-23 | End: 2021-04-28 | Stop reason: HOSPADM

## 2021-04-23 RX ORDER — POLYETHYLENE GLYCOL 3350 17 G/17G
17 POWDER, FOR SOLUTION ORAL DAILY PRN
Status: ON HOLD | COMMUNITY
End: 2021-08-17

## 2021-04-23 RX ORDER — ACETAMINOPHEN 325 MG/1
975 TABLET ORAL 3 TIMES DAILY
Status: DISCONTINUED | OUTPATIENT
Start: 2021-04-23 | End: 2021-04-28 | Stop reason: HOSPADM

## 2021-04-23 RX ORDER — AMOXICILLIN 250 MG
2 CAPSULE ORAL 2 TIMES DAILY PRN
Status: DISCONTINUED | OUTPATIENT
Start: 2021-04-23 | End: 2021-04-28 | Stop reason: HOSPADM

## 2021-04-23 RX ORDER — ACETAMINOPHEN 325 MG/1
650 TABLET ORAL ONCE
Status: COMPLETED | OUTPATIENT
Start: 2021-04-23 | End: 2021-04-23

## 2021-04-23 RX ORDER — ACETAMINOPHEN 325 MG/1
650 TABLET ORAL EVERY 4 HOURS PRN
Status: ON HOLD | COMMUNITY
End: 2021-04-27

## 2021-04-23 RX ORDER — AMOXICILLIN 250 MG
1 CAPSULE ORAL 2 TIMES DAILY PRN
Status: DISCONTINUED | OUTPATIENT
Start: 2021-04-23 | End: 2021-04-28 | Stop reason: HOSPADM

## 2021-04-23 RX ORDER — NALOXONE HYDROCHLORIDE 0.4 MG/ML
0.2 INJECTION, SOLUTION INTRAMUSCULAR; INTRAVENOUS; SUBCUTANEOUS
Status: DISCONTINUED | OUTPATIENT
Start: 2021-04-23 | End: 2021-04-28 | Stop reason: HOSPADM

## 2021-04-23 RX ORDER — VENLAFAXINE HYDROCHLORIDE 150 MG/1
150 CAPSULE, EXTENDED RELEASE ORAL DAILY
COMMUNITY
End: 2021-06-02

## 2021-04-23 RX ORDER — TRAZODONE HYDROCHLORIDE 50 MG/1
50 TABLET, FILM COATED ORAL
COMMUNITY
End: 2021-06-02

## 2021-04-23 RX ORDER — SENNOSIDES 8.6 MG
2 TABLET ORAL 2 TIMES DAILY PRN
Status: ON HOLD | COMMUNITY
End: 2021-08-17

## 2021-04-23 RX ORDER — PROCHLORPERAZINE 25 MG
12.5 SUPPOSITORY, RECTAL RECTAL EVERY 12 HOURS PRN
Status: DISCONTINUED | OUTPATIENT
Start: 2021-04-23 | End: 2021-04-28 | Stop reason: HOSPADM

## 2021-04-23 RX ORDER — ONDANSETRON 2 MG/ML
4 INJECTION INTRAMUSCULAR; INTRAVENOUS EVERY 6 HOURS PRN
Status: DISCONTINUED | OUTPATIENT
Start: 2021-04-23 | End: 2021-04-28 | Stop reason: HOSPADM

## 2021-04-23 RX ORDER — ONDANSETRON 4 MG/1
4 TABLET, ORALLY DISINTEGRATING ORAL EVERY 6 HOURS PRN
Status: DISCONTINUED | OUTPATIENT
Start: 2021-04-23 | End: 2021-04-28 | Stop reason: HOSPADM

## 2021-04-23 RX ORDER — BISACODYL 10 MG
10 SUPPOSITORY, RECTAL RECTAL DAILY PRN
Status: ON HOLD | COMMUNITY
End: 2021-08-17

## 2021-04-23 RX ORDER — PROCHLORPERAZINE MALEATE 5 MG
5 TABLET ORAL EVERY 6 HOURS PRN
Status: DISCONTINUED | OUTPATIENT
Start: 2021-04-23 | End: 2021-04-28 | Stop reason: HOSPADM

## 2021-04-23 RX ORDER — CEFTRIAXONE 2 G/1
2 INJECTION, POWDER, FOR SOLUTION INTRAMUSCULAR; INTRAVENOUS ONCE
Status: COMPLETED | OUTPATIENT
Start: 2021-04-23 | End: 2021-04-23

## 2021-04-23 RX ORDER — DONEPEZIL HYDROCHLORIDE 5 MG/1
5 TABLET, FILM COATED ORAL AT BEDTIME
Status: ON HOLD | COMMUNITY
End: 2021-08-17

## 2021-04-23 RX ORDER — HYDROMORPHONE HYDROCHLORIDE 1 MG/ML
0.3 INJECTION, SOLUTION INTRAMUSCULAR; INTRAVENOUS; SUBCUTANEOUS
Status: DISCONTINUED | OUTPATIENT
Start: 2021-04-23 | End: 2021-04-27 | Stop reason: ALTCHOICE

## 2021-04-23 RX ORDER — POLYETHYLENE GLYCOL 3350 17 G/17G
17 POWDER, FOR SOLUTION ORAL DAILY PRN
Status: DISCONTINUED | OUTPATIENT
Start: 2021-04-23 | End: 2021-04-28 | Stop reason: HOSPADM

## 2021-04-23 RX ORDER — OXYCODONE HYDROCHLORIDE 5 MG/1
5-10 TABLET ORAL
Status: DISCONTINUED | OUTPATIENT
Start: 2021-04-23 | End: 2021-04-27 | Stop reason: ALTCHOICE

## 2021-04-23 RX ORDER — ONDANSETRON 4 MG/1
4 TABLET, FILM COATED ORAL EVERY 8 HOURS PRN
COMMUNITY
End: 2021-08-06

## 2021-04-23 RX ADMIN — CEFTRIAXONE SODIUM 2 G: 2 INJECTION, POWDER, FOR SOLUTION INTRAMUSCULAR; INTRAVENOUS at 19:50

## 2021-04-23 RX ADMIN — ACETAMINOPHEN 650 MG: 325 TABLET, FILM COATED ORAL at 17:46

## 2021-04-23 RX ADMIN — SODIUM CHLORIDE 1000 ML: 9 INJECTION, SOLUTION INTRAVENOUS at 20:13

## 2021-04-23 ASSESSMENT — ENCOUNTER SYMPTOMS
SHORTNESS OF BREATH: 0
DIARRHEA: 0
NECK PAIN: 0
BACK PAIN: 0
FEVER: 0

## 2021-04-23 NOTE — ED TRIAGE NOTES
Pt presents via EMS from Assisted Living. Pt having repeated falls. Fall head laceration. Bleeding controlled in ED. No blood thinners.

## 2021-04-23 NOTE — ED PROVIDER NOTES
History   Chief Complaint:  Fall       HPI   Elizabeth Joy is a 81 year old female with history of Fung- figueroa syncope, hypercholesterolemia, hypertension and depression who presents with fall. The patient states she fell in the living room of her Condo today and hit her windowsill. She was with a member of the staff when this happened and they did not allow her to get up. She states shew as unsteady when walking. She notes right knee pain after the fall. She denies fever, vomiting, diarrhea, chest pain, neck pain, syncope, back pain, hip pain and shortness of breath.     Review of Systems   Constitutional: Negative for fever.   Respiratory: Negative for shortness of breath.    Cardiovascular: Negative for chest pain.   Gastrointestinal: Negative for diarrhea.   Musculoskeletal: Positive for gait problem. Negative for back pain and neck pain.        Knee pain (+), hip pain (-)   Neurological: Negative for syncope.   All other systems reviewed and are negative.      Allergies:  Ace inhibitors    Medications:  Norvasc  Hydralazine  Potassium chloride Er  Prednisone  Seroquel    Past Medical History:    Depression  Elevated blood sugar  Hypertension  Hypercholesteremia  Insomnia  Lichen sclerosus  Lymphocytic colitis  PMR  Syncope  Ventricular tachycardia   Generalized muscle weakness  Acute cystitis  Infectious encephalopathy  CKD  Fung-Figueroa syncope     Past Surgical History:    Hysterectomy  Keratotomy  Lasix  Pilonidal cyst removed       Family History:    Breast cancer      Social History:  Lives at [facility] in [place]  Arrives via EMS    Physical Exam     Patient Vitals for the past 24 hrs:   BP Temp Temp src Pulse Resp SpO2   04/23/21 2201 134/77 98.6  F (37  C) Oral 72 16 98 %   04/23/21 2115 (!) 181/103 -- -- 83 -- 100 %   04/23/21 2100 (!) 170/104 -- -- 82 -- 99 %   04/23/21 2045 -- -- -- 87 -- 98 %   04/23/21 2015 (!) 161/114 -- -- 83 -- 97 %   04/23/21 1845 (!) 179/99 -- -- 73 -- 97 %   04/23/21  1844 -- -- -- -- -- 100 %   04/23/21 1843 (!) 179/99 -- -- 73 -- 95 %   04/23/21 1734 131/84 -- -- 73 14 99 %   04/23/21 1307 131/61 97.5  F (36.4  C) Temporal 69 14 97 %       Physical Exam  VS: Reviewed per above  HENT: Mucous membranes moist, no nuchal rigidity. Dried blood to Right temporal scalp underlying 3 cm laceration.  Bruising around the bilateral eyes, that appears yellowed and mature.  EYES: sclera anicteric  CV: Rate as noted, regular rhythm.   RESP: Effort normal. Breath sounds are normal bilaterally.  GI: no tenderness/rebound/guarding, not distended.  NEURO: GCS 15, cranial nerves II through XII are intact, 5 out of 5 strength in all 4 extremities, sensation is intact light touch in all 4 extremities  MSK: No deformity of the extremities.  No midline vertebral tenderness to palpation.  Swelling and tenderness about the anterior right knee.  No pain with passive range of motion of the bilateral hips.  SKIN: Warm and dry.      Emergency Department Course   ECG  ECG taken at 1723, ECG read at 1725  Normal sinus rhythm. Normal ECG.   No significant change as compared to prior, dated 11/27/20.  Rate 70 bpm. MA interval 178 ms. QRS duration 86 ms. QT/QTc 408/440 ms. P-R-T axes 73 12 27.     Imaging:    XR Chest 2 Views   Final Result   IMPRESSION: Since 9/17/2020, heart size remains normal. No pleural   effusion, pneumothorax, or abnormal area of consolidation.      KIM HODGE MD      Head CT w/o contrast   Final Result   IMPRESSION: Diffuse cerebral volume loss and cerebral white matter   changes consistent with chronic small vessel ischemic disease. No   evidence for acute intracranial pathology.         Radiation dose for this scan was reduced using automated exposure   control, adjustment of the mA and/or kV according to patient size, or   iterative reconstruction technique      RESHMA HARRISON MD      XR Knee Right 3 Views   Final Result   IMPRESSION: Nondisplaced intra-articular fracture through  the lateral patella facet. Complex knee joint effusion.      Bones are demineralized. Advanced lateral compartment degenerative change. Meniscal chondrocalcinosis.      XR Pelvis 1/2 Views    (Results Pending)       Laboratory:    CBC: WBC 15.3 (H), HGB 12.5,     BMP: Glucose 111 (H), BUN 37 (H), Creatinine 1.18 (H), GFR 43 (L)3 o/w WNL     INR: 1.07     UA with microscopic: Protein 10, Leukocyte Small, WBC/HPF 9 (H), Mucous Present o/w WNL     Blood cultures: Pending    Urine Culture Aerobic Bacterial: Pending    Lactic acid (result time 1933) 3.0 (H)     Lactic acid (result time 2115) 1.7      [facility]    -Laceration Repair    Date/Time: 4/23/2021 7:51 PM  Performed by: Fuad Stock MD  Authorized by: Fuad Stock MD       ANESTHESIA (see MAR for exact dosages):     Anesthesia method:  Local infiltration    Local anesthetic:  Lidocaine 1% WITH epi (5cc)  LACERATION DETAILS     Location:  Scalp    Scalp location:  R temporal    Length (cm):  3    REPAIR TYPE:     Repair type:  Simple      EXPLORATION:     Wound exploration: wound explored through full range of motion and entire depth of wound probed and visualized      TREATMENT:     Area cleansed with:  Saline    Amount of cleaning:  Standard    Irrigation solution:  Sterile saline    Irrigation method:  Pressure wash    SKIN REPAIR     Repair method:  Staples    Number of staples:  7    APPROXIMATION     Approximation:  Close    POST-PROCEDURE DETAILS     Dressing:  Open (no dressing)      PROCEDURE   Patient Tolerance:  Patient tolerated the procedure well with no immediate complications          Emergency Department Course:    Reviewed:    I reviewed nursing notes, vitals and past medical history    Assessments:  1707 I obtained history and examined the patient as noted above.   2012 I rechecked the patient and explained findings.       Consults:   2112 I consulted Dr. Rivera from hospitalist services regarding  patient admission     Interventions:  1746 Acetaminophen, 650 mg, PO  2013 Rocephin, 2 g, IV injection  2013 NS 1L IV Bolus      Disposition:  The patient was admitted to the hospital under the care of Dr. Rivear.       Impression & Plan     Medical Decision Making:  Patient presents to the ER for evaluation of fall.  On arrival vital signs are reassuring.  On exam she has tenderness about the right knee and dried blood with laceration to the right temporal scalp.  No focal neurological deficits.  No other signs of trauma.  CT of the head is negative for acute intracranial process.  X-ray of the knee does show evidence of right patellar fracture.  Patient declined knee immobilizer.  Labs show evidence of leukocytosis to 15 with lactic acidosis of 3.  Urinalysis is abnormal but not definitive for infection.  Patient was given empiric Rocephin.  After 1 L of IV fluids, lactic acidosis resolved.  I suspect underlying NAGI that lactic acidosis is multifactorial from dehydration and fall rather than sepsis.  Chest x-ray is clear as well.  Patient remained stable in the ER prior to admission for further management of fall and weakness and patellar fracture and UTI and NAGI.      Covid-19  Elizabeth Joy was evaluated during a global COVID-19 pandemic, which necessitated consideration that the patient might be at risk for infection with the SARS-CoV-2 virus that causes COVID-19.   Applicable protocols for evaluation were followed during the patient's care.   COVID-19 was considered as part of the patient's evaluation. The plan for testing is:  a test was obtained during this visit.    Diagnosis:    ICD-10-CM    1. Closed nondisplaced fracture of right patella, unspecified fracture morphology, initial encounter  S82.001A Blood culture     Blood culture     Urine Culture     Asymptomatic SARS-CoV-2 COVID-19 Virus (Coronavirus) by PCR     Lactic acid     Procalcitonin     Procalcitonin     CANCELED: Procalcitonin   2.  Laceration of scalp, initial encounter  S01.01XA    3. Urinary tract infection without hematuria, site unspecified  N39.0    4. NAGI (acute kidney injury) (H)  N17.9    5. Lactic acidosis  E87.2        Scribe Disclosure:  I, Butch Knapp, am serving as a scribe at 5:07 PM on 4/23/2021 to document services personally performed by Fuad Stock MD based on my observations and the provider's statements to me.              Fuda Stock MD  04/24/21 0005

## 2021-04-24 ENCOUNTER — APPOINTMENT (OUTPATIENT)
Dept: GENERAL RADIOLOGY | Facility: CLINIC | Age: 81
End: 2021-04-24
Attending: PHYSICIAN ASSISTANT
Payer: COMMERCIAL

## 2021-04-24 ENCOUNTER — APPOINTMENT (OUTPATIENT)
Dept: PHYSICAL THERAPY | Facility: CLINIC | Age: 81
End: 2021-04-24
Attending: PHYSICIAN ASSISTANT
Payer: COMMERCIAL

## 2021-04-24 LAB
ANION GAP SERPL CALCULATED.3IONS-SCNC: 5 MMOL/L (ref 3–14)
BASOPHILS # BLD AUTO: 0 10E9/L (ref 0–0.2)
BASOPHILS NFR BLD AUTO: 0.3 %
BUN SERPL-MCNC: 23 MG/DL (ref 7–30)
CALCIUM SERPL-MCNC: 8.4 MG/DL (ref 8.5–10.1)
CHLORIDE SERPL-SCNC: 107 MMOL/L (ref 94–109)
CO2 SERPL-SCNC: 26 MMOL/L (ref 20–32)
CREAT SERPL-MCNC: 0.93 MG/DL (ref 0.52–1.04)
DIFFERENTIAL METHOD BLD: ABNORMAL
EOSINOPHIL # BLD AUTO: 0.1 10E9/L (ref 0–0.7)
EOSINOPHIL NFR BLD AUTO: 0.6 %
ERYTHROCYTE [DISTWIDTH] IN BLOOD BY AUTOMATED COUNT: 13.3 % (ref 10–15)
GFR SERPL CREATININE-BSD FRML MDRD: 58 ML/MIN/{1.73_M2}
GLUCOSE SERPL-MCNC: 86 MG/DL (ref 70–99)
HCT VFR BLD AUTO: 34.4 % (ref 35–47)
HGB BLD-MCNC: 11.2 G/DL (ref 11.7–15.7)
IMM GRANULOCYTES # BLD: 0.1 10E9/L (ref 0–0.4)
IMM GRANULOCYTES NFR BLD: 0.6 %
LYMPHOCYTES # BLD AUTO: 2.9 10E9/L (ref 0.8–5.3)
LYMPHOCYTES NFR BLD AUTO: 20.5 %
MCH RBC QN AUTO: 31.2 PG (ref 26.5–33)
MCHC RBC AUTO-ENTMCNC: 32.6 G/DL (ref 31.5–36.5)
MCV RBC AUTO: 96 FL (ref 78–100)
MONOCYTES # BLD AUTO: 1.2 10E9/L (ref 0–1.3)
MONOCYTES NFR BLD AUTO: 8.3 %
NEUTROPHILS # BLD AUTO: 9.9 10E9/L (ref 1.6–8.3)
NEUTROPHILS NFR BLD AUTO: 69.7 %
NRBC # BLD AUTO: 0 10*3/UL
NRBC BLD AUTO-RTO: 0 /100
PLATELET # BLD AUTO: 300 10E9/L (ref 150–450)
POTASSIUM SERPL-SCNC: 3.4 MMOL/L (ref 3.4–5.3)
RBC # BLD AUTO: 3.59 10E12/L (ref 3.8–5.2)
SODIUM SERPL-SCNC: 138 MMOL/L (ref 133–144)
WBC # BLD AUTO: 14.2 10E9/L (ref 4–11)

## 2021-04-24 PROCEDURE — 97530 THERAPEUTIC ACTIVITIES: CPT | Mod: GP

## 2021-04-24 PROCEDURE — 36415 COLL VENOUS BLD VENIPUNCTURE: CPT | Performed by: INTERNAL MEDICINE

## 2021-04-24 PROCEDURE — 250N000013 HC RX MED GY IP 250 OP 250 PS 637: Performed by: PHYSICIAN ASSISTANT

## 2021-04-24 PROCEDURE — G0378 HOSPITAL OBSERVATION PER HR: HCPCS

## 2021-04-24 PROCEDURE — 36415 COLL VENOUS BLD VENIPUNCTURE: CPT | Performed by: PHYSICIAN ASSISTANT

## 2021-04-24 PROCEDURE — 85025 COMPLETE CBC W/AUTO DIFF WBC: CPT | Performed by: INTERNAL MEDICINE

## 2021-04-24 PROCEDURE — 97161 PT EVAL LOW COMPLEX 20 MIN: CPT | Mod: GP

## 2021-04-24 PROCEDURE — 73502 X-RAY EXAM HIP UNI 2-3 VIEWS: CPT

## 2021-04-24 PROCEDURE — 80048 BASIC METABOLIC PNL TOTAL CA: CPT | Performed by: PHYSICIAN ASSISTANT

## 2021-04-24 PROCEDURE — 250N000013 HC RX MED GY IP 250 OP 250 PS 637: Performed by: HOSPITALIST

## 2021-04-24 PROCEDURE — 99225 PR SUBSEQUENT OBSERVATION CARE,LEVEL II: CPT | Performed by: HOSPITALIST

## 2021-04-24 RX ORDER — CARBOXYMETHYLCELLULOSE SODIUM 5 MG/ML
2 SOLUTION/ DROPS OPHTHALMIC
Status: DISCONTINUED | OUTPATIENT
Start: 2021-04-24 | End: 2021-04-28 | Stop reason: HOSPADM

## 2021-04-24 RX ADMIN — ACETAMINOPHEN 975 MG: 325 TABLET, FILM COATED ORAL at 15:37

## 2021-04-24 RX ADMIN — CARBOXYMETHYLCELLULOSE SODIUM 2 DROP: 5 SOLUTION/ DROPS OPHTHALMIC at 08:06

## 2021-04-24 RX ADMIN — ACETAMINOPHEN 975 MG: 325 TABLET, FILM COATED ORAL at 08:00

## 2021-04-24 RX ADMIN — ACETAMINOPHEN 975 MG: 325 TABLET, FILM COATED ORAL at 21:11

## 2021-04-24 NOTE — PROGRESS NOTES
Observation goals PRIOR TO DISCHARGE     Comments: -diagnostic tests and consults completed and resulted : Partially met, pending ortho notes/ortho consult, PT consult pending  -vital signs normal or at patient baseline : Met  -returns to baseline functional status : Not Met  -safe disposition plan has been identified : Not Met  Nurse to notify provider when observation goals have been met and patient is ready for discharge.         Xray done, external catheter/purewick in place, good appetite, denies pain @ rest. Has not been OOB yet.

## 2021-04-24 NOTE — ED NOTES
[facility]  ED Nurse Handoff Report    ED Chief complaint: Fall      ED Diagnosis:   Final diagnoses:   None       Code Status: See H&P    Allergies:   Allergies   Allergen Reactions     Ace Inhibitors Cough       Patient Story: Patient comes from assisted living, she states she has been falling frequently. Laceration to right scalp.  Focused Assessment:  AOx3. AVSS. Right knee pain. Laceration to right scalp. Bruising around eyes from previous fall. Pure wick in place. Knee immobilizer in place.    Abnormal Labs Reviewed   CBC WITH PLATELETS DIFFERENTIAL - Abnormal; Notable for the following components:       Result Value    WBC 15.3 (*)     Absolute Neutrophil 12.3 (*)     All other components within normal limits   BASIC METABOLIC PANEL - Abnormal; Notable for the following components:    Glucose 111 (*)     Urea Nitrogen 37 (*)     Creatinine 1.18 (*)     GFR Estimate 43 (*)     GFR Estimate If Black 50 (*)     All other components within normal limits   ROUTINE UA WITH MICROSCOPIC - Abnormal; Notable for the following components:    Protein Albumin Urine 10 (*)     Leukocyte Esterase Urine Small (*)     WBC Urine 9 (*)     Mucous Urine Present (*)     All other components within normal limits   LACTIC ACID WHOLE BLOOD - Abnormal; Notable for the following components:    Lactic Acid 3.0 (*)     All other components within normal limits         Treatments and/or interventions provided:   Medications   cefTRIAXone (ROCEPHIN) 2 g vial to attach to  ml bag for ADULTS or NS 50 ml bag for PEDS (2 g Intravenous New Bag 4/23/21 1950)   0.9% sodium chloride BOLUS (has no administration in time range)   acetaminophen (TYLENOL) tablet 650 mg (650 mg Oral Given 4/23/21 1746)       Patient's response to treatments and/or interventions: continue to monitor    To be done/followed up on inpatient unit:  continue to monitor    Does this patient have any cognitive concerns?: Short term memory  loss    Activity level - Baseline/Home:  Independent  Activity Level - Current:   Stand with assist x2    Patient's Preferred language: English   Needed?: No    Isolation: None  Infection: Not Applicable  Patient tested for COVID 19 prior to admission: YES  Bariatric?: No    Vital Signs:   Vitals:    04/23/21 1307 04/23/21 1734 04/23/21 1843 04/23/21 1844   BP: 131/61 131/84 (!) 179/99    Pulse: 69 73 73    Resp: 14 14     Temp: 97.5  F (36.4  C)      TempSrc: Temporal      SpO2: 97% 99% 95% 100%       Cardiac Rhythm:     Was the PSS-3 completed:   Yes  What interventions are required if any?               Family Comments: son Erich called, message left.   OBS brochure/video discussed/provided to patient/family: N/A              Name of person given brochure if not patient: n/a              Relationship to patient: n/a    For the majority of the shift this patient's behavior was Green.   Behavioral interventions performed were frequent rounding.    ED NURSE PHONE NUMBER: *[phone]

## 2021-04-24 NOTE — PROGRESS NOTES
Patient is A&O x4, forgetful @ times. Denies chest pain or SOB. Denies pain except tender to touch on R knee. Immobilizer in place on RLE. Taking scheduled tylenol. External catheter in place with adequate output. Will continue to monitor.

## 2021-04-24 NOTE — H&P
[facility]    History and Physical - Hospitalist Service       Date of Admission:  4/23/2021    Assessment & Plan   Elizabeth Joy is a 81 year old female with a past medical history of frequent falls, polymyalgia rheumatica depression and suspected cognitive impairment who presented to the emergency department from her assisted living facility following a fall with head injury.  Patient is registered to observation status for safe discharge planning.    Status post mechanical fall  Closed head injury  Scalp laceration status post repair  Right patella fracture  Patient fell while walking with a member of the staff, hitting her head on a windowsill.  There was no loss of consciousness.  Patient is not on chronic anticoagulation.  She reported right knee as well as hip pain, was also identified to have a scalp laceration.  Right knee x-ray was obtained and revealed lateral patellar intra-articular fracture with complex knee joint infusion.  - Orthopedics consultation  - NWB to RLE with knee immobilizer pending Orthopedics evaluation  - Pain control with scheduled tylenol, PRN oxycodone/dilaudid  - Pelvis XR to rule-out hip injury  - Orthostatic blood pressures  - PT/OT  - Care transitions consult for discharge planning    Leukocytosis  Lactic acidosis  WBC 15.3, Lactate 3. In absence of s/s of infection. UA abnormal however without significant pyuria, neg nitrite. CXR neg. Procalcitonin negative. Likely related to stress demargination and chronic use of steroids. Received IV rocephin x1 in ED for possible UTI. Blood, urine cultures drawn on admission.  - Monitor off Abx  - Follow cultures    Polymyalgia Rheumatica  - PTA prednisone    Suspected cognitive impairment  May require higher level of care, has been referred for cognitive evaluation as outpatient. Most recent SLUMS recorded is 20/30 in 12/2020.  - Delirium protocol  - PTA Aricept    Depression  Pt has been hospitalized in a  [facility] unit for depression with behavioral concerns 01/2021.   - PTA Trazodone, Seroquel, Effexor    Hypertension  - PTA amlodipine, hydralazine  - PRN hydralazine for SBP > 180     Diet:   Regular  DVT Prophylaxis: Pneumatic Compression Devices  Ram Catheter: not present  Code Status:   DNR/DNI         Disposition Plan   Expected discharge: Tomorrow, recommended to transitional care unit once safe disposition plan/ TCU bed available.  Entered: Ziggy Lowe PA-C 04/23/2021, 9:29 PM     The patient's care was discussed with the Attending Physician, Dr. Rivera, Bedside Nurse and Patient.    SRUTHI [facility] [place]  Contact information available via [facility] Paging/Directory      ______________________________________________________________________    Chief Complaint   fall    History is obtained from the patient and electronic health record    History of Present Illness   Elizabeth Joy is a 81 year old female with a past medical history of frequent falls, polymyalgia rheumatica depression and suspected cognitive impairment who presented to the emergency department from her assisted living facility following a fall with head injury. Patient was ambulating with a staff member when she lost her balance and fell into a windowsill and hitting her head. She remembers the event, denies LOC. Due to apparent head wound and fall, EMS was notified and transported patient to [facility] for care.    On arrival, pt was seen by Dr. Stock. She was noted to be hemodynamically stable. Denied headache, c/o right knee and hip pain. Workup included CT head which was negative, as well as right knee imaging which revealed a patellar fracture with associated joint effusion. Additionally, she was noted to have an elevated Creatinine at 1.18, lactic acid 3, and WBC 15.3. She was given 1L IVF and blood/urine cultures obtained. UA was mildly abnormal, therefore she was also given 2g of empiric  "IV Rocephin. CXR was negative. Due to frequent falls with injury and concern for need of higher level of care, a request was made to admit under observation care.    Pt is presently evaluated in hospital room. She denies having any pain at this time. She has significant bruising to her face including eyes and chin which she notes is not new, and has been present for \"a couple of weeks.\" Notes she does not like being in the hospital and would prefer to remain home as much as possible. States she has some mild orthostasis and is told to change positions slowly as a result. Reports she has been in otherwise normal state of health over the past week without fever, chills, cough, sob, cp, abdominal pain, change in bowel/bladder state. No acute concerns for me presently.    Review of Systems    The 10 point Review of Systems is negative other than noted in the HPI or here.     Past Medical History    I have reviewed this patient's medical history and updated it with pertinent information if needed.   Past Medical History:   Diagnosis Date     Depression 08/2020     Elevated blood sugar      HTN (hypertension) 35     Hx of colonoscopy 2008    bx collagenous colitis     Hypercholesteremia      Insomnia     on ambien 10mg 1/2 daily for long time     Lichen sclerosus et atrophicus of the vulva 02/2017    dx by Dr. Weldon, had bx     Lymphocytic colitis 2008    on colon exam at [facility]     PMR (polymyalgia rheumatica) (H) 01/31/2019     Syncope 08/2016    echo trace lvh, nl ef; ziopatch with one 5 beat run of vtach, seen by ep Dr. Allen and nothing found     Ventricular tachycardia (H) 8/16    seen on ziopatch done for syncope, just one 5 beat run       Past Surgical History   I have reviewed this patient's surgical history and updated it with pertinent information if needed.  Past Surgical History:   Procedure Laterality Date     HYSTERECTOMY, PAP NO LONGER INDICATED  1990    had bso also, not for cancer     KERATOTOMY ARCUATE " WITH FEMTOSECOND LASER/IMAGING FOR ATIOL Right 2015    Procedure: KERATOTOMY ARCUATE WITH FEMTOSECOND LASER/IMAGING FOR ATIOL;  Surgeon: Lionel Reza MD;  Location: [facility]     KERATOTOMY ARCUATE WITH FEMTOSECOND LASER/IMAGING FOR ATIOL Right 2015    Procedure: KERATOTOMY ARCUATE WITH FEMTOSECOND LASER/IMAGING FOR ATIOL;  Surgeon: Lionel Reza MD;  Location: [facility]     PHACOEMULSIFICATION CLEAR CORNEA WITH STANDARD INTRAOCULAR LENS IMPLANT Right 2015    Procedure: PHACOEMULSIFICATION CLEAR CORNEA WITH STANDARD INTRAOCULAR LENS IMPLANT;  Surgeon: Lionel Reza MD;  Location:  [facility]     pilonidal cyst removed  30's       Social History   I have reviewed this patient's social history and updated it with pertinent information if needed.  Social History     Tobacco Use     Smoking status: Former Smoker     Packs/day: 1.00     Years: 42.00     Pack years: 42.00     Types: Cigarettes     Start date:      Quit date: 1997     Years since quittin.5     Smokeless tobacco: Never Used   Substance Use Topics     Alcohol use: No     Alcohol/week: 0.0 standard drinks     Frequency: Never     Drug use: No       Family History   I have reviewed this patient's family history and updated it with pertinent information if needed.  Family History   Problem Relation Age of Onset     Breast Cancer Mother        Prior to Admission Medications   Prior to Admission Medications   Prescriptions Last Dose Informant Patient Reported? Taking?   QUEtiapine (SEROQUEL) 25 MG tablet 2021 at [facility] Nursing Home No Yes   Sig: Take 1 tablet (25 mg) by mouth At Bedtime   acetaminophen (TYLENOL) 325 MG tablet 2021 at [facility]7 Nursing Home Yes Yes   Sig: Take 650 mg by mouth every 4 hours as needed for mild pain   amLODIPine (NORVASC) 2.5 MG tablet 2021 at 0732 Nursing Home No Yes   Sig: Take 2 tablets (5 mg) by mouth 2 times daily   bisacodyl (DULCOLAX) 10 MG suppository  at [facility] Nursing [facility] Yes Yes   Sig:  Place 10 mg rectally daily as needed for constipation   calcium carbonate 600 mg-vitamin D 400 units (CALTRATE) 600-400 MG-UNIT per tablet 2021 at [address] [place] [address] Yes Yes   Sig: Take 1 tablet by mouth 2 times daily   donepezil (ARICEPT) 5 MG tablet 2021 at [address] [place] [address] Yes Yes   Sig: Take 5 mg by mouth At Bedtime   hydrALAZINE (APRESOLINE) 10 MG tablet 2021 at [address] [place] [address] No Yes   Sig: Take 1 tablet (10 mg) by mouth 3 times daily   hypromellose (ARTIFICIAL TEARS) 0.5 % SOLN ophthalmic solution  at [facility] Yes Yes   Si drops 4 times daily as needed for dry eyes   ondansetron (ZOFRAN) 4 MG tablet  at [facility] Yes Yes   Sig: Take 4 mg by mouth every 8 hours as needed for nausea   polyethylene glycol (MIRALAX) 17 g packet  at [facility] Yes Yes   Sig: Take 1 packet by mouth daily as needed for constipation   potassium chloride ER (KLOR-CON M) 10 MEQ CR tablet 2021 at [address] [place] [address] Yes Yes   Sig: Take 10 mEq by mouth daily    predniSONE (DELTASONE) 1 MG tablet 2021 at [address] [place] [address] No Yes   Sig: Take 3 of these along with one 5mg for a total daily dose of 8mg   predniSONE (DELTASONE) 5 MG tablet 2021 at [address] [place] [address] No Yes   Sig: TAKE 1 TABLET BY MOUTH DAILY ALONG WITH 3 ONE MG TABLETS FOR A TOTAL DAILY DOSE OF 8MG   sennosides (SENOKOT) 8.6 MG tablet  at [facility] Yes Yes   Sig: Take 2 tablets by mouth 2 times daily as needed for constipation   traZODone (DESYREL) 50 MG tablet 2021 [facility] Yes Yes   Sig: Take 50 mg by mouth at bedtime as needed, may repeat once for sleep   venlafaxine (EFFEXOR-XR) 150 MG 24 hr capsule 2021 at [address] [place] [address] Yes Yes   Sig: Take 150 mg by mouth daily      Facility-Administered Medications: None     Allergies   Allergies   Allergen Reactions     Ace Inhibitors Cough       Physical Exam   Vital Signs: Temp: 97.5  F (36.4  C) Temp src: Temporal BP: (!) 181/103 Pulse: 83   Resp: 14 SpO2:  100 % O2 Device: None (Room air)    Weight: 0 lbs 0 oz    GEN: well-developed, well-nourished, appears comfortable  HEENT: Normocephalic, right temporal laceration repaired with staples, +bilateral racoon eyes, PERRLA, EOM intact bilaterally, sclera clear, conjunctiva normal, nose & mouth patent, chin with healing wound, slight amount of dried blood on band-aid, mucous membranes moist  CHEST: lungs CTA bilaterally, no increased work of breathing, no wheeze, crackles, rhonchi  HEART: RRR, S1 & S2  ABD: soft, nontender, nondistended, no guarding or rigidity, +BS in all 4 quadrants  MSK: AROM bilateral UE, R knee edematous without ecchymosis/erythema, pedal & radial pulses 2+ bilaterally  NEURO: awake, alert, oriented to name, place, and time. CN II-XII grossly intact. Sensation grossly intact to light touch.   SKIN: warm & dry without rash, no pedal edema    Data   Data reviewed today: I reviewed all medications, new labs and imaging results over the last 24 hours. I personally reviewed no images or EKG's today.    Recent Labs   Lab 04/23/21  1732   WBC 15.3*   HGB 12.5   MCV 94      INR 1.07      POTASSIUM 4.3   CHLORIDE 106   CO2 29   BUN 37*   CR 1.18*   ANIONGAP 5   MICHELLE 9.5   *     Recent Results (from the past 24 hour(s))   XR Knee Right 3 Views    Narrative    EXAM: XR KNEE RIGHT 3 VIEWS  LOCATION: [facility]  DATE/TIME: 4/23/2021 4:57 PM    INDICATION: Pain after fall.  COMPARISON: None.      Impression    IMPRESSION: Nondisplaced intra-articular fracture through the lateral patella facet. Complex knee joint effusion.    Bones are demineralized. Advanced lateral compartment degenerative change. Meniscal chondrocalcinosis.   Head CT w/o contrast    Narrative    CT OF THE HEAD WITHOUT CONTRAST 4/23/2021 5:20 PM     COMPARISON: Head CT 3/31/2021    HISTORY: Dried blood to right temporal scalp after fall.    TECHNIQUE: 5 mm thick axial CT images of the head were acquired  without  IV contrast material.    FINDINGS: There is moderate diffuse cerebral volume loss. There are  extensive confluent areas of decreased density in the cerebral white  matter bilaterally that are consistent with sequela of chronic small  vessel ischemic disease.    The ventricles and basal cisterns are within normal limits in  configuration given the degree of cerebral volume loss.  There is no  midline shift. There are no extra-axial fluid collections.    No intracranial hemorrhage, mass or recent infarct.    The visualized paranasal sinuses are well-aerated. There is no  mastoiditis. There are no fractures of the visualized bones.      Impression    IMPRESSION: Diffuse cerebral volume loss and cerebral white matter  changes consistent with chronic small vessel ischemic disease. No  evidence for acute intracranial pathology.      Radiation dose for this scan was reduced using automated exposure  control, adjustment of the mA and/or kV according to patient size, or  iterative reconstruction technique    RESHMA HARRISON MD   XR Chest 2 Views    Narrative    CHEST TWO VIEWS  4/23/2021 8:38 PM     HISTORY: 81-year-old woman with fall and lactic acidosis.       Impression    IMPRESSION: Since 9/17/2020, heart size remains normal. No pleural  effusion, pneumothorax, or abnormal area of consolidation.    KIM HODGE MD

## 2021-04-24 NOTE — PROGRESS NOTES
04/24/21 1455   Quick Adds   Type of Visit Initial PT Evaluation   Living Environment   People in home alone   Current Living Arrangements assisted living   Home Accessibility no concerns   Living Environment Comments Spouse in [place] in a memory care facility. Patient is hopeful for patient to be able to return to home with her. She is currently living at the [facility].   Self-Care   Usual Activity Tolerance moderate   Current Activity Tolerance poor   Equipment Currently Used at Home none   Activity/Exercise/Self-Care Comment Reports being IND at baseline and hating walkers because of how they make her feel.   Disability/Function   Fall history within last six months yes   Number of times patient has fallen within last six months 2  (reason of admit)   Change in Functional Status Since Onset of Current Illness/Injury yes   General Information   Onset of Illness/Injury or Date of Surgery 04/23/21   Referring Physician Ziggy Lowe PA-C   Patient/Family Therapy Goals Statement (PT) To return to home   Pertinent History of Current Problem (include personal factors and/or comorbidities that impact the POC) Per chart: 81 year old female with a PMH of frequent falls, polymyalgia rheumatica depression and suspected cognitive impairment who presented to ED from her assisted living facility following a fall with head injury.  Patient was registered to observation status for safe discharge planning.Patient fell while walking with a member of the staff, hitting her head on a windowsill. There was no loss of consciousness.  Patient is not on chronic anticoagulation.    Existing Precautions/Restrictions fall   Weight-Bearing Status - RLE nonweight-bearing  (with knee immobilizer)   General Observations Greeted patient supine in bed.    Cognition   Orientation Status (Cognition) oriented x 3   Pain Assessment   Patient Currently in Pain No   Integumentary/Edema   Integumentary/Edema Comments scalp laceration   Range of Motion  (ROM)   ROM Comment limited R knee ROM   Strength   Strength Comments functionally weak   Bed Mobility   Comment (Bed Mobility) supine <> EOB at [facility] for R LE management   Transfers   Transfer Safety Comments sit <> stand with FWW at minAx2; poor compliance for NWB restrictions   Gait/Stairs (Locomotion)   Comment (Gait/Stairs) unable to perform NWB technique   Balance   Balance Comments requires use of FWW; unsteady   Sensory Examination   Sensory Perception patient reports no sensory changes   Clinical Impression   Criteria for Skilled Therapeutic Intervention yes, treatment indicated   PT Diagnosis (PT) impaired functional mobility   Influenced by the following impairments NWB R LE; LE weakness; impaired balance   Functional limitations due to impairments bed mobility, transfers, ambulation   Clinical Presentation Stable/Uncomplicated   Clinical Presentation Rationale PMH, current presentation, clinical judgement   Clinical Decision Making (Complexity) low complexity   Therapy Frequency (PT) 3x/week  (while on OBS status)   Predicted Duration of Therapy Intervention (days/wks) 1 week   Planned Therapy Interventions (PT) balance training;bed mobility training;gait training;home exercise program;patient/family education;strengthening;transfer training   Anticipated Equipment Needs at Discharge (PT) walker, rolling   Risk & Benefits of therapy have been explained evaluation/treatment results reviewed;care plan/treatment goals reviewed;patient   PT Discharge Planning    PT Discharge Recommendation (DC Rec) Transitional Care Facility   PT Rationale for DC Rec PT - Patient is below baseline for functional mobility, unable to comply with NWB restrictions to mobilize safely & would benefit from continued physical therapy in the setting of a TCU for improving functional mobility, LE strength and tolerance for functional activities in order to return to baseline of functioning.    Therapy Certification   Start of care date  04/24/21   Certification date from 04/24/21   Certification date to 04/29/21   Total Evaluation Time   Total Evaluation Time (Minutes) 10

## 2021-04-24 NOTE — PLAN OF CARE
Patient is A&O, but forgetful,VSS on RA, CMS intact, denies pain, regular diet, turn and repo Q2H, and purewick in place. R scalp laceration stapled and open to air. Orthostatic BP done this morning, see flowsheet for detail.  Will continue to monitor

## 2021-04-24 NOTE — PROGRESS NOTES
Observation goals PRIOR TO DISCHARGE     Comments: -diagnostic tests and consults completed and resulted : Partially Met, pending PT/OT/ ortho consult  -vital signs normal or at patient baseline : Met  -returns to baseline functional status : No Met  -safe disposition plan has been identified : Not Met  Nurse to notify provider when observation goals have been met and patient is ready for discharge.

## 2021-04-24 NOTE — PLAN OF CARE
[facility]      OUTPATIENT PHYSICAL THERAPY EVALUATION  PLAN OF TREATMENT FOR OUTPATIENT REHABILITATION  (COMPLETE FOR INITIAL CLAIMS ONLY)  Patient's Last Name, First Name, M.I.  YOB: 1940  BenitaElizabeth  D                        Provider's Name  [facility] Medical Record No.  [id number]                               Onset Date:  04/23/21   Start of Care Date:  04/24/21      Type:     _X_PT   ___OT   ___SLP Medical Diagnosis:                           PT Diagnosis:  impaired functional mobility   Visits from SOC:  1   _________________________________________________________________________________  Plan of Treatment/Functional Goals    Planned Interventions: balance training, bed mobility training, gait training, home exercise program, patient/family education, strengthening, transfer training     Goals: See Physical Therapy Goals on Care Plan in [employer] electronic health record.    Therapy Frequency: 3x/week(while on OBS status)  Predicted Duration of Therapy Intervention: 1 week  _________________________________________________________________________________    I CERTIFY THE NEED FOR THESE SERVICES FURNISHED UNDER        THIS PLAN OF TREATMENT AND WHILE UNDER MY CARE     (Physician co-signature of this document indicates review and certification of the therapy plan).                Certification date from: 04/24/21, Certification date to: 04/29/21    Referring Physician: Ziggy Lowe PA-C            Initial Assessment        See Physical Therapy evaluation dated 04/24/21 in Epic electronic health record.

## 2021-04-24 NOTE — PROGRESS NOTES
[facility]  Hospitalist Progress Note        Kenney Linton MD   04/24/2021        Interval History:        - no fever, lactate 3.0----1.7  - Cr 1.18---0.93 (baseline around 0.8 to 1)  - abnormal UA with small LE, 9 WBC; blood and urine cultures with no growth so far; got a dose of Rocephin in ED; denies any urinary symptoms, will hold further antibiotics for now  - awaiting PT/OT and orthopedics evaluation;  for disposition planning  - Xray Right hip reviewed with no fracture or joint malalignment         Assessment and Plan:        Elizabeth Joy is a 81 year old female with a PMH of frequent falls, polymyalgia rheumatica depression and suspected cognitive impairment who presented to ED from her assisted living facility following a fall with head injury.  Patient was registered to observation status for safe discharge planning.    Patient fell while walking with a member of the staff, hitting her head on a windowsill. There was no loss of consciousness.  Patient is not on chronic anticoagulation.      Status post mechanical fall  Closed head injury  Scalp laceration status post repair  Right patella fracture  - Right knee x-ray was obtained and revealed lateral patellar intra-articular fracture with complex knee joint infusion.  - CT head with nothing acute; scalp laceration was repaired in ED  - awaiting PT/OT and orthopedics evaluation;  for disposition planning  - Xray Right hip reviewed with no fracture or joint malalignment  - NWB to RLE with knee immobilizer pending Orthopedics evaluation  - Pain control with scheduled tylenol, PRN oxycodone/dilaudid  - Pelvis XR to rule-out hip injury     Leukocytosis  Lactic acidosis  abnormal UA  CKD-stage 3  -mild leukocytosis with WBC 15.3---11 is likely secondary to her chronic steroids  - no fever, lactate 3.0----1.7  - abnormal UA with small LE, 9 WBC; blood and urine cultures with no growth so far  - got a dose of  Rocephin in ED; denies any urinary symptoms, will hold further antibiotics for now  - Cr 1.18---0.93 (baseline around 0.8 to 1)     Polymyalgia Rheumatica  - PTA prednisone     Suspected cognitive impairment  May require higher level of care, has been referred for cognitive evaluation as outpatient. Most recent SLUMS recorded is 20/30 in 12/2020.  - Delirium protocol  - PTA Aricept     Depression  Pt has been hospitalized in a geripsych unit for depression with behavioral concerns 01/2021.   - PTA Trazodone, Seroquel, Effexor     Hypertension  - PTA amlodipine, hydralazine  - PRN hydralazine for SBP > 180        Diet:   Regular  DVT Prophylaxis: Pneumatic Compression Devices  Code Status:   DNR/DNI       Disposition Plan     Expected discharge: 1-2 days pending PT/OT/ Orthopedics eval; SW for disposition                   Physical Exam:      Blood pressure 125/89, pulse 75, temperature 98.2  F (36.8  C), temperature source Oral, resp. rate 16, SpO2 98 %, not currently breastfeeding.  There were no vitals filed for this visit.  Vital Signs with Ranges  Temp:  [97.5  F (36.4  C)-98.6  F (37  C)] 98.2  F (36.8  C)  Pulse:  [69-87] 75  Resp:  [14-16] 16  BP: (125-181)/() 125/89  SpO2:  [95 %-100 %] 98 %  I/O's Last 24 hours  I/O last 3 completed shifts:  In: -   Out: 750 [Urine:750]    Constitutional: Alert, awake and oriented X 3; resting comfortably in no apparent distress   HEENT: Pupils equal and reactive to light and accomodation, neck supple ; facial bruises noted   Oral cavity: Moist mucosa   Cardiovascular: Normal s1 s2, regular rate and rhythm, no murmur   Lungs: B/l clear to auscultation, no wheezes or crepitations   Abdomen: Soft, nt, nd, no guarding, rigidity or rebound; BS +   LE : No edema   Musculoskeletal: Power 5/5 in all extremities   Neuro: No focal neurological deficits noted   Psychiatry: normal mood and affect                Medications:          acetaminophen  975 mg Oral TID     PRN Meds:  artificial tears ophthalmic solution, HYDROmorphone, melatonin, naloxone **OR** naloxone **OR** naloxone **OR** naloxone, ondansetron **OR** ondansetron, oxyCODONE, polyethylene glycol, prochlorperazine **OR** prochlorperazine **OR** prochlorperazine, senna-docusate **OR** senna-docusate         Data:      All new lab and imaging data was reviewed.   Recent Labs   Lab Test 04/23/21  1732 11/30/20  1157 11/29/20  1231   WBC 15.3* 10.9 10.0   HGB 12.5 12.5 12.7   MCV 94 93 94    323 334   INR 1.07  --   --       Recent Labs   Lab Test 04/23/21  1732 11/30/20  1157 11/29/20  1231    140 143   POTASSIUM 4.3 4.1 3.6   CHLORIDE 106 109 111*   CO2 29 23 23   BUN 37* 26 20   CR 1.18* 0.98 0.89   ANIONGAP 5 8 9   MICHELLE 9.5 9.0 9.0   * 136* 143*     Recent Labs   Lab Test 11/27/20  1140 09/17/20  1322 09/04/20  0946   TROPI <0.015 <0.015 0.086*

## 2021-04-24 NOTE — PHARMACY-ADMISSION MEDICATION HISTORY
Pharmacy Medication History  Admission medication history interview status for the 4/23/2021  admission is complete. See EPIC admission navigator for prior to admission medications     Location of Interview: Phone  Medication history sources: MAR ([facility])    Medication reconciliation completed by provider prior to medication history? No    Time spent in this activity: 20 minutes    Prior to Admission medications    Medication Sig Last Dose Taking? Auth Provider   acetaminophen (TYLENOL) 325 MG tablet Take 650 mg by mouth every 4 hours as needed for mild pain 4/17/2021 at 1337 Yes Unknown, Entered By History   amLODIPine (NORVASC) 2.5 MG tablet Take 2 tablets (5 mg) by mouth 2 times daily 4/23/2021 at 0732 Yes Radha Lopez MD   bisacodyl (DULCOLAX) 10 MG suppository Place 10 mg rectally daily as needed for constipation  at PRN Yes Unknown, Entered By History   calcium carbonate 600 mg-vitamin D 400 units (CALTRATE) 600-400 MG-UNIT per tablet Take 1 tablet by mouth 2 times daily 4/23/2021 at 0732 Yes Unknown, Entered By History   donepezil (ARICEPT) 5 MG tablet Take 5 mg by mouth At Bedtime 4/23/2021 at 0732 Yes Unknown, Entered By History   hydrALAZINE (APRESOLINE) 10 MG tablet Take 1 tablet (10 mg) by mouth 3 times daily 4/23/2021 at 0732 Yes Radha Lopez MD   hypromellose (ARTIFICIAL TEARS) 0.5 % SOLN ophthalmic solution 2 drops 4 times daily as needed for dry eyes  at PRN Yes Unknown, Entered By History   ondansetron (ZOFRAN) 4 MG tablet Take 4 mg by mouth every 8 hours as needed for nausea  at PRN Yes Unknown, Entered By History   polyethylene glycol (MIRALAX) 17 g packet Take 1 packet by mouth daily as needed for constipation  at PRN Yes Unknown, Entered By History   potassium chloride ER (KLOR-CON M) 10 MEQ CR tablet Take 10 mEq by mouth daily  4/23/2021 at 0732 Yes Unknown, Entered By History   predniSONE (DELTASONE) 1 MG tablet Take 3 of these along with one 5mg for a total  daily dose of 8mg 4/23/2021 at 0732 Yes Marky Josue MD   predniSONE (DELTASONE) 5 MG tablet TAKE 1 TABLET BY MOUTH DAILY ALONG WITH 3 ONE MG TABLETS FOR A TOTAL DAILY DOSE OF 8MG 4/23/2021 at 0732 Yes Marky Josue MD   QUEtiapine (SEROQUEL) 25 MG tablet Take 1 tablet (25 mg) by mouth At Bedtime 4/22/2021 at 2000 Yes Alexis Cash MD   sennosides (SENOKOT) 8.6 MG tablet Take 2 tablets by mouth 2 times daily as needed for constipation  at PRN Yes Unknown, Entered By History   traZODone (DESYREL) 50 MG tablet Take 50 mg by mouth at bedtime as needed, may repeat once for sleep 2/1/2021 Yes Unknown, Entered By History   venlafaxine (EFFEXOR-XR) 150 MG 24 hr capsule Take 150 mg by mouth daily 4/23/2021 at 0732 Yes Unknown, Entered By History       The information provided in this note is only as accurate as the sources available at the time of update(s)

## 2021-04-24 NOTE — PROVIDER NOTIFICATION
Paged hospitalist: xray tech not sure if the order is for pelvis or hip. The order shows pelvis which will not show any hip picture. Awaiting for call back.

## 2021-04-25 PROCEDURE — 250N000012 HC RX MED GY IP 250 OP 636 PS 637: Performed by: HOSPITALIST

## 2021-04-25 PROCEDURE — G0378 HOSPITAL OBSERVATION PER HR: HCPCS

## 2021-04-25 PROCEDURE — 250N000013 HC RX MED GY IP 250 OP 250 PS 637: Performed by: HOSPITALIST

## 2021-04-25 PROCEDURE — 250N000013 HC RX MED GY IP 250 OP 250 PS 637: Performed by: PHYSICIAN ASSISTANT

## 2021-04-25 PROCEDURE — 99226 PR SUBSEQUENT OBSERVATION CARE,LEVEL III: CPT | Performed by: HOSPITALIST

## 2021-04-25 RX ORDER — QUETIAPINE FUMARATE 25 MG/1
25 TABLET, FILM COATED ORAL AT BEDTIME
Status: DISCONTINUED | OUTPATIENT
Start: 2021-04-25 | End: 2021-04-28 | Stop reason: HOSPADM

## 2021-04-25 RX ORDER — DONEPEZIL HYDROCHLORIDE 5 MG/1
5 TABLET, FILM COATED ORAL AT BEDTIME
Status: DISCONTINUED | OUTPATIENT
Start: 2021-04-25 | End: 2021-04-28 | Stop reason: HOSPADM

## 2021-04-25 RX ORDER — PREDNISONE 5 MG/1
5 TABLET ORAL DAILY
Status: DISCONTINUED | OUTPATIENT
Start: 2021-04-25 | End: 2021-04-28 | Stop reason: HOSPADM

## 2021-04-25 RX ORDER — SULFAMETHOXAZOLE/TRIMETHOPRIM 800-160 MG
1 TABLET ORAL 2 TIMES DAILY
Status: COMPLETED | OUTPATIENT
Start: 2021-04-25 | End: 2021-04-28

## 2021-04-25 RX ORDER — TRAZODONE HYDROCHLORIDE 50 MG/1
50 TABLET, FILM COATED ORAL
Status: DISCONTINUED | OUTPATIENT
Start: 2021-04-25 | End: 2021-04-28 | Stop reason: HOSPADM

## 2021-04-25 RX ORDER — AMLODIPINE BESYLATE 5 MG/1
5 TABLET ORAL 2 TIMES DAILY
Status: DISCONTINUED | OUTPATIENT
Start: 2021-04-25 | End: 2021-04-28 | Stop reason: HOSPADM

## 2021-04-25 RX ORDER — HYDRALAZINE HYDROCHLORIDE 10 MG/1
10 TABLET, FILM COATED ORAL 3 TIMES DAILY
Status: DISCONTINUED | OUTPATIENT
Start: 2021-04-25 | End: 2021-04-28 | Stop reason: HOSPADM

## 2021-04-25 RX ORDER — ACETAMINOPHEN 325 MG/1
650 TABLET ORAL EVERY 4 HOURS PRN
Status: DISCONTINUED | OUTPATIENT
Start: 2021-04-25 | End: 2021-04-28 | Stop reason: HOSPADM

## 2021-04-25 RX ORDER — VENLAFAXINE HYDROCHLORIDE 150 MG/1
150 CAPSULE, EXTENDED RELEASE ORAL DAILY
Status: DISCONTINUED | OUTPATIENT
Start: 2021-04-25 | End: 2021-04-28 | Stop reason: HOSPADM

## 2021-04-25 RX ORDER — POTASSIUM CHLORIDE 750 MG/1
10 TABLET, EXTENDED RELEASE ORAL DAILY
Status: DISCONTINUED | OUTPATIENT
Start: 2021-04-25 | End: 2021-04-28 | Stop reason: HOSPADM

## 2021-04-25 RX ADMIN — SULFAMETHOXAZOLE AND TRIMETHOPRIM 1 TABLET: 800; 160 TABLET ORAL at 21:35

## 2021-04-25 RX ADMIN — PREDNISONE 5 MG: 5 TABLET ORAL at 14:09

## 2021-04-25 RX ADMIN — VENLAFAXINE HYDROCHLORIDE 150 MG: 150 CAPSULE, EXTENDED RELEASE ORAL at 14:10

## 2021-04-25 RX ADMIN — POTASSIUM CHLORIDE 10 MEQ: 750 TABLET, EXTENDED RELEASE ORAL at 14:10

## 2021-04-25 RX ADMIN — HYDRALAZINE HYDROCHLORIDE 10 MG: 10 TABLET ORAL at 21:35

## 2021-04-25 RX ADMIN — AMLODIPINE BESYLATE 5 MG: 5 TABLET ORAL at 21:36

## 2021-04-25 RX ADMIN — ACETAMINOPHEN 975 MG: 325 TABLET, FILM COATED ORAL at 15:44

## 2021-04-25 RX ADMIN — DONEPEZIL HYDROCHLORIDE 5 MG: 5 TABLET, FILM COATED ORAL at 21:36

## 2021-04-25 RX ADMIN — PREDNISONE 3 MG: 2.5 TABLET ORAL at 14:47

## 2021-04-25 RX ADMIN — ACETAMINOPHEN 975 MG: 325 TABLET, FILM COATED ORAL at 21:36

## 2021-04-25 RX ADMIN — HYDRALAZINE HYDROCHLORIDE 10 MG: 10 TABLET ORAL at 15:44

## 2021-04-25 RX ADMIN — QUETIAPINE 25 MG: 25 TABLET ORAL at 21:35

## 2021-04-25 NOTE — PLAN OF CARE
OT: OT orders received and reviewed. Per PT note and recommendations, pt planning to discharge to TCU for continued rehab prior to returning home. OT to defer orders to next level of care. Will complete IP OT orders at this time.

## 2021-04-25 NOTE — PLAN OF CARE
Pt A&Ox 4 (forgetful at times). VSS ex Slightly hypertensive. Getting schedule tylenol for pain. Knee immobilizer in place. Purewick in place with good UO. Will continue to monitor.

## 2021-04-25 NOTE — PROGRESS NOTES
[facility]  Hospitalist Progress Note        Kenney Linton MD   04/25/2021        Interval History:        - Evaluated by orthopedics, suggested nonoperative management with nonweightbearing RLE, knee immobilizer  - reports ankle pain with the knee immobilizer; not compliant with knee immobilizer are non- weight bearing status  - therapies rec TCU placement         Assessment and Plan:        Elizabeth Joy is a 81 year old female with a PMH of frequent falls, polymyalgia rheumatica depression and suspected cognitive impairment who presented to ED from her assisted living facility following a fall with head injury.  Patient was registered to observation status for safe discharge planning.    Patient fell while walking with a member of the staff, hitting her head on a windowsill. There was no loss of consciousness.  Patient is not on chronic anticoagulation.      Status post mechanical fall  Closed head injury  Scalp laceration status post repair  Right patella fracture  - Right knee x-ray was obtained and revealed lateral patellar intra-articular fracture with complex knee joint infusion.  - CT head with nothing acute; scalp laceration was repaired in ED  - Xray Right hip reviewed with no fracture or joint malalignment  - Pain control with scheduled tylenol, PRN oxycodone/dilaudid  - Pelvis XR noted with no acute fractures  - Evaluated by orthopedics, suggested nonoperative management with nonweightbearing RLE, knee immobilizer  - reports ankle pain with the knee immobilizer; not compliant with knee immobilizer are non- weight bearing status  - therapies rec TCU placement     Leukocytosis  Lactic acidosis  abnormal UA  CKD-stage 3  - mild leukocytosis with WBC 15.3---14 is likely secondary to her chronic steroids  - no fever, lactate 3.0----1.7  - abnormal UA with small LE, 9 WBC; blood cultures with no growth so far; urine culture growing staph epidermidis, sensitivities pending; does  report some increased urinary frequency  - got a dose of Rocephin in ED; will start Bactrim bid X 3 days pending final urine cultures results   - Cr 1.18---0.93 (baseline around 0.8 to 1)     Polymyalgia Rheumatica  -resume PTA prednisone     Suspected cognitive impairment  Depression  May require higher level of care, has been referred for cognitive evaluation as outpatient. Most recent SLUMS recorded is 20/30 in 12/2020.  - Delirium protocol  - resume PTA Aricept, Trazodone, Seroquel, Effexor  - pt has been hospitalized in a geripsych unit for depression with behavioral concerns 01/2021     Hypertension  - resume PTA amlodipine, hydralazine  - PRN hydralazine for SBP > 180     DVT Prophylaxis: Pneumatic Compression Devices  Code Status:   DNR/DNI       Disposition Plan     Expected discharge: to TCU when bed available; SW following for disposition                    Physical Exam:      Blood pressure (!) 168/105, pulse 68, temperature 97.9  F (36.6  C), temperature source Oral, resp. rate 16, SpO2 95 %, not currently breastfeeding.  There were no vitals filed for this visit.  Vital Signs with Ranges  Temp:  [97.8  F (36.6  C)-99  F (37.2  C)] 97.9  F (36.6  C)  Pulse:  [68-85] 68  Resp:  [16] 16  BP: (115-168)/() 168/105  SpO2:  [94 %-97 %] 95 %  I/O's Last 24 hours  I/O last 3 completed shifts:  In: 1280 [P.O.:1280]  Out: 1500 [Urine:1500]    Constitutional: Alert, awake and oriented ; irritable mood; resting comfortably in no apparent distress   HEENT: Pupils equal and reactive to light and accomodation, neck supple ; facial bruises noted   Oral cavity: Moist mucosa   Cardiovascular: Normal s1 s2, regular rate and rhythm, no murmur   Lungs: B/l clear to auscultation, no wheezes or crepitations   Abdomen: Soft, nt, nd, no guarding, rigidity or rebound; BS +   LE : No edema; RLE in immobilizer    Musculoskeletal: Power 5/5 in all extremities   Neuro: No focal neurological deficits noted   Psychiatry:  irritable mood                Medications:          acetaminophen  975 mg Oral TID     PRN Meds: artificial tears ophthalmic solution, HYDROmorphone, melatonin, naloxone **OR** naloxone **OR** naloxone **OR** naloxone, ondansetron **OR** ondansetron, oxyCODONE, polyethylene glycol, prochlorperazine **OR** prochlorperazine **OR** prochlorperazine, senna-docusate **OR** senna-docusate         Data:      All new lab and imaging data was reviewed.   Recent Labs   Lab Test 04/24/21  1209 04/23/21  1732 11/30/20  1157   WBC 14.2* 15.3* 10.9   HGB 11.2* 12.5 12.5   MCV 96 94 93    323 323   INR  --  1.07  --       Recent Labs   Lab Test 04/24/21  0747 04/23/21  1732 11/30/20  1157    140 140   POTASSIUM 3.4 4.3 4.1   CHLORIDE 107 106 109   CO2 26 29 23   BUN 23 37* 26   CR 0.93 1.18* 0.98   ANIONGAP 5 5 8   MICHELLE 8.4* 9.5 9.0   GLC 86 111* 136*     Recent Labs   Lab Test 11/27/20  1140 09/17/20  1322 09/04/20  0946   TROPI <0.015 <0.015 0.086*

## 2021-04-25 NOTE — PROGRESS NOTES
Observation goals PRIOR TO DISCHARGE     Comments: -diagnostic tests and consults completed and resulted : Partially Met, SW pending  -vital signs normal or at patient baseline : Met  -returns to baseline functional status : Not Met  -safe disposition plan has been identified : Not Met  Nurse to notify provider when observation goals have been met and patient is ready for discharge.            Pt complained of R ankle pain due to immobilizer, removed for comfort in bed and applied ice. Refused orthostatic BP.

## 2021-04-25 NOTE — CONSULTS
Care Management Initial Consult    General Information  Assessment completed with: patient at bedside        Primary Care Provider verified and updated as needed:     Readmission within the last 30 days:  n/a          Advance Care Planning:     none       Communication Assessment  Patient's communication style: spoken language (English or Bilingual)    Hearing Difficulty or Deaf: no   Wear Glasses or Blind: no    Cognitive  Cognitive/Neuro/Behavioral: WDL                      Living Environment:   People in home:     No one  Current living Arrangements: assisted living      Able to return to prior arrangements:  recommending TCU       Family/Social Support:  Care provided by:  none  Provides care for:  No one.                Description of Support System:    Supportive family and friends.        Current Resources:   Patient receiving home care services:  No services reported.     Community Resources:    Equipment currently used at home: none  Supplies currently used at home:      Employment/Financial:  Employment Status:     Not employed     Financial Concerns:   none          Lifestyle & Psychosocial Needs:  Lifestyle     Physical activity     Days per week: Not on file     Minutes per session: Not on file     Stress: Not at all     Social Needs     Financial resource strain: Not hard at all     Food insecurity     Worry: Never true     Inability: Never true     Transportation needs     Medical: No     Non-medical: No     Socioeconomic History     Marital status:      Spouse name: Not on file     Number of children: 3     Years of education: Not on file     Highest education level: Not on file   Relationships     Social connections     Talks on phone: More than three times a week     Gets together: Never     Attends Hinduism service: Never     Active member of club or organization: No     Attends meetings of clubs or organizations: Never     Relationship status:      Intimate partner violence      Fear of current or ex partner: Not on file     Emotionally abused: Not on file     Physically abused: Not on file     Forced sexual activity: Not on file     Tobacco Use     Smoking status: Former Smoker     Packs/day: 1.00     Years: 42.00     Pack years: 42.00     Types: Cigarettes     Start date:      Quit date: 1997     Years since quittin.5     Smokeless tobacco: Never Used   Substance and Sexual Activity     Alcohol use: No     Alcohol/week: 0.0 standard drinks     Frequency: Never     Drug use: No     Sexual activity: Not Currently       Functional Status:  Prior to admission patient needed assistance:    Patient reports needing no assistance with any ADL's or iADL's at baseline prior to admission. Does not use equipment or supplies at home.           Mental Health Status:      Alert & oriented    Chemical Dependency Status:      Not assessed          Values/Beliefs:  Spiritual, Cultural Beliefs, Mu-ism Practices, Values that affect care:             No concerns    Additional Information:  Writer met with patient at bedside. Per therapy recommending TCU at discharge for continued strength & mobility. Patient agreeable to plan and would like referrals sent to [facility]. Transportation to be setup at discharge, aware of 80 base fee and 5-7/ mile fee. Prefers private room.     Writer sent referrals to [facility] via [facility].     MARTHA Esparza

## 2021-04-25 NOTE — PROGRESS NOTES
Ortho  Right non displaced patella fracture, Non op    Denies pain, SOB, CP, fever/chills  Resting comfortable  NAD, A&O  RLE - KI, right knee TTP  Calves soft, NT  CMS intact    Plan:  Non op nondisplaced patella fracture   PT/OT, NWB RLE, KI   Follow up two weeks for repeat xray    Linda Hollis PA-C  8:22 AM

## 2021-04-25 NOTE — PROGRESS NOTES
Observation goals PRIOR TO DISCHARGE     Comments: -diagnostic tests and consults completed and resulted : Partially Met, pending SW consult  -vital signs normal or at patient baseline : Met  -returns to baseline functional status : Not Met  -safe disposition plan has been identified : Not Met  Nurse to notify provider when observation goals have been met and patient is ready for discharge.          Pt is little upset that she cannot control her bladder, the writer offered to use BSC, pt refused, external catheter in place. Knee immobilizer removed when in bed, complained of ankle pain, education provided to apply KI with transfer/ambulation, pt verbalized the understanding.

## 2021-04-26 LAB
BACTERIA SPEC CULT: ABNORMAL
Lab: ABNORMAL
SPECIMEN SOURCE: ABNORMAL

## 2021-04-26 PROCEDURE — G0378 HOSPITAL OBSERVATION PER HR: HCPCS

## 2021-04-26 PROCEDURE — 250N000012 HC RX MED GY IP 250 OP 636 PS 637: Performed by: HOSPITALIST

## 2021-04-26 PROCEDURE — 250N000013 HC RX MED GY IP 250 OP 250 PS 637: Performed by: HOSPITALIST

## 2021-04-26 PROCEDURE — 250N000013 HC RX MED GY IP 250 OP 250 PS 637: Performed by: PHYSICIAN ASSISTANT

## 2021-04-26 PROCEDURE — 99225 PR SUBSEQUENT OBSERVATION CARE,LEVEL II: CPT | Performed by: HOSPITALIST

## 2021-04-26 PROCEDURE — 99207 PR CDG-CODE CATEGORY CHANGED: CPT | Performed by: HOSPITALIST

## 2021-04-26 RX ADMIN — PREDNISONE 3 MG: 2.5 TABLET ORAL at 08:10

## 2021-04-26 RX ADMIN — AMLODIPINE BESYLATE 5 MG: 5 TABLET ORAL at 08:09

## 2021-04-26 RX ADMIN — SULFAMETHOXAZOLE AND TRIMETHOPRIM 1 TABLET: 800; 160 TABLET ORAL at 20:45

## 2021-04-26 RX ADMIN — DONEPEZIL HYDROCHLORIDE 5 MG: 5 TABLET, FILM COATED ORAL at 20:45

## 2021-04-26 RX ADMIN — ACETAMINOPHEN 975 MG: 325 TABLET, FILM COATED ORAL at 17:06

## 2021-04-26 RX ADMIN — AMLODIPINE BESYLATE 5 MG: 5 TABLET ORAL at 20:44

## 2021-04-26 RX ADMIN — HYDRALAZINE HYDROCHLORIDE 10 MG: 10 TABLET ORAL at 17:06

## 2021-04-26 RX ADMIN — ACETAMINOPHEN 975 MG: 325 TABLET, FILM COATED ORAL at 08:08

## 2021-04-26 RX ADMIN — HYDRALAZINE HYDROCHLORIDE 10 MG: 10 TABLET ORAL at 08:09

## 2021-04-26 RX ADMIN — POTASSIUM CHLORIDE 10 MEQ: 750 TABLET, EXTENDED RELEASE ORAL at 08:09

## 2021-04-26 RX ADMIN — SULFAMETHOXAZOLE AND TRIMETHOPRIM 1 TABLET: 800; 160 TABLET ORAL at 08:08

## 2021-04-26 RX ADMIN — ACETAMINOPHEN 975 MG: 325 TABLET, FILM COATED ORAL at 20:45

## 2021-04-26 RX ADMIN — PREDNISONE 5 MG: 5 TABLET ORAL at 08:09

## 2021-04-26 RX ADMIN — QUETIAPINE 25 MG: 25 TABLET ORAL at 20:45

## 2021-04-26 RX ADMIN — HYDRALAZINE HYDROCHLORIDE 10 MG: 10 TABLET ORAL at 20:44

## 2021-04-26 RX ADMIN — VENLAFAXINE HYDROCHLORIDE 150 MG: 150 CAPSULE, EXTENDED RELEASE ORAL at 08:10

## 2021-04-26 NOTE — OP NOTE
Procedure Date: 04/23/2021    CHIEF COMPLAINT:  Right patellar fracture.    HISTORY OF PRESENT ILLNESS:  The patient is an 81-year-old female who presented to the Emergency Department after a fall in her assisted living facility.  She was ambulating with a staff member when she lost her balance and fell into a window sill, hitting her knee and her head.  She remembers the event.  She denies any loss of consciousness.  She denies any other musculoskeletal complaints today.  She was brought through the Emergency Room in which an x-ray of the knee was obtained, which did show a longitudinal fracture along the lateral facet, which was essentially nondisplaced.  She had a head CT, which was negative.  She again denies any other musculoskeletal complaints today.    PAST MEDICAL HISTORY:  Consistent with depression, hypertension, colitis, history of syncope, and ventricular tachycardia.    PAST SURGICAL HISTORY:  Including a hysterectomy as well as laser surgery for her eyes.    SOCIAL HISTORY:  She is a previous smoker, does not use alcohol.    FAMILY HISTORY:  Breast cancer.    MEDICATIONS:  Please see chart.  I do note she is on:  1.  Seroquel.  2.  Norvasc.  3.  Aricept.    ALLERGIES:  ACE INHIBITORS.    REVIEW OF SYSTEMS:  The patient denies fevers, chills, chest pain, cough, dyspepsia, dysuria.  Denies any other musculoskeletal complaints.    PHYSICAL EXAMINATION:   GENERAL:  On exam today, she is alert, in bed, appears comfortable.   HEENT:  She does have some lacerations around her eyes and at her chin.   LUNGS:  She is breathing comfortably.   ABDOMEN:  Soft.   EXTREMITIES:  Examination of her right knee reveals minimal swelling.  She is point tender directly over the kneecap.  She is able to do a straight leg raise with minimal discomfort.  Unable to bend her, however, past 90 degrees.  This gives her a fair amount of pain, but again her quad strength is good.    DATA REVIEW:  X-ray reviewed shows a nondisplaced  fracture through the lateral facet of the patella.  The patella is well aligned.  Minimal knee joint effusion.    IMPRESSION:  Right knee patellar fracture.    PLAN:  I did discuss with Elizabeth the pathophysiology of the above diagnosis.  I recommend nonsurgical treatment at this time with close followup in roughly 2 weeks for a repeat x-ray to confirm that the fracture continues to be nondisplaced.  My recommendation is a knee immobilizer or a hinged knee brace, which could be locked in extension for ambulation.  She is somewhat reluctant to wear this, but I have asked her to try it at least with physical therapy initially to see if she can mobilize better and that we will have a better chance of getting this to heal quickly, and the healing process will take 4-6 weeks.  We did discuss if the fracture were to displace, she would require a fixation, which she is hoping to avoid any surgical options.  Recommend ice and pain control again with orthopedics followup in roughly 2 weeks.    Erwin Bell MD        D: 2021   T: 2021   MT: WANG    Name:     ELIZABETH LEE  MRN:      [id number]        Account:        [id number]   :      1940           Procedure Date: 2021     Document: [id number]    [facility]  [facility]

## 2021-04-26 NOTE — PROGRESS NOTES
[facility]  Hospitalist Progress Note        Adeola Vera MD   04/26/2021        Interval History:        Denies pain, however evaluation by Orthopedics the knee immobilizer is too long and resultant pain in ankle region, refitted with smaller immobilizer.         Assessment and Plan:        Elizabeth Joy is a 81 year old female with a PMH of frequent falls, polymyalgia rheumatica depression and suspected cognitive impairment who presented to ED from her assisted living facility following a fall with head injury.  Patient was registered to observation status for safe discharge planning.    Patient fell while walking with a member of the staff, hitting her head on a windowsill. There was no loss of consciousness.  Patient is not on chronic anticoagulation.      Status post mechanical fall  Closed head injury  Scalp laceration status post repair  Right patella fracture  - Right knee x-ray was obtained and revealed lateral patellar intra-articular fracture with complex knee joint infusion.  - CT head with nothing acute; scalp laceration was repaired in ED, multiple ecchymoses on face healing, no open wounds on face.  - Xray Right hip reviewed with no fracture or joint malalignment  - Pain control with scheduled tylenol, PRN oxycodone/dilaudid  - Pelvis XR noted with no acute fractures  - Evaluated by orthopedics, suggested nonoperative management, WBAT, knee immobilizer refitted, see above.  -PT recommends TCU placement.     Leukocytosis  Lactic acidosis  abnormal UA  CKD-stage 3  - mild leukocytosis with WBC 15.3---14 is likely secondary to her chronic steroids  - no fever, lactate 3.0----1.7  - abnormal UA with small LE, 9 WBC; blood cultures with no growth so far; urine culture growing staph epidermidis, sensitivities pending; does report some increased urinary frequency  - got a dose of Rocephin in ED; will start Bactrim bid X 3 days, final UC/S sensitive.  Continue plans for 3 days.   -  Cr 1.18---0.93 (baseline around 0.8 to 1)     Polymyalgia Rheumatica  -resume PTA prednisone     Suspected cognitive impairment  Depression  May require higher level of care, has been referred for cognitive evaluation as outpatient. Most recent SLUMS recorded is 20/30 in 12/2020.  - Delirium protocol  - resume PTA Aricept, Trazodone, Seroquel, Effexor.  -Nursing reports no acute behavioral issues.  - pt has been hospitalized in a geripsych unit for depression with behavioral concerns 01/2021     Hypertension  - resume PTA amlodipine, hydralazine  - PRN hydralazine for SBP > 180     DVT Prophylaxis: Pneumatic Compression Devices  Code Status:   DNR/DNI       Disposition Plan     Expected discharge: TCU when bed available; SW following for disposition           I spent >35 minutes on the unit/floor in management of care today; reviewing labs, medications,reviewing interdisciplinary notes; and completing documentation of encounter and orders and Coordinating Care and plan with Nursing and SW regarding  discharge planning             Physical Exam:      Blood pressure 118/75, pulse 63, temperature 98.1  F (36.7  C), temperature source Axillary, resp. rate 16, weight 94.6 kg (208 lb 9 oz), SpO2 97 %, not currently breastfeeding.  Vitals:    04/26/21 0634   Weight: 94.6 kg (208 lb 9 oz)     Vital Signs with Ranges  Temp:  [97.6  F (36.4  C)-98.2  F (36.8  C)] 98.1  F (36.7  C)  Pulse:  [63-86] 63  Resp:  [16-18] 16  BP: (118-143)/(68-88) 118/75  SpO2:  [94 %-98 %] 97 %  I/O's Last 24 hours  I/O last 3 completed shifts:  In: 720 [P.O.:720]  Out: 1825 [Urine:1825]    Constitutional:  NAD, alert, calm, cooperative     HEENT:  Possible ecchymoses on forehead, below both eyes and chin, resolving, no open wounds.  Laceration on scalp sutured.   Oral cavity: Moist mucosa   Cardiovascular: regular rate and rhythm, no murmur   Lungs: B/l clear to auscultation, respirations nonlabored on room air   Abdomen: Soft, nt, nd, no  guarding, rigidity or rebound;   LE : No edema; ROM right knee not tested.  On palpation reports no pain.   Musculoskeletal:  Is all 4 extremities, sensation grossly tested intact.   Neuro: No focal neurological deficits noted   Psychiatry:  awake, affect calm.                Medications:          acetaminophen  975 mg Oral TID     amLODIPine  5 mg Oral BID     donepezil  5 mg Oral At Bedtime     hydrALAZINE  10 mg Oral TID     potassium chloride ER  10 mEq Oral Daily     predniSONE  3 mg Oral Daily     predniSONE  5 mg Oral Daily     QUEtiapine  25 mg Oral At Bedtime     sulfamethoxazole-trimethoprim  1 tablet Oral BID     venlafaxine  150 mg Oral Daily     PRN Meds: acetaminophen, artificial tears ophthalmic solution, HYDROmorphone, melatonin, naloxone **OR** naloxone **OR** naloxone **OR** naloxone, ondansetron **OR** ondansetron, oxyCODONE, polyethylene glycol, prochlorperazine **OR** prochlorperazine **OR** prochlorperazine, senna-docusate **OR** senna-docusate, traZODone         Data:      All new lab and imaging data was reviewed.   Recent Labs   Lab Test 04/24/21  1209 04/23/21  1732 11/30/20  1157   WBC 14.2* 15.3* 10.9   HGB 11.2* 12.5 12.5   MCV 96 94 93    323 323   INR  --  1.07  --       Recent Labs   Lab Test 04/24/21  0747 04/23/21  1732 11/30/20  1157    140 140   POTASSIUM 3.4 4.3 4.1   CHLORIDE 107 106 109   CO2 26 29 23   BUN 23 37* 26   CR 0.93 1.18* 0.98   ANIONGAP 5 5 8   MICHELLE 8.4* 9.5 9.0   GLC 86 111* 136*     Recent Labs   Lab Test 11/27/20  1140 09/17/20  1322 09/04/20  0946   TROPI <0.015 <0.015 0.086*

## 2021-04-26 NOTE — PLAN OF CARE
Observation goals PRIOR TO DISCHARGE     Comments: -diagnostic tests and consults completed and resulted Placement TBD  -vital signs normal or at patient baseline Met  -returns to baseline functional status Not Met  -safe disposition plan has been identified Not Met  Nurse to notify provider when observation goals have been met and patient is ready for discharge.          Orientation: A & O x 4 w/ forgetfulness   VS/ O2/ IV: VSS on RA. IV SL.  Mobility: A1-2 w/ 2ww and RLE knee immobilizer.  Pain Management: Denies pain.  Diet: Regular diet. Adequate intake.  Bowel/ Bladder: Incontinent. Purewick in place. Output adequate. Smear BM.  Skin: Staples to head. Bruising to face.   CMS: Intact.   Discharge/ Plan: Needs TCU placement.

## 2021-04-26 NOTE — PLAN OF CARE
-diagnostic tests and consults completed and resulted partially MET  -vital signs normal or at patient baseline MET  -returns to baseline functional status NOT MET, will need TCU placement  -safe disposition plan has been identified NOT MET, referrals pending    Pt A&Ox4. VSS on RA. CMS intact. KI on. Incontinent overnight, purewich in place. Up with assist of 2, pivot. Pain managed with scheduled tylenol. Bruising to face. IV SL. Discharge pending placement.

## 2021-04-26 NOTE — PROGRESS NOTES
Orthopedics    Right patella fracture    Patient resting in bed.  Denies knee pain at rest.  Knee immobilizer digging in at ankle    Exam:  Skin intact  Moderate right knee effusion present.   NVI, able to dorsi and plantar flex    Plan:  WBAT in knee immobilizer  Current knee immobilizer is too long and the stays are not in appropriate positioning  Change to shorter knee immobilizer   Ice to knee prn   Follow-up with Dr VARGAS Bell at [facility] in 2 weeks for recheck.      Marija Felder PA-C on 4/26/2021 at 11:45 AM

## 2021-04-26 NOTE — PLAN OF CARE
A&O.  VSS, RA.  CMS intact.  Pain managed with tylenol.  Up with 1-2 assist and walker to BSC.  Knee immobilizer in place.  Voiding adequate amount in BSC/purewick.  Discharge pending.

## 2021-04-27 ENCOUNTER — PATIENT OUTREACH (OUTPATIENT)
Dept: CARE COORDINATION | Facility: CLINIC | Age: 81
End: 2021-04-27

## 2021-04-27 PROCEDURE — G0378 HOSPITAL OBSERVATION PER HR: HCPCS

## 2021-04-27 PROCEDURE — 250N000013 HC RX MED GY IP 250 OP 250 PS 637: Performed by: HOSPITALIST

## 2021-04-27 PROCEDURE — 99217 PR OBSERVATION CARE DISCHARGE: CPT | Performed by: HOSPITALIST

## 2021-04-27 PROCEDURE — 250N000012 HC RX MED GY IP 250 OP 636 PS 637: Performed by: HOSPITALIST

## 2021-04-27 PROCEDURE — 250N000013 HC RX MED GY IP 250 OP 250 PS 637: Performed by: PHYSICIAN ASSISTANT

## 2021-04-27 RX ORDER — ACETAMINOPHEN 325 MG/1
975 TABLET ORAL 3 TIMES DAILY
Status: ON HOLD | DISCHARGE
Start: 2021-04-27 | End: 2021-06-07

## 2021-04-27 RX ORDER — CARBOXYMETHYLCELLULOSE SODIUM 5 MG/ML
2 SOLUTION/ DROPS OPHTHALMIC
DISCHARGE
Start: 2021-04-27 | End: 2021-06-02

## 2021-04-27 RX ADMIN — QUETIAPINE 25 MG: 25 TABLET ORAL at 22:01

## 2021-04-27 RX ADMIN — VENLAFAXINE HYDROCHLORIDE 150 MG: 150 CAPSULE, EXTENDED RELEASE ORAL at 08:10

## 2021-04-27 RX ADMIN — SULFAMETHOXAZOLE AND TRIMETHOPRIM 1 TABLET: 800; 160 TABLET ORAL at 08:10

## 2021-04-27 RX ADMIN — PREDNISONE 3 MG: 2.5 TABLET ORAL at 08:10

## 2021-04-27 RX ADMIN — AMLODIPINE BESYLATE 5 MG: 5 TABLET ORAL at 08:10

## 2021-04-27 RX ADMIN — HYDRALAZINE HYDROCHLORIDE 10 MG: 10 TABLET ORAL at 08:10

## 2021-04-27 RX ADMIN — PREDNISONE 5 MG: 5 TABLET ORAL at 08:11

## 2021-04-27 RX ADMIN — ACETAMINOPHEN 650 MG: 325 TABLET, FILM COATED ORAL at 06:57

## 2021-04-27 RX ADMIN — DONEPEZIL HYDROCHLORIDE 5 MG: 5 TABLET, FILM COATED ORAL at 22:01

## 2021-04-27 RX ADMIN — ACETAMINOPHEN 975 MG: 325 TABLET, FILM COATED ORAL at 15:23

## 2021-04-27 RX ADMIN — SULFAMETHOXAZOLE AND TRIMETHOPRIM 1 TABLET: 800; 160 TABLET ORAL at 22:01

## 2021-04-27 RX ADMIN — HYDRALAZINE HYDROCHLORIDE 10 MG: 10 TABLET ORAL at 22:01

## 2021-04-27 RX ADMIN — HYDRALAZINE HYDROCHLORIDE 10 MG: 10 TABLET ORAL at 15:22

## 2021-04-27 RX ADMIN — POTASSIUM CHLORIDE 10 MEQ: 750 TABLET, EXTENDED RELEASE ORAL at 08:10

## 2021-04-27 RX ADMIN — AMLODIPINE BESYLATE 5 MG: 5 TABLET ORAL at 22:01

## 2021-04-27 NOTE — PROGRESS NOTES
Care Management Discharge Note    Discharge Date: 04/27/21(TCU)  Expected Time of Departure: [facility] wheelchair 1545    Discharge Disposition: Transitional Care    Discharge Services: None    Discharge DME: None    Discharge Transportation: health plan transportation    Private pay costs discussed: transportation costs    PAS Confirmation Code: [id number]  Patient/family educated on Medicare website which has current facility and service quality ratings: yes    Education Provided on the Discharge Plan:    Persons Notified of Discharge Plans: yes  Patient/Family in Agreement with the Plan: yes    Handoff Referral Completed: Yes    Additional Information:  Patient accepted to [facility]. [facility] transport scheduled today at 1545 via wheelchair. Patient informed and in agreement. Patient did have questions about discharging home vs TCU but after writer spoke to therapy, patient informed that TCU continues to be recommended. Patient agreed. Orders received for discharge today and faxed to facility. PAS completed.     PAS-RR    D: Per DHS regulation, SW completed and submitted PAS-RR to [place] [employer] Direct Connect via the Senior [employer] Line.  PAS-RR confirmation # is : [id number]    I: SW spoke with patient and they are aware a PAS-RR has been submitted.  SW reviewed with patient that they may be contacted for a follow up appointment within 10 days of hospital discharge if their SNF stay is < 30 days.  Contact information for Senior [employer] Line was also provided.    A: patient verbalized understanding.    P: Further questions may be directed to Senior [employer] Line at #[phone], option #4 for PAS-RR staff.    Update: When transport arrived, patient refused to go to facility. Writer, RNCC Jaci and bedside nurse spoke to patient and she would like new referrals sent to [facility]. Call placed to Angelica to verify reason for decline. Angelica will review. Referrals sent via  MATT.    MARTHA Ariza

## 2021-04-27 NOTE — CONSULTS
Consult Date: 04/23/2021    CHIEF COMPLAINT:  Right patellar fracture.    HISTORY OF PRESENT ILLNESS:  The patient is an 81-year-old female who presented to the Emergency Department after a fall in her assisted living facility.  She was ambulating with a staff member when she lost her balance and fell into a window sill, hitting her knee and her head.  She remembers the event.  She denies any loss of consciousness.  She denies any other musculoskeletal complaints today.  She was brought through the Emergency Room in which an x-ray of the knee was obtained, which did show a longitudinal fracture along the lateral facet, which was essentially nondisplaced.  She had a head CT, which was negative.  She again denies any other musculoskeletal complaints today.    PAST MEDICAL HISTORY:  Consistent with depression, hypertension, colitis, history of syncope, and ventricular tachycardia.    PAST SURGICAL HISTORY:  Including a hysterectomy as well as laser surgery for her eyes.    SOCIAL HISTORY:  She is a previous smoker, does not use alcohol.    FAMILY HISTORY:  Breast cancer.    MEDICATIONS:  Please see chart.  I do note she is on:  1.  Seroquel.  2.  Norvasc.  3.  Aricept.    ALLERGIES:  ACE INHIBITORS.    REVIEW OF SYSTEMS:  The patient denies fevers, chills, chest pain, cough, dyspepsia, dysuria.  Denies any other musculoskeletal complaints.    PHYSICAL EXAMINATION:   GENERAL:  On exam today, she is alert, in bed, appears comfortable.   HEENT:  She does have some lacerations around her eyes and at her chin.   LUNGS:  She is breathing comfortably.   ABDOMEN:  Soft.   EXTREMITIES:  Examination of her right knee reveals minimal swelling.  She is point tender directly over the kneecap.  She is able to do a straight leg raise with minimal discomfort.  Unable to bend her, however, past 90 degrees.  This gives her a fair amount of pain, but again her quad strength is good.    DATA REVIEW:  X-ray reviewed shows a nondisplaced  fracture through the lateral facet of the patella.  The patella is well aligned.  Minimal knee joint effusion.    IMPRESSION:  Right knee patellar fracture.    PLAN:  I did discuss with Elizabeth the pathophysiology of the above diagnosis.  I recommend nonsurgical treatment at this time with close followup in roughly 2 weeks for a repeat x-ray to confirm that the fracture continues to be nondisplaced.  My recommendation is a knee immobilizer or a hinged knee brace, which could be locked in extension for ambulation.  She is somewhat reluctant to wear this, but I have asked her to try it at least with physical therapy initially to see if she can mobilize better and that we will have a better chance of getting this to heal quickly, and the healing process will take 4-6 weeks.  We did discuss if the fracture were to displace, she would require a fixation, which she is hoping to avoid any surgical options.  Recommend ice and pain control again with orthopedics followup in roughly 2 weeks.    Erwin Bell MD        D: 2021   T: 2021   MT: WANG    Name:     ELIZABETH LEEJuanjose  MRN:      [id number]        Account:      [id number]   :      1940           Consult Date: 2021     Document: [id number]    [facility]  [facility]

## 2021-04-27 NOTE — LETTER
[facility]   To:   Guillermo          Please give to facility    From:   OLAMIDE Caputo Care Coordinator [facility]     Patient Name:  Elizabeth Joy  YOB: 1940    Admit date: 4/27/2021      *Information Needed:  Please contact me when the patient will discharge (or if they will move to long term care)- include the discharge date, disposition, and main diagnosis   - If the patient is discharged with home care services, please provide the name of the agency    Also- Please inform me if a care conference is being held.   Phone, Fax or Email with information       Thank You,   RIC Caputo, MSW  Clinic Care Coordinator  [facility] [place] [place], and [place]  Ph: [phone]  [email or web address]

## 2021-04-27 NOTE — PROGRESS NOTES
Clinic Care Coordination Contact  [facility]/Voicemail       Clinical Data: Care Coordinator Outreach    CC SW received a call from pt's son, Philip, informing of pt's fall.    Outreach attempted x 1.  Left message on pt's son voicemail with call back information and requested return call.    Plan: Care Coordinator will try to reach patient again in 10 business days.    Lory Durant [facility]  Clinic Care Coordinator  [facility] Baylee Burt  [facility]  [phone]  [email or web address]

## 2021-04-27 NOTE — DISCHARGE SUMMARY
[facility]    Discharge Summary  Hospitalist    Date of Admission:  4/23/2021  Date of Discharge:  4/27/2021  Discharging Provider: Adeola Vera MD  Date of Service (when I saw the patient): 04/27/21      History of Present Illness   Elizabeth Joy is a 81 year old female with a PMH of frequent falls, polymyalgia rheumatica depression and suspected cognitive impairment who presented to ED from her assisted living facility following a fall with head injury.    Hospital Course   Elizabeth Joy was admitted on 4/23/2021.  The following problems were addressed during her hospitalization:    Status post mechanical fall  Closed head injury  Scalp laceration status post repair  Right patella fracture  - Right knee x-ray was obtained and revealed lateral patellar intra-articular fracture with complex knee joint infusion.  - CT head without acute findings; scalp laceration was repaired in ED, multiple ecchymoses on face healing, no open wounds on face; resolving.  - Xray Right hip reviewed with no fracture or joint malalignment  - Pain control with scheduled tylenol, review of records indicates NO use of PRN oxycodone since admission  - Pelvis XR noted with no acute fractures  - Evaluated by Orthopedics, suggested nonoperative management, WBAT, knee immobilizer refitted; follow-up with Ortho at 2 wks.   -discharge today to TCU; Ortho recommendations as above with follow-up. Site:   Scalp laceration stapled in ED  Instructions:  Daily local inspection and cleansing with staple removal on 4/30/21     Leukocytosis  Lactic acidosis  abnormal UA  CKD-stage 3  - mild leukocytosis with WBC 15.3---14 is likely secondary to her chronic steroids  - no fever, lactate 3.0----1.7  - abnormal UA with small LE, 9 WBC; blood cultures with no growth so far; urine culture growing staph epidermidis,  does report some increased urinary frequency  - got a dose of Rocephin in ED; transitioned to Bactrim bid;  final UC/S sensitive. Received abx for total to 3 day course.    - Cr 1.18---0.93 (baseline around 0.8 to 1)     Polymyalgia Rheumatica  -resume PTA prednisone; take with food/meals     Suspected cognitive impairment  Depression  May require higher level of care, has been referred for cognitive evaluation as outpatient. Most recent SLUMS recorded is 20/30 in 12/2020.  - resume PTA Aricept, Trazodone, Seroquel, Effexor.  -Nursing reports no acute behavioral issues.  - pt has been hospitalized in a geripsych unit for depression with behavioral concerns 01/2021.  -discharge to TCU with OT to advise further discharge planning regarding living situation LOC     Hypertension  - resume PTA amlodipine, hydralazine      Code Status   DNR / DNI       Primary Care Physician   Marky Josue    Physical Exam   Temp: 97.9  F (36.6  C) Temp src: Oral BP: (!) 141/88 Pulse: 67   Resp: 16 SpO2: 95 % O2 Device: None (Room air)    Vitals:    04/26/21 0634   Weight: 94.6 kg (208 lb 9 oz)     Vital Signs with Ranges  Temp:  [97.8  F (36.6  C)-98.6  F (37  C)] 97.9  F (36.6  C)  Pulse:  [63-79] 67  Resp:  [16] 16  BP: (114-141)/(60-88) 141/88  SpO2:  [94 %-98 %] 95 %  I/O last 3 completed shifts:  In: 220 [P.O.:220]  Out: 375 [Urine:375]    Constitutional:   NAD, alert, calm, cooperative      HEENT:  multiple ecchymoses on forehead, below both eyes and chin, resolving, no open wounds.  Laceration on scalp sutured w/o erythema, swelling or open wound   Oral cavity: Moist mucosa   Cardiovascular: regular rate and rhythm, no murmur   Lungs: B/l clear to auscultation, respirations nonlabored on room air   Abdomen: Soft, nt, nd, no guarding, rigidity or rebound;   LE : No edema; ROM right knee not tested.  On palpation reports no pain.   Musculoskeletal:  moves all 4 extremities, sensation grossly tested intact.   Neuro: No focal neurological deficits noted   Psychiatry:  awake, affect calm.      Discharge Disposition   Discharged to short-term  care facility  Condition at discharge: Fair    Consultations This Hospital Stay   ORTHOPEDIC SURGERY IP CONSULT  CARE MANAGEMENT / SOCIAL WORK IP CONSULT  PHYSICAL THERAPY ADULT IP CONSULT  OCCUPATIONAL THERAPY ADULT IP CONSULT    Time Spent on this Encounter   I, Adeola Vera MD, personally saw the patient today and spent greater than 30 minutes discharging this patient discussing discharge plans to TCU with Nursing and SW; completing discharge orders to TCU, medications and follow-up .    Discharge Orders      Reason for your hospital stay    Right patella fracture     Activity - Up with assistive device    Up with walker with knee immobilizer applied     Weight bearing status    WBAT right LE with knee immobilizer applied     Follow Up and recommended labs and tests    Follow up with Dr. Bell at [facility] 2 weeks post surgery.   Call [phone] for appointment and questions.  Main [facility] number [phone]     Additional Discharge Instructions    Keep knee immobilizer applied.  Ok to remove for daily skin checks.     Advance Diet as Tolerated    Follow this diet upon discharge: Orders Placed This Encounter      Regular Diet Adult     Discharge Medications   Current Discharge Medication List      START taking these medications    Details   carboxymethylcellulose PF (REFRESH PLUS) 0.5 % ophthalmic solution Apply 2 drops to eye every hour as needed for dry eyes  Qty:      Associated Diagnoses: Dry eyes         CONTINUE these medications which have CHANGED    Details   acetaminophen (TYLENOL) 325 MG tablet Take 3 tablets (975 mg) by mouth 3 times daily  Qty:      Associated Diagnoses: Pain         CONTINUE these medications which have NOT CHANGED    Details   amLODIPine (NORVASC) 2.5 MG tablet Take 2 tablets (5 mg) by mouth 2 times daily    Associated Diagnoses: Benign essential hypertension      bisacodyl (DULCOLAX) 10 MG suppository Place 10 mg rectally daily as needed for constipation      calcium carbonate  600 mg-vitamin D 400 units (CALTRATE) 600-400 MG-UNIT per tablet Take 1 tablet by mouth 2 times daily      donepezil (ARICEPT) 5 MG tablet Take 5 mg by mouth At Bedtime      hydrALAZINE (APRESOLINE) 10 MG tablet Take 1 tablet (10 mg) by mouth 3 times daily    Associated Diagnoses: Benign essential hypertension      hypromellose (ARTIFICIAL TEARS) 0.5 % SOLN ophthalmic solution 2 drops 4 times daily as needed for dry eyes      ondansetron (ZOFRAN) 4 MG tablet Take 4 mg by mouth every 8 hours as needed for nausea      polyethylene glycol (MIRALAX) 17 g packet Take 1 packet by mouth daily as needed for constipation      potassium chloride ER (KLOR-CON M) 10 MEQ CR tablet Take 10 mEq by mouth daily       !! predniSONE (DELTASONE) 1 MG tablet Take 3 of these along with one 5mg for a total daily dose of 8mg  Qty: 100 tablet, Refills: 4    Associated Diagnoses: PMR (polymyalgia rheumatica) (H)      !! predniSONE (DELTASONE) 5 MG tablet TAKE 1 TABLET BY MOUTH DAILY ALONG WITH 3 ONE MG TABLETS FOR A TOTAL DAILY DOSE OF 8MG  Qty: 90 tablet, Refills: 0    Associated Diagnoses: PMR (polymyalgia rheumatica) (H)      QUEtiapine (SEROQUEL) 25 MG tablet Take 1 tablet (25 mg) by mouth At Bedtime  Qty:      Associated Diagnoses: Current mild episode of major depressive disorder, unspecified whether recurrent (H)      sennosides (SENOKOT) 8.6 MG tablet Take 2 tablets by mouth 2 times daily as needed for constipation      traZODone (DESYREL) 50 MG tablet Take 50 mg by mouth at bedtime as needed, may repeat once for sleep      venlafaxine (EFFEXOR-XR) 150 MG 24 hr capsule Take 150 mg by mouth daily       !! - Potential duplicate medications found. Please discuss with provider.        Allergies   Allergies   Allergen Reactions     Ace Inhibitors Cough

## 2021-04-27 NOTE — PLAN OF CARE
A&O.  VSS, RA.  CMS intact.  Knee immobilizer in place, no skin breakdown noted.  Up with 1 to BS.  Incontinent at times.  Will discharge to TCU around 1600 with HE transport.     Pt refused to discharge to the facility, notified provider.  See SW note.

## 2021-04-27 NOTE — PLAN OF CARE
Patient vital signs are at baseline: Yes  Patient able to ambulate as they were prior to admission or with assist devices provided by therapies during their stay:  Yes  Patient MUST void prior to discharge:  Yes, PW patent and draining  Patient able to tolerate oral intake:  Yes  Pain has adequate pain control using Oral analgesics:  Yes     AOx4, forgetful, VSS on RA, pain controlled by tylenol, scattered bruising noted on face from recent fall, CMS intact, PW patent and draining.Plan to discharge to TCU today.

## 2021-04-27 NOTE — PLAN OF CARE
PT- Attempted to see pt this AM, pt declined any OOB mobility after multiple attempts. Encouragement provided but pt still declined.

## 2021-04-27 NOTE — UTILIZATION REVIEW
"  [facility] Utilization Review  Admission Status; Secondary Review Determination     Admission Date: 4/23/2021  4:32 PM      Under the authority of the Utilization Management Committee, the utilization review process indicated a secondary review on the above patient.  The review outcome is based on review of the medical records, discussions with staff, and applying clinical experience noted on the date of the review.        (X) Observation Status Appropriate - This patient does not meet hospital inpatient criteria and is placed in observation status. If this patient's primary payer is Medicare and was admitted as an inpatient, Condition Code 44 should be used and patient status changed to \"observation\".   () Observation Status concurrent Review           RATIONALE FOR DETERMINATION   81-year-old female with history of frequent falls, polymyalgia rheumatica, depression, cognitive impairment, admitted with fall and head injury.  Patient found to have nondisplaced fracture of right patella, laceration of the scalp with urinary tract infection and acute kidney injury.  Pain control with scheduled Tylenol, oxycodone and as needed Dilaudid.  Orthopedic surgery recommended nonoperative management with weightbearing as tolerated in knee immobilizer.  Patient received 1 dose of IV ceftriaxone in the ED, switched to Bactrim to complete course.  Patient has not requiring as needed medications for pain control, waiting for placement to TCU, does not meet criteria for inpatient stay, recommend continue observation status until bed available at TCU      The severity of illness, intensity of service provided, expected LOS make the care appropriate for observation status at this time.        The information on this document is developed by the utilization review team in order for the business office to ensure compliance.  This only denotes the appropriateness of proper admission status and does not reflect the quality " of care rendered.         The definitions of Inpatient Status and Observation Status used in making the determination above are those provided in the CMS Coverage Manual, Chapter 1 and Chapter 6, section 70.4.      Sincerely,       Kalyn Medel MD  Physician Advisor  Utilization Review-[place]    Phone: [phone]

## 2021-04-28 VITALS
HEART RATE: 75 BPM | OXYGEN SATURATION: 97 % | WEIGHT: 208.56 LBS | BODY MASS INDEX: 35.8 KG/M2 | DIASTOLIC BLOOD PRESSURE: 78 MMHG | SYSTOLIC BLOOD PRESSURE: 144 MMHG | RESPIRATION RATE: 16 BRPM | TEMPERATURE: 98.3 F

## 2021-04-28 PROCEDURE — 250N000013 HC RX MED GY IP 250 OP 250 PS 637: Performed by: HOSPITALIST

## 2021-04-28 PROCEDURE — G0378 HOSPITAL OBSERVATION PER HR: HCPCS

## 2021-04-28 PROCEDURE — 250N000012 HC RX MED GY IP 250 OP 636 PS 637: Performed by: HOSPITALIST

## 2021-04-28 PROCEDURE — 250N000013 HC RX MED GY IP 250 OP 250 PS 637: Performed by: PHYSICIAN ASSISTANT

## 2021-04-28 PROCEDURE — 99225 PR SUBSEQUENT OBSERVATION CARE,LEVEL II: CPT | Performed by: HOSPITALIST

## 2021-04-28 RX ADMIN — SULFAMETHOXAZOLE AND TRIMETHOPRIM 1 TABLET: 800; 160 TABLET ORAL at 08:14

## 2021-04-28 RX ADMIN — PREDNISONE 5 MG: 5 TABLET ORAL at 08:18

## 2021-04-28 RX ADMIN — POTASSIUM CHLORIDE 10 MEQ: 750 TABLET, EXTENDED RELEASE ORAL at 08:15

## 2021-04-28 RX ADMIN — AMLODIPINE BESYLATE 5 MG: 5 TABLET ORAL at 08:15

## 2021-04-28 RX ADMIN — HYDRALAZINE HYDROCHLORIDE 10 MG: 10 TABLET ORAL at 08:16

## 2021-04-28 RX ADMIN — CARBOXYMETHYLCELLULOSE SODIUM 2 DROP: 5 SOLUTION/ DROPS OPHTHALMIC at 01:11

## 2021-04-28 RX ADMIN — ACETAMINOPHEN 975 MG: 325 TABLET, FILM COATED ORAL at 08:15

## 2021-04-28 RX ADMIN — VENLAFAXINE HYDROCHLORIDE 150 MG: 150 CAPSULE, EXTENDED RELEASE ORAL at 08:15

## 2021-04-28 RX ADMIN — PREDNISONE 3 MG: 2.5 TABLET ORAL at 08:17

## 2021-04-28 NOTE — PROGRESS NOTES
Clinic Care Coordination Contact  Care Coordination Transition Communication         Clinical Data: [facility]     Discharge Summary  Hospitalist     Date of Admission:  4/23/2021  Date of Discharge:  4/27/2021    Status post mechanical fall  Closed head injury  Scalp laceration status post repair  Right patella fracture    Transition to Facility:              Facility Name: [facility]              Contact name and phone number/fax: Nilda at [phone]/ fax [phone]    Plan: RN/SW Care Coordinator will await notification from facility staff informing RN/SW Care Coordinator of patient's discharge plans/needs. RN/SW Care Coordinator will review chart and outreach to facility staff every 4 weeks and as needed. Fax sent to TCU with my contact information requesting notification upon discharge.    Lory Durant [facility]  Clinic Care Coordinator  [facility] Baylee Porter  [facility]  [phone]  [email or web address]

## 2021-04-28 NOTE — PROVIDER NOTIFICATION
"MD Notification    Notified Person: MD    Notified Person Name: Mina Montano    Notification Date/Time: 4/27/21 @ 6464    Notification Interaction: web page     Purpose of Notification: \" Pt c/o dry eyes -- may you please  place an order for eye drops?\"     Orders Received: Give carboxymethylcellulose PF (Refresh Plus) 0.5% ophthalmic solution 2 drops to affected eyes  Q1H PRN    Comments:    "

## 2021-04-28 NOTE — PLAN OF CARE
Patient vital signs are at baseline: Yes  Patient able to ambulate as they were prior to admission or with assist devices provided by therapies during their stay:  Yes  Patient MUST void prior to discharge:  Yes  Patient able to tolerate oral intake:  Yes  Pain has adequate pain control using Oral analgesics:  Yes     AOx4, pleasant, forgetful, VSS on RA, denies pain, CMS intact, knee immobilizer remains in place,  pericare done, brief and PW changed x2, no skin breakdown noted, boosts herself in bed, assist of 2 with pillow support and turning side to side, ambulated in room x1 this shift, with SBA. PRN eye drops given for dry eyes. Plan is for pt to discharge to TCU today.

## 2021-04-28 NOTE — PROGRESS NOTES
[facility]  Hospitalist Progress Note        Adeola Vera MD   04/28/2021        Interval History:      Patient feels well.  Patient with concern regarding discharge to TCU, in fact planned discharge yesterday patient cqncelled on her own.  After long discussion with myself and SW patient today Patient was agreeable to discharge to TCU today for ongoing rehabilitation for generalized weakness and right patellar fracture.  Please see discharge summary 4/27/2021 for details, and progress note below, no interim changes on discharge plans and recommendations.         Assessment and Plan:        Elizabeth Joy is a 81 year old female with a PMH of frequent falls, polymyalgia rheumatica depression and suspected cognitive impairment who presented to ED from her assisted living facility following a fall with head injury.  Patient was registered to observation status for safe discharge planning.    Patient fell while walking with a member of the staff, hitting her head on a windowsill. There was no loss of consciousness.  Patient is not on chronic anticoagulation.      Status post mechanical fall  Closed head injury  Scalp laceration status post repair  Right patella fracture  - Right knee x-ray was obtained and revealed lateral patellar intra-articular fracture with complex knee joint infusion.  - CT head with nothing acute; scalp laceration was repaired in ED, multiple ecchymoses on face healing, no open wounds on face.  - Xray Right hip reviewed with no fracture or joint malalignment  - Pain control with scheduled tylenol, PRN oxycodone/dilaudid  - Pelvis XR noted with no acute fractures  - Evaluated by orthopedics, suggested nonoperative management, WBAT, knee immobilizer refitted, see above.  -PT recommends TCU placement.  Discharge to TCU 4/28/2021     Leukocytosis  Lactic acidosis  abnormal UA  CKD-stage 3  - mild leukocytosis with WBC 15.3---14 is likely secondary to her chronic steroids  -  no fever, lactate 3.0----1.7  - abnormal UA with small LE, 9 WBC; blood cultures with no growth so far; urine culture growing staph epidermidis, sensitivities pending; does report some increased urinary frequency  - got a dose of Rocephin in ED; completed Bactrim bid X 3 days, final UC/S sensitive.    - Cr 1.18---0.93 (baseline around 0.8 to 1)     Polymyalgia Rheumatica  -resume PTA prednisone     Suspected cognitive impairment  Depression  May require higher level of care, has been referred for cognitive evaluation as outpatient. Most recent SLUMS recorded is 20/30 in 12/2020.  - Delirium protocol  - resume PTA Aricept, Trazodone, Seroquel, Effexor.  -Nursing reports no acute behavioral issues.  - pt has been hospitalized in a geripsych unit for depression with behavioral concerns 01/2021     Hypertension  - resume PTA amlodipine, hydralazine  - PRN hydralazine for SBP > 180     DVT Prophylaxis: Pneumatic Compression Devices  Code Status:   DNR/DNI                  Physical Exam:      Blood pressure (!) 144/78, pulse 75, temperature 98.3  F (36.8  C), temperature source Oral, resp. rate 16, weight 94.6 kg (208 lb 9 oz), SpO2 97 %, not currently breastfeeding.  Vitals:    04/26/21 0634   Weight: 94.6 kg (208 lb 9 oz)     Vital Signs with Ranges  Temp:  [98  F (36.7  C)-98.9  F (37.2  C)] 98.3  F (36.8  C)  Pulse:  [63-76] 75  Resp:  [16] 16  BP: ()/(56-81) 144/78  SpO2:  [96 %-99 %] 97 %  I/O's Last 24 hours  I/O last 3 completed shifts:  In: 160 [P.O.:160]  Out: 700 [Urine:700]    Constitutional:   NAD, alert, calm, cooperative     HEENT:  multiple ecchymoses on forehead, below both eyes and chin, resolving/nearly resolved, no open wounds.  Laceration on scalp sutured, no surrounding swelling, erythema, intact.   Oral cavity: Moist mucosa   Cardiovascular: regular rate and rhythm, no murmur   Lungs: B/l clear to auscultation, respirations nonlabored on room air   Abdomen: Soft, nt, nd, no guarding, rigidity  or rebound;   LE : No edema; ROM right knee not tested.  Immobilizer on.  On palpation reports no pain.   Musculoskeletal:  moves all 4 extremities, sensation grossly tested intact.   Neuro: No focal neurological deficits noted   Psychiatry:  awake, affect calm      I spent >25 minutes on the unit/floor in management of care today reviewing labs, medications, interdisciplinary notes; and completing documentation of encounter and orders with over 50% of my time was spent Counseling the Patient regarding indications for TCU for ongoing rehabilitation of right patellar fracture and generalized weakness, as outlined above.              Data:      All new lab and imaging data was reviewed.   Recent Labs   Lab Test 04/24/21  1209 04/23/21  1732 11/30/20  1157   WBC 14.2* 15.3* 10.9   HGB 11.2* 12.5 12.5   MCV 96 94 93    323 323   INR  --  1.07  --       Recent Labs   Lab Test 04/24/21  0747 04/23/21  1732 11/30/20  1157    140 140   POTASSIUM 3.4 4.3 4.1   CHLORIDE 107 106 109   CO2 26 29 23   BUN 23 37* 26   CR 0.93 1.18* 0.98   ANIONGAP 5 5 8   MICHELLE 8.4* 9.5 9.0   GLC 86 111* 136*     Recent Labs   Lab Test 11/27/20  1140 09/17/20  1322 09/04/20  0946   TROPI <0.015 <0.015 0.086*

## 2021-04-29 NOTE — PLAN OF CARE
Physical Therapy Discharge Summary    Reason for therapy discharge:    Discharged to transitional care facility.    Progress towards therapy goal(s). See goals on Care Plan in [facility] electronic health record for goal details.  Goals not met.  Barriers to achieving goals:   discharge from facility.    Therapy recommendation(s):    Continued therapy is recommended.  Rationale/Recommendations:  TCU to progress safety and independence with mobility with NWB status.

## 2021-05-26 ENCOUNTER — RECORDS - HEALTHEAST (OUTPATIENT)
Dept: LAB | Facility: CLINIC | Age: 81
End: 2021-05-26

## 2021-05-28 ENCOUNTER — TELEPHONE (OUTPATIENT)
Dept: FAMILY MEDICINE | Facility: CLINIC | Age: 81
End: 2021-05-28

## 2021-05-28 LAB
ANION GAP SERPL CALCULATED.3IONS-SCNC: 14 MMOL/L (ref 5–18)
BUN SERPL-MCNC: 19 MG/DL (ref 8–28)
CALCIUM SERPL-MCNC: 9.1 MG/DL (ref 8.5–10.5)
CHLORIDE BLD-SCNC: 104 MMOL/L (ref 98–107)
CO2 SERPL-SCNC: 26 MMOL/L (ref 22–31)
CREAT SERPL-MCNC: 1.35 MG/DL (ref 0.6–1.1)
GFR SERPL CREATININE-BSD FRML MDRD: 38 ML/MIN/1.73M2
GLUCOSE BLD-MCNC: 118 MG/DL (ref 70–125)
POTASSIUM BLD-SCNC: 3.6 MMOL/L (ref 3.5–5)
SODIUM SERPL-SCNC: 144 MMOL/L (ref 136–145)

## 2021-06-01 ENCOUNTER — PATIENT OUTREACH (OUTPATIENT)
Dept: NURSING | Facility: CLINIC | Age: 81
End: 2021-06-01
Payer: COMMERCIAL

## 2021-06-01 NOTE — PROGRESS NOTES
Clinic Care Coordination Contact    Follow Up Progress Note      Assessment: CC OLAMIDE spoke with pt's son, Philip, regarding pt's overall wellbeing. Philip shared that pt is back to [facility] and she is liking it there. She is struggling to use her walker regularly and falls due to this. He also stated that she mentioned concerns for her blood pressure, he doesn't know if this is contributing to her falls.    CRISS QUIÑONEZ inquired about if pt has transitioned her primary care to [facility]. Philip was unsure but stated due to lack of transportation, this would be easier. He believes she may be seeing an NP at [facility]. CC OLAMIDE discussed the pros of transitioning her care to [facility]. Philip had questions about which ortho she should be following up with. According to AVS in last hospital stay, Dr. Bell from [facility] was recommended, CRISS QUIÑONEZ provided this phone number.     Philip shared that he saw pt 2 weeks ago and her mental health is much better.     Plan: At this time, pt denies outstanding need for connection or referral to resources or assistance navigating recommended follow up care. No further outreaches will be made at this time unless a new referral is made or a change in the pt's status occurs. Patient was provided with CC OLAMIDE contact information and encouraged to call with any questions or concerns.    Lory Durant [facility]  Clinic Care Coordinator  [facility] Baylee Price  [facility]  [phone]  [email or web address]

## 2021-06-02 ENCOUNTER — APPOINTMENT (OUTPATIENT)
Dept: CT IMAGING | Facility: CLINIC | Age: 81
End: 2021-06-02
Attending: EMERGENCY MEDICINE
Payer: COMMERCIAL

## 2021-06-02 ENCOUNTER — HOSPITAL ENCOUNTER (OUTPATIENT)
Facility: CLINIC | Age: 81
Setting detail: OBSERVATION
Discharge: NURSING, REHAB OR LONG-TERM CARE | End: 2021-06-10
Attending: INTERNAL MEDICINE | Admitting: INTERNAL MEDICINE
Payer: COMMERCIAL

## 2021-06-02 ENCOUNTER — HOSPITAL ENCOUNTER (EMERGENCY)
Facility: CLINIC | Age: 81
Discharge: ANOTHER HOSPITAL | End: 2021-06-02
Attending: EMERGENCY MEDICINE | Admitting: EMERGENCY MEDICINE
Payer: COMMERCIAL

## 2021-06-02 VITALS
OXYGEN SATURATION: 99 % | SYSTOLIC BLOOD PRESSURE: 123 MMHG | HEIGHT: 64 IN | TEMPERATURE: 98.4 F | DIASTOLIC BLOOD PRESSURE: 79 MMHG | RESPIRATION RATE: 18 BRPM | WEIGHT: 200 LBS | BODY MASS INDEX: 34.15 KG/M2 | HEART RATE: 78 BPM

## 2021-06-02 DIAGNOSIS — R29.6 RECURRENT FALLS: Primary | ICD-10-CM

## 2021-06-02 DIAGNOSIS — N30.01 ACUTE CYSTITIS WITH HEMATURIA: ICD-10-CM

## 2021-06-02 DIAGNOSIS — S02.30XA ORBITAL FLOOR (BLOW-OUT) CLOSED FRACTURE (H): ICD-10-CM

## 2021-06-02 DIAGNOSIS — M54.50 BILATERAL LOW BACK PAIN WITHOUT SCIATICA, UNSPECIFIED CHRONICITY: ICD-10-CM

## 2021-06-02 DIAGNOSIS — I10 BENIGN ESSENTIAL HYPERTENSION: ICD-10-CM

## 2021-06-02 DIAGNOSIS — N39.0 URINARY TRACT INFECTION WITHOUT HEMATURIA, SITE UNSPECIFIED: ICD-10-CM

## 2021-06-02 DIAGNOSIS — R29.6 RECURRENT FALLS: ICD-10-CM

## 2021-06-02 LAB
ALBUMIN UR-MCNC: NEGATIVE MG/DL
ALBUMIN UR-MCNC: NEGATIVE MG/DL
ANION GAP SERPL CALCULATED.3IONS-SCNC: 5 MMOL/L (ref 3–14)
APPEARANCE UR: ABNORMAL
APPEARANCE UR: ABNORMAL
BACTERIA #/AREA URNS HPF: ABNORMAL /HPF
BASOPHILS # BLD AUTO: 0 10E9/L (ref 0–0.2)
BASOPHILS NFR BLD AUTO: 0.4 %
BILIRUB UR QL STRIP: NEGATIVE
BILIRUB UR QL STRIP: NEGATIVE
BUN SERPL-MCNC: 21 MG/DL (ref 7–30)
CALCIUM SERPL-MCNC: 9.6 MG/DL (ref 8.5–10.1)
CHLORIDE SERPL-SCNC: 107 MMOL/L (ref 94–109)
CO2 SERPL-SCNC: 30 MMOL/L (ref 20–32)
COLOR UR AUTO: ABNORMAL
COLOR UR AUTO: ABNORMAL
CREAT SERPL-MCNC: 0.99 MG/DL (ref 0.52–1.04)
DIFFERENTIAL METHOD BLD: ABNORMAL
EOSINOPHIL # BLD AUTO: 0.1 10E9/L (ref 0–0.7)
EOSINOPHIL NFR BLD AUTO: 1.2 %
ERYTHROCYTE [DISTWIDTH] IN BLOOD BY AUTOMATED COUNT: 13.5 % (ref 10–15)
GFR SERPL CREATININE-BSD FRML MDRD: 53 ML/MIN/{1.73_M2}
GLUCOSE SERPL-MCNC: 93 MG/DL (ref 70–99)
GLUCOSE UR STRIP-MCNC: NEGATIVE MG/DL
GLUCOSE UR STRIP-MCNC: NEGATIVE MG/DL
HCT VFR BLD AUTO: 40.4 % (ref 35–47)
HGB BLD-MCNC: 13.1 G/DL (ref 11.7–15.7)
HGB UR QL STRIP: NEGATIVE
HGB UR QL STRIP: NEGATIVE
HYALINE CASTS #/AREA URNS LPF: 2 /LPF (ref 0–2)
IMM GRANULOCYTES # BLD: 0.1 10E9/L (ref 0–0.4)
IMM GRANULOCYTES NFR BLD: 0.5 %
KETONES UR STRIP-MCNC: NEGATIVE MG/DL
KETONES UR STRIP-MCNC: NEGATIVE MG/DL
LABORATORY COMMENT REPORT: NORMAL
LEUKOCYTE ESTERASE UR QL STRIP: ABNORMAL
LEUKOCYTE ESTERASE UR QL STRIP: ABNORMAL
LYMPHOCYTES # BLD AUTO: 2.4 10E9/L (ref 0.8–5.3)
LYMPHOCYTES NFR BLD AUTO: 21.1 %
MAGNESIUM SERPL-MCNC: 2.3 MG/DL (ref 1.6–2.3)
MCH RBC QN AUTO: 30.5 PG (ref 26.5–33)
MCHC RBC AUTO-ENTMCNC: 32.4 G/DL (ref 31.5–36.5)
MCV RBC AUTO: 94 FL (ref 78–100)
MONOCYTES # BLD AUTO: 0.9 10E9/L (ref 0–1.3)
MONOCYTES NFR BLD AUTO: 8 %
MUCOUS THREADS #/AREA URNS LPF: PRESENT /LPF
MUCOUS THREADS #/AREA URNS LPF: PRESENT /LPF
NEUTROPHILS # BLD AUTO: 7.8 10E9/L (ref 1.6–8.3)
NEUTROPHILS NFR BLD AUTO: 68.8 %
NITRATE UR QL: NEGATIVE
NITRATE UR QL: NEGATIVE
NRBC # BLD AUTO: 0 10*3/UL
NRBC BLD AUTO-RTO: 0 /100
PH UR STRIP: 6.5 PH (ref 5–7)
PH UR STRIP: 6.5 PH (ref 5–7)
PLATELET # BLD AUTO: 300 10E9/L (ref 150–450)
POTASSIUM SERPL-SCNC: 3.3 MMOL/L (ref 3.4–5.3)
RBC # BLD AUTO: 4.29 10E12/L (ref 3.8–5.2)
RBC #/AREA URNS AUTO: 106 /HPF (ref 0–2)
RBC #/AREA URNS AUTO: 19 /HPF (ref 0–2)
SARS-COV-2 RNA RESP QL NAA+PROBE: NEGATIVE
SODIUM SERPL-SCNC: 142 MMOL/L (ref 133–144)
SOURCE: ABNORMAL
SOURCE: ABNORMAL
SP GR UR STRIP: 1.02 (ref 1–1.03)
SP GR UR STRIP: 1.02 (ref 1–1.03)
SPECIMEN SOURCE: NORMAL
SQUAMOUS #/AREA URNS AUTO: 0 /HPF (ref 0–1)
SQUAMOUS #/AREA URNS AUTO: 17 /HPF (ref 0–1)
UROBILINOGEN UR STRIP-MCNC: 0 MG/DL (ref 0–2)
UROBILINOGEN UR STRIP-MCNC: 0 MG/DL (ref 0–2)
WBC # BLD AUTO: 11.3 10E9/L (ref 4–11)
WBC #/AREA URNS AUTO: 27 /HPF (ref 0–5)
WBC #/AREA URNS AUTO: 48 /HPF (ref 0–5)

## 2021-06-02 PROCEDURE — 70486 CT MAXILLOFACIAL W/O DYE: CPT

## 2021-06-02 PROCEDURE — 87635 SARS-COV-2 COVID-19 AMP PRB: CPT | Performed by: EMERGENCY MEDICINE

## 2021-06-02 PROCEDURE — 81001 URINALYSIS AUTO W/SCOPE: CPT | Performed by: EMERGENCY MEDICINE

## 2021-06-02 PROCEDURE — 85025 COMPLETE CBC W/AUTO DIFF WBC: CPT | Performed by: EMERGENCY MEDICINE

## 2021-06-02 PROCEDURE — 80048 BASIC METABOLIC PNL TOTAL CA: CPT | Performed by: EMERGENCY MEDICINE

## 2021-06-02 PROCEDURE — 87088 URINE BACTERIA CULTURE: CPT | Performed by: EMERGENCY MEDICINE

## 2021-06-02 PROCEDURE — 83735 ASSAY OF MAGNESIUM: CPT | Performed by: EMERGENCY MEDICINE

## 2021-06-02 PROCEDURE — 72131 CT LUMBAR SPINE W/O DYE: CPT

## 2021-06-02 PROCEDURE — 250N000009 HC RX 250: Performed by: EMERGENCY MEDICINE

## 2021-06-02 PROCEDURE — 96366 THER/PROPH/DIAG IV INF ADDON: CPT

## 2021-06-02 PROCEDURE — C9803 HOPD COVID-19 SPEC COLLECT: HCPCS

## 2021-06-02 PROCEDURE — 99220 PR INITIAL OBSERVATION CARE,LEVEL III: CPT | Performed by: INTERNAL MEDICINE

## 2021-06-02 PROCEDURE — 99285 EMERGENCY DEPT VISIT HI MDM: CPT | Mod: 25

## 2021-06-02 PROCEDURE — 250N000013 HC RX MED GY IP 250 OP 250 PS 637: Performed by: EMERGENCY MEDICINE

## 2021-06-02 PROCEDURE — 120N000002 HC R&B MED SURG/OB UMMC

## 2021-06-02 PROCEDURE — 87086 URINE CULTURE/COLONY COUNT: CPT | Performed by: EMERGENCY MEDICINE

## 2021-06-02 PROCEDURE — 70450 CT HEAD/BRAIN W/O DYE: CPT

## 2021-06-02 PROCEDURE — 87186 SC STD MICRODIL/AGAR DIL: CPT | Performed by: EMERGENCY MEDICINE

## 2021-06-02 PROCEDURE — 96365 THER/PROPH/DIAG IV INF INIT: CPT

## 2021-06-02 PROCEDURE — 250N000011 HC RX IP 250 OP 636: Performed by: EMERGENCY MEDICINE

## 2021-06-02 RX ORDER — PROPARACAINE HYDROCHLORIDE 5 MG/ML
1 SOLUTION/ DROPS OPHTHALMIC ONCE
Status: COMPLETED | OUTPATIENT
Start: 2021-06-02 | End: 2021-06-02

## 2021-06-02 RX ORDER — VENLAFAXINE HYDROCHLORIDE 150 MG/1
150 TABLET, EXTENDED RELEASE ORAL DAILY
Status: DISCONTINUED | OUTPATIENT
Start: 2021-06-03 | End: 2021-06-10 | Stop reason: HOSPADM

## 2021-06-02 RX ORDER — QUETIAPINE FUMARATE 25 MG/1
25 TABLET, FILM COATED ORAL AT BEDTIME
Status: DISCONTINUED | OUTPATIENT
Start: 2021-06-02 | End: 2021-06-10 | Stop reason: HOSPADM

## 2021-06-02 RX ORDER — HYDRALAZINE HYDROCHLORIDE 10 MG/1
5 TABLET, FILM COATED ORAL 3 TIMES DAILY
Status: ON HOLD | COMMUNITY
End: 2021-06-07

## 2021-06-02 RX ORDER — POLYETHYLENE GLYCOL 3350 17 G/17G
17 POWDER, FOR SOLUTION ORAL DAILY PRN
Status: DISCONTINUED | OUTPATIENT
Start: 2021-06-03 | End: 2021-06-10 | Stop reason: HOSPADM

## 2021-06-02 RX ORDER — CEFTRIAXONE 2 G/1
2 INJECTION, POWDER, FOR SOLUTION INTRAMUSCULAR; INTRAVENOUS ONCE
Status: COMPLETED | OUTPATIENT
Start: 2021-06-02 | End: 2021-06-02

## 2021-06-02 RX ORDER — VENLAFAXINE HYDROCHLORIDE 37.5 MG/1
37.5 TABLET, EXTENDED RELEASE ORAL DAILY
Status: DISCONTINUED | OUTPATIENT
Start: 2021-06-03 | End: 2021-06-10 | Stop reason: HOSPADM

## 2021-06-02 RX ORDER — SENNOSIDES 8.6 MG
2 TABLET ORAL 2 TIMES DAILY PRN
Status: DISCONTINUED | OUTPATIENT
Start: 2021-06-02 | End: 2021-06-10 | Stop reason: HOSPADM

## 2021-06-02 RX ORDER — VENLAFAXINE HYDROCHLORIDE 150 MG/1
150 TABLET, EXTENDED RELEASE ORAL DAILY
Status: ON HOLD | COMMUNITY
End: 2021-08-17

## 2021-06-02 RX ORDER — HYDRALAZINE HCL 10 MG
5 TABLET ORAL 3 TIMES DAILY
Status: DISCONTINUED | OUTPATIENT
Start: 2021-06-03 | End: 2021-06-06

## 2021-06-02 RX ORDER — VENLAFAXINE HYDROCHLORIDE 37.5 MG/1
37.5 TABLET, EXTENDED RELEASE ORAL DAILY
Status: ON HOLD | COMMUNITY
End: 2021-08-17

## 2021-06-02 RX ORDER — AMLODIPINE BESYLATE 5 MG/1
5 TABLET ORAL 2 TIMES DAILY
Status: DISCONTINUED | OUTPATIENT
Start: 2021-06-02 | End: 2021-06-10 | Stop reason: HOSPADM

## 2021-06-02 RX ORDER — BISACODYL 10 MG
10 SUPPOSITORY, RECTAL RECTAL DAILY PRN
Status: DISCONTINUED | OUTPATIENT
Start: 2021-06-02 | End: 2021-06-10 | Stop reason: HOSPADM

## 2021-06-02 RX ORDER — ACETAMINOPHEN 325 MG/1
975 TABLET ORAL 3 TIMES DAILY
Status: DISCONTINUED | OUTPATIENT
Start: 2021-06-03 | End: 2021-06-10 | Stop reason: HOSPADM

## 2021-06-02 RX ORDER — ONDANSETRON 2 MG/ML
4 INJECTION INTRAMUSCULAR; INTRAVENOUS EVERY 6 HOURS PRN
Status: DISCONTINUED | OUTPATIENT
Start: 2021-06-02 | End: 2021-06-10 | Stop reason: HOSPADM

## 2021-06-02 RX ORDER — ACETAMINOPHEN 325 MG/1
650 TABLET ORAL EVERY 4 HOURS PRN
Status: DISCONTINUED | OUTPATIENT
Start: 2021-06-02 | End: 2021-06-10 | Stop reason: HOSPADM

## 2021-06-02 RX ORDER — CEFTRIAXONE 1 G/1
1 INJECTION, POWDER, FOR SOLUTION INTRAMUSCULAR; INTRAVENOUS EVERY 24 HOURS
Status: DISCONTINUED | OUTPATIENT
Start: 2021-06-03 | End: 2021-06-05

## 2021-06-02 RX ORDER — POTASSIUM CHLORIDE 1500 MG/1
40 TABLET, EXTENDED RELEASE ORAL ONCE
Status: COMPLETED | OUTPATIENT
Start: 2021-06-02 | End: 2021-06-02

## 2021-06-02 RX ORDER — ONDANSETRON 4 MG/1
4 TABLET, ORALLY DISINTEGRATING ORAL EVERY 6 HOURS PRN
Status: DISCONTINUED | OUTPATIENT
Start: 2021-06-02 | End: 2021-06-10 | Stop reason: HOSPADM

## 2021-06-02 RX ORDER — POTASSIUM CHLORIDE 750 MG/1
40 TABLET, EXTENDED RELEASE ORAL 2 TIMES DAILY
Status: COMPLETED | OUTPATIENT
Start: 2021-06-03 | End: 2021-06-03

## 2021-06-02 RX ORDER — POTASSIUM CHLORIDE 750 MG/1
10 TABLET, EXTENDED RELEASE ORAL DAILY
Status: DISCONTINUED | OUTPATIENT
Start: 2021-06-04 | End: 2021-06-10 | Stop reason: HOSPADM

## 2021-06-02 RX ORDER — LIDOCAINE 40 MG/G
CREAM TOPICAL
Status: DISCONTINUED | OUTPATIENT
Start: 2021-06-02 | End: 2021-06-10 | Stop reason: HOSPADM

## 2021-06-02 RX ADMIN — POTASSIUM CHLORIDE 40 MEQ: 1500 TABLET, EXTENDED RELEASE ORAL at 16:53

## 2021-06-02 RX ADMIN — PROPARACAINE HYDROCHLORIDE 1 DROP: 5 SOLUTION/ DROPS OPHTHALMIC at 16:55

## 2021-06-02 RX ADMIN — CEFTRIAXONE SODIUM 2 G: 2 INJECTION, POWDER, FOR SOLUTION INTRAMUSCULAR; INTRAVENOUS at 15:14

## 2021-06-02 ASSESSMENT — ENCOUNTER SYMPTOMS
CONFUSION: 1
BACK PAIN: 1
COLOR CHANGE: 1

## 2021-06-02 ASSESSMENT — MIFFLIN-ST. JEOR: SCORE: 1357.19

## 2021-06-02 NOTE — H&P
History and Physical - Hospitalist Service       Date of Admission:  6/2/21    Assessment & Plan   Elizabeth Joy is a 81 year old female admitted on 6/2/21. She is being admitted to the hospital to evaluate the large rt orbital floor fracture as well as evaluate recurrent falls.   Individual problems and their management are outlined below    Recurrent falls:  No obvious cause at this time. Labs largely within normal limits except for low potassium  Not noted to be dehydrated   No chest pain/ SOB. Baseline EKG ordered  UA appears to be infected - Cultures pending. Patient initiated on ceftriaxone  Monitor with telemetry for any arrhythmia   PT/OT to evaluate Gait and ADL's       Orbital floor fracture:  Patient to be marianne by ophthalmology team  No acute surgical intervention, but may require further tests   They will perform a dilated eye exam     HTN:   Resume home dose of Amlodipine 5 mg BID and Hydralazine 5 mg TID    Depression:   Resume home dose of venlafaxine 187.5 mg daily  Resume seroquel 25 mg at bedtime      UTI:  Cultures pending. Continue ceftriaxone for now          Diet: Combination Diet Regular Diet Adult  DVT Prophylaxis: Pneumatic Compression Devices  Ram Catheter: not present  Code Status: No CPR- Do NOT Intubate         Disposition Plan   Expected discharge: 2 - 3 days, recommended to prior living arrangement once evaluated by ophthamology and causes for falls are reviewed .  Entered: Nabeel Steward MD 06/02/2021, 11:31 PM     The patient's care was discussed with the Bedside Nurse and Patient.    Nabeel Steward MD    Contact information available via [facility] Paging/Directory      ______________________________________________________________________    Chief Complaint    Recurrent falls  Facial Fractures   Low back pain       History is obtained from the patient and chart review     History of Present Illness   Elizabeth Joy is a 81 year old female who with  history of hypertension and hyperlipidemia, who  presented to the emergency department at [facility] from her assisted living facility (the [facility]) for evaluation of low back pain in the setting of frequent falls.  It may be recalled here that she was  hospitalized [facility] 4/23/2021 with discharge 4/27/2021 after a mechanical fall resulting in a closed head injury with scalp laceration and right patella fracture.  She was ischarged to a TCU with subsequent transfer back to the [facility].    She has had continued falls approximately once weekly according to the patient's family. On presentation to the ER at [facility]  Today, she  complained of low back pain.   Facial bruising was noted.  CT of the head without contrast demonstrated chronic white matter change.   A large Fracture of the right orbital floor with extension of extra conal fat in the right inferior rectus muscle through the orbital floor defect.  CT lumbar spine demonstrated a chronic appearing compression fracture of L1.  Moderate to severe right L5-S1 foraminal compromise potentially correlating with right L5 radiculopathy.  [facility] does not have ophthalmology consultation prompting request for transfer to [facility].   Given the large fracture of the orbital floor,   Recommended that the emergency department provider obtain visual acuity testing and intraocular pressure determination.  A dedicated facial CT to be obtained for more definitive fracture evaluation and direct evaluation by ophthalmology .  For these reasons and also evaluation for recurrent falls, patient was transferred to [facility] medicine service    When I went to see patient, she was resting comfortably and in a very pleasant mood.  She denies any chest pain or shortness of breath.  She denies any fevers or chills.  She denies any nausea, vomiting or diarrhea.  She tells me that this is being worked over and over again to evaluate the reasons for her falls, but  "they have not found anything.  She says \"nothing is wrong with me\"    Review of Systems    The 10 point Review of Systems is negative other than noted in the HPI or here.     Past Medical History    I have reviewed this patient's medical history and updated it with pertinent information if needed.   Past Medical History:   Diagnosis Date     Depression 08/2020     Elevated blood sugar      HTN (hypertension) 35     Hx of colonoscopy 2008    bx collagenous colitis     Hypercholesteremia      Insomnia     on ambien 10mg 1/2 daily for long time     Lichen sclerosus et atrophicus of the vulva 02/2017    dx by Dr. Weldon, had bx     Lymphocytic colitis 2008    on colon exam at [facility]     PMR (polymyalgia rheumatica) (H) 01/31/2019     Syncope 08/2016    echo trace lvh, nl ef; ziopatch with one 5 beat run of vtach, seen by ep Dr. Allen and nothing found     Ventricular tachycardia (H) 8/16    seen on ziopatch done for syncope, just one 5 beat run       Past Surgical History   I have reviewed this patient's surgical history and updated it with pertinent information if needed.  Past Surgical History:   Procedure Laterality Date     HYSTERECTOMY, PAP NO LONGER INDICATED  1990    had bso also, not for cancer     KERATOTOMY ARCUATE WITH FEMTOSECOND LASER/IMAGING FOR ATIOL Right 7/13/2015    Procedure: KERATOTOMY ARCUATE WITH FEMTOSECOND LASER/IMAGING FOR ATIOL;  Surgeon: Lionel Reza MD;  Location:  [facility]     KERATOTOMY ARCUATE WITH FEMTOSECOND LASER/IMAGING FOR ATIOL Right 7/13/2015    Procedure: KERATOTOMY ARCUATE WITH FEMTOSECOND LASER/IMAGING FOR ATIOL;  Surgeon: Lionel Reza MD;  Location:  [facility]     PHACOEMULSIFICATION CLEAR CORNEA WITH STANDARD INTRAOCULAR LENS IMPLANT Right 7/13/2015    Procedure: PHACOEMULSIFICATION CLEAR CORNEA WITH STANDARD INTRAOCULAR LENS IMPLANT;  Surgeon: Lionel Reza MD;  Location:  [facility]     pilonidal cyst removed  30's       Social History   I have reviewed this patient's social " history and updated it with pertinent information if needed.  Social History     Tobacco Use     Smoking status: Former Smoker     Packs/day: 1.00     Years: 42.00     Pack years: 42.00     Types: Cigarettes     Start date:      Quit date: 1997     Years since quittin.6     Smokeless tobacco: Never Used   Substance Use Topics     Alcohol use: No     Alcohol/week: 0.0 standard drinks     Frequency: Never     Drug use: No       Family History   I have reviewed this patient's family history and updated it with pertinent information if needed.  Family History   Problem Relation Age of Onset     Breast Cancer Mother        Prior to Admission Medications   Prior to Admission Medications   Prescriptions Last Dose Informant Patient Reported? Taking?   QUEtiapine (SEROQUEL) 25 MG tablet  Nursing Home No No   Sig: Take 1 tablet (25 mg) by mouth At Bedtime   acetaminophen (TYLENOL) 325 MG tablet   No No   Sig: Take 3 tablets (975 mg) by mouth 3 times daily   amLODIPine (NORVASC) 2.5 MG tablet  Nursing Home No No   Sig: Take 2 tablets (5 mg) by mouth 2 times daily   bisacodyl (DULCOLAX) 10 MG suppository  Nursing Home Yes No   Sig: Place 10 mg rectally daily as needed for constipation   calcium carbonate 600 mg-vitamin D 400 units (CALTRATE) 600-400 MG-UNIT per tablet  Nursing Home Yes No   Sig: Take 1 tablet by mouth 2 times daily   donepezil (ARICEPT) 5 MG tablet  Nursing Home Yes No   Sig: Take 5 mg by mouth At Bedtime   hydrALAZINE (APRESOLINE) 10 MG tablet   Yes No   Sig: Take 5 mg by mouth 3 times daily 1/2 of 10mg tablet.  Hold if SBP < 100   ondansetron (ZOFRAN) 4 MG tablet  Nursing Home Yes No   Sig: Take 4 mg by mouth every 8 hours as needed for nausea   polyethylene glycol (MIRALAX) 17 g packet  custodial Yes No   Sig: Take 1 packet by mouth daily as needed for constipation   potassium chloride ER (KLOR-CON M) 10 MEQ CR tablet  Nursing Home Yes No   Sig: Take 10 mEq by mouth daily    predniSONE  (DELTASONE) 1 MG tablet  Nursing Home No No   Sig: Take 3 of these along with one 5mg for a total daily dose of 8mg   Patient not taking: Reported on 6/2/2021   predniSONE (DELTASONE) 5 MG tablet  Nursing Home No No   Sig: TAKE 1 TABLET BY MOUTH DAILY ALONG WITH 3 ONE MG TABLETS FOR A TOTAL DAILY DOSE OF 8MG   Patient not taking: Reported on 6/2/2021   sennosides (SENOKOT) 8.6 MG tablet  Nursing Home Yes No   Sig: Take 2 tablets by mouth 2 times daily as needed for constipation   venlafaxine (EFFEXOR-ER) 150 MG 24 hr tablet   Yes No   Sig: Take 150 mg by mouth daily Take with 37.5mg for total 187.5mg daily   venlafaxine (EFFEXOR-ER) 37.5 MG 24 hr tablet   Yes No   Sig: Take 37.5 mg by mouth daily Take with 150mg for total 187.5mg daily      Facility-Administered Medications: None     Allergies   No Known Allergies    Physical Exam   Vital Signs: Temp: 98.3  F (36.8  C) Temp src: Oral BP: (!) 150/91     Resp: 18        Weight: 0 lbs 0 oz    Constitutional: awake, alert, cooperative, no apparent distress, and appears stated age  Eyes: Brusiing under both eyes. AGNES. Normal extra occular movements. See separate note by ophthalmology   ENT: Normocephalic, without obvious abnormality, atraumatic, sinuses nontender on palpation, external ears without lesions, oral pharynx with moist mucous membranes, Respiratory: No increased work of breathing, good air exchange, clear to auscultation bilaterally, no crackles or wheezing  Cardiovascular: Normal apical impulse, regular rate and rhythm, normal S1 and S2, no S3 or S4, and no murmur noted  GI: Normal bowel sounds, soft, non-distended, non-tender, no masses palpated, no hepatosplenomegally  Skin: Bruising on the face under both eyes npotes   Musculoskeletal: no lower extremity pitting edema present  Neurologic: Awake, alert, oriented to name, place and time.  Cranial nerves II-XII are grossly intact.    Data   Data reviewed today: I reviewed all medications, new labs and  imaging results over the last 24 hours. I personally reviewed no images or EKG's today. See separate CT scan report    Recent Labs   Lab 06/02/21  1255   WBC 11.3*   HGB 13.1   MCV 94         POTASSIUM 3.3*   CHLORIDE 107   CO2 30   BUN 21   CR 0.99   ANIONGAP 5   MICHELLE 9.6   GLC 93

## 2021-06-02 NOTE — PROGRESS NOTES
[facility]  Transfer Triage Note    Date of call: 06/02/21  Time of call: 4:21 PM    Is pandemic COVID-19 a concern? NO    Reason for transfer: Further diagnostic work up, management, and consultation for specialized care   Diagnosis: Frequent falls.  Orbital floor fracture.  Lumbar pain.  Possible UTI.    Outside Records: Available  Additional records requested to be faxed to [phone].    Stability of Patient: Patient is vitally stable, with no critical labs, and will likely remain stable throughout the transfer process  ICU: No    Expected Time of Arrival for Transfer: 0-8 hours    Arrival Location:  [address] [address] Phone: [phone]    Recommendations for Management and Stabilization: Given    Additional Comments 81-year-old female with history of hypertension and hyperlipidemia presented to the emergency department at [facility] from her assisted living facility (the [facility]) for evaluation of low back pain in the setting of frequent falls.  Referred presently to [facility] for admission largely to facilitate ophthalmology opinion regarding documented orbital floor fracture presumably occurring with a fall approximately 1 week ago.  Hospitalized [facility] 4/23/2021 with discharge 4/27/2021 after a mechanical fall resulting in a closed head injury with scalp laceration and right patella fracture.  Discharged to a TCU with subsequent transfer back to the [facility].  Continued falls approximately once weekly according to the patient's family.  Present complaint of low back pain, prompting ER presentation as above.  Evaluation demonstrated blood pressure 110/76 heart rate 70s temperature 98.4 O2 sat 99% room air.  Respirations nonlabored.  Facial bruising noted.  Extraocular movements intact.  Neurologic nonfocal.  CT of the head without contrast demonstrated chronic white matter  change.  Fracture of the right orbital floor with extension of extra conal fat in the right inferior rectus muscle through the orbital floor defect.  CT lumbar spine demonstrated a chronic appearing compression fracture of L1.  Moderate to severe right L5-S1 foraminal compromise potentially correlating with right L5 radiculopathy.  Lab data included basic metabolic profile with normal electrolytes except for low potassium at 3.3 (to be replaced).  Serum magnesium requested.  White blood count 11.3 hemoglobin 13.1 platelets 300,000.  Urinalysis demonstrated moderate leukocyte Estrace but negative nitrite.  48 white cells 19 RBCs 0 squamous epithelial cells.  [facility] does not have ophthalmology consultation prompting request for transfer to [facility].  Dr. Figueroa from ophthalmology was on the call.  At this point approximately 1 week out from the facial injury it is unlikely that ophthalmology will recommend surgical intervention however they will see patient in consultation.  Recommended that the emergency department provider obtain visual acuity testing and intraocular pressure determination.  A dedicated facial CT to be obtained for more definitive fracture evaluation.  Patient to be admitted to a med/surge bed at [facility].  With recurrent falls a bedside attendant was requested.  Covid testing pending.  Clinically stable for transport.  Not felt by [facility] ED provider to require evaluation by the trauma service.  Started on IV ceftriaxone for suspected urinary tract infection (pending culture results).    Kb Rico MD

## 2021-06-02 NOTE — ED NOTES
"[facility]  ED Nurse Handoff Report    ED Chief complaint: Fall      ED Diagnosis:   Final diagnoses:   Orbital floor (blow-out) closed fracture (H)   Bilateral low back pain without sciatica, unspecified chronicity   Acute cystitis with hematuria   Recurrent falls       Code Status: Full Code    Allergies: No Active Allergies    Patient Story: Patient has a history of falls that have increased this last week.  Focused Assessment:  Patient has ecchymotic eyes and cheeks. Has a steri-stripped right cheek bone. Patient does not want to ambulate and states that her right knee is in pain.     Treatments and/or interventions provided: IV ABX, Pure Wick.  Patient's response to treatments and/or interventions: N/A    To be done/followed up on inpatient unit:  See orders    Does this patient have any cognitive concerns?: Forgetful    Activity level - Baseline/Home:  Walker  Activity Level - Current:   Total Care    Patient's Preferred language: English   Needed?: No    Isolation: None  Infection: Not Applicable  Patient tested for COVID 19 prior to admission: YES  Bariatric?: No    Vital Signs:   Vitals:    06/02/21 1211   BP: 110/76   Pulse: 71   Resp: 14   Temp: 98.4  F (36.9  C)   TempSrc: Oral   SpO2: 99%   Weight: 90.7 kg (200 lb)   Height: 1.626 m (5' 4\")       Cardiac Rhythm:     Was the PSS-3 completed:   Yes  What interventions are required if any?               Family Comments: Patient states that she is supposed to use her walker but doesn't like it. Patients two sons are hopeful that she will be discharged in time to attend a family wedding this Saturday.   OBS brochure/video discussed/provided to patient/family: N/A              Name of person given brochure if not patient: N/A              Relationship to patient: N/A    For the majority of the shift this patient's behavior was Green.   Behavioral interventions performed were [phone].    ED NURSE PHONE NUMBER: [phone]   "

## 2021-06-02 NOTE — ED NOTES
Patient's purewick noted to have fallen out of place. Patient changed, and new linens placed. Patient was able to roll from side to side, and lift her hips.

## 2021-06-02 NOTE — LETTER
Health Information Management Services               Recipient:    Francoise/Tamie  Attn: Candy      Sender:    Levi Montesinos, MSW, LGSW  P: [phone]      Date: June 9, 2021  Patient Name:  Elizabeth Joy  Routing Message:      Please review for TCU. Pt from assisted living facility that requests pt pursue IP rehab prior to returning to [facility]. Pt is medically ready for discharge.   Would prefer [facility] if possible (where pt's [facility] is).       The documents accompanying this notice contain confidential information belonging to the sender.  This information is intended only for the use of the individual or entity named above.  The authorized recipient of this information is prohibited from disclosing this information to any other party and is required to destroy the information after its stated need has been fulfilled, unless otherwise required by state law.      If you are not the intended recipient, you are hereby notified that any disclosure, copy, distribution or action taken in reliance on the contents of these documents is strictly prohibited.  If you have received this document in error, please return it by fax to [phone] with a note on the cover sheet explaining why you are returning it (e.g. not your patient, not your provider, etc.).  If you need further assistance, please call [facility] Centralized Transcription at [phone].  Documents may also be returned by mail to [employer], , [facility] [address], [place], [place] [address].

## 2021-06-02 NOTE — LETTER
Health Information Management Services               Recipient: Cary          Sender: Kali Francis (PH: [phone])          Date: Cori 10, 2021  Patient Name:  Elizabeth Joy  Routing Message:  Elizabeth Joy discharge orders and paperwork. Thank you!          The documents accompanying this notice contain confidential information belonging to the sender.  This information is intended only for the use of the individual or entity named above.  The authorized recipient of this information is prohibited from disclosing this information to any other party and is required to destroy the information after its stated need has been fulfilled, unless otherwise required by state law.      If you are not the intended recipient, you are hereby notified that any disclosure, copy, distribution or action taken in reliance on the contents of these documents is strictly prohibited.  If you have received this document in error, please return it by fax to [phone] with a note on the cover sheet explaining why you are returning it (e.g. not your patient, not your provider, etc.).  If you need further assistance, please call [facility] Centralized Transcription at [phone].  Documents may also be returned by mail to [employer], , [facility] [address], [place], [place] [address].

## 2021-06-02 NOTE — PROVIDER NOTIFICATION
Telephone encounter:    Was called regarding patient is a 81-year-old female who has been having recent falls.  Of note, as per outside ED provider at [facility], she fell about a week ago, and is having some low back pain with this.  She had a recent fall, which prompted a CT head to be performed, and this identified a large orbital floor fracture.  As per ED provider, she does not complain of any diplopia, and extraocular motility is intact, patient is not having any nausea or vomiting.  ED provider will obtain visual acuity, and intraocular pressure, and additional ophthalmic evaluation for further assessment. Advised that if patient does develop significant ocular symptoms, to urgently inform ophthalmology.    [facility] does wants admission for patient in the setting of ongoing falls, for further medical evaluation. Patient can be evaluated by ophthalmology, once transferred as per [facility] provider to the [facility] inpatient unit.    Fermin Figueroa MD  Department of Ophthalmology  Pager: [phone]

## 2021-06-02 NOTE — LETTER
Health Information Management Services               Recipient: Cary          Sender: Kali          Date: June 9, 2021  Patient Name:  Elizabeth Joy  Routing Message:  Pt ready for discharge to a facility around [place].          The documents accompanying this notice contain confidential information belonging to the sender.  This information is intended only for the use of the individual or entity named above.  The authorized recipient of this information is prohibited from disclosing this information to any other party and is required to destroy the information after its stated need has been fulfilled, unless otherwise required by state law.      If you are not the intended recipient, you are hereby notified that any disclosure, copy, distribution or action taken in reliance on the contents of these documents is strictly prohibited.  If you have received this document in error, please return it by fax to [phone] with a note on the cover sheet explaining why you are returning it (e.g. not your patient, not your provider, etc.).  If you need further assistance, please call [facility] Centralized Transcription at [phone].  Documents may also be returned by mail to [employer], , [facility] [address], [place], [place] [address].

## 2021-06-02 NOTE — ED PROVIDER NOTES
"History of frequent falls most recent fall she does not remember  History   Chief Complaint:  Fall       HPI   Elizabeth Joy is a 81 year old female with history of hypertension and hyperlipidemia who presents after sustaing a fall. She was brought to the ED by EMS after complaining to staff at [facility] about a lower back ache. She has a history of falls and does not remember her most recent fall. There is some bruising evident on her face from a recent fall. She also mentions bilateral patella fractures. Her right knee hurts more.     The patient's son was contacted. He says that she has been experiencing frequent falls (about one per week). She does not use her walker. As of now, she stays in the independent living portion of [facility].  He was told that she was having low back pain by staff at [facility].     Review of Systems   Musculoskeletal: Positive for back pain.   Skin: Positive for color change.   Psychiatric/Behavioral: Positive for confusion.   All other systems reviewed and are negative.        Allergies:  No known allergies    Medications:  Amlodipine  Dulcolax  Aricept  Hydralazine  Zofran  Seroquel  Senokot  Trazodone  Venlafaxine    Past Medical History:    Depression  Elevated blood sugar  Hypertension  Colonoscopy  Insomnia  Hyperlipidemia  Lichen sclerosus et atrophicus of the vulva  Lymphocytic colitis  PMR  Syncope  Ventricular tachycardia  Lactic acidosis  NAGI     Past Surgical History:    Hysterectomy  Keratotomy  Phacoemulsification clear cornea  Pilonidal cyst removal    Family History:    Breast cancer, mother    Social History:  Arrives via EMS  Unaccompanied in ED    Physical Exam     Patient Vitals for the past 24 hrs:   BP Temp Temp src Pulse Resp SpO2 Height Weight   06/02/21 1211 110/76 98.4  F (36.9  C) Oral 71 14 99 % 1.626 m (5' 4\") 90.7 kg (200 lb)       Physical Exam  GENERAL: well developed, pleasant  HEAD: atraumatic  EYES: pupils reactive, extraocular muscles intact, " conjunctivae normal  ENT:  mucus membranes moist  NECK:  trachea midline, normal range of motion  RESPIRATORY: no tachypnea, breath sounds clear to auscultation   CVS: normal S1/S2, no murmurs, intact distal pulses  ABDOMEN: soft, nontender, nondistention  MUSCULOSKELETAL: no deformities  SKIN: warm and dry, old bruising to the face  NEURO: GCS 15, cranial nerves intact, alert and oriented x3  PSYCH:  Mood/affect normal    Emergency Department Course     Imaging:  CT Head without contrast:   1. Chronic changes deep white matter of both cerebral hemispheres with  no evidence for acute infarction, mass, mass effect or hemorrhage.  2. Stable intracranial appearance from 4/23/2021.  3. There is fracture of the right orbital floor with extension of  extraconal fat and the right inferior rectus muscle through the  orbital floor defect. This is indeterminate in time course, definitely  new from 3/31/2021 evaluation but not included on the 4/23/2021  evaluation. No significant facial or scalp swelling is noted on  today's evaluation.    As per radiology.    CT Lumbar Spine w/o IV Contrast:    1. Chronic compression L1 superior endplate, low grade, without  retropulsion.  2. No severe spinal stenosis.  3. Moderate to severe right L5-S1 foraminal compromise may correlate  to right L5 radiculopathy. As per radiology.    Laboratory:    CBC: WBC 11.3 (H), HGB 13.1,     BMP: potassium 3.3 (L), GFR 53 (L) o/w WNL (Creatinine 0.99)     UA with microscopic 1249: leukocyte esterase moderate (A), wbc/hpf 27 (H), rbc/hpf 106 (H), bacteria few (A), squamous epithelial 17 (H), mucous present (A) o/w WNL     UA with microscopic 1339: leukocyte esterase moderate (A), wbc/hpf 45 (H), rbc/hpf 19 (H), mucous present (A) o/w WNL     Emergency Department Course:    Reviewed:  I reviewed nursing notes, vitals, past medical history and care everywhere    Assessments:  1227 I obtained history and examined the patient as noted above.    3588 I rechecked the patient and explained findings.     Consults:   3827 I spoke with the patient's son, Erich    Interventions:      Disposition:  Transfer [facility], Dr. Mercado      Impression & Plan     CMS Diagnoses: None    Medical Decision Making:  Patient presents from the [facility] living for low back pain, frequent falls.  Spoke to the to different sons one that was driving in [place] and the other one that was in [place] as to what is been going on recently.  They note that she seems to be falling almost once a week.  She was recently admitted to the hospital for a fall and had a patella fracture.  She was seen by orthopedics and placed in a knee immobilizer.  She went to rehab but then after rehab went back to the [facility].  Apparently she does not like going to rehab and is quite resistant to this.  Unclear if she signed out earlier than planned.  She has not taken the knee immobilizer off.  She did have a fall this past week and was not seen in the emergency department where she hit her face on a sink and had Steri-Strips applied in the care facility.  Patient today has increased low back pain difficulty getting out of bed.    Patient has subacute bruising to her face.  CT head and lumbar spine show orbital floor fracture.  Blood work and urine shows UTI which is a cath specimen.  Patient requires admission due to the low back pain frequent falls and UTI.  Given the blowout fracture from this recent fall that was not seen in the ER she requires surgical evaluation as well as ophthalmology.  I did speak with [facility] and they noted given this concern of orbital floor fracture that ophthalmology should be consulted and unfortunately we do not have those services here at [facility].  I spoke with Dr. Rico at the [facility] as well as Dr. Figueroa from ophthalmology.  Patient has no difficulty with reading the's wall with her affected eye.  She has no diplopia with upward gaze.   Intraocular eye pressures with Nakul-Pen are 15.  Pupils are equal and reactive.  She denies visual complaints nausea or vomiting.    I have received phone calls a couple times from family.  There is a upcoming wedding that they were planning on hiring a  and getting patient down to her grandsons wedding but I advised that this is not a good idea given the frequent falls low back pain and now the facial fractures which they agree.  Son is flying in from [place] and is inquiring where she might be.  Discussed all this with them and they understand.  We will add on facial CT as the orbital floor fracture was picked up on a standard head CT.      Critical Care Time: was 0 minutes for this patient excluding procedures    Covid-19  Elizabeth Joy was evaluated during a global COVID-19 pandemic, which necessitated consideration that the patient might be at risk for infection with the SARS-CoV-2 virus that causes COVID-19.   Applicable protocols for evaluation were followed during the patient's care.   COVID-19 was considered as part of the patient's evaluation. The plan for testing is:  a test was obtained during this visit.      Diagnosis:    ICD-10-CM    1. Orbital floor (blow-out) closed fracture (H)  S02.30XA Urine Culture Aerobic Bacterial     Asymptomatic SARS-CoV-2 COVID-19 Virus (Coronavirus) by PCR     Magnesium     Magnesium     CANCELED: Magnesium   2. Bilateral low back pain without sciatica, unspecified chronicity  M54.5    3. Acute cystitis with hematuria  N30.01    4. Recurrent falls  R29.6        Discharge Medications:  New Prescriptions    No medications on file       Scribe Disclosure:  I, Александр Bae, am serving as a scribe at 12:27 PM on 6/2/2021 to document services personally performed by Fabricio Figueroa MD based on my observations and the provider's statements to me.              Fabricio Figueroa MD  06/02/21 6119

## 2021-06-02 NOTE — ED NOTES
Bed: ED05  Expected date:   Expected time:   Means of arrival:   Comments:  432   81 F back pain from fall/compression fx/no covid  2555

## 2021-06-02 NOTE — ED TRIAGE NOTES
Frequent falls. Back pain increased. Hurts to sit. Lumbar area. History of Postural hypotension. Patient does not remember how she fell.    BGM 93  131/80 75p 20g  4/10 pain but tolerable, did not want pain meds.

## 2021-06-03 ENCOUNTER — TELEPHONE (OUTPATIENT)
Dept: OPHTHALMOLOGY | Facility: CLINIC | Age: 81
End: 2021-06-03

## 2021-06-03 ENCOUNTER — APPOINTMENT (OUTPATIENT)
Dept: GENERAL RADIOLOGY | Facility: CLINIC | Age: 81
End: 2021-06-03
Attending: INTERNAL MEDICINE
Payer: COMMERCIAL

## 2021-06-03 LAB
ALBUMIN SERPL-MCNC: 3.3 G/DL (ref 3.4–5)
ALP SERPL-CCNC: 76 U/L (ref 40–150)
ALT SERPL W P-5'-P-CCNC: 21 U/L (ref 0–50)
ANION GAP SERPL CALCULATED.3IONS-SCNC: 7 MMOL/L (ref 3–14)
AST SERPL W P-5'-P-CCNC: 10 U/L (ref 0–45)
BASOPHILS # BLD AUTO: 0 10E9/L (ref 0–0.2)
BASOPHILS NFR BLD AUTO: 0.3 %
BILIRUB SERPL-MCNC: 0.3 MG/DL (ref 0.2–1.3)
BUN SERPL-MCNC: 22 MG/DL (ref 7–30)
CALCIUM SERPL-MCNC: 9 MG/DL (ref 8.5–10.1)
CHLORIDE SERPL-SCNC: 108 MMOL/L (ref 94–109)
CO2 SERPL-SCNC: 27 MMOL/L (ref 20–32)
CREAT SERPL-MCNC: 1.05 MG/DL (ref 0.52–1.04)
DIFFERENTIAL METHOD BLD: ABNORMAL
EOSINOPHIL # BLD AUTO: 0.1 10E9/L (ref 0–0.7)
EOSINOPHIL NFR BLD AUTO: 1.3 %
ERYTHROCYTE [DISTWIDTH] IN BLOOD BY AUTOMATED COUNT: 13.4 % (ref 10–15)
GFR SERPL CREATININE-BSD FRML MDRD: 50 ML/MIN/{1.73_M2}
GLUCOSE SERPL-MCNC: 101 MG/DL (ref 70–99)
HCT VFR BLD AUTO: 34.1 % (ref 35–47)
HGB BLD-MCNC: 11 G/DL (ref 11.7–15.7)
IMM GRANULOCYTES # BLD: 0 10E9/L (ref 0–0.4)
IMM GRANULOCYTES NFR BLD: 0.3 %
INTERPRETATION ECG - MUSE: NORMAL
LYMPHOCYTES # BLD AUTO: 3.1 10E9/L (ref 0.8–5.3)
LYMPHOCYTES NFR BLD AUTO: 29.4 %
MCH RBC QN AUTO: 30.6 PG (ref 26.5–33)
MCHC RBC AUTO-ENTMCNC: 32.3 G/DL (ref 31.5–36.5)
MCV RBC AUTO: 95 FL (ref 78–100)
MONOCYTES # BLD AUTO: 1.1 10E9/L (ref 0–1.3)
MONOCYTES NFR BLD AUTO: 10.7 %
NEUTROPHILS # BLD AUTO: 6 10E9/L (ref 1.6–8.3)
NEUTROPHILS NFR BLD AUTO: 58 %
NRBC # BLD AUTO: 0 10*3/UL
NRBC BLD AUTO-RTO: 0 /100
PLATELET # BLD AUTO: 287 10E9/L (ref 150–450)
POTASSIUM SERPL-SCNC: 3.7 MMOL/L (ref 3.4–5.3)
PROT SERPL-MCNC: 6.4 G/DL (ref 6.8–8.8)
RBC # BLD AUTO: 3.59 10E12/L (ref 3.8–5.2)
SODIUM SERPL-SCNC: 142 MMOL/L (ref 133–144)
WBC # BLD AUTO: 10.4 10E9/L (ref 4–11)

## 2021-06-03 PROCEDURE — 93010 ELECTROCARDIOGRAM REPORT: CPT | Performed by: INTERNAL MEDICINE

## 2021-06-03 PROCEDURE — 99207 PR CDG-CODE CATEGORY CHANGED: CPT | Performed by: INTERNAL MEDICINE

## 2021-06-03 PROCEDURE — 73562 X-RAY EXAM OF KNEE 3: CPT | Mod: RT

## 2021-06-03 PROCEDURE — 250N000011 HC RX IP 250 OP 636: Performed by: INTERNAL MEDICINE

## 2021-06-03 PROCEDURE — 80053 COMPREHEN METABOLIC PANEL: CPT | Performed by: INTERNAL MEDICINE

## 2021-06-03 PROCEDURE — 250N000013 HC RX MED GY IP 250 OP 250 PS 637: Performed by: INTERNAL MEDICINE

## 2021-06-03 PROCEDURE — 99225 PR SUBSEQUENT OBSERVATION CARE,LEVEL II: CPT | Performed by: INTERNAL MEDICINE

## 2021-06-03 PROCEDURE — 85025 COMPLETE CBC W/AUTO DIFF WBC: CPT | Performed by: INTERNAL MEDICINE

## 2021-06-03 PROCEDURE — 999N000111 HC STATISTIC OT IP EVAL DEFER: Performed by: OCCUPATIONAL THERAPIST

## 2021-06-03 PROCEDURE — 73562 X-RAY EXAM OF KNEE 3: CPT | Mod: 26 | Performed by: RADIOLOGY

## 2021-06-03 PROCEDURE — 36415 COLL VENOUS BLD VENIPUNCTURE: CPT | Performed by: INTERNAL MEDICINE

## 2021-06-03 PROCEDURE — G0378 HOSPITAL OBSERVATION PER HR: HCPCS

## 2021-06-03 PROCEDURE — 93005 ELECTROCARDIOGRAM TRACING: CPT

## 2021-06-03 RX ORDER — SODIUM CHLORIDE 9 MG/ML
INJECTION, SOLUTION INTRAVENOUS
Status: DISPENSED
Start: 2021-06-03 | End: 2021-06-04

## 2021-06-03 RX ADMIN — Medication 1 TABLET: at 00:34

## 2021-06-03 RX ADMIN — ACETAMINOPHEN 975 MG: 325 TABLET, FILM COATED ORAL at 08:35

## 2021-06-03 RX ADMIN — POTASSIUM CHLORIDE 40 MEQ: 750 TABLET, EXTENDED RELEASE ORAL at 21:05

## 2021-06-03 RX ADMIN — AMLODIPINE BESYLATE 5 MG: 5 TABLET ORAL at 08:35

## 2021-06-03 RX ADMIN — Medication 5 MG: at 08:35

## 2021-06-03 RX ADMIN — POTASSIUM CHLORIDE 40 MEQ: 750 TABLET, EXTENDED RELEASE ORAL at 00:34

## 2021-06-03 RX ADMIN — POTASSIUM CHLORIDE 40 MEQ: 750 TABLET, EXTENDED RELEASE ORAL at 08:35

## 2021-06-03 RX ADMIN — ACETAMINOPHEN 975 MG: 325 TABLET, FILM COATED ORAL at 21:06

## 2021-06-03 RX ADMIN — AMLODIPINE BESYLATE 5 MG: 5 TABLET ORAL at 00:34

## 2021-06-03 RX ADMIN — Medication 1 TABLET: at 08:35

## 2021-06-03 RX ADMIN — AMLODIPINE BESYLATE 5 MG: 5 TABLET ORAL at 21:06

## 2021-06-03 RX ADMIN — Medication 1 TABLET: at 21:05

## 2021-06-03 RX ADMIN — Medication 5 MG: at 21:05

## 2021-06-03 RX ADMIN — CEFTRIAXONE SODIUM 1 G: 1 INJECTION, POWDER, FOR SOLUTION INTRAMUSCULAR; INTRAVENOUS at 14:49

## 2021-06-03 RX ADMIN — QUETIAPINE FUMARATE 25 MG: 25 TABLET ORAL at 00:34

## 2021-06-03 RX ADMIN — QUETIAPINE FUMARATE 25 MG: 25 TABLET ORAL at 21:06

## 2021-06-03 NOTE — CONSULTS
OPHTHALMOLOGY CONSULT NOTE  06/02/21    Patient: Elizabeth Joy      HISTORY OF PRESENTING ILLNESS:     Elizabeth Joy is a 81 year old female with a hx of V.tach and syncope, who presents to the hospital as a transfer from [facility] in setting of recent falls, and found to have a large right-sided orbital floor fracture.     Patient has been experiencing very recent falls, on a weekly basis.  Of note, she went to the ED in this very short-year-old with complaint of low back pain and bruising.  At the ED, they identified that she had some bruising on her face which appeared subacute in nature, and obtain a CT scan which revealed a large orbital floor fracture.  Presently, she denies any ocular pain, she denies any diplopia.  She furthermore denies any flashes of lights, or floaters in her eye.  She reports that she regularly follows with her eye doctor, and her last eye exam was within the last year.    Of note, she feels very comfortable without any chest pain or shortness of breath at this juncture.    10+ review of systems were otherwise negative except for that which has been stated above.      OCULAR/MEDICAL/SURGICAL HISTORIES:     Past Ocular History: Hx of Refractive error - wears reading glasses  Prior eye surgery/laser: Hx of CE IOL OD  CTL wearer: No  Glasses: Yes  GTTs: none    Past Medical History:  Past Medical History:   Diagnosis Date     Depression 08/2020     Elevated blood sugar      HTN (hypertension) 35     Hx of colonoscopy 2008    bx collagenous colitis     Hypercholesteremia      Insomnia     on ambien 10mg 1/2 daily for long time     Lichen sclerosus et atrophicus of the vulva 02/2017    dx by Dr. Weldon, had bx     Lymphocytic colitis 2008    on colon exam at [facility]     PMR (polymyalgia rheumatica) (H) 01/31/2019     Syncope 08/2016    echo trace lvh, nl ef; ziopatch with one 5 beat run of vtach, seen by ep Dr. Allen and nothing found     Ventricular tachycardia (H) 8/16    seen  on ziopatch done for syncope, just one 5 beat run     Family History  N/A    Social History:  Social History     Tobacco Use     Smoking status: Former Smoker     Packs/day: 1.00     Years: 42.00     Pack years: 42.00     Types: Cigarettes     Start date:      Quit date: 1997     Years since quittin.6     Smokeless tobacco: Never Used   Substance Use Topics     Alcohol use: No     Alcohol/week: 0.0 standard drinks     Frequency: Never     Drug use: No       Past Medical History:   Diagnosis Date     Depression 2020     Elevated blood sugar      HTN (hypertension) 35     Hx of colonoscopy     bx collagenous colitis     Hypercholesteremia      Insomnia     on ambien 10mg 1/2 daily for long time     Lichen sclerosus et atrophicus of the vulva 2017    dx by Dr. Weldon, had bx     Lymphocytic colitis     on colon exam [facility]     PMR (polymyalgia rheumatica) (H) 2019     Syncope 2016    echo trace lvh, nl ef; ziopatch with one 5 beat run of vtach, seen by ep Dr. Allen and nothing found     Ventricular tachycardia (H)     seen on ziopatch done for syncope, just one 5 beat run       Past Surgical History:   Procedure Laterality Date     HYSTERECTOMY, PAP NO LONGER INDICATED      had bso also, not for cancer     KERATOTOMY ARCUATE WITH FEMTOSECOND LASER/IMAGING FOR ATIOL Right 2015    Procedure: KERATOTOMY ARCUATE WITH FEMTOSECOND LASER/IMAGING FOR ATIOL;  Surgeon: Lionel Reza MD;  Location:  [facility]     KERATOTOMY ARCUATE WITH FEMTOSECOND LASER/IMAGING FOR ATIOL Right 2015    Procedure: KERATOTOMY ARCUATE WITH FEMTOSECOND LASER/IMAGING FOR ATIOL;  Surgeon: Lionel Reza MD;  Location:  [facility]     PHACOEMULSIFICATION CLEAR CORNEA WITH STANDARD INTRAOCULAR LENS IMPLANT Right 2015    Procedure: PHACOEMULSIFICATION CLEAR CORNEA WITH STANDARD INTRAOCULAR LENS IMPLANT;  Surgeon: Lionel Reza MD;  Location:  [facility]     pilonidal cyst removed  30's        EXAMINATION:     Base Eye Exam     Visual Acuity       Right Left    Near sc 20/40 -1 > 20/20 ph 20/30-2 -> ph 20/20 -1           Tonometry (Tonopen, 11:40 PM)       Right Left    Pressure 19 21          Pupils       Shape React APD    Right Round Yes None    Left Round Yes None          Visual Fields       Left Right     Full Full          Extraocular Movement       Right Left     Full, Ortho Full, Ortho   No restriction to EOM's - Full           Neuro/Psych     Oriented x3: Yes    Mood/Affect: Normal          Dilation     Both eyes: 2.5% Cornell Synephrine, 1.0% Mydriacyl @ 11:40 PM            Slit Lamp and Fundus Exam     External Exam       Right Left    External Maxilla cheek with dried blood - steri strip overlying this            Slit Lamp Exam       Right Left    Lids/Lashes Erythematous lids, mattering present left eye > right eye. No lid lac.  Erythematous lids, mattering present left eye > right eye. No lid lac.     Conjunctiva/Sclera Trace injection Trace injection    Cornea Clear Clear    Anterior Chamber Deep and quiet Deep and quiet    Iris Round and reactive -> Dilating Round and reactive -> Dilating    Lens PCIOL in place 1.5+ NS present     Vitreous Normal Normal          Fundus Exam       Right Left    Disc Normal Normal    C/D Ratio 0.15 0.1    Macula Normal Normal    Vessels Normal Normal    Periphery Normal, no retinal detachment, no holes, or heme Normal, no retinal detachment, no holes, or heme              Labs/Studies/Imaging Performed  CT Facial Bone 06/02/21             Impression:     1. Acute fractures involving the right infraorbital rim and right  orbital floor with herniation of the inferior rectus muscle and  evidence of muscular entrapment.  2. Fractures extend into the infraorbital foramen.       ASSESSMENT/PLAN:     Elizabeth Joy is a 81 year old female with a hx of V.tach and syncope, who presents to the hospital as a transfer from [facility] in setting of recent  falls, and found to have a large right-sided orbital floor fracture.     # Orbital floor fracture, right side  -Patient is best corrected visual acuity is 20/20 in both eyes.  Intraocular pressures within normal limits in both eyes.  Patient's pupillary examination is unremarkable, with no APD notable.   -Patient does have full motility on examination, without diplopia.   -Full confrontational visual field.  Slit-lamp examination significant for  -Subacute appearing facial laceration along right sided maxillary cheek.    -Dilated funduscopic examination was unremarkable.  -CT scan was notable for a large right infraorbital rim and floor fracture with herniation of the inferior rectus along with fat.     Given the patient is presently comfortable, without nausea or vomiting.  No diplopia notable on examination.  Large infraorbital fracture is not apparently causing any symptoms at this juncture.  No acute intervention is warranted at this time.  We do recommend outpatient follow-up with recheck of ophthalmic evaluation in 1 to 2 weeks following discharge.  Please continue to assess patient's for systemic etiology for recent falls especially in the setting of the cardiac history V. tach, and syncopal episodes.,     -Ophthalmology will sign at this time, please page us with any questions or concerns.    It is our pleasure to participate in this patient's care and treatment. Please contact us with any further questions or concerns.    Discussed with Dr. Jorgito Velázquez, oculoplastics fellow.     Fermin Figueroa MD PGY2  Department of Ophthalmology  Pager: [phone]

## 2021-06-03 NOTE — ED PROVIDER NOTES
Assumed care at change of shift.  Patient had admission pending as were no beds available.  Ultimately she was accepted for admission at the [facility] by Dr. Rico.     Irvin Mullins MD  06/02/21 2100

## 2021-06-03 NOTE — PHARMACY-ADMISSION MEDICATION HISTORY
"Pharmacy Medication History  Admission medication history interview status for the 6/2/2021  admission is complete. See EPIC admission navigator for prior to admission medications     Location of Interview: Outside patient room but on unit  Medication history sources: [facility] ([facility] & I called [facility] ([phone]) to inquire about prednisone and donepezil.    Significant changes made to the medication list:  -Changed prednisone and donepezil to \"Not Taking\" as no longer on medication list from [facility]. I called [facility] to verify that these were no longer current medications and it was reported that prednisone and donepezil were both discontinued 5/14/21 - reason for discontinuation not provided. I asked if prednisone was tapered but it was reported that last dose was 8mg daily which was discontinued 5/14/21.  -Changed venlafaxine from 150mg daily to 187.5mg daily  -Changed hydralazine from 10mg TID to 5mg TID    Medication reconciliation completed by provider prior to medication history? No    Time spent in this activity: 15 min    Prior to Admission medications    Medication Sig Last Dose Taking? Auth Provider   acetaminophen (TYLENOL) 325 MG tablet Take 3 tablets (975 mg) by mouth 3 times daily 6/1/2021 at 2013 Yes Adeola Vera MD   amLODIPine (NORVASC) 2.5 MG tablet Take 2 tablets (5 mg) by mouth 2 times daily 6/1/2021 at 2013 Yes Radha Lopez MD   bisacodyl (DULCOLAX) 10 MG suppository Place 10 mg rectally daily as needed for constipation prn Yes Unknown, Entered By History   calcium carbonate 600 mg-vitamin D 400 units (CALTRATE) 600-400 MG-UNIT per tablet Take 1 tablet by mouth 2 times daily 6/1/2021 at 2013 Yes Unknown, Entered By History   hydrALAZINE (APRESOLINE) 10 MG tablet Take 5 mg (1/2 of 10mg tablet) by mouth 3 times daily   Hold if SBP < 100 6/1/2021 at 2013 Yes Unknown, Entered By History   ondansetron (ZOFRAN) 4 MG tablet Take 4 mg by mouth every 8 " hours as needed for nausea prn Yes Unknown, Entered By History   polyethylene glycol (MIRALAX) 17 g packet Take 1 packet by mouth daily as needed for constipation prn Yes Unknown, Entered By History   potassium chloride ER (KLOR-CON M) 10 MEQ CR tablet Take 10 mEq by mouth daily  5/31/2021 at 0810 Yes Unknown, Entered By History   QUEtiapine (SEROQUEL) 25 MG tablet Take 1 tablet (25 mg) by mouth At Bedtime 6/1/2021 at 2013 Yes Alexis Cash MD   sennosides (SENOKOT) 8.6 MG tablet Take 2 tablets by mouth 2 times daily as needed for constipation prn Yes Unknown, Entered By History   venlafaxine (EFFEXOR-ER) 150 MG 24 hr tablet Take 150 mg by mouth daily Take with 37.5mg for total 187.5mg daily 5/31/2021 at 0810 Yes Unknown, Entered By History   venlafaxine (EFFEXOR-ER) 37.5 MG 24 hr tablet Take 37.5 mg by mouth daily Take with 150mg for total 187.5mg daily 5/31/2021 at 0810 Yes Unknown, Entered By History   donepezil (ARICEPT) 5 MG tablet Take 5 mg by mouth At Bedtime Not taking at Per [facility] d/c'd 5/14/21  Unknown, Entered By History   predniSONE (DELTASONE) 1 MG tablet Take 3 of these along with one 5mg for a total daily dose of 8mg  Patient not taking: Reported on 6/2/2021 Not Taking at Per [facility] d/c'd 5/14/21  Marky Josue MD   predniSONE (DELTASONE) 5 MG tablet TAKE 1 TABLET BY MOUTH DAILY ALONG WITH 3 ONE MG TABLETS FOR A TOTAL DAILY DOSE OF 8MG  Patient not taking: Reported on 6/2/2021 Not Taking at Per [facility] d/c'd 5/14/21  Marky Josue MD       The information provided in this note is only as accurate as the sources available at the time of update(s)

## 2021-06-03 NOTE — PLAN OF CARE
PT order received and chart reviewed. Patient now OBSERVATION status. Also of note patient with patellar fracture 4/24 and orders unclear. Recent notes state WBAT but therapy evaluation at [facility] list NWB in [place]. Would benefit from contacting ortho for clarification prior to mobilizing patient. Patient to mobilize with staff if cleared and will check in tomorrow to give patient some time in order to get an accurate picture. Recommended to nurse that patient be NWB to bedside commode if going to the bathroom but avoid ambulation until clarified. Will see tomorrow morning to complete safety/home evaluation.

## 2021-06-03 NOTE — PLAN OF CARE
VS:   /86 (BP Location: Left arm)   Pulse 93   Temp 97.9  F (36.6  C) (Oral)   Resp 16   SpO2 99%   VSS. RA. Tele on, NSR.   Output:   Incontinent of urine- Pt is sometimes unaware that she had incontinence, Pt is wearing a brief. Pt had 2x soft stools.   Activity:   PT (therapy) is requesting an ortho consult before they meet with Pt to clarify weightbearing status of Pt. RN paged Provider   Skin: Ex bruising on right cheek. Scab on right ankle. Dry flaky skin.   Pain:   Denies pain.   Neuro/CMS:   A/Ox4. CMS intact.   Dressing(s):   None.   Diet:   Regular diet. Tolerating.   LDA:   Loss of IV access. Intermittent Abx (rocephin)    Equipment:   IV pole, call light.   Plan:   Continue to monitor and follow plan of care. Pt is able to make needs known and call light in reach- Pt calls appropriately.    Additional Info:   SWK consult ordered for transportation issues and discharge planning.  Scheduled potassium

## 2021-06-03 NOTE — PLAN OF CARE
Problem: Adult Inpatient Plan of Care  Goal: Plan of Care Review  6/3/2021 0413 by Kizzy Reese RN  Outcome: No Change     Pt admitted from [facility] at 2215.  VSS on admission, tolerating RA.  A&Ox4, overwhelmed.  R face abrasions and orbital bruising d/t recent fall, neuro intact, eye movement intact, pupils equal and reactive.  Pt denies pain.  Normal sinus rhythm.  Purewick in use.  1:1 attendant for fall risk.  Cameron Palacios notified of admission and given update, ok per pt.  Will continue to monitor and assess.

## 2021-06-03 NOTE — TELEPHONE ENCOUNTER
Spoke with The charge Nurse for [facility] regarding scheduling for patient for a 1-2 week follow up in the [facility]. Charge nurse stated the scheduling would have to be arranged with the family as the facility does not provided transportation to appointments.-Per [facility] Charge Nurse

## 2021-06-03 NOTE — TELEPHONE ENCOUNTER
Spoke with patient's son, Philip, regarding scheduling and updating patient's contact numbers as all are invalid. Updated contact number for patient. Son asked me not to contact patient for scheduling as she has Dementia. Provided the number for [facility]  For scheduling as patient's sons live out of State. Son, Philip stated [email or web address] will need to schedule patient for a follow-up in 1 to 2 weeks with either Oculoplastics for further evaluation and consideration of repair of orbital floor fracture. Patient is presently admitted in the hospital, with work-up for syncopal episodes. -Per Patient's son, Philip

## 2021-06-03 NOTE — TELEPHONE ENCOUNTER
Spoke with patient's sonSaul, regarding scheduling as sonPhilip declined scheduling and stated [facility] needed to schedule patient. Informed patient's, sonSaul that [facility] declined scheduling as they do not provide transportation and scheduling would be the families responsibility. Patient's son, Saul, stated that both sons live out of state and they will not be here long enough to schedule anything for the patient at this time. SonSaul, also stated that they were under the impression that Patient, Mother, was fine and did not require any further evaluation to rule out surgery. SonSaul, declined scheduling and stated that patient is very hard to transport due to walking issues and dementia. The family would like to discuss with provider need for further Evaluation and if it is to push Surgery when Surgery is not needed. Informed patient's sonSaul,  that the Clinic is not out to push unneeded surgeries. Informed patient's sonSaul that I would forward message to Provider's asking if the Scan's could be reviewed for patient and either Saul chairez or Philip called with an update and clear plan of needs for the patient going forward. Patient's sonSaul was agreeable. -Per Patient's sonSaul

## 2021-06-03 NOTE — PLAN OF CARE
OT: Defer OT evaluation.  Per conversation with patient, no IP OT needs identified at this time.  Pt admitted for frequent falls and would benefit from IP PT evaluation for training in balance and gait.  Pt reporting no concerns with ADL completion and reports receiving assistance with most IADL.  Will complete OT orders at this time, please reorder if any inpatient OT needs arise.

## 2021-06-03 NOTE — PROGRESS NOTES
[facility]    Medicine Progress Note - Hospitalist Service       Date of Admission:  6/2/2021  Assessment & Plan         Elizabeth Joy is a 81 year old female admitted on 6/2/21. She is being admitted to the hospital to evaluate the large rt orbital floor fracture as well as evaluate recurrent falls.     Individual problems and their management are outlined below     Recurrent falls:  No obvious cause at this time. Labs largely within normal limits except for low potassium  Not noted to be dehydrated   No chest pain/ SOB. Baseline EKG: NSR. QTC 440s  UA: s/o UTI. UC pending.   Monitor with telemetry for any arrhythmia: none so far.   PT/OT to evaluate Gait and ADL's   Fall precautions         Orbital floor fracture:  Ophthalmic consultation done.  Reviewed.  Appreciate input.     -Patient is best corrected visual acuity is 20/20 in both eyes.  Intraocular pressures within normal limits in both eyes.  Patient's pupillary examination is unremarkable, with no APD notable.   -Patient does have full motility on examination, without diplopia.   -Full confrontational visual field.  Slit-lamp examination significant for  -Subacute appearing facial laceration along right sided maxillary cheek.    -Dilated funduscopic examination was unremarkable.  -CT scan was notable for a large right infraorbital rim and floor fracture with herniation of the inferior rectus along with fat.   No acute intervention is warranted at this time.  We do recommend outpatient follow-up with recheck of ophthalmic evaluation in 1 to 2 weeks following discharge.       HTN:   Resume home dose of Amlodipine 5 mg BID and Hydralazine 5 mg TID     Depression:   Resume home dose of venlafaxine 187.5 mg daily  Resume seroquel 25 mg at bedtime       UTI:  Cultures pending. Follow-up  Continue empiric iv ceftriaxone for now      History of right patella fracture:   Cysts x-ray right knee  Orthopedic consultation            Diet: Combination Diet Regular Diet Adult    DVT Prophylaxis: Pneumatic Compression Devices  Ram Catheter: not present  Code Status: No CPR- Do NOT Intubate           Disposition Plan   Expected discharge: TBD  Entered: Jaime Vinson MD 06/03/2021, 2:30 PM       The patient's care was discussed with the Bedside Nurse, Care Coordinator/ and Patient.    Jaime Vinson MD  Hospitalist Service  [facility]  Contact information available via [facility] Paging/Directory    ______________________________________________________________________    Interval History   Interval events reviewed.   States doing well  Denies fever or chills.   No cough or cp or sob.   No LH or dizziness.   No NV or pain abdomen.   No dysuria or bowel complaint.   No new sensory or motor complaint.   Pain control.    No other new or acute medical concern      Data reviewed today: I reviewed all medications, new labs and imaging results over the last 24 hours. I personally reviewed no images or EKG's today.    Physical Exam   Vital Signs: Temp: 97.9  F (36.6  C) Temp src: Oral BP: 139/86 Pulse: 93   Resp: 16 SpO2: 99 % O2 Device: None (Room air)    Weight: 0 lbs 0 oz    General Appearance: Awake, interactive, NAD, right maxilla cheek Steri-Strips.  Trace left eye injection.  Respiratory: Normal work of breathing.    Cardiovascular: S1 S2 Regular  GI: Soft. NT. ND.  Extremities: Distally wwp. R LE> L LE edema.   Skin: No acute rash on exposed areas.   Other: Grossly nonfocal.  Alert oriented.    Data   Recent Labs   Lab 06/03/21  0557 06/02/21  1255   WBC 10.4 11.3*   HGB 11.0* 13.1   MCV 95 94    300    142   POTASSIUM 3.7 3.3*   CHLORIDE 108 107   CO2 27 30   BUN 22 21   CR 1.05* 0.99   ANIONGAP 7 5   MICHELLE 9.0 9.6   * 93   ALBUMIN 3.3*  --    PROTTOTAL 6.4*  --    BILITOTAL 0.3  --    ALKPHOS 76  --    ALT 21  --    AST 10  --      Recent Results (from the past  24 hour(s))   XR Knee Right 3 Views    Narrative    3 views right knee radiographs 6/3/2021 5:07 PM    History: R patellar fracture, fu.    Comparison: 4/23/2021    Findings:    AP, lateral and patellofemoral views of the right knee were obtained.     Ongoing healing nondisplaced vertically oriented lateral patellar  facet fracture. Small joint effusion.    Mild medial and lateral compartment joint space loss.  Chondrocalcinosis.     No patellar tilt or lateral subluxation. Vascular calcification.  Subcutaneous edema laterally.    Prepatellar soft tissue swelling.      Impression    Impression: Ongoing healing nondisplaced vertically oriented lateral  patellar facet fracture.     RENEE ROMAINE     Medications       acetaminophen  975 mg Oral TID     amLODIPine  5 mg Oral BID     calcium carbonate 600 mg-vitamin D 400 units  1 tablet Oral BID     cefTRIAXone  1 g Intravenous Q24H     hydrALAZINE  5 mg Oral TID     [START ON 6/4/2021] potassium chloride ER  10 mEq Oral Daily     QUEtiapine  25 mg Oral At Bedtime     sodium chloride (PF)  3 mL Intracatheter Q8H     sodium chloride         venlafaxine  150 mg Oral Daily     venlafaxine  37.5 mg Oral Daily

## 2021-06-03 NOTE — UTILIZATION REVIEW
"[facility]    Admission Status; Secondary Review Determination     Admission Date: 6/2/2021 10:20 PM      Under the authority of the Utilization Management Committee, the utilization review process indicated a secondary review on the above patient.  The review outcome is based on review of the medical records, discussions with staff, and applying clinical experience noted on the date of the review.          (x) Observation Status Appropriate - This patient does not meet hospital inpatient criteria and is placed in observation status. If this patient's primary payer is Medicare and was admitted as an inpatient, Condition Code 44 should be used and patient status changed to \"observation\".     RATIONALE FOR DETERMINATION   81-year-old female with a history of hypertension and depression was admitted yesterday with recurrent falls.  She was found to have a left right orbital floor fracture.  She was also noted to have a urinary tract infection.  She was started on ceftriaxone.  There is no evidence of sepsis nor instability.  She was evaluated by ophthalmology and her intraocular pressures and visual examination are normal.  Physical and Occupational Therapy consults are requested.  Further work-up with regards to her falls will be performed.  At the time of this review the patient does not meet medical necessity for inpatient hospitalization and observation status is recommended.    The severity of illness, intensity of service provided, expected LOS and risk for adverse outcome make the care appropriate for further observation; however, doesn't meet criteria for hospital inpatient admission.  Dr Vinson was paged/notified of this determination.        The information on this document is developed by the utilization review team in order for the business office to ensure compliance.  This only denotes the appropriateness of proper admission status and does not reflect the quality of care rendered.         The definitions of " Inpatient Status and Observation Status used in making the determination above are those provided in the CMS Coverage Manual, Chapter 1 and Chapter 6, section 70.4.      Sincerely,     Angelo King DO MPH   Physician Advisor  Utilization Review  [facility]

## 2021-06-04 ENCOUNTER — APPOINTMENT (OUTPATIENT)
Dept: CARDIOLOGY | Facility: CLINIC | Age: 81
End: 2021-06-04
Attending: INTERNAL MEDICINE
Payer: COMMERCIAL

## 2021-06-04 ENCOUNTER — TELEPHONE (OUTPATIENT)
Dept: OPHTHALMOLOGY | Facility: CLINIC | Age: 81
End: 2021-06-04

## 2021-06-04 PROCEDURE — 99207 PR CDG-CODE CATEGORY CHANGED: CPT | Performed by: INTERNAL MEDICINE

## 2021-06-04 PROCEDURE — 999N000147 HC STATISTIC PT IP EVAL DEFER: Performed by: PHYSICAL THERAPIST

## 2021-06-04 PROCEDURE — 93306 TTE W/DOPPLER COMPLETE: CPT

## 2021-06-04 PROCEDURE — G0378 HOSPITAL OBSERVATION PER HR: HCPCS

## 2021-06-04 PROCEDURE — 250N000011 HC RX IP 250 OP 636: Performed by: INTERNAL MEDICINE

## 2021-06-04 PROCEDURE — 250N000013 HC RX MED GY IP 250 OP 250 PS 637: Performed by: INTERNAL MEDICINE

## 2021-06-04 PROCEDURE — 93306 TTE W/DOPPLER COMPLETE: CPT | Mod: 26 | Performed by: INTERNAL MEDICINE

## 2021-06-04 PROCEDURE — 99225 PR SUBSEQUENT OBSERVATION CARE,LEVEL II: CPT | Performed by: INTERNAL MEDICINE

## 2021-06-04 RX ORDER — SODIUM CHLORIDE 9 MG/ML
INJECTION, SOLUTION INTRAVENOUS
Status: DISPENSED
Start: 2021-06-04 | End: 2021-06-05

## 2021-06-04 RX ADMIN — POTASSIUM CHLORIDE 10 MEQ: 750 TABLET, EXTENDED RELEASE ORAL at 12:22

## 2021-06-04 RX ADMIN — Medication 1 TABLET: at 19:38

## 2021-06-04 RX ADMIN — ACETAMINOPHEN 975 MG: 325 TABLET, FILM COATED ORAL at 22:26

## 2021-06-04 RX ADMIN — AMLODIPINE BESYLATE 5 MG: 5 TABLET ORAL at 12:21

## 2021-06-04 RX ADMIN — CEFTRIAXONE SODIUM 1 G: 1 INJECTION, POWDER, FOR SOLUTION INTRAMUSCULAR; INTRAVENOUS at 14:35

## 2021-06-04 RX ADMIN — Medication 1 TABLET: at 12:22

## 2021-06-04 RX ADMIN — Medication 5 MG: at 22:27

## 2021-06-04 RX ADMIN — VENLAFAXINE HYDROCHLORIDE 150 MG: 150 TABLET, EXTENDED RELEASE ORAL at 12:24

## 2021-06-04 RX ADMIN — Medication 5 MG: at 12:21

## 2021-06-04 RX ADMIN — ACETAMINOPHEN 975 MG: 325 TABLET, FILM COATED ORAL at 12:21

## 2021-06-04 RX ADMIN — VENLAFAXINE HYDROCHLORIDE 37.5 MG: 37.5 TABLET, EXTENDED RELEASE ORAL at 12:24

## 2021-06-04 RX ADMIN — QUETIAPINE FUMARATE 25 MG: 25 TABLET ORAL at 22:27

## 2021-06-04 NOTE — CONSULTS
Ortho Plan of Care:    Paged by primary team regarding weightbearing restrictions for patient.    Imaging reviewed, showing a healing nondisplaced vertically oriented lateral patellar facet fracture. No patella farida or transverse-oriented fractures.     Assessment:  Stable fracture pattern of right patella. No indication for knee immobilization or activity restriction.     -Recommend  WBAT, knee ROMAT  -Activity as tolerated  -Pain control with OTC medications, topical medications if needed  -Agree with PT for continued mobilization training PRN  -Can follow-up with Non-operative Sports Medicine Physician PRN in the future if patient desires further follow-up    Discussed with Dr. Dykes.    Hugo Lopez MD  Orthopedic Surgery PGY-4  Pager: [phone]

## 2021-06-04 NOTE — PLAN OF CARE
VS: BP (!) 152/80   Pulse 77   Temp 98.2  F (36.8  C) (Oral)   Resp 16   SpO2 99%   RA   Output: Voids w/o difficulty. Has briefs on incase of incontinence  No BM during shift   Activity: SBA w/ walker   Skin: WDL x bruising on bilateral cheeks and scab on ankle   Pain:   denied   Neuro/CMS:   A&Ox4 but has some confusion at times.  Pt was agitated and confused ~0430 but went back to sleep after ambulating to bathroom   Diet:   regular   LDA:   R PIV SL   Equipment:   IV pole, walker   Plan:   Continue POC   Additional Info:   Bed alarm on for safety    Pt removed and refused tele monitoring ~0430      OBS Goal 0000, 0200, 0400, 0600    Fall evaluation. PT/OT. Not Met  Safe discharge planning. Partially Met  Stable vitals. Met  Ambulatory. Met

## 2021-06-04 NOTE — PROGRESS NOTES
[facility]    Medicine Progress Note - Hospitalist Service       Date of Admission:  6/2/2021    Assessment & Plan         Elizabeth Joy is a 81 year old female admitted on 6/2/21. She is being admitted to the hospital to evaluate the large rt orbital floor fracture as well as evaluate recurrent falls.     Individual problems and their management are outlined below     Recurrent falls:  No obvious cause at this time. Labs largely within normal limits except for low potassium  Not noted to be dehydrated   No chest pain/ SOB. Baseline EKG: NSR. QTC 440s  UA: s/o UTI. UC pending.   Monitor with telemetry for any arrhythmia: none so far.   PT/OT to evaluate Gait and ADL's   Fall precautions  Check Echo         Orbital floor fracture:  Ophthalmic consultation done.  Reviewed.  Appreciate input.     -Patient is best corrected visual acuity is 20/20 in both eyes. Intraocular pressures within normal limits in both eyes. Patient's pupillary examination is unremarkable, with no APD notable.   -Patient does have full motility on examination, without diplopia.   -Full confrontational visual field.  Slit-lamp examination significant for  -Subacute appearing facial laceration along right sided maxillary cheek.    -Dilated funduscopic examination was unremarkable.  -CT scan was notable for a large right infraorbital rim and floor fracture with herniation of the inferior rectus along with fat.   No acute intervention is warranted at this time.  We do recommend outpatient follow-up with recheck of ophthalmic evaluation in 1 to 2 weeks following discharge.       HTN:   Resume home dose of Amlodipine 5 mg BID and Hydralazine 5 mg TID     Depression:   Resume home dose of venlafaxine 187.5 mg daily  Resume seroquel 25 mg at bedtime       UTI:  Cultures pending. Follow-up  Continue empiric iv ceftriaxone for now      History of right patella fracture:   Check x-ray right knee  Orthopedic  consultation - reviewed. Appreciate input.     OT consultation for cognition eval.   SW consultation for discharge planning.      Diet: Combination Diet Regular Diet Adult    DVT Prophylaxis: Pneumatic Compression Devices  Ram Catheter: not present  Code Status: No CPR- Do NOT Intubate           Disposition Plan   Expected discharge: TBD  Entered: Jaime Vinson MD 06/04/2021, 9:26 AM       The patient's care was discussed with the Bedside Nurse, Care Coordinator/ and Patient.    Jaime Vinson MD  Hospitalist Service  [facility]  Contact information available via [facility] Paging/Directory    ______________________________________________________________________    Interval History      Interval events reviewed.   Doing okay.   Denies fever or chills.   No cough or cp or sob.   No LH or dizziness.   No NV or pain abdomen.   No dysuria or bowel complaint.   No new sensory or motor complaint.     No other new or acute medical concern      Data reviewed today: I reviewed all medications, new labs and imaging results over the last 24 hours. I personally reviewed no images or EKG's today.    Physical Exam   Vital Signs: Temp: 98.1  F (36.7  C) Temp src: Oral BP: (!) 151/96 Pulse: 77   Resp: 16 SpO2: 97 % O2 Device: None (Room air)    Weight: 0 lbs 0 oz    General Appearance:  NAD, right maxilla cheek Steri-Strips.  Trace left eye injection.  Respiratory: Normal work of breathing.    Cardiovascular: S1 S2 Regular  GI: Soft. NT. ND.  Extremities: Distally wwp. R LE> L LE edema.   Skin: No acute rash on exposed areas.   Other: Grossly nonfocal.  Alert oriented.    Data   Recent Labs   Lab 06/03/21  0557 06/02/21  1255   WBC 10.4 11.3*   HGB 11.0* 13.1   MCV 95 94    300    142   POTASSIUM 3.7 3.3*   CHLORIDE 108 107   CO2 27 30   BUN 22 21   CR 1.05* 0.99   ANIONGAP 7 5   MICHELLE 9.0 9.6   * 93   ALBUMIN 3.3*  --    PROTTOTAL 6.4*  --    BILITOTAL  0.3  --    ALKPHOS 76  --    ALT 21  --    AST 10  --      Recent Results (from the past 24 hour(s))   XR Knee Right 3 Views    Narrative    3 views right knee radiographs 6/3/2021 5:07 PM    History: R patellar fracture, fu.    Comparison: 4/23/2021    Findings:    AP, lateral and patellofemoral views of the right knee were obtained.     Ongoing healing nondisplaced vertically oriented lateral patellar  facet fracture. Small joint effusion.    Mild medial and lateral compartment joint space loss.  Chondrocalcinosis.     No patellar tilt or lateral subluxation. Vascular calcification.  Subcutaneous edema laterally.    Prepatellar soft tissue swelling.      Impression    Impression: Ongoing healing nondisplaced vertically oriented lateral  patellar facet fracture.     RENEE ROMAINE     Medications       acetaminophen  975 mg Oral TID     amLODIPine  5 mg Oral BID     calcium carbonate 600 mg-vitamin D 400 units  1 tablet Oral BID     cefTRIAXone  1 g Intravenous Q24H     hydrALAZINE  5 mg Oral TID     potassium chloride ER  10 mEq Oral Daily     QUEtiapine  25 mg Oral At Bedtime     sodium chloride (PF)  3 mL Intracatheter Q8H     venlafaxine  150 mg Oral Daily     venlafaxine  37.5 mg Oral Daily

## 2021-06-04 NOTE — PROGRESS NOTES
Care Management Follow Up    Length of Stay (days): 1    Expected Discharge Date: 06/07/21     Concerns to be Addressed:       Patient plan of care discussed at interdisciplinary rounds: Unknown    Anticipated Discharge Disposition:       Anticipated Discharge Services:    Anticipated Discharge DME:      Patient/family educated on Medicare website which has current facility and service quality ratings:    Education Provided on the Discharge Plan:    Patient/Family in Agreement with the Plan:      Referrals Placed by CM/SW:    Private pay costs discussed: Not applicable    Additional Information:    At 1636, OLAMIDE spoke with Andree ([phone]) regarding patient. Andree informed OLAMIDE that Elizabeth currently receives home health care services (PT, OT and RN)  at her assisted living facility through her agency. She asked that the treatment team be made aware of this. OLAMIDE agreed to pass on the information to the team.    OLAMIDE will continue to follow as needed    PRIMO Lr, LGSW  ED/OBS   [facility]  Phone: [phone]  Pager: [phone]

## 2021-06-04 NOTE — PLAN OF CARE
VS:    BP (!) 152/80   Pulse 77   Temp 98  F (36.7  C) (Oral)   Resp 16   SpO2 99%     Output:    Voids spontaneously without difficulty into toilet. Continent of urine this evening.    Activity:     Per ortho, WBAT. Pt uses walker, ambulates with minimal assist, steady but has knee pain. Pt refused gait belt multiple times this evening. LS clear.   Skin: Ex bruising on Bilat cheeks, scab on ankle.    Pain:    Pt denies and declines any pain meds or interventions, but is visibly in pain when sitting/standing in R knee.    Neuro/CMS:    A&Ox4, but is slightly confused at times. Son visited this evening and insisted that pt has dementia at baseline. Neuros/CMS intact and denies numbness/tingling.  Pt was slightly anxious in late afternoon, but resolved after a visit with her son.   Dressing(s):     CDI on R cheek   Diet:    Regular, denies nausea   Equipment:     Walker, gait belt, IV pole   IV's/Drains:    PIV SL.    Plan:    Continue to monitor and follow plan of care. Pt is able to make needs known and call light in reach- Pt calls appropriately but can be quite impulsive when her anxiety has increased. Bed alarm on for safety.     Additional Info:             OBS Goals:    1600:Fall evaluation: PT/OT: Not Met. Safe discharge planning: Partially Met Stable vitals: Met Ambulatory: Met    1800:Fall evaluation: PT/OT: Not Met. Safe discharge planning: Partially Met Stable vitals: Met Ambulatory: Met    2000:Fall evaluation: PT/OT: Not Met, new activity order complete by ortho, PT eval tomorrow. Safe discharge planning: Partially Met Stable vitals: Met Ambulatory: Met    2200:Fall evaluation: PT/OT: Not Met, new activity order complete by ortho, PT eval tomorrow. Safe discharge planning: Partially Met Stable vitals: Met Ambulatory: Met

## 2021-06-04 NOTE — PLAN OF CARE
"PT orders received and chart reviewed. Attempted to initiate evaluation. Pt asked me to leave. When I introduced myself she said \" GO AWAY\". I tried to explain Rehabs role and my role but she cut me off and again said \" GO AWAY\". I had her bedside nurse attempt to engage the patient with regard to PT services and when we could come back. She stated \"never\". Will attempt again as schedule allows.   "

## 2021-06-04 NOTE — CONSULTS
Care Management Initial Consult    General Information  Assessment completed with: Children, Cameron Hughes  Type of CM/SW Visit: Initial Assessment    Primary Care Provider verified and updated as needed:     Readmission within the last 30 days: no previous admission in last 30 days      Reason for Consult: discharge planning  Advance Care Planning:    No ACP docs; not discussed        Communication Assessment  Patient's communication style: spoken language (English or Bilingual)    Hearing Difficulty or Deaf: no   Wear Glasses or Blind: yes    Cognitive  Cognitive/Neuro/Behavioral: .WDL except, orientation  Level of Consciousness: confused  Arousal Level: arouses to voice  Orientation: disoriented to, situation     Best Language: 0 - No aphasia  Speech: clear    Living Environment:   People in home:  Alone     Current living Arrangements:   [facility] [address] [place] [address] [phone]       Able to return to prior arrangements: other (see comments)(TBD, pending recs, whether pt can return to [facility])       Family/Social Support:  Care provided by: homecare agency, other (see comments)([facility] staff)  Provides care for:    Marital Status: ( lives in [facility] in [place])  Children          Description of Support System: Supportive, Involved    Support Assessment: Adequate family and caregiver support    Current Resources:   Patient receiving home care services:  Per cameron Hughes, pt receives some assistance with her ADL's from staff of [facility]. Pt has hand rails on shower, and staff monitor pt for possible falls when she showers.     Per cameron Palacios, pt was receiving PT and OT at [facility] when she was wearing an 'immobilizer leg brace' to assist her with walking with a FWW with this brace. Pt was not receiving any PT or OT PTA.      Community Resources:  N/A  Equipment currently used at home: walker, standard  Supplies currently used at home:   "N/A    Employment/Financial:  Employment Status:   Not employed       Financial Concerns:   No concerns identified          Lifestyle & Psychosocial Needs:  Lifestyle     Physical activity     Days per week: Not on file     Minutes per session: Not on file     Stress: Not at all     Social Needs     Financial resource strain: Not hard at all     Food insecurity     Worry: Never true     Inability: Never true     Transportation needs     Medical: No     Non-medical: No     Socioeconomic History     Marital status:      Spouse name: Not on file     Number of children: 3     Years of education: Not on file     Highest education level: Not on file   Relationships     Social connections     Talks on phone: More than three times a week     Gets together: Never     Attends Restorationist service: Never     Active member of club or organization: No     Attends meetings of clubs or organizations: Never     Relationship status:      Intimate partner violence     Fear of current or ex partner: Not on file     Emotionally abused: Not on file     Physically abused: Not on file     Forced sexual activity: Not on file     Tobacco Use     Smoking status: Former Smoker     Packs/day: 1.00     Years: 42.00     Pack years: 42.00     Types: Cigarettes     Start date:      Quit date: 1997     Years since quittin.6     Smokeless tobacco: Never Used   Substance and Sexual Activity     Alcohol use: No     Alcohol/week: 0.0 standard drinks     Frequency: Never     Drug use: No     Sexual activity: Not Currently       Functional Status:  Prior to admission patient needed assistance:    Per son Saul, pt receives some assistance with her ADL's from staff of [facility]. Pt has hand rails on shower, and staff monitor pt for possible falls when she showers.     Pt has FWW which she uses while walking. Cameron Hughes stated that he is unsure how often she uses this, but per [facility] staff and \"anyone who visits her\" states they do observe her " "using it.     Pt's son Saul discussed issues with pt's recurrent falls at long-term. Son stated this typically happens when pt gets out of bed. He stated pt has emergency button on necklace that alerts staff of [facility] which she uses as needed.        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     SW entered pt's room to complete assessment, pt presented as irritable, yelling at SW \"go away!\"; with assistance from RN, SW asked pt if she were okay with SW contacting her sons to complete assessment, to which pt said \"do whatever you want.\"     Son Saul discussed how pt's mental health status declines while in a TCU setting (see below).        Chemical Dependency Status:  No issues identified.               Values/Beliefs:  Spiritual, Cultural Beliefs, Caodaism Practices, Values that affect care:         No      Additional Information:  SW entered pt's room to complete assessment, pt presented as irritable, yelling at SW \"go away!\"; with assistance from RN, SW asked pt if she were okay with SW contacting her sons to complete assessment, to which pt said \"do whatever you want.\"     OLAMIDE spoke with pt's son Saul at length about her situation at the [facility] and TCU settings. Son stated all of pt's children live out of state, but they have been very involved with [place]'s cares from [place], remaining in regular communication with long-term staff. Saul discussed how relieved the family was when pt admitted to an long-term.     Son stated they have had a very positive experience with the [facility], stating pt \"loves it there.\" Saul expressed how pt's mental health declines while at a TCU, describing how pt becomes \"very depressed, withdrawn, and disoriented.\" Saul also discussed that there does not appear to be a significant benefit with pt \"going through this carousel\" of TCU's, as he stated \"she doesn't really remember any of the goals she meets while at a TCU.\" Saul discussed " "how pt \"absolutely hated it\" at the [facility], the TCU she last discharged from the hospital on 4/27/21.     Per son Saul, pt receives some assistance with her ADL's from staff of [facility]. Pt has hand rails on shower, and staff monitor pt for possible falls when she showers.     Pt has FWW which she uses while walking. Son Saul stated that he is unsure how often she uses this, but per [facility] staff and \"anyone who visits her\" states they do observe her using it.     Pt's son Saul discussed issues with pt's recurrent falls at [facility]. Son stated this typically happens when pt gets out of bed. He stated pt has emergency button on necklace that alerts staff of [facility] which she uses as needed.     Saul stated he believes pt receives PT and OT at the [facility], but deferred to son Philip for more information.     OLAMIDE then spoke with son Philip who informed was receiving PT and OT at [facility] when she was wearing an 'immobilizer leg brace' to assist her with walking with a FWW with this brace. Pt was not receiving any PT or OT PTA.     Son Philip similarly expressed how pt \"hated it\" at the last TCU. OLAMIDE discussed with both Saul and Philip that if therapies recommend TCU, it's possible the [facility] will not re-admit pt until she undergoes IP rehab. OLAMIDE stated he will advocate pt to return to [facility], if possible to safely do so, and will assist with finding a different TCU than her previous one ([facility]), to which sons were receptive. Son Philip stated he could assist with encouraging pt to attend a TCU if needed.         Levi Montesinos, PRIMO, [facility]  Acute Care Float   [facility]        "

## 2021-06-04 NOTE — PLAN OF CARE
VS:    BP (!) 151/96 (BP Location: Left arm)   Pulse 77   Temp 98.1  F (36.7  C) (Oral)   Resp 16   SpO2 97%     BP high otherwise- VSS. RA. Pt refused to  have Tele on. Bed alarm for safety.    Output:    Incontinent of urine, brief and incontinent pad on.  LBM yesterday.   Activity:    Assist of 1 with walker and gait belt. Pt refused to reposition with staff.   Skin: Ex bruising on right cheek. Scab on right ankle. Dry flaky skin.   Pain:    Denies pain.   Neuro/CMS:    Ex Sleepy all  shift, disoriented to situation and agitated with staff  this AM, calm this afternoon. CMS intact.   Dressing(s):    None.   Diet:    Regular diet. Tolerating. Pt refused breakfast ate  100% lunch   LDA:    PIV R  arm  infusing TKO and Intermittent Abx (rocephin)    Equipment:    IV pole, call light.   Plan:    Continue to monitor and follow plan of care. Pt is able to make needs known and call light in reach.    Additional Info:

## 2021-06-04 NOTE — TELEPHONE ENCOUNTER
Telephone Encounter:    Attempted to call Philip Boyd Pt's Son to address questions and concerns. Was not able to reach them x 2 - last PM and this AM -- will attempt to call again. Left a voice message today stating that we will try to reach out again and we heard their concerns.     Fermin Figueroa MD  Department of Ophthalmology  Pager: [phone]

## 2021-06-05 ENCOUNTER — APPOINTMENT (OUTPATIENT)
Dept: OCCUPATIONAL THERAPY | Facility: CLINIC | Age: 81
End: 2021-06-05
Attending: INTERNAL MEDICINE
Payer: COMMERCIAL

## 2021-06-05 LAB
BACTERIA SPEC CULT: ABNORMAL
BACTERIA SPEC CULT: ABNORMAL
SPECIMEN SOURCE: ABNORMAL

## 2021-06-05 PROCEDURE — 97165 OT EVAL LOW COMPLEX 30 MIN: CPT | Mod: GO

## 2021-06-05 PROCEDURE — 99207 PR CDG-CODE CATEGORY CHANGED: CPT | Performed by: INTERNAL MEDICINE

## 2021-06-05 PROCEDURE — 250N000013 HC RX MED GY IP 250 OP 250 PS 637: Performed by: INTERNAL MEDICINE

## 2021-06-05 PROCEDURE — 97535 SELF CARE MNGMENT TRAINING: CPT | Mod: GO

## 2021-06-05 PROCEDURE — 999N000147 HC STATISTIC PT IP EVAL DEFER

## 2021-06-05 PROCEDURE — G0378 HOSPITAL OBSERVATION PER HR: HCPCS

## 2021-06-05 PROCEDURE — 99225 PR SUBSEQUENT OBSERVATION CARE,LEVEL II: CPT | Performed by: INTERNAL MEDICINE

## 2021-06-05 RX ORDER — AMOXICILLIN 500 MG/1
500 CAPSULE ORAL EVERY 8 HOURS SCHEDULED
Status: COMPLETED | OUTPATIENT
Start: 2021-06-05 | End: 2021-06-09

## 2021-06-05 RX ADMIN — AMLODIPINE BESYLATE 5 MG: 5 TABLET ORAL at 20:26

## 2021-06-05 RX ADMIN — VENLAFAXINE HYDROCHLORIDE 37.5 MG: 37.5 TABLET, EXTENDED RELEASE ORAL at 09:47

## 2021-06-05 RX ADMIN — ACETAMINOPHEN 975 MG: 325 TABLET, FILM COATED ORAL at 09:46

## 2021-06-05 RX ADMIN — Medication 1 TABLET: at 20:26

## 2021-06-05 RX ADMIN — POTASSIUM CHLORIDE 10 MEQ: 750 TABLET, EXTENDED RELEASE ORAL at 09:47

## 2021-06-05 RX ADMIN — ACETAMINOPHEN 975 MG: 325 TABLET, FILM COATED ORAL at 20:26

## 2021-06-05 RX ADMIN — QUETIAPINE FUMARATE 25 MG: 25 TABLET ORAL at 22:13

## 2021-06-05 RX ADMIN — ACETAMINOPHEN 975 MG: 325 TABLET, FILM COATED ORAL at 13:32

## 2021-06-05 RX ADMIN — AMOXICILLIN 500 MG: 500 CAPSULE ORAL at 16:04

## 2021-06-05 RX ADMIN — AMOXICILLIN 500 MG: 500 CAPSULE ORAL at 22:13

## 2021-06-05 RX ADMIN — Medication 1 TABLET: at 09:47

## 2021-06-05 RX ADMIN — VENLAFAXINE HYDROCHLORIDE 150 MG: 150 TABLET, EXTENDED RELEASE ORAL at 09:47

## 2021-06-05 RX ADMIN — Medication 5 MG: at 13:32

## 2021-06-05 ASSESSMENT — ACTIVITIES OF DAILY LIVING (ADL): IADL_COMMENTS: HAS ASSIST WITH IADLS

## 2021-06-05 NOTE — PLAN OF CARE
Patient A&O x4 with labile mood and using the call light intermittently. VSS and lung sounds clear and equal bilaterally. Bowel sounds active and passing gas. Denies chest pain, lightheadedness, dizziness, and SOB. PIV SL. Antibiotics transitioned to oral today. Drinking well and tolerating regular diet. Voiding spontaneously without difficulties; occasional incontinence. Able to wiggle toes and CMS intact; denies numbness or tingling. No complaints of pain or discomfort. Turned and repositioned with heels floated off of bed. Able to transfer with assist of 1 and walker. Patient agreed to work with PT today. Plan is assisted living vs. Memory care. Refused orthostatic blood pressures; will reapproach later today. Refused telemetry. Will continue to follow plan of care and provide interventions as needed.      PIV removed per patient request; no longer needed.

## 2021-06-05 NOTE — PLAN OF CARE
OT: [place] completed, tx initiated. Patient participated in [place] for cognitive screen scoring 17/30 which places her in same category of those with dementia (1-20 dementia). Patient reports she has done this screen many times and knows it should be easier.     Patient requesting to use restroom, unable to get orthostatic BP. Supine>sit with SBA. Initial sit>stand with heavy CGA as patient had not been up in awhile. Patient ambulated to/from bathroom using FWW with SBA. Completed toilet transfer and task with SBA using grab bars.     Patient transferred to chair and seated /83. Standing /76. Patient with no reports of LH.     Recommend return to [facility] with increased assist for ADLs/IADLs and continued home OT/PT services vs memory care. Will defer to medical team/family.     Occupational Therapy Discharge Summary    Reason for therapy discharge:    All goals and outcomes met, no further needs identified.    Progress towards therapy goal(s). See goals on Care Plan in [facility] electronic health record for goal details.  Goals met    Therapy recommendation(s):    Recommend return to [facility] with increased assist for ADLs/IADLs and continued home OT/PT services vs memory care. Will defer to medical team/family.

## 2021-06-05 NOTE — PLAN OF CARE
[facility]      OUTPATIENT OCCUPATIONAL THERAPY  EVALUATION  PLAN OF TREATMENT FOR OUTPATIENT REHABILITATION  (COMPLETE FOR INITIAL CLAIMS ONLY)  Patient's Last Name, First Name, M.I.  YOB: 1940  Elizabeth Joy                          Provider's Name  [facility] Medical Record No.  [id number]                               Onset Date:  06/04/21   Start of Care Date:  06/05/21     Type:     ___PT   _X_OT   ___SLP Medical Diagnosis:  Recurrent falls, orbital fx                        OT Diagnosis:  Decreased functional mobility and ADLs   Visits from SOC:  1   _________________________________________________________________________________  Plan of Treatment/Functional Goals    Planned Interventions: ADL retraining, cognition   Goals: See Occupational Therapy Goals on Care Plan in [employer] electronic health record.    Therapy Frequency: 1x eval and treat  Predicted Duration of Therapy Intervention: 1 day  _________________________________________________________________________________    I CERTIFY THE NEED FOR THESE SERVICES FURNISHED UNDER        THIS PLAN OF TREATMENT AND WHILE UNDER MY CARE     (Physician co-signature of this document indicates review and certification of the therapy plan).              Certification date from: 06/05/21, Certification date to: 06/06/21    Referring Physician: Jaime Vinson MD            Initial Assessment        See Occupational Therapy evaluation dated 06/05/21 in Epic electronic health record.

## 2021-06-05 NOTE — PLAN OF CARE
PT: Defer - per discussion with therapy team, discharge recommendations have been established, to return to prior living facility with continued PT intervention to address balance deficits and frequent falls. Agree with this plan based on OT assessment and chart review. Pt currently on obs and discharge recommendations established so at this time, I will complete orders. Thank you for the referral.

## 2021-06-05 NOTE — PLAN OF CARE
Pt slept the whole shift.   R cheek bruised.  Pt is sometimes incontinent of urine, pt has a brief on.  Assist of 1 w/ walker & gait belt, she has not been OOB during shift.  On bed alarm for safety.  Continue POC.

## 2021-06-05 NOTE — PROGRESS NOTES
06/05/21 1500   Quick Adds   Quick Adds Certification   Type of Visit Initial Occupational Therapy Evaluation   Living Environment   People in home facility resident   Current Living Arrangements assisted living   Home Accessibility no concerns   Self-Care   Current Activity Tolerance fair   Regular Exercise Yes  (sometimes attends exercise groups at [facility])   Equipment Currently Used at Home walker, rolling;shower chair;grab bar, toilet;grab bar, tub/shower;wheelchair, manual   Disability/Function   Hearing Difficulty or Deaf no   Wear Glasses or Blind yes   Vision Management for reading and driving   Concentrating, Remembering or Making Decisions Difficulty yes   Difficulty Communicating no   Difficulty Eating/Swallowing no   Walking or Climbing Stairs Difficulty yes   Walking or Climbing Stairs ambulation difficulty, requires equipment   Mobility Management has been using walker    Dressing/Bathing Difficulty yes   Dressing/Bathing bathing difficulty, requires equipment;bathing difficulty, assistance 1 person;dressing difficulty, assistance 1 person   Toileting issues no   Doing Errands Independently Difficulty (such as shopping) no   Fall history within last six months yes   Number of times patient has fallen within last six months   (patient estimates 6)   General Information   Onset of Illness/Injury or Date of Surgery 06/04/21   Referring Physician Jaime Vinson MD   Patient/Family Therapy Goal Statement (OT) To go home   Additional Occupational Profile Info/Pertinent History of Current Problem Elizabeth Joy is a 81 year old female admitted on 6/2/21. She is being admitted to the hospital to evaluate the large rt orbital floor fracture as well as evaluate recurrent falls.    Existing Precautions/Restrictions fall;other (see comments)  (bed alarm )   Left Lower Extremity (Weight-bearing Status) weight-bearing as tolerated (WBAT)   Right Lower Extremity (Weight-bearing Status) weight-bearing as tolerated  (WBAT)   General Observations and Info per ortho note, WBAT, ROMAT   Cognitive Status Examination   Cognitive Status Comments Patient participated in SLUMS scoring a 17/30 placing her in same category as those with dementia. Knew it was June, 2021. Off on day by 1 day. Decreased concentration. See daily doc for details.    Visual Perception   Visual Impairment/Limitations corrective lenses for reading   Sensory   Sensory Quick Adds No deficits were identified   Pain Assessment   Patient Currently in Pain Yes, see Vital Sign flowsheet   Range of Motion Comprehensive   General Range of Motion no range of motion deficits identified   Strength Comprehensive (MMT)   General Manual Muscle Testing (MMT) Assessment no strength deficits identified   Muscle Tone Assessment   Muscle Tone Quick Adds No deficits were identified   Coordination   Upper Extremity Coordination No deficits were identified   Bed Mobility   Comment (Bed Mobility) Supine<>sit SBA   Sit-Stand Transfer   Sit-Stand Rayle (Transfers) contact guard;set up   Assistive Device (Sit-Stand Transfers) walker, front-wheeled   Toilet Transfer   Type (Toilet Transfer) sit-stand;stand-sit   Rayle Level (Toilet Transfer) supervision;verbal cues   Assistive Device (Toilet Transfer) grab bars/safety frame;walker, front-wheeled   Toileting   Rayle Level (Toileting) supervision   Assistive Devices (Toileting) grab bar, toilet   Instrumental Activities of Daily Living (IADL)   IADL Comments has assist with IADLs   Clinical Impression   Criteria for Skilled Therapeutic Interventions Met (OT) yes   OT Diagnosis Decreased functional mobility and ADLs   OT Problem List-Impairments impacting ADL cognition;balance;strength   Assessment of Occupational Performance 3-5 Performance Deficits   Identified Performance Deficits dressing, bathing, transfers, home mgmt, cognition   Planned Therapy Interventions (OT) ADL retraining;cognition   Clinical Decision Making  Complexity (OT) low complexity   Therapy Frequency (OT) 1x eval and treat   Predicted Duration of Therapy 1 day   Anticipated Equipment Needs Upon Discharge (OT) walker, rolling;shower chair   Risk & Benefits of therapy have been explained care plan/treatment goals reviewed;patient   OT Discharge Planning    OT Discharge Recommendation (DC Rec) Home with assist;home with home care occupational therapy  (CAREY with assist and home PT/OT vs memory care)   OT Rationale for DC Rec Patient has cognitive issues requiring assist with ADLs/IADLs for safety. Would benefit from continued home OT/PT. Per chart, also considering memory care.    OT Brief overview of current status  SBA for bed mobility, CGA initial sit<>stand, progressing to SBA. Patient ambulated to/from bathroom using FWW with SBA. Toilet transfer and task SBA using grab bars. 17/30 on SLUMS.    Therapy Certification   Start of Care Date 06/05/21   Certification date from 06/05/21   Certification date to 06/06/21   Medical Diagnosis Recurrent falls, orbital fx   Total Evaluation Time (Minutes)   Total Evaluation Time (Minutes) 5

## 2021-06-05 NOTE — DISCHARGE INSTRUCTIONS
-Can follow-up with Non-operative Sports Medicine Physician PRN in the future if patient desires further follow-up

## 2021-06-05 NOTE — PLAN OF CARE
VS:   /83   Pulse 77   Temp 97.6  F (36.4  C) (Oral)   Resp 16   SpO2 100%   RA, lung sounds clear, BS active, cardiac WDL refusing telemetry   Output:   Patient up to bathroom to void, is also incontinent before getting to bathroom is not always aware of incontinence.   Activity:   Patient up up assist of 1 with walker and gait belt, ambulated to bathroom during shift.    Skin: WDL ex. abrasion and bruising to R cheek, scab on R ankle, dry flaky skin on feet.   Pain:   Patient denied pain during shift has scheduled tylenol.    Neuro/CMS:   A&Ox4, no N/T reported, strengths intact, at beginning of shift patient was very drowsy and angry and did not want staff in the room, once patient woke up to go to bathroom was calm and cooperative.    Dressing(s):   R cheek dried drainage.    Diet:   Regular diet ate 100% of box lunch this evening.   LDA:   PIV R arm SL.    Equipment:   IV pole, call light, commode, walker, gait belt, personal belongings.    Plan:   Continue plan of care, continue working with interdisciplinary team for discharge, able to make needs known call light in reach.    Additional Info:   Patient was angry, drowsy, and refusing all cares until 1915, patient has been calm and cooperative since.

## 2021-06-05 NOTE — PROGRESS NOTES
[facility] [place]    Medicine Progress Note - Hospitalist Service       Date of Admission:  6/2/2021    Assessment & Plan         Elizabeth Joy is a 81 year old female admitted on 6/2/21. She is being admitted to the hospital to evaluate the large rt orbital floor fracture as well as evaluate recurrent falls.     Individual problems and their management are outlined below    Today's changes: 6/5/2021  Doing well.   No new concern.   Agreeable to work with therapy  Check Orthostatic vitals when up  No other change  Ct to monitor  Fall precautions.        Recurrent falls:  No obvious cause at this time. Labs largely within normal limits except for low potassium  Not noted to be dehydrated   No chest pain/ SOB. Baseline EKG: NSR. QTC 440s  UA: s/o UTI. UC pending.   Monitor with telemetry for any arrhythmia: none so far.   PT/OT to evaluate Gait and ADL's   Fall precautions  Check Echo         Orbital floor fracture:  Ophthalmic consultation done.  Reviewed.  Appreciate input.     -Patient is best corrected visual acuity is 20/20 in both eyes. Intraocular pressures within normal limits in both eyes. Patient's pupillary examination is unremarkable, with no APD notable.   -Patient does have full motility on examination, without diplopia.   -Full confrontational visual field.  Slit-lamp examination significant for  -Subacute appearing facial laceration along right sided maxillary cheek.    -Dilated funduscopic examination was unremarkable.  -CT scan was notable for a large right infraorbital rim and floor fracture with herniation of the inferior rectus along with fat.   No acute intervention is warranted at this time.  We do recommend outpatient follow-up with recheck of ophthalmic evaluation in 1 to 2 weeks following discharge.       HTN:   Resume home dose of Amlodipine 5 mg BID and Hydralazine 5 mg TID     Depression:   Resume home dose of venlafaxine 187.5 mg daily  Resume  seroquel 25 mg at bedtime       UTI:  Cultures pending. Follow-up  Continue empiric iv ceftriaxone for now      History of right patella fracture:   Check x-ray right knee  Orthopedic consultation - reviewed. Appreciate input.     OT consultation for cognition eval.   SW consultation for discharge planning.      Diet: Combination Diet Regular Diet Adult    DVT Prophylaxis: Pneumatic Compression Devices  Ram Catheter: not present  Code Status: No CPR- Do NOT Intubate           Disposition Plan   Expected discharge: TBD  Entered: Jaime Vinson MD 06/05/2021, 6:51 PM       The patient's care was discussed with the Bedside Nurse and Patient.    Jaime Vinson MD  Hospitalist Service  [facility]  Contact information available via [facility] Paging/Directory    ______________________________________________________________________    Interval History      Interval events reviewed.   Doing well    Denies fever or chills.   No cough or cp or sob.   No LH or dizziness.   No NV or pain abdomen.   No dysuria or bowel complaint.   No new sensory or motor complaint.     No other new or acute medical concern      Data reviewed today: I reviewed all medications, new labs and imaging results over the last 24 hours. I personally reviewed no images or EKG's today.    Physical Exam   Vital Signs: Temp: 98.5  F (36.9  C) Temp src: Oral BP: 129/84 Pulse: 80   Resp: 16 SpO2: 96 % O2 Device: None (Room air)    Weight: 0 lbs 0 oz    General Appearance:  NAD, right maxilla cheek Steri-Strips.  Trace left eye injection.  Respiratory: Normal work of breathing.    Cardiovascular: S1 S2 Regular  GI: Soft. NT. ND.  Extremities: Distally wwp. R LE> L LE edema.   Skin: No acute rash on exposed areas.   Other: Grossly nonfocal.  Alert oriented.    Data   Recent Labs   Lab 06/03/21  0557 06/02/21  1255   WBC 10.4 11.3*   HGB 11.0* 13.1   MCV 95 94    300    142   POTASSIUM 3.7 3.3*    CHLORIDE 108 107   CO2 27 30   BUN 22 21   CR 1.05* 0.99   ANIONGAP 7 5   MICHELLE 9.0 9.6   * 93   ALBUMIN 3.3*  --    PROTTOTAL 6.4*  --    BILITOTAL 0.3  --    ALKPHOS 76  --    ALT 21  --    AST 10  --      No results found for this or any previous visit (from the past 24 hour(s)).  Medications       acetaminophen  975 mg Oral TID     amLODIPine  5 mg Oral BID     amoxicillin  500 mg Oral Q8H TRINH     calcium carbonate 600 mg-vitamin D 400 units  1 tablet Oral BID     hydrALAZINE  5 mg Oral TID     potassium chloride ER  10 mEq Oral Daily     QUEtiapine  25 mg Oral At Bedtime     sodium chloride (PF)  3 mL Intracatheter Q8H     venlafaxine  150 mg Oral Daily     venlafaxine  37.5 mg Oral Daily

## 2021-06-06 PROCEDURE — 250N000013 HC RX MED GY IP 250 OP 250 PS 637: Performed by: INTERNAL MEDICINE

## 2021-06-06 PROCEDURE — 99225 PR SUBSEQUENT OBSERVATION CARE,LEVEL II: CPT | Performed by: INTERNAL MEDICINE

## 2021-06-06 PROCEDURE — 99207 PR CDG-CODE CATEGORY CHANGED: CPT | Performed by: INTERNAL MEDICINE

## 2021-06-06 PROCEDURE — G0378 HOSPITAL OBSERVATION PER HR: HCPCS

## 2021-06-06 RX ADMIN — ACETAMINOPHEN 975 MG: 325 TABLET, FILM COATED ORAL at 20:04

## 2021-06-06 RX ADMIN — AMOXICILLIN 500 MG: 500 CAPSULE ORAL at 21:31

## 2021-06-06 RX ADMIN — ACETAMINOPHEN 975 MG: 325 TABLET, FILM COATED ORAL at 09:18

## 2021-06-06 RX ADMIN — VENLAFAXINE HYDROCHLORIDE 37.5 MG: 37.5 TABLET, EXTENDED RELEASE ORAL at 09:20

## 2021-06-06 RX ADMIN — AMOXICILLIN 500 MG: 500 CAPSULE ORAL at 15:45

## 2021-06-06 RX ADMIN — POTASSIUM CHLORIDE 10 MEQ: 750 TABLET, EXTENDED RELEASE ORAL at 09:19

## 2021-06-06 RX ADMIN — ACETAMINOPHEN 975 MG: 325 TABLET, FILM COATED ORAL at 15:44

## 2021-06-06 RX ADMIN — Medication 5 MG: at 09:20

## 2021-06-06 RX ADMIN — AMLODIPINE BESYLATE 5 MG: 5 TABLET ORAL at 09:20

## 2021-06-06 RX ADMIN — VENLAFAXINE HYDROCHLORIDE 150 MG: 150 TABLET, EXTENDED RELEASE ORAL at 09:19

## 2021-06-06 RX ADMIN — AMOXICILLIN 500 MG: 500 CAPSULE ORAL at 05:59

## 2021-06-06 RX ADMIN — QUETIAPINE FUMARATE 25 MG: 25 TABLET ORAL at 21:31

## 2021-06-06 RX ADMIN — Medication 1 TABLET: at 09:19

## 2021-06-06 RX ADMIN — Medication 1 TABLET: at 20:04

## 2021-06-06 NOTE — PLAN OF CARE
VS:   /69   Pulse 80   Temp 98.5  F (36.9  C) (Oral)   Resp 16   SpO2 96%   RA, lung sounds clear, BS active, Cardiac refusing telemetry.   Output:   Patient voiding in bathroom without difficulty has some incontinence when feeling urge to void.  LBM 6/5/2021 soft.   Activity:   Patient is up assist of 1 with walker and gait belt, ambulated to bathroom during shift.    Skin: WDL ex. bruising to cheeks, abrasion scabbed on R cheek.   Pain:   Patient reported no pain during shift.   Neuro/CMS:   A&Ox4, no N/T reported during shift, strengths intact, generalized weakness. Does seem to have some mild confusion but easily able to orient to current setting.   Dressing(s):   Bandage on R cheek has dried drainage.   Diet:   Regular diet tolerating well.   LDA:   None   Equipment:   Walker, gait belt, commode, call light, personal belongings.    Plan:   Continue plan of care, continue to monitor, able to make needs known, call light in reach, eventual discharge back to care facility.   Additional Info:   Patient confusion is mild sometimes just forgets nurse is coming back but easy to reorient.

## 2021-06-06 NOTE — PLAN OF CARE
Observation goals PER UNIT ROUTINE     Comments: Fall evaluation. PT/OT. done  Safe discharge planning. Planning either return to assisted living or memory care, not determined  Stable vitals. - sl elevated BP  on oral BP meds  Ambulatory.- SBA of 1 w walker and gaitbelt     Nursing  fall risk, hx of falls,   S- Pt has been alert and oriented x4 today.  Cooperative and pleasant.  Pt has been using call light appropriately  Ate well for brfst, walked in fields, chair x2 with Rt knee elevated.    States some joints are achey kitty Rt knee.  Sl reddened knee and LE,  offered ice prn.  Elevate knee when in bed also. Tyelnol scheduled  Ate late lunch.  Napped at 11-12n.     Son and spouse visited on their return from [place] to [place].    Son gave pt a new phone- it is in her room in a gift bag to go home with her    A- stable.    R- enc chair for meals,  enc walk in fields 2-3 x/day w assist.  Bed alarm in bed, waiting for placement decision

## 2021-06-06 NOTE — PLAN OF CARE
"Pt slept most of the shift. When writer woke pt up for oral antibiotic, pt was slightly irritated. A&Ox4, whe asked about her name she said, \"don't you know it?\". She denied pain.  R cheek abrasion is covered with steri-strips and it has dried blood.  She has not been OOB during shift. A1 w/ gait belt & walker.  Pt on bed alarm for safety, alarm did not go off during shift.  Continue POC. Discharge back to assisted living or memory care.  "

## 2021-06-06 NOTE — PROGRESS NOTES
[facility]    Medicine Progress Note - Hospitalist Service       Date of Admission:  6/2/2021    Assessment & Plan         Elizabeth Joy is a 81 year old female admitted on 6/2/21. She is being admitted to the hospital to evaluate the large right orbital floor fracture as well as evaluate recurrent falls.     Individual problems and their management are outlined below    Today's changes:6/6/2021  Doing well.   No new concern.   Agreeable to work with therapy  No other change  Ct to monitor w Fall precautions.   Pending safe dispo planning.   Follow-up PT/OT, Sw eval  Discontinue tele.        Recurrent falls:    No obvious cause at this time. Labs largely within normal limits except for low potassium. Not noted to be dehydrated. No clear ortho stasis.   No chest pain/ SOB. Baseline EKG: NSR. QTC 440s  Monitor with telemetry for any arrhythmia: none so far. Okay to discontinue. 06/06  Echo: unremarkable.     PT/OT to evaluate Gait and ADL's  OT consultation for cognition eval.   SW consultation for discharge planning.   Fall precautions    Orbital floor fracture:  Ophthalmic consultation done.  Reviewed.  Appreciate input.     -Patient is best corrected visual acuity is 20/20 in both eyes. Intraocular pressures within normal limits in both eyes. Patient's pupillary examination is unremarkable, with no APD notable.   -Patient does have full motility on examination, without diplopia.   -Full confrontational visual field.  Slit-lamp examination significant for  -Subacute appearing facial laceration along right sided maxillary cheek.    -Dilated funduscopic examination was unremarkable.  -CT scan was notable for a large right infraorbital rim and floor fracture with herniation of the inferior rectus along with fat.   No acute intervention is warranted at this time.  We do recommend outpatient follow-up with recheck of ophthalmic evaluation in 1 to 2 weeks following  discharge.       HTN:  Ct home dose of Amlodipine 5 mg BID  Discontinue Hydralazine.   Monitor.      Depression:  Resume home dose of venlafaxine 187.5 mg daily  Resume seroquel 25 mg at bedtime       UTI:  UC: E. Fecalis  Empirically started on iv Ceftriaxone.   06/05: Switch to amoxicillin 500 tid x total 7 days.      History of right patella fracture:   Check x-ray right knee  Orthopedic consultation - reviewed. Appreciate input.        Diet: Combination Diet Regular Diet Adult    DVT Prophylaxis: Pneumatic Compression Devices  Ram Catheter: not present  Code Status: No CPR- Do NOT Intubate           Disposition Plan   Expected discharge: TBD  Entered: Jaime Vinson MD 06/06/2021, 9:18 AM       The patient's care was discussed with the Bedside Nurse and Patient.    Jaime Vinson MD  Hospitalist Service  [facility]  Contact information available via [facility] Paging/Directory    ______________________________________________________________________    Interval History      Interval events reviewed.   Doing well    Denies fever or chills.   No cough or cp or sob.   No LH or dizziness.   No NV or pain abdomen.   No dysuria or bowel complaint.   No new sensory or motor complaint.     No other new or acute medical concern      Data reviewed today: I reviewed all medications, new labs and imaging results over the last 24 hours. I personally reviewed no images or EKG's today.    Physical Exam   Vital Signs: Temp: 97.9  F (36.6  C) Temp src: Oral BP: (!) 149/91 Pulse: 75   Resp: 18 SpO2: 98 % O2 Device: None (Room air)    Weight: 0 lbs 0 oz    General Appearance:  NAD, right maxilla cheek Steri-Strips.  Trace left eye injection.  Respiratory: Normal work of breathing.    Cardiovascular: S1 S2 Regular  GI: Soft. NT. ND.  Extremities: Distally wwp. R LE> L LE edema.   Skin: No acute rash on exposed areas.   Other: Grossly nonfocal.  Alert oriented.    Data   Recent Labs   Lab  06/03/21  0557 06/02/21  1255   WBC 10.4 11.3*   HGB 11.0* 13.1   MCV 95 94    300    142   POTASSIUM 3.7 3.3*   CHLORIDE 108 107   CO2 27 30   BUN 22 21   CR 1.05* 0.99   ANIONGAP 7 5   MICHELLE 9.0 9.6   * 93   ALBUMIN 3.3*  --    PROTTOTAL 6.4*  --    BILITOTAL 0.3  --    ALKPHOS 76  --    ALT 21  --    AST 10  --      No results found for this or any previous visit (from the past 24 hour(s)).  Medications       acetaminophen  975 mg Oral TID     amLODIPine  5 mg Oral BID     amoxicillin  500 mg Oral Q8H TRINH     calcium carbonate 600 mg-vitamin D 400 units  1 tablet Oral BID     hydrALAZINE  5 mg Oral TID     potassium chloride ER  10 mEq Oral Daily     QUEtiapine  25 mg Oral At Bedtime     sodium chloride (PF)  3 mL Intracatheter Q8H     venlafaxine  150 mg Oral Daily     venlafaxine  37.5 mg Oral Daily

## 2021-06-07 ENCOUNTER — MEDICAL CORRESPONDENCE (OUTPATIENT)
Dept: HEALTH INFORMATION MANAGEMENT | Facility: CLINIC | Age: 81
End: 2021-06-07

## 2021-06-07 PROCEDURE — 99225 PR SUBSEQUENT OBSERVATION CARE,LEVEL II: CPT | Performed by: INTERNAL MEDICINE

## 2021-06-07 PROCEDURE — 250N000013 HC RX MED GY IP 250 OP 250 PS 637: Performed by: INTERNAL MEDICINE

## 2021-06-07 PROCEDURE — 99207 PR CDG-CODE CATEGORY CHANGED: CPT | Performed by: INTERNAL MEDICINE

## 2021-06-07 PROCEDURE — G0378 HOSPITAL OBSERVATION PER HR: HCPCS

## 2021-06-07 RX ORDER — AMOXICILLIN 500 MG/1
500 CAPSULE ORAL EVERY 8 HOURS
DISCHARGE
Start: 2021-06-07 | End: 2021-06-09

## 2021-06-07 RX ORDER — AMLODIPINE BESYLATE 5 MG/1
5 TABLET ORAL 2 TIMES DAILY
Status: ON HOLD | DISCHARGE
Start: 2021-06-07 | End: 2021-08-17

## 2021-06-07 RX ORDER — ACETAMINOPHEN 325 MG/1
650 TABLET ORAL EVERY 6 HOURS PRN
Status: ON HOLD | DISCHARGE
Start: 2021-06-07 | End: 2021-08-17

## 2021-06-07 RX ADMIN — VENLAFAXINE HYDROCHLORIDE 150 MG: 150 TABLET, EXTENDED RELEASE ORAL at 09:41

## 2021-06-07 RX ADMIN — AMOXICILLIN 500 MG: 500 CAPSULE ORAL at 06:03

## 2021-06-07 RX ADMIN — Medication 1 TABLET: at 09:41

## 2021-06-07 RX ADMIN — Medication 1 TABLET: at 20:16

## 2021-06-07 RX ADMIN — POTASSIUM CHLORIDE 10 MEQ: 750 TABLET, EXTENDED RELEASE ORAL at 09:41

## 2021-06-07 RX ADMIN — AMLODIPINE BESYLATE 5 MG: 5 TABLET ORAL at 09:41

## 2021-06-07 RX ADMIN — AMOXICILLIN 500 MG: 500 CAPSULE ORAL at 14:20

## 2021-06-07 RX ADMIN — AMOXICILLIN 500 MG: 500 CAPSULE ORAL at 22:04

## 2021-06-07 RX ADMIN — ACETAMINOPHEN 975 MG: 325 TABLET, FILM COATED ORAL at 20:16

## 2021-06-07 RX ADMIN — ACETAMINOPHEN 975 MG: 325 TABLET, FILM COATED ORAL at 14:20

## 2021-06-07 RX ADMIN — ACETAMINOPHEN 975 MG: 325 TABLET, FILM COATED ORAL at 09:40

## 2021-06-07 RX ADMIN — AMLODIPINE BESYLATE 5 MG: 5 TABLET ORAL at 20:16

## 2021-06-07 RX ADMIN — VENLAFAXINE HYDROCHLORIDE 37.5 MG: 37.5 TABLET, EXTENDED RELEASE ORAL at 09:42

## 2021-06-07 RX ADMIN — QUETIAPINE FUMARATE 25 MG: 25 TABLET ORAL at 22:04

## 2021-06-07 NOTE — PHARMACY-ADMISSION MEDICATION HISTORY
A medication history was completed 6/2/21 at [facility]. Please see the pharmacist's note for medication history details.    Laura Ch PharmJuanjoseD.

## 2021-06-07 NOTE — PLAN OF CARE
"  Vitals /79 (BP Location: Left arm)   Pulse 77   Temp 98  F (36.7  C) (Oral)   Resp 16   SpO2 96%   VSS    A+O A+Ox4   Output LBM 6/5/21   Respiratory Clear, equal bilaterally   Cardiac WDL   Activity Assist x1, Bed alarm   Skin Facial abrasion with steri strips   Pain Denies   Neuro/CSM Decreased strength in the lower extremities   GI/ Voiding spontaneously   LDA No IV access   Equipment Walker, Gait belt   Plan Discharge back to nursing home after care conference    Additional Patient requested to be left alone and stated \"go away\".                      "

## 2021-06-07 NOTE — PLAN OF CARE
VS: /56   Pulse 80   Temp 97.9  F (36.6  C) (Oral)   Resp 16   SpO2 96%      Output: Voiding spontaneously without difficulties. LBM 6/5, passing flatus, BS active x4.     Lungs: LS clear, on RA. Denies chest pain, SOB, cough.      Activity: Up x1 assist w/ walker and gait belt. Ambulating to bathroom throughout night.      Skin: Intact ex edema to RLE, bruising to cheeks, abrasion closed w/ steri strip under R eye, and scabs to R ankle.      Pain:   Pt c/o R knee discomfort, but declined pain meds.   Neuro/CMS:   A&Ox4, CMS intact, denies N/T; generalized weakness   Dressing(s):   Steri strip to R cheek w/ some dried drainage   Diet:   Regular diet, tolerating water overnight   LDA:   None  No IV access     Equipment:   Walker, gait belt   Plan:   Continue w/ oral abx and monitor for confusion. Continue to follow plan of care.      Additional Info:   Bed alarm on for safety and d/t to hx of falls  Baseline confusion, per family report; no confusion noted overnight     Observation Goals:  2300  -Fall evaluation: Calling appropriately, Met  -PT/OT: Met  -Safe discharge planning: Not Met  -Stable vitals: Met  -Ambulatory: Met  0300  -Fall evaluation: Calling appropriately, Met  -PT/OT: Met  -Safe discharge planning: Not Met  -Stable vitals: Met  -Ambulatory. Met

## 2021-06-07 NOTE — PLAN OF CARE
Care Management Discharge Note    Discharge Date: 06/07/21  Expected Time of Departure: 17:45    Discharge Disposition: Assisted Living([facility])    Discharge Services:      Discharge DME:      Discharge Transportation: agency([facility])    Private pay costs discussed:  Discussed with patient's sonPhilip who is POA. He is aware there may be a cost    PAS Confirmation Code: (N/A)  Patient/family educated on Medicare website which has current facility and service quality ratings: (N/A)    Education Provided on the Discharge Plan:  Yes  Persons Notified of Discharge Plans: *Patient's Philip cr and Saul   Patient/Family in Agreement with the Plan: yes    Handoff Referral Completed: Yes    Additional Information:  Per MD team, patient is ready for discharge back to [facility] today.   The [address] [place] [address]  [phone]   This RNCC placed call to supervisor, Heavenly [phone], no answer or VM. I spoke with on duty nurse ([phone]) who stated that patient could return anytime, no stipulation on time frame. She reported there was no fax number, they would look at patient's orders on return. This RNCC spoke with MADELIN Candelario at home care agency:  [facility]  Phone  [phone]  Fax  [phone]   Intake Fax: [phone]  Patient was open to them for RN/PT/OT services. Resumption orders placed and faxed to agency  I spoke with both Saul cr and Philip. Per SaulPhilip is the HC agent (no docs on file). He is in agreement with plan but reports that particulars should go through Philip. I spoke to Philip who is also in agreement. He reports that patient has assistance with ADLs at [facility] and they will increase services as needed. He is aware of HC and in agreement with resumption. He wanted a W/C ride set up with [employer]. He is aware that there will likely be a bill for this. He said the bill can be sent to him.   Wheelchair  ride set up with [facility] for 5:45 PM.  Meredith CAT was updated on plan and ride time.     Wilber Bryant RN Care Coordinator [phone]

## 2021-06-07 NOTE — PLAN OF CARE
VS:   BP (!) 163/84 (BP Location: Left arm)   Pulse 78   Temp 98.6  F (37  C) (Oral)   Resp 18   SpO2 97%      Output:   Pt got up to bathroom during shift. She is sometimes incontinent/has urgency.   Activity:   Pt was very sleepy during the shift. She is an assist of 1 with gait belt and walker.   Skin: No concerns.   Pain:   Pt receives scheduled Tylenol. She denies pain.   Neuro/CMS:   No concerns.   Dressing(s):   None   Diet:   Regular diet.   LDA:   None   Equipment:   Walker, gait belt   Plan:   Continue to follow plan of care. She may discharge to her assistive living center [facility].   Additional Info:

## 2021-06-07 NOTE — UTILIZATION REVIEW
"  Concomitant stay Status; Secondary Review Determination     Admission Date: 6/2/2021 10:20 PM      Under the authority of the Utilization Management Committee, the utilization review process indicated a secondary review on the above patient.  The review outcome is based on review of the medical records, discussions with staff, and applying clinical experience noted on the date of the review.        ()      Inpatient Status Appropriate - This patient's medical care is consistent with medical management for inpatient care and reasonable inpatient medical practice.      (x) Observation Status Appropriate - This patient does not meet hospital inpatient criteria and is placed in observation status. If this patient's primary payer is Medicare and was admitted as an inpatient, Condition Code 44 should be used and patient status changed to \"observation\".   () Admission Status NOT Appropriate - This patient's medical care is not consistent with medical management for Inpatient or Observation Status.          RATIONALE FOR DETERMINATION   81-year-old female with a history of hypertension and depression.  She presented to the hospital with recurrent falls.  She was found to have right orbital floor fracture.  Also noted to have urinary tract infection.  She was started on IV ceftriaxone.  She was initially placed in the hospital on observation status for further evaluation and treatment.  She currently is on oral antibiotics for her UTI.  She currently remains at the hospital only awaiting safe disposition plan.  As she remains at the hospital currently only awaiting safe disposition plan, observation status at the hospital remains appropriate.      The severity of illness, intensity of service provided, expected LOS and risk for adverse outcome make the care appropriate for observation level care at the hospital.        The information on this document is developed by the utilization review team in order for the business " office to ensure compliance.  This only denotes the appropriateness of proper admission status and does not reflect the quality of care rendered.         The definitions of Inpatient Status and Observation Status used in making the determination above are those provided in the CMS Coverage Manual, Chapter 1 and Chapter 6, section 70.4.      Sincerely,     Lloyd Cormier D.O.  Utilization Review/ Case Management  [facility].

## 2021-06-07 NOTE — PROGRESS NOTES
[facility]    Medicine Progress Note - Hospitalist Service       Date of Admission:  6/2/2021    Assessment & Plan         Elizabeth Joy is a 81 year old female admitted on 6/2/21. She is being admitted to the hospital to evaluate the large right orbital floor fracture as well as evaluate recurrent falls.     Individual problems and their management are outlined below    Today's changes: 6/7/2021  Doing well.   No new concern.   PT/OT, SW on board.    Pending safe dispo planning.     OT:   Cognitive Status Comments Patient participated in SLUMS scoring a 17/30 placing her in same category as those with dementia. Knew it was June, 2021. Off on day by 1 day. Decreased concentration. See daily doc for details.        Issues:      Recurrent falls:    No obvious cause at this time. On adm: Labs largely within normal limits except for low potassium. Not noted to be dehydrated. No clear orthostasis.   No chest pain/ SOB. Baseline EKG: NSR. QTC 440s  Monitor with telemetry for any arrhythmia: none so far. Discontinued. 06/06  Echo: unremarkable.     PT/OT to evaluate Gait and ADL's  OT consultation for cognition eval.   SW consultation for discharge planning.   Fall precautions    Orbital floor fracture:  Ophthalmic consultation done.  Reviewed.  Appreciate input.     -Patient is best corrected visual acuity is 20/20 in both eyes. Intraocular pressures within normal limits in both eyes. Patient's pupillary examination is unremarkable, with no APD notable.   -Patient does have full motility on examination, without diplopia.   -Full confrontational visual field.  Slit-lamp examination significant for  -Subacute appearing facial laceration along right sided maxillary cheek.    -Dilated funduscopic examination was unremarkable.  -CT scan was notable for a large right infraorbital rim and floor fracture with herniation of the inferior rectus along with fat.   No acute intervention  is warranted at this time.  We do recommend outpatient follow-up with recheck of ophthalmic evaluation in 1 to 2 weeks following discharge.       HTN:  Ct home dose of Amlodipine 5 mg BID  Discontinue Hydralazine.   Monitor.      Depression:  Resume home dose of venlafaxine 187.5 mg daily  Resume seroquel 25 mg at bedtime       UTI:  UC: E. Fecalis  Empirically started on iv Ceftriaxone.   06/05: Switch to amoxicillin 500 tid x total 7 days.      History of right patella fracture:   Check x-ray right knee  Orthopedic consultation - reviewed. Appreciate input.        Diet: Combination Diet Regular Diet Adult    DVT Prophylaxis: Pneumatic Compression Devices  Ram Catheter: not present  Code Status: No CPR- Do NOT Intubate           Disposition Plan   Expected discharge: TBD  Entered: Jaime Vinson MD 06/07/2021, 2:48 PM       The patient's care was discussed with the Bedside Nurse, Care Coordinator/ and Patient.    Jaime Vinson MD  Hospitalist Service  [facility]  Contact information available via [facility] Paging/Directory    ______________________________________________________________________    Interval History      Interval events reviewed.   Doing well    Denies fever or chills.   No cough or cp or sob.   No LH or dizziness.   No NV or pain abdomen.   No dysuria or bowel complaint.   No new sensory or motor complaint.     No other new or acute medical concern      Data reviewed today: I reviewed all medications, new labs and imaging results over the last 24 hours. I personally reviewed no images or EKG's today.    Physical Exam   Vital Signs: Temp: 98.6  F (37  C) Temp src: Oral BP: (!) 163/84 Pulse: 78   Resp: 18 SpO2: 97 % O2 Device: None (Room air)    Weight: 0 lbs 0 oz    General Appearance:  NAD, right maxilla cheek Steri-Strips.  Trace left eye injection.  Respiratory: Normal work of breathing.    Cardiovascular: S1 S2 Regular  GI: Soft. NT.  ND.  Extremities: Distally wwp. R LE> L LE edema.   Skin: No acute rash on exposed areas.   Other: Grossly nonfocal.  Alert oriented.    Data   Recent Labs   Lab 06/03/21  0557 06/02/21  1255   WBC 10.4 11.3*   HGB 11.0* 13.1   MCV 95 94    300    142   POTASSIUM 3.7 3.3*   CHLORIDE 108 107   CO2 27 30   BUN 22 21   CR 1.05* 0.99   ANIONGAP 7 5   MICHELLE 9.0 9.6   * 93   ALBUMIN 3.3*  --    PROTTOTAL 6.4*  --    BILITOTAL 0.3  --    ALKPHOS 76  --    ALT 21  --    AST 10  --      No results found for this or any previous visit (from the past 24 hour(s)).  Medications       acetaminophen  975 mg Oral TID     amLODIPine  5 mg Oral BID     amoxicillin  500 mg Oral Q8H TRINH     calcium carbonate 600 mg-vitamin D 400 units  1 tablet Oral BID     potassium chloride ER  10 mEq Oral Daily     QUEtiapine  25 mg Oral At Bedtime     sodium chloride (PF)  3 mL Intracatheter Q8H     venlafaxine  150 mg Oral Daily     venlafaxine  37.5 mg Oral Daily

## 2021-06-07 NOTE — PROGRESS NOTES
Care Management Follow Up    Length of Stay (days): 1    Expected Discharge Date: 06/07/21  Expected Time of Departure: 17:45  Concerns to be Addressed: all concerns addressed in this encounter     Patient plan of care discussed at interdisciplinary rounds: Yes    Anticipated Discharge Disposition: Assisted Living([facility])  Disposition Comments: Return to [facility] Vs Memory Care  Anticipated Discharge Services:  TBD-has HC with [facility]  Anticipated Discharge DME:  None new    Patient/family educated on Medicare website which has current facility and service quality ratings: (N/A)  Education Provided on the Discharge Plan:  Yes  Patient/Family in Agreement with the Plan: yes    Referrals Placed by CM/SW: Homecare  Private pay costs discussed: transportation costs with sonPhilip    Additional Information:  This RNCC received a call from Heavenly ([phone]), Nursing supervisor at [facility]. She reports that patient is unable to come back to facility until we have had a care conference with their staff, family and provider her. Discussed that there is nothing indicating this in patient's record and that I had spoken with a staff nurse bisi who said she could come back and that I tried calling her number and there was no answer/no VM. She reported that she had been talking with nursing staff here last week and informed them that this was needed. Per OLAMIDE who worked last week, this was not communicated to him. Heavenly referred me to Vivien ([phone]/ Fax: [phone]) the  at the [facility]. She and the executive nurse were on the line. They report that they believe patients status has changed and they will be unable to take her back. They report that she is getting the maximum services and is not succeeding. The only way she could stay in her apt is if the family pays for private 24/7 care. They would like all PT/OT notes and will review and contact patient's sonPhilip. Informed  them that patient is here on an obs stay so there are not a lot of therapy notes as obs status is a one time assessment. I faxed PT/OT and nursing notes to them. They will speak with son and let the RNCC know what has been decided. I have cancelled [facility]. I have also updated Andree at [employer] ([phone]). CN is aware of change in plan.       Wilber Bryant, RN Care Coordinator [phone]

## 2021-06-07 NOTE — PLAN OF CARE
VS:   /56   Pulse 80   Temp 97.9  F (36.6  C) (Oral)   Resp 16   SpO2 96%   RA, lung sounds clear, BS active, cardiac WDL refusing Tele   Output:   Patient up to bathroom to void without difficulty.   LBM 6/5/2, passing flatus   Activity:   Patient is up assist of 1 with walker and gait belt, up to bathroom several times throughout shift.   Skin: WDL ex. Bruising to cheeks, scab to R cheek, scabs to R ankle.    Pain:   Patient reported no pain during shift.    Neuro/CMS:   A&Ox4, no N/T reported, generalized weakness.    Dressing(s):   steri strip on R cheek dried drainage   Diet:   Regular diet tolerating well   LDA:   None   Equipment:   Walker, gait belt, call light, personal belongings.    Plan:   Continue to monitor, continue plan of care, possible discharge back to care facility tomorrow, 6/7/2021   Additional Info:   Patient calling appropriately  Confusing mostly resolved very mild confusion, has some baseline confusion according to family.    1900  Fall evaluation. Calling appropriately, Met  PT/OT. Met  Safe discharge planning. Not Met  Stable vitals. Met  Ambulatory. Met  2300  Fall evaluation. Calling appropriately, Met  PT/OT. Met  Safe discharge planning. Not Met  Stable vitals. Met  Ambulatory. Met

## 2021-06-08 LAB
ALBUMIN SERPL-MCNC: 3.5 G/DL (ref 3.4–5)
ALP SERPL-CCNC: 90 U/L (ref 40–150)
ALT SERPL W P-5'-P-CCNC: 20 U/L (ref 0–50)
ANION GAP SERPL CALCULATED.3IONS-SCNC: 9 MMOL/L (ref 3–14)
AST SERPL W P-5'-P-CCNC: 12 U/L (ref 0–45)
BILIRUB SERPL-MCNC: 0.3 MG/DL (ref 0.2–1.3)
BUN SERPL-MCNC: 25 MG/DL (ref 7–30)
CALCIUM SERPL-MCNC: 9.1 MG/DL (ref 8.5–10.1)
CHLORIDE SERPL-SCNC: 105 MMOL/L (ref 94–109)
CO2 SERPL-SCNC: 24 MMOL/L (ref 20–32)
CREAT SERPL-MCNC: 0.85 MG/DL (ref 0.52–1.04)
ERYTHROCYTE [DISTWIDTH] IN BLOOD BY AUTOMATED COUNT: 12.9 % (ref 10–15)
GFR SERPL CREATININE-BSD FRML MDRD: 64 ML/MIN/{1.73_M2}
GLUCOSE SERPL-MCNC: 85 MG/DL (ref 70–99)
HCT VFR BLD AUTO: 35.2 % (ref 35–47)
HGB BLD-MCNC: 11.5 G/DL (ref 11.7–15.7)
MCH RBC QN AUTO: 30.7 PG (ref 26.5–33)
MCHC RBC AUTO-ENTMCNC: 32.7 G/DL (ref 31.5–36.5)
MCV RBC AUTO: 94 FL (ref 78–100)
PLATELET # BLD AUTO: 282 10E9/L (ref 150–450)
POTASSIUM SERPL-SCNC: 3.2 MMOL/L (ref 3.4–5.3)
POTASSIUM SERPL-SCNC: 4 MMOL/L (ref 3.4–5.3)
PROT SERPL-MCNC: 6.8 G/DL (ref 6.8–8.8)
RBC # BLD AUTO: 3.75 10E12/L (ref 3.8–5.2)
SODIUM SERPL-SCNC: 138 MMOL/L (ref 133–144)
WBC # BLD AUTO: 8.8 10E9/L (ref 4–11)

## 2021-06-08 PROCEDURE — 99207 PR CDG-CODE CATEGORY CHANGED: CPT | Performed by: INTERNAL MEDICINE

## 2021-06-08 PROCEDURE — 250N000013 HC RX MED GY IP 250 OP 250 PS 637: Performed by: INTERNAL MEDICINE

## 2021-06-08 PROCEDURE — 80053 COMPREHEN METABOLIC PANEL: CPT | Performed by: INTERNAL MEDICINE

## 2021-06-08 PROCEDURE — 84132 ASSAY OF SERUM POTASSIUM: CPT | Performed by: INTERNAL MEDICINE

## 2021-06-08 PROCEDURE — G0378 HOSPITAL OBSERVATION PER HR: HCPCS

## 2021-06-08 PROCEDURE — 36415 COLL VENOUS BLD VENIPUNCTURE: CPT | Performed by: INTERNAL MEDICINE

## 2021-06-08 PROCEDURE — 85027 COMPLETE CBC AUTOMATED: CPT | Performed by: INTERNAL MEDICINE

## 2021-06-08 PROCEDURE — 99225 PR SUBSEQUENT OBSERVATION CARE,LEVEL II: CPT | Performed by: INTERNAL MEDICINE

## 2021-06-08 RX ORDER — POTASSIUM CHLORIDE 750 MG/1
40 TABLET, EXTENDED RELEASE ORAL ONCE
Status: COMPLETED | OUTPATIENT
Start: 2021-06-08 | End: 2021-06-08

## 2021-06-08 RX ORDER — HYDRALAZINE HCL 10 MG
5 TABLET ORAL 3 TIMES DAILY
Status: DISCONTINUED | OUTPATIENT
Start: 2021-06-08 | End: 2021-06-10 | Stop reason: HOSPADM

## 2021-06-08 RX ADMIN — Medication 1 TABLET: at 20:04

## 2021-06-08 RX ADMIN — HYDRALAZINE HYDROCHLORIDE 5 MG: 10 TABLET, FILM COATED ORAL at 09:10

## 2021-06-08 RX ADMIN — HYDRALAZINE HYDROCHLORIDE 5 MG: 10 TABLET, FILM COATED ORAL at 20:04

## 2021-06-08 RX ADMIN — POTASSIUM CHLORIDE 40 MEQ: 750 TABLET, EXTENDED RELEASE ORAL at 14:45

## 2021-06-08 RX ADMIN — AMOXICILLIN 500 MG: 500 CAPSULE ORAL at 14:45

## 2021-06-08 RX ADMIN — QUETIAPINE FUMARATE 25 MG: 25 TABLET ORAL at 21:31

## 2021-06-08 RX ADMIN — ACETAMINOPHEN 975 MG: 325 TABLET, FILM COATED ORAL at 09:10

## 2021-06-08 RX ADMIN — AMLODIPINE BESYLATE 5 MG: 5 TABLET ORAL at 20:05

## 2021-06-08 RX ADMIN — Medication 1 TABLET: at 09:11

## 2021-06-08 RX ADMIN — ACETAMINOPHEN 975 MG: 325 TABLET, FILM COATED ORAL at 14:45

## 2021-06-08 RX ADMIN — ACETAMINOPHEN 975 MG: 325 TABLET, FILM COATED ORAL at 20:04

## 2021-06-08 RX ADMIN — HYDRALAZINE HYDROCHLORIDE 5 MG: 10 TABLET, FILM COATED ORAL at 16:38

## 2021-06-08 RX ADMIN — VENLAFAXINE HYDROCHLORIDE 37.5 MG: 37.5 TABLET, EXTENDED RELEASE ORAL at 09:12

## 2021-06-08 RX ADMIN — AMOXICILLIN 500 MG: 500 CAPSULE ORAL at 21:31

## 2021-06-08 RX ADMIN — POTASSIUM CHLORIDE 10 MEQ: 750 TABLET, EXTENDED RELEASE ORAL at 09:11

## 2021-06-08 RX ADMIN — AMOXICILLIN 500 MG: 500 CAPSULE ORAL at 06:24

## 2021-06-08 RX ADMIN — AMLODIPINE BESYLATE 5 MG: 5 TABLET ORAL at 09:11

## 2021-06-08 RX ADMIN — VENLAFAXINE HYDROCHLORIDE 150 MG: 150 TABLET, EXTENDED RELEASE ORAL at 09:11

## 2021-06-08 NOTE — PROGRESS NOTES
[facility]    Medicine Progress Note - Hospitalist Service       Date of Admission:  6/2/2021    Assessment & Plan          81 year old female admitted on 6/2/21.  She has past medical history of hypertension, hyperlipidemia presented to the ER at [facility] from her assisted living facility for evaluation of low back pain in the setting of frequent falls.  Her last hospitalization at [facility] was from 4/23/2021 to 4/27/2021 after mechanical fall resulting in closed head injury with scalp laceration and right patella fracture, she was discharged to TCU from hospital and then subsequently was transferred to assisted living facility.  During this admission she was found to have large fracture of right orbital floor with extension of extraconal fat and the right inferior rectus muscle through orbital floor defect.  CT head without contrast did not show any acute abnormality in the brain, CT lumbar spine showed chronic appearing compression fracture of L1, moderate to severe right L5-S1 foraminal compromise potentially correlating with \right L5 radiculopathy.  She was transferred to [facility] because [place] does not have ophthalmology consultation.    History of recurrent falls; was found to have mild UTI  No obvious etiology determined during this admission.  Labs were largely normal except for low potassium which is being replaced.  She was noted to be little dehydrated on admission.  EKG showed normal sinus rhythm with  ms, no arrhythmias noted on telemetry monitor.  Echocardiogram unremarkable.  Check vitamin B12 level, vitamin D level, folate level, tsh level.  PT OT following.  Recommending TCU.  Pending placement for discharge.  Occupational Therapy said slums score foot 17/30 placing on the category alcohol for dementia.  Decreased concentration.  Continue fall precautions.    Orbital floor fracture:  Visual acuity 20/20 in  both eyes, intraocular pressure normal.  No diplopia, extraocular muscles full motility.  Full confrontational visual field  -Subacute appearing facial laceration along right sided maxillary cheek.    -Dilated funduscopic examination was unremarkable.  -CT scan was notable for a large right infraorbital rim and floor fracture with herniation of the inferior rectus along with fat.   No acute intervention is warranted at this time.    Ophthalmology service has recommended of outpatient follow-up in 1 to 2 weeks after discharge for reevaluation.      HTN:  Patient is on amlodipine 5 mg twice daily and hydralazine at home.  Hydralazine was held but her blood pressure is high so I resumed hydralazine today.  Continue to monitor closely.       Depression:  Resume home dose of venlafaxine 187.5 mg daily  Resume seroquel 25 mg at bedtime       UTI:  UC: E. Fecalis  Empirically started on iv Ceftriaxone.   06/05: Switch to amoxicillin 500 tid x total 7 days.      History of right patella fracture:   X-ray knee showed Ongoing healing nondisplaced vertically oriented lateral  patellar facet fracture.   Orthopedic consultation - reviewed. Appreciate input.        Diet: Combination Diet Regular Diet Adult  Advance Diet as Tolerated    DVT Prophylaxis: Pneumatic Compression Devices  Ram Catheter: not present  Code Status: No CPR- Do NOT Intubate           Disposition Plan   Expected discharge: TBD, pending safe disposition.  Entered: Charbel Stephen MD 06/08/2021, 11:12 AM       The patient's care was discussed with the Bedside Nurse, Care Coordinator/ and Patient.    Charbel Stephen MD  Hospitalist Service  [facility]  Contact information available via [facility] Paging/Directory    ______________________________________________________________________    Interval History      Patient seen and examined.  Denies chest pain, shortness of, fever, chills, nausea, vomiting, belly pain,  dysuria, hematuria, dizziness lightheadedness.  No acute events overnight.  Interval events reviewed.       Data reviewed today: I reviewed all medications, new labs and imaging results over the last 24 hours. I personally reviewed no images or EKG's today.    Physical Exam   Vital Signs: Temp: 97.6  F (36.4  C) Temp src: Oral BP: (!) 160/92 Pulse: 74   Resp: 16 SpO2: 96 % O2 Device: None (Room air)    Weight: 0 lbs 0 oz    General Appearance:  NAD, right maxilla cheek Steri-Strips.  Trace left eye injection.  Respiratory: Normal work of breathing.    Cardiovascular: S1 S2 Regular  GI: Soft. NT. ND.  Extremities: Distally wwp. R LE> L LE edema.   Skin: No acute rash on exposed areas.   Other: Grossly nonfocal.  Alert oriented.    Data   Recent Labs   Lab 06/08/21  0820 06/03/21  0557 06/02/21  1255   WBC 8.8 10.4 11.3*   HGB 11.5* 11.0* 13.1   MCV 94 95 94    287 300    142 142   POTASSIUM 3.2* 3.7 3.3*   CHLORIDE 105 108 107   CO2 24 27 30   BUN 25 22 21   CR 0.85 1.05* 0.99   ANIONGAP 9 7 5   MICHELLE 9.1 9.0 9.6   GLC 85 101* 93   ALBUMIN 3.5 3.3*  --    PROTTOTAL 6.8 6.4*  --    BILITOTAL 0.3 0.3  --    ALKPHOS 90 76  --    ALT 20 21  --    AST 12 10  --      No results found for this or any previous visit (from the past 24 hour(s)).  Medications       acetaminophen  975 mg Oral TID     amLODIPine  5 mg Oral BID     amoxicillin  500 mg Oral Q8H TRINH     calcium carbonate 600 mg-vitamin D 400 units  1 tablet Oral BID     hydrALAZINE  5 mg Oral TID     potassium chloride ER  10 mEq Oral Daily     QUEtiapine  25 mg Oral At Bedtime     venlafaxine  150 mg Oral Daily     venlafaxine  37.5 mg Oral Daily

## 2021-06-08 NOTE — PROGRESS NOTES
Care Management Follow Up    Length of Stay (days): 1    Expected Discharge Date: 06/08/21  Expected Time of Departure: 17:45  Concerns to be Addressed: all concerns addressed in this encounter     Patient plan of care discussed at interdisciplinary rounds: Yes      Additional Information:  Received VM from Karen @ [facility] -Staff at [facility] had conference with son Soledad and the plan is to send patient to TCU for rehab  until she is strong enough to return to [facility]. Message forwarded to  at #[phone]    Stephanie BROWNN RN CCM  RN Care Coordinator [address] [place] [id number]  [email or web address]   Office: [phone]   Pager: [phone]    To contact Weekend RNCC, dial [phone] and enter job code [phone] at prompt. This pager can not be contacted by text page or outside line.

## 2021-06-08 NOTE — PLAN OF CARE
Observation Goals    1000:  Fall Evaluation - Pt seen by PT/OT. Bed alarm on for safety.  PT/OT - Evals completed. Recommendations to return to prior living facility with outpt PT.   Safe discharge plan - senior living declining to take pt back and have requested care conference with family. Unknown discharge plan at this time.   VSS - Hypertensive this AM. Hydralazine restarted.   Ambulatory - Up to commode with assist of 1 with walker and gait belt.     1400:  Fall Evaluation - Pt seen by PT/OT. Bed alarm on for safety.  PT/OT - Evals completed. Recommendations to return to prior living facility with outpt PT.   Safe discharge plan - senior living declining to take pt back and have requested care conference with family. Unknown discharge plan at this time.   VSS - Hypertensive this AM. Hydralazine restarted.   Ambulatory - Up to commode with assist of 1 with walker and gait belt.

## 2021-06-08 NOTE — PLAN OF CARE
VS: BP (!) 159/94 (BP Location: Right arm)   Pulse 65   Temp 97.7  F (36.5  C) (Oral)   Resp 16   SpO2 93%   RA   Output: Voids w/o difficulty, voided in bathroom 2x  No BM during shift   Activity: SBA walker and gait belt   Skin: Intact x R cheek abrasion covered with steri strip   Pain:   denied   Neuro/CMS:   A&Ox4   Dressing(s):   Dried blood on steri-strip   Diet:   regular   LDA:   No IV access   Equipment:   Walker, IV pole   Plan:   Continue POC, care conference call before discharge back to [facility]   Additional Info:   Bed alarm on for safety

## 2021-06-08 NOTE — PROGRESS NOTES
Care Management Follow Up    Length of Stay (days): 1    Expected Discharge Date: 06/08/21  Expected Time of Departure: 17:45  Concerns to be Addressed: all concerns addressed in this encounter     Patient plan of care discussed at interdisciplinary rounds: Yes    Anticipated Discharge Disposition: TCU prior to returning to [facility]  Anticipated Discharge Services: None   Anticipated Discharge DME: None     Patient/family educated on Medicare website which has current facility and service quality ratings: (N/A)  Education Provided on the Discharge Plan: No  Patient/Family in Agreement with the Plan: TBD    Referrals Placed by CM/SW: None at this time, awaiting call back from sonPhilip  Private pay costs discussed: Not applicable    Additional Information:  OLAMIDE informed by RN CC that Karen at [facility] (PH: [phone]) had a conference w/ the son, Philip, and the plan now is for pt to discharge from the hospital to TCU until she is strong enough to return to [facility]. OLAMIDE left a VM for Philip, (PH: [phone]) introducing self and requested a call back to discuss discharge plan and begin TCU referral process.     Kali Francis MSW, LGSW  [facility]

## 2021-06-09 PROCEDURE — 99217 PR OBSERVATION CARE DISCHARGE: CPT | Performed by: INTERNAL MEDICINE

## 2021-06-09 PROCEDURE — 250N000013 HC RX MED GY IP 250 OP 250 PS 637: Performed by: INTERNAL MEDICINE

## 2021-06-09 PROCEDURE — 99207 PR CDG-CODE CATEGORY CHANGED: CPT | Performed by: INTERNAL MEDICINE

## 2021-06-09 PROCEDURE — G0378 HOSPITAL OBSERVATION PER HR: HCPCS

## 2021-06-09 RX ORDER — HYDRALAZINE HYDROCHLORIDE 10 MG/1
5 TABLET, FILM COATED ORAL 3 TIMES DAILY
Status: ON HOLD | DISCHARGE
Start: 2021-06-09 | End: 2021-08-17

## 2021-06-09 RX ADMIN — ACETAMINOPHEN 975 MG: 325 TABLET, FILM COATED ORAL at 09:19

## 2021-06-09 RX ADMIN — HYDRALAZINE HYDROCHLORIDE 5 MG: 10 TABLET, FILM COATED ORAL at 14:23

## 2021-06-09 RX ADMIN — AMLODIPINE BESYLATE 5 MG: 5 TABLET ORAL at 09:20

## 2021-06-09 RX ADMIN — VENLAFAXINE HYDROCHLORIDE 150 MG: 150 TABLET, EXTENDED RELEASE ORAL at 09:19

## 2021-06-09 RX ADMIN — Medication 1 TABLET: at 19:39

## 2021-06-09 RX ADMIN — AMOXICILLIN 500 MG: 500 CAPSULE ORAL at 06:36

## 2021-06-09 RX ADMIN — ACETAMINOPHEN 975 MG: 325 TABLET, FILM COATED ORAL at 14:23

## 2021-06-09 RX ADMIN — HYDRALAZINE HYDROCHLORIDE 5 MG: 10 TABLET, FILM COATED ORAL at 09:19

## 2021-06-09 RX ADMIN — VENLAFAXINE HYDROCHLORIDE 37.5 MG: 37.5 TABLET, EXTENDED RELEASE ORAL at 09:21

## 2021-06-09 RX ADMIN — ACETAMINOPHEN 975 MG: 325 TABLET, FILM COATED ORAL at 19:39

## 2021-06-09 RX ADMIN — POTASSIUM CHLORIDE 10 MEQ: 750 TABLET, EXTENDED RELEASE ORAL at 09:20

## 2021-06-09 RX ADMIN — Medication 1 TABLET: at 09:20

## 2021-06-09 NOTE — PLAN OF CARE
VS: /83 (BP Location: Left arm)   Pulse 75   Temp 98.2  F (36.8  C) (Oral)   Resp 20   SpO2 96%      Output: Voiding spontaneously without difficulty, LBM 6/8/21.   Lungs: Lung sounds clear, room air.   Activity: Assist of 1 with walker and gait belt, shifts weight in bed, pt was up to bathroom this evening but has bedside commode available if needed.   Skin: Lesion on right cheek covered with steri-strips, bruise on left cheek.   Pain:   Denies.   Neuro/CMS:   A&Ox4, CMS intact.   Dressing(s):   None.   Diet:   Regular diet, tolerating well, good appetite.   LDA:   None.   Equipment:   Gait belt, walker, bedside commode, PCDs.   Plan:   Continue POC, pending discharge plans to TCU before pt returns to assisted living.   Additional Info:   Bed alarm on for pt safety. Call light within reach, pt able to make needs known.

## 2021-06-09 NOTE — PLAN OF CARE
Observation Goals     1000:  Fall Evaluation - Pt seen by PT/OT. Bed alarm on for safety.  PT/OT - Evals completed. Recommendations to return to prior living facility with outpt PT.   Safe discharge plan - senior living declining to take pt back. Plan for TCU per family. Referrals sent.   VSS - Hypertensive this AM - meds given. Other VSS.  Ambulatory - Up to commode with assist of 1 with walker and gait belt.      1400:  Fall Evaluation - Pt seen by PT/OT. Bed alarm on for safety.  PT/OT - Evals completed. Recommendations to return to prior living facility with outpt PT.   Safe discharge plan - senior living declining to take pt back. Plan for TCU per family. Referrals sent.    VSS - Hypertensive this AM - meds given. Other VSS.   Ambulatory - Up to commode with assist of 1 with walker and gait belt.

## 2021-06-09 NOTE — PROGRESS NOTES
CLINICAL NUTRITION SERVICES    Reviewed nutrition risk factors due to LOS. Pt is tolerating diet, eating well per nursing documentation. No nutrition issues identified at this time. RD will follow via rounds at this time, unless consulted.      Lisandra Best RD, LD  TCU/8A Pager (M-F): [phone]  Weekend Pager ([place]): [phone]

## 2021-06-09 NOTE — PROGRESS NOTES
Care Management Follow Up    Length of Stay (days): 1    Expected Discharge Date: 06/09/21  Expected Time of Departure: 17:45  Concerns to be Addressed: all concerns addressed in this encounter     Patient plan of care discussed at interdisciplinary rounds: Yes    Anticipated Discharge Disposition: TCU  Anticipated Discharge Services:  None  Anticipated Discharge DME: None     Patient/family educated on Medicare website which has current facility and service quality ratings: Yes  Education Provided on the Discharge Plan:  Yes  Patient/Family in Agreement with the Plan: Yes    Referrals Placed by CM/SW: TCU  Private pay costs discussed: transportation costs    Additional Information: SW received call back from pt's son, Philip, re: TCU. Philip shared that pt has been to a few TCUs in the past so he's familiar w/ TCU. Philip requested SW send referrals to the following facilities list below. Plan is for pt to discharge to TCU from hospital, then return to [facility] once she's stronger.    Addendum: SW sent a global referral to [facility] and [facility].     PENDING REFERRALS  [facility] Global Referral  PH: [phone]  F: [phone]    [facility] Global Referral  PH: [phone]  F: [phone]    [address] [place] [address]  Phone: [phone]  Fax: [phone]    [address] [address]  Phone: [phone]  Fax: [phone]    [address] [address]  Phone: [phone]  Fax: [phone]  6/9: No available beds.    [facility]  [address] \[facility] [place] [facility]\"" [address]  Phone: [phone]  Fax: [phone]  6/9: No available beds until Monday.    [address] [place] [address]  Phone: [phone]  Fax: [phone]    Kali Francis MSW, LGSW  [place]

## 2021-06-09 NOTE — PLAN OF CARE
VS: /83 (BP Location: Left arm)   Pulse 75   Temp 98.2  F (36.8  C) (Oral)   Resp 20   SpO2 96%     Output: Voiding spontaneously without difficulty. Last BM 6/8.   Lungs: clear   Activity: Assist of 1 with walker and gait belt, pt up to the bathroom.   Skin: Lesion on right cheek covered with steri-strip, bruise on left cheek   Pain:   Denies pain   Neuro/CMS:   A&Ox4 No N/T   Dressing(s):   none   Diet:   Regular diet.   LDA:   none   Plan:   Continue with plan of care. Discharge to TCU when placement found. Pt makes needs known with call light   Additional Info:   Bed alarm on for safety

## 2021-06-09 NOTE — DISCHARGE SUMMARY
[facility]  Hospitalist Discharge Summary      Date of Admission:  6/2/2021  Date of Discharge:  6/9/2021  Discharging Provider: Charbel Stephen MD      Discharge Diagnoses   Fall  Orbital floor fracture    Follow-ups Needed After Discharge   Follow-up Appointments     Follow Up and recommended labs and tests      Follow up with skilled nursing physician.  The following labs/tests are   recommended: cbc, cmp weekly or as needed.  Follow up with primary care provider in 7 days days.  No follow up labs or   test are needed.  Follow up with ophthalmology clinic in 1 to 2 weeks after discharge             Unresulted Labs Ordered in the Past 30 Days of this Admission     No orders found from 5/3/2021 to 6/3/2021.      These results will be followed up by     Discharge Disposition   Discharged to nursing home  Condition at discharge: Stable      Hospital Course   81 year old female admitted on 6/2/21.  She has past medical history of hypertension, hyperlipidemia presented to the ER at [facility] from her assisted living facility for evaluation of low back pain in the setting of frequent falls.  Her last hospitalization at [facility] was from 4/23/2021 to 4/27/2021 after mechanical fall resulting in closed head injury with scalp laceration and right patella fracture, she was discharged to TCU from hospital and then subsequently was transferred to assisted living facility.  During this admission she was found to have large fracture of right orbital floor with extension of extraconal fat and the right inferior rectus muscle through orbital floor defect.  CT head without contrast did not show any acute abnormality in the brain, CT lumbar spine showed chronic appearing compression fracture of L1, moderate to severe right L5-S1 foraminal compromise potentially correlating with \right L5 radiculopathy.  She was transferred to [facility] because [facility] does not  have ophthalmology consultation.     History of recurrent falls; was found to have mild UTI  No obvious etiology determined during this admission.  Labs were largely normal except for low potassium which is being replaced.  She was noted to be little dehydrated on admission.  EKG showed normal sinus rhythm with  ms, no arrhythmias noted on telemetry monitor.  Echocardiogram unremarkable.  Check vitamin B12 level, vitamin D level, folate level, tsh level at tcu  PT OT following.  Recommended TCU.  Occupational Therapy said slums score foot 17/30 placing on the category of dementia.  Decreased concentration.  Continue fall precautions.     Orbital floor fracture:  Visual acuity 20/20 in both eyes, intraocular pressure normal.  No diplopia, extraocular muscles full motility.  Full confrontational visual field  -Subacute appearing facial laceration along right sided maxillary cheek.    -Dilated funduscopic examination was unremarkable.  -CT scan was notable for a large right infraorbital rim and floor fracture with herniation of the inferior rectus along with fat.   No acute intervention is warranted at this time.    Ophthalmology service has recommended of outpatient follow-up in 1 to 2 weeks after discharge for reevaluation.      HTN:  Patient is on amlodipine 5 mg twice daily and hydralazine at home.  Hydralazine was held but her blood pressure is high so I resumed hydralazine today.  Continue to monitor closely.        Depression:  Resume home dose of venlafaxine 187.5 mg daily  Resume seroquel 25 mg at bedtime       UTI:  UC: E. Fecalis  Empirically started on iv Ceftriaxone.   06/05: Switch to amoxicillin 500 tid x total 7 days.      History of right patella fracture:   X-ray knee showed Ongoing healing nondisplaced vertically oriented lateral  patellar facet fracture.   Orthopedic consultation - reviewed. Appreciate input.        Consultations This Hospital Stay   PHYSICAL THERAPY ADULT IP  CONSULT  OCCUPATIONAL THERAPY ADULT IP CONSULT  OPHTHALMOLOGY IP CONSULT  SOCIAL WORK IP CONSULT  SOCIAL WORK IP CONSULT  ORTHOPAEDIC SURGERY ADULT/PEDS IP CONSULT  OCCUPATIONAL THERAPY ADULT IP CONSULT  CARE MANAGEMENT / SOCIAL WORK IP CONSULT    Code Status   No CPR- Do NOT Intubate    Time Spent on this Encounter   ICharbel MD, personally saw the patient today and spent greater than 30 minutes discharging this patient.       Charbel Stephen MD  [facility] [address]  [facility] [address]  Phone: [phone]  Fax: [phone]  ______________________________________________________________________    Physical Exam   Vital Signs: Temp: 97.2  F (36.2  C) Temp src: Oral BP: (!) 149/81 Pulse: 69   Resp: 18 SpO2: 96 % O2 Device: None (Room air)    Weight: 0 lbs 0 oz     General Appearance:   NAD, right maxilla cheek Steri-Strips.  Trace left eye injection.  Respiratory: Normal work of breathing.    Cardiovascular: S1 S2 Regular  GI: Soft. NT. ND.  Extremities: Distally wwp. R LE> L LE edema.   Skin: No acute rash on exposed areas.   Other: Grossly nonfocal.  Alert, awake     Primary Care Physician   Marky Josue    Discharge Orders      CARE COORDINATION REFERRAL      Home care nursing referral      MD face to face encounter    Documentation of Face to Face and Certification for Home Health Services    I certify that patient: Elizabeth Joy is under my care and that I, or a nurse practitioner or physician's assistant working with me, had a face-to-face encounter that meets the physician face-to-face encounter requirements with this patient on: 6/7/2021.    This encounter with the patient was in whole, or in part, for the following medical condition, which is the primary reason for home health care: Frequent Falls.    I certify that, based on my findings, the following services are medically necessary home health services: Nursing, Occupational Therapy and Physical Therapy.    My clinical findings  support the need for the above services because: Nurse is needed: To assess status after changes in medications or other medical regimen, Occupational Therapy Services are needed to assess and treat cognitive ability and address ADL safety due to impairment in cognition/function. and Physical Therapy Services are needed to assess and treat the following functional impairments: weakness    Further, I certify that my clinical findings support that this patient is homebound (i.e. absences from home require considerable and taxing effort and are for medical reasons or Judaism services or infrequently or of short duration when for other reasons) because: Requires assistance of another person or specialized equipment to access medical services because patient: Is prone to wander/get lost without assistance. and  Lives at [facility] with no transport.    Based on the above findings. I certify that this patient is confined to the home and needs intermittent skilled nursing care, physical therapy and/or speech therapy.  The patient is under my care, and I have initiated the establishment of the plan of care.  This patient will be followed by a physician who will periodically review the plan of care.  Physician/Provider to provide follow up care: Marky Josue    Attending [facility] physician (the Medicare certified PEC[facility] provider): Lionel Alvarado MD  Physician Signature: See electronic signature associated with these discharge orders.  Date: 6/7/2021     Activity - Up with nursing assistance     General info for SNF    Length of Stay Estimate: Short Term Care: Estimated # of Days <30  Condition at Discharge: Stable  Level of care:skilled   Rehabilitation Potential: Good  Admission H&P remains valid and up-to-date: Yes  Recent Chemotherapy: N/A  Use Nursing Home Standing Orders: Yes     Mantoux instructions    Give two-step Mantoux (PPD) Per Facility Policy Yes     Reason for your hospital stay    fall     Glucose  monitor nursing POCT    Before meals and at bedtime     Intake and output    Every shift     Daily weights    Call Provider for weight gain of more than 2 pounds per day or 5 pounds per week.     Activity - Up with nursing assistance     Follow Up and recommended labs and tests    Follow up with [facility] physician.  The following labs/tests are recommended: cbc, cmp weekly or as needed.  Follow up with primary care provider in 7 days days.  No follow up labs or test are needed.  Follow up with ophthalmology clinic in 1 to 2 weeks after discharge     Fall precautions     Advance Diet as Tolerated    Follow this diet upon discharge: Orders Placed This Encounter      Combination Diet Regular Diet Adult       Significant Results and Procedures     Most Recent 3 CBC's:  Recent Labs   Lab Test 06/08/21  0820 06/03/21  0557 06/02/21  1255   WBC 8.8 10.4 11.3*   HGB 11.5* 11.0* 13.1   MCV 94 95 94    287 300     Most Recent 3 BMP's:  Recent Labs   Lab Test 06/08/21  1946 06/08/21 0820 06/03/21  0557 06/02/21  1255   NA  --  138 142 142   POTASSIUM 4.0 3.2* 3.7 3.3*   CHLORIDE  --  105 108 107   CO2  --  24 27 30   BUN  --  25 22 21   CR  --  0.85 1.05* 0.99   ANIONGAP  --  9 7 5   MICHELLE  --  9.1 9.0 9.6   GLC  --  85 101* 93     Most Recent 2 LFT's:  Recent Labs   Lab Test 06/08/21 0820 06/03/21  0557   AST 12 10   ALT 20 21   ALKPHOS 90 76   BILITOTAL 0.3 0.3     Most Recent 3 INR's:  Recent Labs   Lab Test 04/23/21  1732   INR 1.07   ,   Results for orders placed or performed during the hospital encounter of 06/02/21   XR Knee Right 3 Views    Narrative    3 views right knee radiographs 6/3/2021 5:07 PM    History: R patellar fracture, fu.    Comparison: 4/23/2021    Findings:    AP, lateral and patellofemoral views of the right knee were obtained.     Ongoing healing nondisplaced vertically oriented lateral patellar  facet fracture. Small joint effusion.    Mild medial and lateral compartment joint space  loss.  Chondrocalcinosis.     No patellar tilt or lateral subluxation. Vascular calcification.  Subcutaneous edema laterally.    Prepatellar soft tissue swelling.      Impression    Impression: Ongoing healing nondisplaced vertically oriented lateral  patellar facet fracture.     RENEE GAVIN   Echo Complete    Narrative    [id number]  [id number]  [id number]  [id number]^CLAY^ANABELLE     [facility]  Echocardiography Laboratory  [address] [place] [address]     Name: AMELIA LEE  MRN: [id number]  : 1940  Study Date: 2021 12:12 PM  Age: 81 yrs  Gender: Female  Patient Location: [facility]  Reason For Study: Syncope and Collapse  Ordering Physician: ANABELLE WILLIAMSON  Referring Physician: CHLOE RAMSAY  Performed By: Pamela Avelar RDCS     BSA: 2.0 m2  Height: 64 in  Weight: 209 lb  HR: 74  BP: 151/96 mmHg  ______________________________________________________________________________  Procedure  Complete Portable Echo Adult. Technically difficult study.  ______________________________________________________________________________  Interpretation Summary  Global and regional left ventricular function is normal with an EF of 55-60%.  Global right ventricular function is normal.  No pericardial effusion is present.     This study was compared with the study from 2020 .There has been no  change.  ______________________________________________________________________________  Left Ventricle  Left ventricular size is normal. Left ventricular wall thickness is normal.  Global and regional left ventricular function is normal with an EF of 55-60%.  Left ventricular diastolic function is not assessable.     Right Ventricle  The right ventricle is normal size. Global right ventricular function is  normal.     Atria  The atria cannot be assessed.     Mitral Valve  Mild mitral annular calcification is present.     Aortic Valve  Trileaflet aortic sclerosis without  stenosis. On Doppler interrogation, there  is no significant stenosis or regurgitation.     Tricuspid Valve  The tricuspid valve is normal.     Pulmonic Valve  The pulmonic valve is normal.     Vessels  The aorta root is normal. The thoracic aorta is normal. The inferior vena cava  was normal in size with preserved respiratory variability. IVC diameter <2.1  cm collapsing >50% with sniff suggests a normal RA pressure of 3 mmHg.     Pericardium  No pericardial effusion is present.     Compared to Previous Study  This study was compared with the study from 2020 . There has been no  change.  ______________________________________________________________________________  MMode/2D Measurements & Calculations  asc Aorta Diam: 3.5 cm     LVOT diam: 2.0 cm  LVOT area: 3.2 cm2     Doppler Measurements & Calculations  MV E max abraham: 66.1 cm/sec  MV A max abraham: 70.6 cm/sec  MV E/A: 0.94  MV dec time: 0.20 sec  E/E' av.8  Lateral E/e': 9.8  Medial E/e': 17.9     ______________________________________________________________________________  Report approved by: Tariq MORTENSEN 2021 01:38 PM             Discharge Medications   Current Discharge Medication List      CONTINUE these medications which have CHANGED    Details   acetaminophen (TYLENOL) 325 MG tablet Take 2 tablets (650 mg) by mouth every 6 hours as needed for mild pain or fever    Associated Diagnoses: Recurrent falls      amLODIPine (NORVASC) 5 MG tablet Take 1 tablet (5 mg) by mouth 2 times daily    Associated Diagnoses: Benign essential hypertension      hydrALAZINE (APRESOLINE) 10 MG tablet Take 0.5 tablets (5 mg) by mouth 3 times daily  Qty:      Associated Diagnoses: Benign essential hypertension         CONTINUE these medications which have NOT CHANGED    Details   bisacodyl (DULCOLAX) 10 MG suppository Place 10 mg rectally daily as needed for constipation      calcium carbonate 600 mg-vitamin D 400 units (CALTRATE) 600-400 MG-UNIT per tablet Take  1 tablet by mouth 2 times daily      donepezil (ARICEPT) 5 MG tablet Take 5 mg by mouth At Bedtime      ondansetron (ZOFRAN) 4 MG tablet Take 4 mg by mouth every 8 hours as needed for nausea      polyethylene glycol (MIRALAX) 17 g packet Take 1 packet by mouth daily as needed for constipation      potassium chloride ER (KLOR-CON M) 10 MEQ CR tablet Take 10 mEq by mouth daily       QUEtiapine (SEROQUEL) 25 MG tablet Take 1 tablet (25 mg) by mouth At Bedtime  Qty:      Associated Diagnoses: Current mild episode of major depressive disorder, unspecified whether recurrent (H)      sennosides (SENOKOT) 8.6 MG tablet Take 2 tablets by mouth 2 times daily as needed for constipation      !! venlafaxine (EFFEXOR-ER) 150 MG 24 hr tablet Take 150 mg by mouth daily Take with 37.5mg for total 187.5mg daily      !! venlafaxine (EFFEXOR-ER) 37.5 MG 24 hr tablet Take 37.5 mg by mouth daily Take with 150mg for total 187.5mg daily       !! - Potential duplicate medications found. Please discuss with provider.      STOP taking these medications       predniSONE (DELTASONE) 1 MG tablet Comments:   Reason for Stopping:         predniSONE (DELTASONE) 5 MG tablet Comments:   Reason for Stopping:             Allergies   No Known Allergies

## 2021-06-10 VITALS
TEMPERATURE: 97.6 F | OXYGEN SATURATION: 99 % | DIASTOLIC BLOOD PRESSURE: 97 MMHG | BODY MASS INDEX: 34.14 KG/M2 | SYSTOLIC BLOOD PRESSURE: 161 MMHG | WEIGHT: 199.96 LBS | HEART RATE: 72 BPM | RESPIRATION RATE: 18 BRPM | HEIGHT: 64 IN

## 2021-06-10 LAB
LABORATORY COMMENT REPORT: NORMAL
POTASSIUM SERPL-SCNC: 4 MMOL/L (ref 3.4–5.3)
SARS-COV-2 RNA RESP QL NAA+PROBE: NEGATIVE
SPECIMEN SOURCE: NORMAL

## 2021-06-10 PROCEDURE — 84132 ASSAY OF SERUM POTASSIUM: CPT | Performed by: INTERNAL MEDICINE

## 2021-06-10 PROCEDURE — 99225 PR SUBSEQUENT OBSERVATION CARE,LEVEL II: CPT | Performed by: INTERNAL MEDICINE

## 2021-06-10 PROCEDURE — G0378 HOSPITAL OBSERVATION PER HR: HCPCS

## 2021-06-10 PROCEDURE — 250N000013 HC RX MED GY IP 250 OP 250 PS 637: Performed by: INTERNAL MEDICINE

## 2021-06-10 PROCEDURE — 99207 PR CDG-CODE CATEGORY CHANGED: CPT | Performed by: INTERNAL MEDICINE

## 2021-06-10 PROCEDURE — 36415 COLL VENOUS BLD VENIPUNCTURE: CPT | Performed by: INTERNAL MEDICINE

## 2021-06-10 PROCEDURE — 87635 SARS-COV-2 COVID-19 AMP PRB: CPT | Performed by: INTERNAL MEDICINE

## 2021-06-10 RX ADMIN — ACETAMINOPHEN 975 MG: 325 TABLET, FILM COATED ORAL at 08:06

## 2021-06-10 RX ADMIN — VENLAFAXINE HYDROCHLORIDE 150 MG: 150 TABLET, EXTENDED RELEASE ORAL at 08:09

## 2021-06-10 RX ADMIN — Medication 1 TABLET: at 08:07

## 2021-06-10 RX ADMIN — HYDRALAZINE HYDROCHLORIDE 5 MG: 10 TABLET, FILM COATED ORAL at 08:06

## 2021-06-10 RX ADMIN — VENLAFAXINE HYDROCHLORIDE 37.5 MG: 37.5 TABLET, EXTENDED RELEASE ORAL at 08:06

## 2021-06-10 RX ADMIN — POTASSIUM CHLORIDE 10 MEQ: 750 TABLET, EXTENDED RELEASE ORAL at 08:07

## 2021-06-10 RX ADMIN — AMLODIPINE BESYLATE 5 MG: 5 TABLET ORAL at 08:06

## 2021-06-10 ASSESSMENT — MIFFLIN-ST. JEOR: SCORE: 1357.25

## 2021-06-10 NOTE — PLAN OF CARE
VS:   BP (!) 126/92 (BP Location: Left arm)   Pulse 75   Temp 97.8  F (36.6  C) (Oral)   Resp 17   SpO2 99%  on RA, denies SOB or CP   Output:   Voiding in BR, no BM this shift   Activity:   Up with Ax1 + walker + gait belt, repositions self in bed   Skin: R face abrasion, steri strips, scab,L face bruise, R foot /knee/leg 2+ edema,    Pain:   Pt denies pain   Neuro/CMS:   Intact, A&O4, generalized weakness, mild mobility impairment,    Diet:   Regular    Equipment:   Walker, gait belt, bed alarm    Plan:   Possible discharge to TCU   Additional Info:   Able to make needs known, using call light appropriately, bed alarm on for safety

## 2021-06-10 NOTE — PROGRESS NOTES
Care Management Follow Up    Length of Stay (days): 1    Expected Discharge Date: 6/10  Concerns to be Addressed: all concerns addressed in this encounter     Patient plan of care discussed at interdisciplinary rounds: Yes    Anticipated Discharge Disposition: TCU  Anticipated Discharge Services:  Rehab  Anticipated Discharge DME:  None    Patient/family educated on Medicare website which has current facility and service quality ratings: Yes  Education Provided on the Discharge Plan:  Yes  Patient/Family in Agreement with the Plan: Yes    Referrals Placed by CM/SW: TCU  Private pay costs discussed: transportation costs    Additional Information: OLAMIDE received phone call from [place] at [facility] that pt has been accepted at [facility] in [place] for today. SW left VM for son, Philip (PH: [phone]), providing update and requested call back for preference.     OLAMIDE received call back from pt's son, Philip, stating he prefers pt discharge to [facility]. OLAMIDE updated pt in her room and she's in agreement w/ plan. Her preference is to discharge straight back to her [facility], but she understands she needs to get stronger prior to doing so. OLAMIDE updated charge RN, RN. OLAMIDE faxed discharge orders to Jn. Pt will go via [facility] (bringing W/C) at 12:45 PM per Philip's request.     Accepted  [facility]  PH: [phone]  F: [phone]    PENDING REFERRALS  [facility] Global Referral  PH: [phone]  F: [phone]     DECLINED  [facility]  [address] [facility] [address]  Phone: [phone]  Fax: [phone]  6/9: No available beds.     [facility]  [address] [facility] [address]  Phone: [phone]  Fax: [phone]  6/9: No available beds until Monday.     Estates at [address] [address]  Phone: [phone]  Fax: [phone]  6/10: No available beds.    [address]  [address] [facility] [place] [address]  Phone: [phone]  Fax: [phone]  6/10: No available beds until Monday.    Kali Francis MSW, [facility]

## 2021-06-10 NOTE — PLAN OF CARE
VS: /75   Pulse 66   Temp 98.1  F (36.7  C) (Oral)   Resp 16   SpO2 100%   BP meds held this evening given than SBP<120.    Output: Voiding spontaneously without difficulty, LBM 6/8/21, passing flatus.   Lungs: Lung sounds clear, room air, denies SOB.   Activity: Assist of 1 with walker and gait belt.   Skin: Intact ex abrasion on right cheek and bruising on left cheek.   Pain:   Denies.   Neuro/CMS:   A&Ox4, CMS intact.   Dressing(s):   Steri-strips on left cheek intact.   Diet:   Regular diet, tolerating well.   LDA:   None.   Equipment:   PCDs, walker, gait belt, bed alarm.   Plan:   Continue POC, awaiting TCU placement because pt's shelter is declining to take her back at this time.   Additional Info:   Call light within reach, pt able to make needs known.

## 2021-06-10 NOTE — PLAN OF CARE
Pt discharged to [facility] via wheelchair ride. Report was given to RN. Pt left with belongings. Packet given to EMS, left at 1300.

## 2021-06-11 ENCOUNTER — PATIENT OUTREACH (OUTPATIENT)
Dept: CARE COORDINATION | Facility: CLINIC | Age: 81
End: 2021-06-11

## 2021-06-11 NOTE — PROGRESS NOTES
Clinic Care Coordination Contact  Care Coordination Transition Communication    Referral Source: IP Handoff    Clinical Data: [facility]  Hospitalist Discharge Summary       Date of Admission:  6/2/2021  Date of Discharge:  6/9/2021  Discharging Provider: Charbel Stephen MD      Discharge Diagnoses     Fall  Orbital floor fracture     Transition to Facility:              Facility Name: [facility]               Contact name and phone number/fax: [phone]    Plan: RN/SW Care Coordinator will await notification from facility staff informing RN/SW Care Coordinator of patient's discharge plans/needs. RN/SW Care Coordinator will review chart and outreach to facility staff every 4 weeks and as needed. Fax sent to TCU with my contact information requesting notification upon discharge.      GLORIA Caputo  Clinic Care Coordinator  [facility] [place]  [facility]  [phone]  [email or web address]

## 2021-06-11 NOTE — LETTER
[facility]   To:   [facility] in [place]           Please give to facility    From:   OLAMIDE Caputo Care Coordinator [facility]     Patient Name:  Elizabeth Joy YOB: 1940   Admit date: 6/10/2021      *Information Needed:  Please contact me when the patient will discharge (or if they will move to long term care)- include the discharge date, disposition, and main diagnosis   - If the patient is discharged with home care services, please provide the name of the agency    Phone, Fax or Email with information       Thank You,   RIC Caputo, MSW  Clinic Care Coordinator  [facility] [place] [place]  Ph: [phone]  [email or web address]

## 2021-06-11 NOTE — LETTER
[facility]   To:   [facility] [place]          Please give to facility    From:   OLAMIDE Caputo Care Coordinator [facility]     Patient Name:  Elizabeth Joy YOB: 1940   Admit date: 6/10/2021      *Information Needed:  Please contact me when the patient will discharge (or if they will move to long term care)- include the discharge date, disposition, and main diagnosis   - If the patient is discharged with home care services, please provide the name of the agency      Phone, Fax or Email with information       Thank You,   RIC Caputo, MSW  Clinic Care Coordinator  [facility] [place] [place] [place]  Ph: [phone]  [email or web address]

## 2021-06-15 NOTE — TELEPHONE ENCOUNTER
FUTURE VISIT INFORMATION      FUTURE VISIT INFORMATION:    Date: 7/6/21    Time: 9:30am    [place] [facility] [place] INFORMATION:    Referring provider:  Fermin Figueroa MD    Referring providers clinic:  [facility]    Reason for visit/diagnosis      RECORDS REQUESTED FROM:       Clinic name Comments Records Status Imaging Status   [facility] ED ED visit 6/2/21  CT Head 6/2/21  CT Facial bones 6/2/21 Epic PAC   [facility] OV/notes 7/13/15-6/2/21 EPIC

## 2021-06-17 ENCOUNTER — RECORDS - HEALTHEAST (OUTPATIENT)
Dept: LAB | Facility: CLINIC | Age: 81
End: 2021-06-17

## 2021-06-21 LAB
ANION GAP SERPL CALCULATED.3IONS-SCNC: 13 MMOL/L (ref 5–18)
BASOPHILS # BLD AUTO: 0.1 THOU/UL (ref 0–0.2)
BASOPHILS NFR BLD AUTO: 0 % (ref 0–2)
BUN SERPL-MCNC: 23 MG/DL (ref 8–28)
CALCIUM SERPL-MCNC: 9.6 MG/DL (ref 8.5–10.5)
CHLORIDE BLD-SCNC: 103 MMOL/L (ref 98–107)
CO2 SERPL-SCNC: 24 MMOL/L (ref 22–31)
CREAT SERPL-MCNC: 1.08 MG/DL (ref 0.6–1.1)
EOSINOPHIL # BLD AUTO: 0.1 THOU/UL (ref 0–0.4)
EOSINOPHIL NFR BLD AUTO: 1 % (ref 0–6)
ERYTHROCYTE [DISTWIDTH] IN BLOOD BY AUTOMATED COUNT: 12.8 % (ref 11–14.5)
GFR SERPL CREATININE-BSD FRML MDRD: 49 ML/MIN/1.73M2
GLUCOSE BLD-MCNC: 94 MG/DL (ref 70–125)
HCT VFR BLD AUTO: 35.8 % (ref 35–47)
HGB BLD-MCNC: 11.9 G/DL (ref 12–16)
IMM GRANULOCYTES # BLD: 0.1 THOU/UL
IMM GRANULOCYTES NFR BLD: 1 %
LYMPHOCYTES # BLD AUTO: 3.2 THOU/UL (ref 0.8–4.4)
LYMPHOCYTES NFR BLD AUTO: 29 % (ref 20–40)
MCH RBC QN AUTO: 31.2 PG (ref 27–34)
MCHC RBC AUTO-ENTMCNC: 33.2 G/DL (ref 32–36)
MCV RBC AUTO: 94 FL (ref 80–100)
MONOCYTES # BLD AUTO: 1.1 THOU/UL (ref 0–0.9)
MONOCYTES NFR BLD AUTO: 10 % (ref 2–10)
NEUTROPHILS # BLD AUTO: 6.8 THOU/UL (ref 2–7.7)
NEUTROPHILS NFR BLD AUTO: 60 % (ref 50–70)
PLATELET # BLD AUTO: 376 THOU/UL (ref 140–440)
PMV BLD AUTO: 9.4 FL (ref 8.5–12.5)
POTASSIUM BLD-SCNC: 3.4 MMOL/L (ref 3.5–5)
RBC # BLD AUTO: 3.81 MILL/UL (ref 3.8–5.4)
SODIUM SERPL-SCNC: 140 MMOL/L (ref 136–145)
WBC: 11.3 THOU/UL (ref 4–11)

## 2021-07-06 ENCOUNTER — PRE VISIT (OUTPATIENT)
Dept: OPHTHALMOLOGY | Facility: CLINIC | Age: 81
End: 2021-07-06

## 2021-07-06 NOTE — TELEPHONE ENCOUNTER
FUTURE VISIT INFORMATION      FUTURE VISIT INFORMATION:    Date: 7/20/21    Time: 2:00pm    Location: [facility] [place]  REFERRAL INFORMATION:    Referring provider:  Fermin Figueroa MD    Referring providers clinic:  [facility]    Reason for visit/diagnosis       RECORDS REQUESTED FROM:         Clinic name Comments Records Status Imaging Status   [facility] ED ED visit 6/2/21  CT Head 6/2/21  CT Facial bones 6/2/21 Epic PAC   [facility] Eye OV/notes 7/13/15-6/2/21 EPIC

## 2021-07-08 ENCOUNTER — PATIENT OUTREACH (OUTPATIENT)
Dept: CARE COORDINATION | Facility: CLINIC | Age: 81
End: 2021-07-08

## 2021-07-08 NOTE — PROGRESS NOTES
Clinic Care Coordination Contact    Situation: Patient chart reviewed by care coordinator.    Background: Pt was discharged from hospital after a fall to [facility] [place].    Assessment: Pt remains at TCU    Plan/Recommendations: CC OLAMIDE will outreach to pt/family upon discharge to discuss any ongoing needs or concerns.    Lory Durant [facility]  Clinic Care Coordinator  [facility] [place]  [facility]  [phone]  [email or web address]

## 2021-07-19 ENCOUNTER — TELEPHONE (OUTPATIENT)
Dept: OPHTHALMOLOGY | Facility: CLINIC | Age: 81
End: 2021-07-19

## 2021-07-19 NOTE — TELEPHONE ENCOUNTER
Spoke with patient's Nursing Staff at [facility] ([phone]) regarding rescheduling need due to Provider had a family emergency. Nurse state patient was currently in the hospital and thought patient was coming back tomorrow for Eye Appointment. Offered a morning appointment if the Home could make a time work. Nurse has my direct number and will call in the morning regarding patient's status-Per Patient Nursing Staff

## 2021-07-20 ENCOUNTER — PRE VISIT (OUTPATIENT)
Dept: OPHTHALMOLOGY | Facility: CLINIC | Age: 81
End: 2021-07-20

## 2021-07-26 ENCOUNTER — TELEPHONE (OUTPATIENT)
Dept: FAMILY MEDICINE | Facility: CLINIC | Age: 81
End: 2021-07-26

## 2021-07-26 NOTE — TELEPHONE ENCOUNTER
[facility] requesting home care orders    PT: 2x a week for 8 weeks starting today.    Writer gave verbal approval of orders.    Callback: [phone] to leave detailed VM.     Marion Sinclair RN  [facility]

## 2021-08-02 ENCOUNTER — TELEPHONE (OUTPATIENT)
Dept: FAMILY MEDICINE | Facility: CLINIC | Age: 81
End: 2021-08-02

## 2021-08-02 NOTE — TELEPHONE ENCOUNTER
Verbal approval given for the homecare request below. Homecare/Hospice agency to fax orders for provider signature.      OT 2x per week for 4 weeks.     Effective today        Aimee Fuller RN  [facility]

## 2021-08-03 ENCOUNTER — TELEPHONE (OUTPATIENT)
Dept: OPHTHALMOLOGY | Facility: CLINIC | Age: 81
End: 2021-08-03

## 2021-08-03 NOTE — TELEPHONE ENCOUNTER
"LVM for patient with [facility] regarding rescheduling missed Eye Appointment with [facility] for further \"Evaluation and consideration of repair of orbital floor fracture\". Provided direct number for scheduling.  "

## 2021-08-05 ENCOUNTER — APPOINTMENT (OUTPATIENT)
Dept: GENERAL RADIOLOGY | Facility: CLINIC | Age: 81
End: 2021-08-05
Attending: EMERGENCY MEDICINE
Payer: COMMERCIAL

## 2021-08-05 ENCOUNTER — HOSPITAL ENCOUNTER (OUTPATIENT)
Facility: CLINIC | Age: 81
Setting detail: OBSERVATION
Discharge: HOME | End: 2021-08-19
Attending: EMERGENCY MEDICINE | Admitting: INTERNAL MEDICINE
Payer: COMMERCIAL

## 2021-08-05 DIAGNOSIS — F32.A DEPRESSION, UNSPECIFIED DEPRESSION TYPE: ICD-10-CM

## 2021-08-05 DIAGNOSIS — N30.00 ACUTE CYSTITIS WITHOUT HEMATURIA: ICD-10-CM

## 2021-08-05 DIAGNOSIS — K59.09 OTHER CONSTIPATION: ICD-10-CM

## 2021-08-05 DIAGNOSIS — N39.0 URINARY TRACT INFECTION WITHOUT HEMATURIA, SITE UNSPECIFIED: ICD-10-CM

## 2021-08-05 DIAGNOSIS — M25.50 MULTIPLE JOINT PAIN: ICD-10-CM

## 2021-08-05 DIAGNOSIS — F32.0 CURRENT MILD EPISODE OF MAJOR DEPRESSIVE DISORDER, UNSPECIFIED WHETHER RECURRENT (H): ICD-10-CM

## 2021-08-05 DIAGNOSIS — I10 BENIGN ESSENTIAL HYPERTENSION: ICD-10-CM

## 2021-08-05 DIAGNOSIS — K52.832 LYMPHOCYTIC COLITIS: ICD-10-CM

## 2021-08-05 DIAGNOSIS — M35.3 PMR (POLYMYALGIA RHEUMATICA) (H): ICD-10-CM

## 2021-08-05 DIAGNOSIS — S01.01XA LACERATION OF SCALP, INITIAL ENCOUNTER: ICD-10-CM

## 2021-08-05 DIAGNOSIS — E78.5 HYPERLIPIDEMIA LDL GOAL <160: ICD-10-CM

## 2021-08-05 DIAGNOSIS — B99.9 INFECTIOUS ENCEPHALOPATHY: ICD-10-CM

## 2021-08-05 DIAGNOSIS — E87.20 LACTIC ACIDOSIS: ICD-10-CM

## 2021-08-05 DIAGNOSIS — F03.91 DEMENTIA WITH BEHAVIORAL DISTURBANCE, UNSPECIFIED DEMENTIA TYPE: Primary | ICD-10-CM

## 2021-08-05 DIAGNOSIS — N90.4 LICHEN SCLEROSUS ET ATROPHICUS OF THE VULVA: ICD-10-CM

## 2021-08-05 DIAGNOSIS — G93.49 INFECTIOUS ENCEPHALOPATHY: ICD-10-CM

## 2021-08-05 DIAGNOSIS — N17.9 AKI (ACUTE KIDNEY INJURY) (H): ICD-10-CM

## 2021-08-05 DIAGNOSIS — R29.6 RECURRENT FALLS: ICD-10-CM

## 2021-08-05 DIAGNOSIS — R11.0 NAUSEA: ICD-10-CM

## 2021-08-05 DIAGNOSIS — R73.9 ELEVATED BLOOD SUGAR: ICD-10-CM

## 2021-08-05 DIAGNOSIS — I45.9 STOKES-ADAMS SYNCOPE: ICD-10-CM

## 2021-08-05 DIAGNOSIS — F32.0 MILD MAJOR DEPRESSION (H): ICD-10-CM

## 2021-08-05 DIAGNOSIS — N39.0 URINARY TRACT INFECTION IN FEMALE: ICD-10-CM

## 2021-08-05 DIAGNOSIS — M62.81 GENERALIZED MUSCLE WEAKNESS: ICD-10-CM

## 2021-08-05 DIAGNOSIS — H04.123 DRY EYES: ICD-10-CM

## 2021-08-05 DIAGNOSIS — S82.001A CLOSED NONDISPLACED FRACTURE OF RIGHT PATELLA, UNSPECIFIED FRACTURE MORPHOLOGY, INITIAL ENCOUNTER: ICD-10-CM

## 2021-08-05 LAB
ANION GAP SERPL CALCULATED.3IONS-SCNC: 3 MMOL/L (ref 3–14)
BASOPHILS # BLD AUTO: 0 10E3/UL (ref 0–0.2)
BASOPHILS NFR BLD AUTO: 0 %
BUN SERPL-MCNC: 26 MG/DL (ref 7–30)
CALCIUM SERPL-MCNC: 8.7 MG/DL (ref 8.5–10.1)
CHLORIDE BLD-SCNC: 108 MMOL/L (ref 94–109)
CO2 SERPL-SCNC: 29 MMOL/L (ref 20–32)
CREAT SERPL-MCNC: 1.02 MG/DL (ref 0.52–1.04)
EOSINOPHIL # BLD AUTO: 0.2 10E3/UL (ref 0–0.7)
EOSINOPHIL NFR BLD AUTO: 2 %
ERYTHROCYTE [DISTWIDTH] IN BLOOD BY AUTOMATED COUNT: 12.5 % (ref 10–15)
GFR SERPL CREATININE-BSD FRML MDRD: 52 ML/MIN/1.73M2
GLUCOSE BLD-MCNC: 134 MG/DL (ref 70–99)
HCT VFR BLD AUTO: 32.9 % (ref 35–47)
HGB BLD-MCNC: 10.7 G/DL (ref 11.7–15.7)
IMM GRANULOCYTES # BLD: 0 10E3/UL
IMM GRANULOCYTES NFR BLD: 0 %
LYMPHOCYTES # BLD AUTO: 2.2 10E3/UL (ref 0.8–5.3)
LYMPHOCYTES NFR BLD AUTO: 23 %
MCH RBC QN AUTO: 30.7 PG (ref 26.5–33)
MCHC RBC AUTO-ENTMCNC: 32.5 G/DL (ref 31.5–36.5)
MCV RBC AUTO: 94 FL (ref 78–100)
MONOCYTES # BLD AUTO: 0.8 10E3/UL (ref 0–1.3)
MONOCYTES NFR BLD AUTO: 8 %
NEUTROPHILS # BLD AUTO: 6.1 10E3/UL (ref 1.6–8.3)
NEUTROPHILS NFR BLD AUTO: 67 %
NRBC # BLD AUTO: 0 10E3/UL
NRBC BLD AUTO-RTO: 0 /100
PLATELET # BLD AUTO: 285 10E3/UL (ref 150–450)
POTASSIUM BLD-SCNC: 3.6 MMOL/L (ref 3.4–5.3)
RBC # BLD AUTO: 3.49 10E6/UL (ref 3.8–5.2)
SARS-COV-2 RNA RESP QL NAA+PROBE: NEGATIVE
SODIUM SERPL-SCNC: 140 MMOL/L (ref 133–144)
WBC # BLD AUTO: 9.3 10E3/UL (ref 4–11)

## 2021-08-05 PROCEDURE — 73562 X-RAY EXAM OF KNEE 3: CPT | Mod: RT

## 2021-08-05 PROCEDURE — 36415 COLL VENOUS BLD VENIPUNCTURE: CPT | Performed by: EMERGENCY MEDICINE

## 2021-08-05 PROCEDURE — C9803 HOPD COVID-19 SPEC COLLECT: HCPCS

## 2021-08-05 PROCEDURE — 80048 BASIC METABOLIC PNL TOTAL CA: CPT | Performed by: EMERGENCY MEDICINE

## 2021-08-05 PROCEDURE — 250N000013 HC RX MED GY IP 250 OP 250 PS 637: Performed by: EMERGENCY MEDICINE

## 2021-08-05 PROCEDURE — 99285 EMERGENCY DEPT VISIT HI MDM: CPT | Mod: 25

## 2021-08-05 PROCEDURE — 90791 PSYCH DIAGNOSTIC EVALUATION: CPT

## 2021-08-05 PROCEDURE — 96365 THER/PROPH/DIAG IV INF INIT: CPT

## 2021-08-05 PROCEDURE — 85025 COMPLETE CBC W/AUTO DIFF WBC: CPT | Performed by: EMERGENCY MEDICINE

## 2021-08-05 PROCEDURE — 87635 SARS-COV-2 COVID-19 AMP PRB: CPT | Performed by: EMERGENCY MEDICINE

## 2021-08-05 RX ORDER — HYDRALAZINE HCL 10 MG
5 TABLET ORAL 3 TIMES DAILY
Status: DISCONTINUED | OUTPATIENT
Start: 2021-08-05 | End: 2021-08-19 | Stop reason: HOSPADM

## 2021-08-05 RX ORDER — AMLODIPINE BESYLATE 5 MG/1
5 TABLET ORAL 2 TIMES DAILY
Status: DISCONTINUED | OUTPATIENT
Start: 2021-08-05 | End: 2021-08-11

## 2021-08-05 RX ORDER — QUETIAPINE FUMARATE 25 MG/1
25 TABLET, FILM COATED ORAL AT BEDTIME
Status: DISCONTINUED | OUTPATIENT
Start: 2021-08-05 | End: 2021-08-19 | Stop reason: HOSPADM

## 2021-08-05 RX ORDER — LIDOCAINE 4 G/G
2 PATCH TOPICAL ONCE
Status: COMPLETED | OUTPATIENT
Start: 2021-08-05 | End: 2021-08-06

## 2021-08-05 RX ORDER — VENLAFAXINE HYDROCHLORIDE 37.5 MG/1
37.5 TABLET, EXTENDED RELEASE ORAL DAILY
Status: DISCONTINUED | OUTPATIENT
Start: 2021-08-06 | End: 2021-08-05

## 2021-08-05 RX ORDER — ACETAMINOPHEN 500 MG
1000 TABLET ORAL ONCE
Status: COMPLETED | OUTPATIENT
Start: 2021-08-05 | End: 2021-08-05

## 2021-08-05 RX ADMIN — QUETIAPINE FUMARATE 25 MG: 25 TABLET ORAL at 23:01

## 2021-08-05 RX ADMIN — ACETAMINOPHEN 1000 MG: 500 TABLET, FILM COATED ORAL at 16:52

## 2021-08-05 RX ADMIN — AMLODIPINE BESYLATE 5 MG: 5 TABLET ORAL at 23:01

## 2021-08-05 RX ADMIN — HYDRALAZINE HYDROCHLORIDE 5 MG: 10 TABLET ORAL at 23:04

## 2021-08-05 RX ADMIN — LIDOCAINE 2 PATCH: 246 PATCH TOPICAL at 18:15

## 2021-08-05 ASSESSMENT — ENCOUNTER SYMPTOMS
ABDOMINAL PAIN: 0
DIARRHEA: 1
ARTHRALGIAS: 1
ROS GI COMMENTS: INCREASED BOWEL MOVEMENTS
NECK PAIN: 1

## 2021-08-05 NOTE — ED PROVIDER NOTES
"  History   Chief Complaint:  Back Pain and Social Work Services       The history is provided by the patient.      Elizabeth Joy is a 81 year old female with history of hypertension, PMR, mild major depression, and who presents with back and knee pain. A few months ago, the patient reports that she broke both of her knees and that since then she has been experiencing persistent pain. She states that when she stands up her pain radiates to her back. Additionally, she has been having neck pain. Today, her pain has worsened and she began having increased bowel movements that have been associated with diarrhea. While in the ED, she states her wish is to be able to walk on her own without pain. The patient also states that she wishes she were \"in the ground.\" She denies any abdominal pain.    [facility] Mental Health Assessment [facility] (8/5[facility]  The patient is an 81 year old female who currently lives at [facility]. Patient is clearly not oriented and states it is a Sunday in November/December. She is able to relay the names of her children and . Patient reports that she wants to die and is adamant that she no longer wants to live like this. She was tearful during the assessment. These thoughts are new for her and denies intent. She denies self harm or homicidal ideation. She was previously hospitalized psychiatrically earlier this year and reports being more stable and less sad. The patient reports that she thinks she takes her medications however collateral indicates she has not been. The nursing home staff reports she has been refusing both psychiatric and medical medications since July 28. Her son states his goal is having her move to [place] where he lives once she is well enough.     Review of Systems   Gastrointestinal: Positive for diarrhea. Negative for abdominal pain.        Increased bowel movements   Musculoskeletal: Positive for arthralgias (knees) and neck pain.     10 point " review of systems performed and is negative except as above and in HPI.    Allergies:  The patient has no known allergies.     Medications:  Amlodipine  Dulcolax  Aricept  Hydralazine  Zofran  Miralax  Seroquel   Sennosides  Venlafaxine    Past Medical History:    Depression  Hypertension  Hypercholesteremia  Insomnia  Lichen sclerosus et atrophicus of the vulva  Lymphocytic colitis  PMR  Ventricular tachycardia   Lactic acidosis   NAGI  UTI  Closed nondisplaced fracture of right patella  Generalized muscle weakness  Acute cystitis   Infectious encephalopathy  CKD  Fung-Figueroa syncope     Past Surgical History:    Hysterectomy  Keratotomy arcuate with femtosecond laser  Phacoemulsification clear cornea with standard intraocular lens implant  Pilonidal cyst removed     Family History:    Mother: breast cancer    Social History:  The patient presents alone. She currently lives at [facility].     Physical Exam     Patient Vitals for the past 24 hrs:   BP Temp Temp src Pulse Resp SpO2 Weight   08/05/21 2100 137/73 -- -- 78 -- 98 % --   08/05/21 1700 137/89 -- -- -- -- -- --   08/05/21 1654 137/89 -- -- 68 20 98 % --   08/05/21 1500 -- -- -- -- -- 96 % --   08/05/21 1400 -- -- -- -- -- 99 % --   08/05/21 1352 124/64 98.1  F (36.7  C) Oral 71 16 100 % 90.7 kg (200 lb)       Physical Exam    General: Resting on the gurney  Head:  The scalp, face, and head appear normal  Mouth/Throat: Mucus membranes are moist  CV:  Regular rate    Normal S1 and S2  No pathological murmur   Resp:  Breath sounds clear and equal bilaterally    Non-labored, no retractions or accessory muscle use    No coarseness    No wheezing   GI:  Abdomen is soft, no rigidity    No tenderness to palpation  MS:  Bilateral knees tender to palpation, right worse than left. Right lateral neck pain no midline tenderness to palpation.   Skin:   No rash or lesions noted.  Neuro:  Speech is normal and fluent. No focal deficit.  Psych:  Indicates  that she wishes that she were dead. Does not appear to be hallucinating.       Emergency Department Course   Imaging:  XR Knee Right 3 Views  Chronic nondisplaced vertical patellar fracture with  increased osseous bridging. Normal joint alignment is maintained.  Moderate lateral compartment joint space narrowing. Moderate knee  joint effusion. Chondrocalcinosis. Osteopenia. Atherosclerosis.   As per radiology.     Laboratory:  CBC: WBC 9.3, HGB 10.7 (L),      Asymptomatic COVID19 Virus PCR by nasopharyngeal swab pending     UA with microscopic: pending    BMP: Glucose 134 (H), GFR estimate 52 (L) o/w WNL (Creatinine 1.02)       Emergency Department Course:    Reviewed:  I reviewed nursing notes, vitals, past medical history and care everywhere    Assessments:  1532 I obtained history and examined the patient as noted above.     1617 I rechecked the patient and explained findings.     Consults:   1758 I spoke with [facility] to discuss the patient's mental and emotional status.     Interventions:  1652 Tylenol 1000 mg PO    1815 Lidocaine 4% 2 patches transdermal    Disposition:  Care of the patient was transferred to my colleague Dr. Damico.       Impression & Plan       Medical Decision Making:  Elizabeth Joy is a 81 year old female who presents for evaluation of knee and neck pain.  She has a history of previous depression and at this point appears decompensated psychiatrically. The patient has had increasing depression.     There are no signs at this point of suicide attempt or ingestion.        Plan will be for her to board in the ED until the morning as we are looking for a teo-psych bed.  If no bed is available throughout the day tomorrow, would likely be appropriate for medical admission in the afternoon.    The patient was seen by DEC who agrees with this assessment.  The patient was given resources and will follow up as instructed.    Care of the patient was transferred to my colleague Dr. Damico.      Covid-19  Elizabeth Joy was evaluated during a global COVID-19 pandemic, which necessitated consideration that the patient might be at risk for infection with the SARS-CoV-2 virus that causes COVID-19.   Applicable protocols for evaluation were followed during the patient's care.   COVID-19 was considered as part of the patient's evaluation. The plan for testing is:  a test was obtained during this visit.    Diagnosis:    ICD-10-CM    1. Depression, unspecified depression type  F32.9        Scribe Disclosure:  I, Lisseth Reis, am serving as a scribe at 3:31 PM on 8/5/2021 to document services personally performed by Aimee Bass MD based on my observations and the provider's statements to me.     [facility]         Aimee Bass MD  08/05/21 [id number]

## 2021-08-05 NOTE — ED NOTES
"Nursing Staff at [facility] ([phone])    She reports that she has been in their facility for over a year.  They report that she was previously in locked memory care due to her dementia, but since she is now less ambulatory due to her knee pain she has reduced services to [facility].  However, she still gets 24 hr care including med mgmt, dressing, ADL's etc etc.  Pt can only pivot.      They report that she has been refusing all medications both psychiatric and medical medications since July 28th.  They report that this includes her blood pressure medications, Depakote, Aricept, Seroquel, Effexor, pain mgmt meds, vitamins, and OTC medications.  She is not under commitment or Garcia at this time.    They report that she has been stating that \"she wants to die\".  She states that this is not new statements.  She is not aware of any previous attempts.  She notes that e/o week she has [facility] psychology services.  She states that her med mgmt is Nena NP is from [facility] for her psychiatric medications.    The RN Manager at the facility is at  if discharge is recommended.      Son is VIDA Diggs (son) [phone].    They reports that she has been at the [facility] and [facility].  She was an elopement risk as she wandered and fell.  She then damaged her knee.  She spent some time in TCU.  He reports that she has been deterating, not wanting to get dressed, not wanting to talk to others, depression.  He reports previous teo psych at the [facility] was April 2020 which has been her only hospitalization.  He reports that this went well and she was able to stabilize and appeared well.     He states that 10 days ago he was with her and she ambulated to the bathroom appropriately.  He is not sure where this 10/10 pain report is coming from.  He states that she gets current OT and PT services.  He is aware that she is not currently medication compliant.  He states that she has been making new " suicidal statements of wanting to die.  He also notes a hx of Dementia on top of the depression.  He states a goal of having her move to [place] where he lives to a new [facility]/ facility when she is well enough.

## 2021-08-05 NOTE — CONSULTS
"2021  Patient Name: Elizabeth Joy   : 1940     [facility] Mental Health Assessment  Started at: 1900  Completed at: 1930  What type of assessment are you doing today? Crisis assessment     1.  Presenting Problem  Referral Method to ED: Community Provider     What brings the patient to the ED today:   Pt presents to the ED as a 81 year old female.  PT currently lives at [facility].  Pt reports that she has only been there a week and lives at \"both home and the [facility]\".  Pt very clearly is not oriented.  Pt states that its  and that its  as football is on TV.  Pt is oriented to self, able to relay her husbands name and Childrens names.  PT believes that her  is in the hospital however he lives in a facility in [place].    Pt reports that she wants to die.  PT was adamant she no longer wants to live like this.  Pt was tearful during the entirety of the assessment.  PT appeared flat, confused, and was tangential.  Pt was able to relay that her suicidal thoughts are new for her.  She states that she has been able to think about overdosing.  She states that although she wants to die, she denies any intent currently. Pt reports that she has never attempted suicide in the past.  PT denies any self harm or homicidal ideation.  Pt does present as mildly paranoid.  PT states that she is not always sure that the medications that she is given are for her or things that she needs medications for.    Pt has been previously hospitalized psychiatrically earlier this year and reports being more stable and less sad.  PT is agreeable to hospitalization to address her depression.  Pt denies that she has a dementia dx (although this is clear and documented).  PT reports that she thinks she takes her medications however collateral indicates she has not been.    Nursing Staff at [facility] ([phone])    She reports that she has been in their facility for over a year.  They report " "that she was previously in locked memory care due to her dementia, but since she is now less ambulatory due to her knee pain she has reduced services to [facility].  However, she still gets 24 hr care including med mgmt, dressing, ADL's etc etc.  Pt can only pivot.      They report that she has been refusing all medications both psychiatric and medical medications since July 28th.  They report that this includes her blood pressure medications, Depakote, Aricept, Seroquel, Effexor, pain mgmt meds, vitamins, and OTC medications.  She is not under commitment or Garcia at this time.    They report that she has been stating that \"she wants to die\".  She states that this is not new statements.  She is not aware of any previous attempts.  She notes that e/o week she has [facility] psychology services.  She states that her med mgmt is Nena NP is from [facility] for her psychiatric medications.    The RN Manager at the facility is at  if discharge is recommended.      Son is VIDA Diggs (son) [phone].    They reports that she has been at the [facility] and [facility].  She was an elopement risk as she wandered and fell.  She then damaged her knee.  She spent some time in [facility].  He reports that she has been deterating, not wanting to get dressed, not wanting to talk to others, depression.  He reports previous teo psych at the [place] unit was April 2020 which has been her only hospitalization.  He reports that this went well and she was able to stabilize and appeared well.     He states that 10 days ago he was with her and she ambulated to the bathroom appropriately.  He is not sure where this 10/10 pain report is coming from.  He states that she gets current OT and PT services.  He is aware that she is not currently medication compliant.  He states that she has been making new suicidal statements of wanting to die.  He also notes a hx of Dementia on top of the depression.  He states a goal of having her move to [place] where " he lives to a new [facility] facility when she is well enough.     Has this happened before: Yes April 2021    Duration of presenting problem: Last few weeks    Additional Stressors: Pt's  has dementia and lives in [place]    2.  Risk Assessment  Suicide and Self-Harm    ESS-6  1.a. Over the past 2 weeks, have you had thoughts of killing yourself? Yes   1.b. Have you ever attempted to kill yourself and, if yes, when did this last happen? No  2. Recent or current suicide plan? Yes  3. Recent or current intent to act on ideation? No  4. Lifetime psychiatric hospitalization? Yes  5. Pattern of excessive substance use? No  6. Current irritability, agitation, or aggression? No  ESS-6 Score: 3    SI: Active  Plan: Yes overdose  Intent: No   Prior Attempts: No     Protective Factors: sense of responsibility to family, friends, or pets and Jainism beliefs that discourage suicide     Hopes and goals for the future: PT desires to live with her     Coping Skills: What helps and doesn't help? Pt reports that being around family is helpful    Additional Risk Factors Related to Safety and Suicide: Yes: Depressive symptoms, Health stressors, Chronic pain, Preoccupied with death / dying, Significant behavioral changes and Restless/agitated    The patient denies access to guns.     Is the patient engaged in self injurious behaviors? No     Risk to Others  Aggressive/Assaultive/Homicidal Risk Factors: No     Duty to Warn? No     Was a Child Protection Report Made? No       Was a Adult Protection Report Made? No        Sexually inappropriate behavior? No        Vulnerability to sexual exploitation? No     Additional information: n/a      3. Mental Health Symptoms and Substance Use  Current Symptoms and Mental Health History  GAIN Short Screener (GAIN-SS) administered: N/A    Attention, Hyperactivity, and Impulsivity Symptoms    Patient reported symptoms related to hyperactivity, inattention, or impulsivity? No      Anxiety  Symptoms  Patient reported anxiety symptoms? No     Behavioral Difficulties  Patient reported behavioral difficulties? Yes: Apathy, Negativistic/Defiant and Withdrawal/Isolation     Mood Symptoms  Patient reported mood disorder symptoms? Yes: Crying or feels like crying, Feelings of helplessness , Feelings of hopelessness , Feelings of worthlessness , Impaired concentration, Isolative , Loss of interest / Anhedonia  and Sad, depressed mood      Eating Disorders and Appetite Disturbance    Patient reported appetite symptoms? No     SCOFF  Do you make yourself sick (induce vomiting) because you feel uncomfortably full? No   Do you worry that you have lost Control over how much you eat? No  Have you recently lost more than 14 lb in a three-month period? No   Do you think you are too fat, even though others say you are too thin? No   Would you say that food dominates your life? No  SCOFF Score: 0    Patient reported appetite or eating disorder symptoms? No    Interpersonal Functioning   Patient reported difficulties that may be associated with personality and interpersonal functioning? Yes: Emotional Deregulation and Impaired Interpersonal Functioning     Learning Disabilities/Cognitive/Developmental Disorders  Patient reported concerns related to learning disabilities, cognitive challenges, and/or developmental disorders? No     General Cognitive Impairments  Patient reported symptoms of cognitive impairments? Yes: Decision-Making, Dementia History and Memory    Sleep Disturbance  Patient reported difficulties with sleep? No      Psychosis Symptoms  Patient reported symptoms of psychosis? Yes: Paranoia     Trauma and Post-Traumatic Stress Disorder  Physical Abuse: No   Emotional/Psychological Abuse: No  Sexual Abuse: No  Loss of a friend or family member to suicide: No  Other Traumatic Event: No     Patient reported trauma related symptoms? No =    Impact of Mental Health on Functioning    Negative Impact Score:  unknown  Subjective Impact on functioning: Pt is no longer caring for herself.  She is refusing medications, cares, and is overtly depressed and suicidal  How do symptoms vary from baseline? Pt typically is more stable , not depressed nor suicidal.    Current and Historical Substance Use Note  Is there a history of, or current, substance use? No     Have you been to chemical dependency treatment or detox before? No     CAGE-AID  Have you felt you ought to cut down on your drinking or drug use? No     Have people annoyed you by criticizing your drinking or drug use? No   Have you felt bad or guilty about your drinking or drug use? No  Have you ever had a drink or used drugs first thing in the morning to steady your nerves or to get rid of a hangover? No   CAGE-AID Score: 0/4    Drug screen completed? No   BAL/Breathalyzer completed? No       Mental Status Exam  Affect: Blunted and Flat  Appearance: Appropriate   Attention Span/Concentration: Attentive    Eye Contact: Variable  Fund of Knowledge: Appropriate   Language /Speech Content: Fluent  Language /Speech Volume: Soft   Language /Speech Rate/Productions: Normal   Recent Memory: Poor  Remote Memory: Poor  Mood: Depressed and Sad   Orientation:   Person: Yes   Place: No  Time of Day: No   Date: No   Situation (Do they understand why they are here?): No   Psychomotor Behavior: Normal   Thought Content: Paranoia  Thought Form: Tangential    4. Social and Environmental Conditions  Is the patient their own guardian? Yes     Living Situation: Other: [facility]    Support system and quality of connections: PT has family, however they live out of state    Income source: Other: retired     Issues with employment or education: No    Legal Concerns  Do you have any history of or current involvement with the legal system? No    Spiritual and Cultural Influences  Do you have any Episcopal beliefs that are important in your life? Yes Pt reports that she is Baptist     Do you have any  cultural influences in your life that impact your mental health care? No      5. Psychiatric History, Medical History, and Current Care  Patient Mental Health Services  Does the patient have a history of mental health concerns/diagnoses? Yes Depression and Dementia     Current Providers  Primary Care Provider: Yes Philippe   Psychiatrist: Yes [facility]   Therapist: Yes [facility]   : No   ARMHS: No   ACT Team: No   Other: No    History of Commitment? No  History of Psychiatric Hospitalizations? Yes [facility] April 2021   History of programmatic care? No    Family Mental Health History   Family History of Mental Health or Chemical Dependency Issues? None reported    Development and Physical Health Challenges  Delays or concerns meeting developmental milestones? No  Current psychotropic medications? Yes see MAR .  Medication Compliant? No: Refusing meds   Recent medication changes? No    History of concussion or TBI? No     Additional Information: n/a    6. Collateral Information and Collaboration  Collaboration with medical staff: Medical Records, Nursing and Referring Provider     Collateral Information/Sources: Family and facility    7. 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 Patient Care/Treatment Plan  Summary of Patient Presentation and  needs:  What are the basic needs for this patient in this moment? Med stabilization and further eval     Consultations:  Attending provider consulted? Yes  Attending Name: Debroux   Attending concurs with disposition? Yes     Recommended disposition: Inpatient Mental Health     Does the patient agree with the recommended level of care? Yes    Final disposition: Inpatient mental health     Disposition Details: Writer at the time of assessment recommends inpatient stabilization.  Pt reports significant depression with suicidal ideation.  Both collateral of family and facility report concerns of depression, failure to thrive, and non med compliance.  Pt is in need of further eval and stabilization.    If Inpatient, is patient admitted voluntary? Yes   Patient aware of potential for transfer if there is not appropriate placement? Yes  Patient is willing to travel outside of the [facility] for placement? Yes   Central Intake Notified? No    10. Patient Care Document: Safety and After Care Planning       Safety Plan Provided? No    Follow-Up Plans and Providers: n/a    Follow-Up Plan:  After care plan provided to the patient/guardian by:    After care plan provided to any additional sources/parties? No    Duration of face to face time with patient in minutes: .75 hrs    CPT code(s) utilized: 77026 - Psychotherapy for Crisis - 60 (30-74*) min    FÉLIX Givens on 8/5/2021 at 6:43 PM

## 2021-08-05 NOTE — ED NOTES
Bed: ED09  Expected date:   Expected time:   Means of arrival:   Comments:  [facility] - 81 F chronic knee pain eta 1348

## 2021-08-05 NOTE — ED TRIAGE NOTES
Pt arrived via EMS from [facility].  Has chronic knee and back pain, refusing to take tylenol from staff as it isn't helping.  Pt has ongoing depression.  Family in process of trying to move pt to [place] and may not want her to go back to [facility].

## 2021-08-06 PROBLEM — N39.0 URINARY TRACT INFECTION IN FEMALE: Status: ACTIVE | Noted: 2021-08-06

## 2021-08-06 PROBLEM — F32.A DEPRESSION: Status: ACTIVE | Noted: 2020-08-01

## 2021-08-06 LAB
ALBUMIN UR-MCNC: NEGATIVE MG/DL
APPEARANCE UR: ABNORMAL
BACTERIA #/AREA URNS HPF: ABNORMAL /HPF
BILIRUB UR QL STRIP: NEGATIVE
COLOR UR AUTO: ABNORMAL
GLUCOSE UR STRIP-MCNC: NEGATIVE MG/DL
HGB UR QL STRIP: NEGATIVE
KETONES UR STRIP-MCNC: NEGATIVE MG/DL
LEUKOCYTE ESTERASE UR QL STRIP: ABNORMAL
MUCOUS THREADS #/AREA URNS LPF: PRESENT /LPF
NITRATE UR QL: NEGATIVE
PH UR STRIP: 6 [PH] (ref 5–7)
RBC URINE: 3 /HPF
SARS-COV-2 RNA RESP QL NAA+PROBE: NEGATIVE
SP GR UR STRIP: 1.02 (ref 1–1.03)
SQUAMOUS EPITHELIAL: 1 /HPF
UROBILINOGEN UR STRIP-MCNC: NORMAL MG/DL
WBC URINE: 89 /HPF

## 2021-08-06 PROCEDURE — 250N000011 HC RX IP 250 OP 636: Performed by: EMERGENCY MEDICINE

## 2021-08-06 PROCEDURE — 120N000001 HC R&B MED SURG/OB

## 2021-08-06 PROCEDURE — 81001 URINALYSIS AUTO W/SCOPE: CPT | Performed by: EMERGENCY MEDICINE

## 2021-08-06 PROCEDURE — 250N000013 HC RX MED GY IP 250 OP 250 PS 637: Performed by: EMERGENCY MEDICINE

## 2021-08-06 PROCEDURE — 250N000013 HC RX MED GY IP 250 OP 250 PS 637: Performed by: STUDENT IN AN ORGANIZED HEALTH CARE EDUCATION/TRAINING PROGRAM

## 2021-08-06 PROCEDURE — 87086 URINE CULTURE/COLONY COUNT: CPT | Performed by: EMERGENCY MEDICINE

## 2021-08-06 PROCEDURE — 99219 PR INITIAL OBSERVATION CARE,LEVEL II: CPT | Performed by: STUDENT IN AN ORGANIZED HEALTH CARE EDUCATION/TRAINING PROGRAM

## 2021-08-06 PROCEDURE — 87635 SARS-COV-2 COVID-19 AMP PRB: CPT | Performed by: EMERGENCY MEDICINE

## 2021-08-06 RX ORDER — LIDOCAINE 40 MG/G
CREAM TOPICAL
Status: DISCONTINUED | OUTPATIENT
Start: 2021-08-06 | End: 2021-08-19 | Stop reason: HOSPADM

## 2021-08-06 RX ORDER — DIVALPROEX SODIUM 125 MG/1
125 TABLET, DELAYED RELEASE ORAL 2 TIMES DAILY
Status: DISCONTINUED | OUTPATIENT
Start: 2021-08-06 | End: 2021-08-11

## 2021-08-06 RX ORDER — ACETAMINOPHEN 325 MG/1
650 TABLET ORAL EVERY 6 HOURS PRN
Status: DISCONTINUED | OUTPATIENT
Start: 2021-08-06 | End: 2021-08-19 | Stop reason: HOSPADM

## 2021-08-06 RX ORDER — POTASSIUM CHLORIDE 1500 MG/1
20 TABLET, EXTENDED RELEASE ORAL DAILY
Status: ON HOLD | COMMUNITY
End: 2021-08-17

## 2021-08-06 RX ORDER — ONDANSETRON 4 MG/1
TABLET, FILM COATED ORAL EVERY 8 HOURS PRN
Status: ON HOLD | COMMUNITY
End: 2021-08-17

## 2021-08-06 RX ORDER — POLYETHYLENE GLYCOL 3350 17 G/17G
17 POWDER, FOR SOLUTION ORAL DAILY PRN
Status: DISCONTINUED | OUTPATIENT
Start: 2021-08-06 | End: 2021-08-19 | Stop reason: HOSPADM

## 2021-08-06 RX ORDER — DONEPEZIL HYDROCHLORIDE 5 MG/1
5 TABLET, FILM COATED ORAL AT BEDTIME
Status: DISCONTINUED | OUTPATIENT
Start: 2021-08-06 | End: 2021-08-19 | Stop reason: HOSPADM

## 2021-08-06 RX ORDER — CEFTRIAXONE 2 G/1
2 INJECTION, POWDER, FOR SOLUTION INTRAMUSCULAR; INTRAVENOUS EVERY 24 HOURS
Status: DISCONTINUED | OUTPATIENT
Start: 2021-08-07 | End: 2021-08-08

## 2021-08-06 RX ORDER — ONDANSETRON 4 MG/1
4 TABLET, ORALLY DISINTEGRATING ORAL EVERY 8 HOURS PRN
Status: DISCONTINUED | OUTPATIENT
Start: 2021-08-06 | End: 2021-08-19 | Stop reason: HOSPADM

## 2021-08-06 RX ORDER — LIDOCAINE 4 G/G
2 PATCH TOPICAL
Status: DISCONTINUED | OUTPATIENT
Start: 2021-08-06 | End: 2021-08-19 | Stop reason: HOSPADM

## 2021-08-06 RX ORDER — CEFTRIAXONE 2 G/1
2 INJECTION, POWDER, FOR SOLUTION INTRAMUSCULAR; INTRAVENOUS ONCE
Status: COMPLETED | OUTPATIENT
Start: 2021-08-06 | End: 2021-08-06

## 2021-08-06 RX ORDER — DIVALPROEX SODIUM 125 MG/1
125 TABLET, DELAYED RELEASE ORAL EVERY 12 HOURS SCHEDULED
Status: DISCONTINUED | OUTPATIENT
Start: 2021-08-06 | End: 2021-08-07

## 2021-08-06 RX ORDER — LIDOCAINE 4 G/G
2 PATCH TOPICAL EVERY 24 HOURS
Status: ON HOLD | COMMUNITY
End: 2021-08-17

## 2021-08-06 RX ORDER — ACETAMINOPHEN 650 MG/1
650 SUPPOSITORY RECTAL EVERY 6 HOURS PRN
Status: DISCONTINUED | OUTPATIENT
Start: 2021-08-06 | End: 2021-08-19 | Stop reason: HOSPADM

## 2021-08-06 RX ORDER — DIVALPROEX SODIUM 125 MG/1
125 TABLET, DELAYED RELEASE ORAL 2 TIMES DAILY
Status: ON HOLD | COMMUNITY
End: 2021-08-17

## 2021-08-06 RX ADMIN — CEFTRIAXONE SODIUM 2 G: 2 INJECTION, POWDER, FOR SOLUTION INTRAMUSCULAR; INTRAVENOUS at 10:44

## 2021-08-06 RX ADMIN — QUETIAPINE FUMARATE 25 MG: 25 TABLET ORAL at 21:15

## 2021-08-06 RX ADMIN — DIVALPROEX SODIUM 125 MG: 125 TABLET, DELAYED RELEASE ORAL at 21:15

## 2021-08-06 RX ADMIN — HYDRALAZINE HYDROCHLORIDE 5 MG: 10 TABLET ORAL at 21:15

## 2021-08-06 RX ADMIN — AMLODIPINE BESYLATE 5 MG: 5 TABLET ORAL at 21:15

## 2021-08-06 NOTE — ED PROVIDER NOTES
ED course:  Patient received as a handoff from my partner Dr. Bass.  No issues during my care.  Continues to await availability of geriatric psych bed; if bed does not become available later today will need medical admit for psychiatric evaluation.    Impression:    ICD-10-CM    1. Depression, unspecified depression type  F32.9      Disposition:  Signed out to my partner Dr. Powell pending bed availability         Maurice Damico,   08/06/21 0752

## 2021-08-06 NOTE — ED NOTES
[facility]  ED Nurse Handoff Report    ED Chief complaint: Back Pain and Social Work Services      ED Diagnosis:   Final diagnoses:   Depression, unspecified depression type   Urinary tract infection in female       Code Status:    Allergies: No Known Allergies    Patient Story: Back pain, she also reported she wants to  Die and does not want to live like this  Focused Assessment:  Iv placement urinalysis.   Patient is confused and forgetful, demented, did not complaint of back pain. Incontinent. Needs assistance.   Nursing home reports patient has been refusing psychiatric and medical medications since July 28.  Patient believes she lives at home with her  at home.  for placement.    Treatments and/or interventions provided:  Patient's response to treatments and/or interventions: Denied any pain, no medications given, refused morning medications.    To be done/followed up on inpatient unit:     Does this patient have any cognitive concerns?:\    Activity level - Baseline/Home  Up with strong assist  Activity Level - Current:   Stand with assist x2    Patient's Preferred language: English   Needed?: No    Isolation: None  Infection: Not Applicable  Patient tested for COVID 19 prior to admission: YES  Bariatric?: No    Vital Signs:   Vitals:    08/06/21 0845 08/06/21 0846 08/06/21 0900 08/06/21 1139   BP:   128/79    Pulse:   63    Resp:    16   Temp:       TempSrc:       SpO2: 98% 96% 97%    Weight:           Cardiac Rhythm:     Was the PSS-3 completed:  ye3s    What interventions are required if any?               Family Comments:   OBS brochure/video discussed/provided to patient/family: No              Name of person given brochure if not patient:              Relationship to patient:\    For the majority of the shift this patient's behavior was Green.   Behavioral interventions performed were **      ED NURSE PHONE NUMBER:

## 2021-08-06 NOTE — H&P
[facility]    History and Physical - Hospitalist Service       Date of Admission:  8/5/2021    Assessment & Plan      Elizabeth Joy is a 81 year old female with past medical history significant for dementia, depression, HTN, admitted on 8/5/2021 with chronic pain and suicidal ideation.     Suicidal ideation without attempt   Major Depressive disorder   Advanced dementia   Pt resides in a memory care unit and has been reportedly deteriorating recently with worsening depressive symptoms, refusing medications and making suicidal statement. She had a mental health assessment in the ED who recommended inpatient stabilization in a teo-psych facility. Attempts were made to find a facility in the ED, but beds will likely not be available until Monday 8/9; therefore the patient will be admitted until placement can be obtained.   - Will continue prior to admission Effexor, Seroquel, Depakote and Donepazil   - Psychiatry consult   - CC/SW consult for placement     Possible UTI  UA notable for moderate leukocyte esterase and WBC count of 89.  - She was given ceftriaxone in the ED, will continue for now and follow-up urine cultures     Chronic back and knee pain   Pt has a hx of falls and chronic pain. She has a chronic compression fracture of the L1 superior endplate as well as chronic patellar fracture of the right knee.    - Ice/heat PRN   - Acetaminophen PRN    - Lidocaine patch PRN     Hypertension  - Continue prior to admission amlodipine     Diet: Regular Diet Adult    DVT Prophylaxis: Low Risk/Ambulatory with no VTE prophylaxis indicated  Ram Catheter: Not present  Central Lines: None  Code Status: DNR/DNI      Disposition Plan   Expected discharge: TBD recommended to Teo-psych bed once safe disposition plan/ TCU bed available.     The patient's care was discussed with the Patient.    Lolly Willingham MD  [facility]  Text page via [facility]  "Paging/Directory      ______________________________________________________________________    Chief Complaint   Suicidal ideation    History is obtained from the patient    History of Present Illness   Elizabeth Joy is a 81 year old female who resides in a memory care facility, who presented with back and knee pain. It sounds like she has chronic back pain and knee pain. She was reportedly refusing to take all of her medications at her nursing home. The staff at her nursing home also reported that she has been stating that, \"she wants to die.\" They also report that she seems more depressed and is deteriorating from a mental health standpoint (not wanting to get dressed, get out of bed).     While being evaluated in the ED she reported that the wishes she were \"in the ground.\"     She had a mental health assessment in the ED: see excerpt from MH note below.     \"Pt reports that she wants to die.  PT was adamant she no longer wants to live like this.  Pt was tearful during the entirety of the assessment.  PT appeared flat, confused, and was tangential.  Pt was able to relay that her suicidal thoughts are new for her.  She states that she has been able to think about overdosing.  She states that although she wants to die, she denies any intent currently. Pt reports that she has never attempted suicide in the past.  PT denies any self harm or homicidal ideation.  Pt does present as mildly paranoid.  PT states that she is not always sure that the medications that she is given are for her or things that she needs medications for.\"    The plan was to try to find a teo-psych bed but all avenues have been exhausted and there likely won't be a bed available until Monday. She will be admitted for placement.     Currently the patient reoports feeling fine. She does not want to die and states that, \" I have grand-chilren and I want to see them forever.\"     Review of Systems    The 10 point Review of Systems is negative " other than noted in the HPI or here.     Past Medical History    I have reviewed this patient's medical history and updated it with pertinent information if needed.   Past Medical History:   Diagnosis Date     Depression 08/2020     Elevated blood sugar      HTN (hypertension) 35     Hx of colonoscopy 2008    bx collagenous colitis     Hypercholesteremia      Insomnia     on ambien 10mg 1/2 daily for long time     Lichen sclerosus et atrophicus of the vulva 02/2017    dx by Dr. Weldon, had bx     Lymphocytic colitis 2008    on colon exam at [facility]     PMR (polymyalgia rheumatica) (H) 01/31/2019     Syncope 08/2016    echo trace lvh, nl ef; ziopatch with one 5 beat run of vtach, seen by ep Dr. Allen and nothing found     Ventricular tachycardia (H) 8/16    seen on ziopatch done for syncope, just one 5 beat run       Past Surgical History   I have reviewed this patient's surgical history and updated it with pertinent information if needed.  Past Surgical History:   Procedure Laterality Date     HYSTERECTOMY, PAP NO LONGER INDICATED  1990    had bso also, not for cancer     KERATOTOMY ARCUATE WITH FEMTOSECOND LASER/IMAGING FOR ATIOL Right 7/13/2015    Procedure: KERATOTOMY ARCUATE WITH FEMTOSECOND LASER/IMAGING FOR ATIOL;  Surgeon: Lionel Reza MD;  Location: [facility]     KERATOTOMY ARCUATE WITH FEMTOSECOND LASER/IMAGING FOR ATIOL Right 7/13/2015    Procedure: KERATOTOMY ARCUATE WITH FEMTOSECOND LASER/IMAGING FOR ATIOL;  Surgeon: Lionel Reza MD;  Location: [facility]     PHACOEMULSIFICATION CLEAR CORNEA WITH STANDARD INTRAOCULAR LENS IMPLANT Right 7/13/2015    Procedure: PHACOEMULSIFICATION CLEAR CORNEA WITH STANDARD INTRAOCULAR LENS IMPLANT;  Surgeon: Lionel Reza MD;  Location:  [facility]     pilonidal cyst removed  30's       Social History   I have reviewed this patient's social history and updated it with pertinent information if needed.  Social History     Tobacco Use     Smoking status: Former Smoker      Packs/day: 1.00     Years: 42.00     Pack years: 42.00     Types: Cigarettes     Start date:      Quit date: 1997     Years since quittin.8     Smokeless tobacco: Never Used   Substance Use Topics     Alcohol use: No     Alcohol/week: 0.0 standard drinks     Drug use: No       Family History   I have reviewed this patient's family history and updated it with pertinent information if needed.  Family History   Problem Relation Age of Onset     Breast Cancer Mother        Prior to Admission Medications   Prior to Admission Medications   Prescriptions Last Dose Informant Patient Reported? Taking?   Carboxymethylcellulose Sodium (ARTIFICIAL TEARS OP) prn at prn Nursing Home Yes Yes   Sig: Place 2 drops into both eyes 3 times daily as needed (dry eye relief) 0.4-0.3%   Lidocaine (LIDOCARE) 4 % Patch Past Month at Unknown time Nursing Home Yes Yes   Sig: Place 2 patches onto the skin every 24 hours To prevent lidocaine toxicity, patient should be patch free for 12 hrs daily.  To lower back   QUEtiapine (SEROQUEL) 25 MG tablet Past Month at Unknown time intermediate No Yes   Sig: Take 1 tablet (25 mg) by mouth At Bedtime   acetaminophen (TYLENOL) 325 MG tablet Past Month at Unknown time intermediate No Yes   Sig: Take 2 tablets (650 mg) by mouth every 6 hours as needed for mild pain or fever   Patient taking differently: Take 650 mg by mouth daily as needed for mild pain or fever    amLODIPine (NORVASC) 5 MG tablet Past Month at Unknown time intermediate No Yes   Sig: Take 1 tablet (5 mg) by mouth 2 times daily   bisacodyl (DULCOLAX) 10 MG suppository prn at prn Nursing Home Yes Yes   Sig: Place 10 mg rectally daily as needed for constipation   calcium carbonate 600 mg-vitamin D 400 units (CALTRATE) 600-400 MG-UNIT per tablet Past Month at Unknown time Nursing Home Yes Yes   Sig: Take 1 tablet by mouth 2 times daily   diclofenac (VOLTAREN) 1 % topical gel prn at prn Nursing Home Yes Yes   Sig: Apply topically  2 times daily as needed for moderate pain To bilateral knees and elbows or wrists   divalproex sodium delayed-release (DEPAKOTE) 125 MG DR tablet Past Month at Unknown time Nursing Home Yes Yes   Sig: Take 125 mg by mouth 2 times daily   donepezil (ARICEPT) 5 MG tablet Past Month at Unknown time Nursing Home Yes Yes   Sig: Take 5 mg by mouth At Bedtime   hydrALAZINE (APRESOLINE) 10 MG tablet Past Month at Unknown time skilled nursing No Yes   Sig: Take 0.5 tablets (5 mg) by mouth 3 times daily   Patient taking differently: Take 5 mg by mouth 3 times daily Hold for sbp < 120   ondansetron (ZOFRAN) 4 MG tablet prn at prn Nursing Home Yes Yes   Sig: Take by mouth every 8 hours as needed for nausea   polyethylene glycol (MIRALAX) 17 g packet prn at prn Nursing Home Yes Yes   Sig: Take 17 g by mouth daily as needed for constipation    potassium chloride ER (KLOR-CON M) 20 MEQ CR tablet Past Month at Unknown time Nursing Home Yes Yes   Sig: Take 20 mEq by mouth daily   sennosides (SENOKOT) 8.6 MG tablet prn at prn Nursing Home Yes Yes   Sig: Take 2 tablets by mouth 2 times daily as needed for constipation   venlafaxine (EFFEXOR-ER) 150 MG 24 hr tablet Past Month at Unknown time Nursing Home Yes Yes   Sig: Take 150 mg by mouth daily Take with 37.5mg for total 187.5mg daily   venlafaxine (EFFEXOR-ER) 37.5 MG 24 hr tablet Past Month at Unknown time Nursing Home Yes Yes   Sig: Take 37.5 mg by mouth daily Take with 150mg for total 187.5mg daily      Facility-Administered Medications: None     Allergies   No Known Allergies    Physical Exam   Vital Signs:     BP: 128/79 Pulse: 63   Resp: 16 SpO2: 97 %      Weight: 200 lbs 0 oz    Constitutional: Awake, alert, cooperative, no apparent distress.  Eyes: Conjunctiva and pupils examined and normal.  HEENT: Moist mucous membranes, normal dentition.  Respiratory: Clear to auscultation bilaterally, no crackles or wheezing.  Cardiovascular: Regular rate and rhythm, normal S1 and S2, and no  murmur noted.  GI: Soft, non-distended, non-tender, normal bowel sounds.  Lymph/Hematologic: No anterior cervical or supraclavicular adenopathy.  Skin: No rashes, no cyanosis, no edema.  Musculoskeletal: No joint swelling, erythema or tenderness.  Neurologic: Cranial nerves 2-12 intact, normal strength and sensation.  Psychiatric: Alert, oriented to person, place, no obvious anxiety or depression. Confused. Thinks that her  is here (I think he lives in memory care in [place]?). No current SI.       Data   Data reviewed today: I reviewed all medications, new labs and imaging results over the last 24 hours.   Recent Labs   Lab 08/05/21 2041 08/05/21 2040   WBC 9.3  --    HGB 10.7*  --    MCV 94  --      --    NA  --  140   POTASSIUM  --  3.6   CHLORIDE  --  108   CO2  --  29   BUN  --  26   CR  --  1.02   ANIONGAP  --  3   MICHELLE  --  8.7   GLC  --  134*     Recent Results (from the past 24 hour(s))   XR Knee Right 3 Views    Narrative    KNEE THREE VIEWS RIGHT  8/5/2021 8:26 PM     HISTORY: Right knee pain    COMPARISON: 6/3/2021      Impression    IMPRESSION: Chronic nondisplaced vertical patellar fracture with  increased osseous bridging. Normal joint alignment is maintained.  Moderate lateral compartment joint space narrowing. Moderate knee  joint effusion. Chondrocalcinosis. Osteopenia. Atherosclerosis.     KIM CEDILLO MD         SYSTEM ID:  [id number]

## 2021-08-06 NOTE — PROGRESS NOTES
Care Management Initial Consult    General Information  Assessment completed with: ED /[facility] staff, [facility] staff  Type of CM/SW Visit: Chart Assessment    Primary Care Provider verified and updated as needed:     Readmission within the last 30 days:        Reason for Consult: discharge planning, facility placement, psychosocial concerns, transportation  Advance Care Planning:       General Information Comments: [facility] placement    Communication Assessment  Patient's communication style: spoken language (English or Bilingual)             Cognitive  Cognitive/Neuro/Behavioral:                        Living Environment:   People in home:       Current living Arrangements: assisted living  Name of Facility: [facility]   Able to return to prior arrangements:         Family/Social Support:  Care provided by:  Facility staff  Provides care for:                  Description of Support System:    SonPhilip is financial POA and per [facility] is also managing health care decisions. Philip indicates he is interested in moving patient to [place] once stable to be closer to him.       Current Resources:   Patient receiving home care services:  Assist with all ADL's, multiple safety checks     Community Resources:    Equipment currently used at home:    Supplies currently used at home:      Employment/Financial:  Employment Status:          Financial Concerns:             Lifestyle & Psychosocial Needs:  Social Determinants of Health     Tobacco Use: Medium Risk     Smoking Tobacco Use: Former Smoker     Smokeless Tobacco Use: Never Used   Alcohol Use:      Frequency of Alcohol Consumption:      Average Number of Drinks:      Frequency of Binge Drinking:    Financial Resource Strain: Low Risk      Difficulty of Paying Living Expenses: Not hard at all   Food Insecurity: No Food Insecurity     Worried About Running Out of Food in the Last Year: Never true     Ran Out of Food in the Last Year: Never true   Transportation Needs: No  Transportation Needs     Lack of Transportation (Medical): No     Lack of Transportation (Non-Medical): No   Physical Activity:      Days of Exercise per Week:      Minutes of Exercise per Session:    Stress: No Stress Concern Present     Feeling of Stress : Not at all   Social Connections: Moderately Isolated     Frequency of Communication with Friends and Family: More than three times a week     Frequency of Social Gatherings with Friends and Family: Never     Attends Sabianism Services: Never     Active Member of Clubs or Organizations: No     Attends Club or Organization Meetings: Never     Marital Status:    Intimate Partner Violence:      Fear of Current or Ex-Partner:      Emotionally Abused:      Physically Abused:      Sexually Abused:    Depression: Not at risk     PHQ-2 Score: 2   Housing Stability:      Unable to Pay for Housing in the Last Year:      Number of Places Lived in the Last Year:      Unstable Housing in the Last Year:        Functional Status:  Prior to admission patient needed assistance: All ADL's, safety checks, assist of 1 for transfers             Mental Health Status: Suicidal ideations, see chart notes          Chemical Dependency Status:                Values/Beliefs:  Spiritual, Cultural Beliefs, Sabianism Practices, Values that affect care:                 Additional Information:  SW consulted to assist with Teo-Psych placement.  Pt is an 81 yr old female who admitted on 8/5/21 with back pain and suicidal ideations. Pt lives at [facility] and has had several hospitalizations and a teo-psych stay at the [facility] in Dec 2020. [facility] RN Daria stated patient did really well after returning from Teo-psych. Patient expressing suicidal ideations and in need of another teo-psych stay.    OLAMIDE progress:    1-[facility] teo-psych facilities indicate they are at capacity and told to call back at 10pm.  2-[facility]- Left message and requested return call  3-[facility]  accept dementia/delirium diagnosis  4-[facility]- SW left [facility] for admissions, requesting return call.    Patient likely to get admitted to OBS/Hospital while awaiting teo-psych placement.    ADDENDUM: SW received call from Bisi in the [facility] Unit, indicating that family specifically wants patient to go to the [facility] only.    Saturday SW to continue placement efforts.    SW to continue to follow and assist with discharge planning.    MARTHA Worthy  Daytime (8:00am-4:30pm): [phone]  After-Hours SW Pager (4:30pm-11:30pm): [phone]           MARTHA Leavitt

## 2021-08-06 NOTE — ED NOTES
[facility] is currently at capacity and Pt can't be reviewed for at this time.  Status will again be checked 8/6

## 2021-08-06 NOTE — ED NOTES
Writer spoke with Erin Serrato SW *[phone] to follow up on bed placement for teo/psych. Erin states she is in the process of sending out referrals

## 2021-08-06 NOTE — ED NOTES
This patient is an 81-year-old female with history of depression who presents with back pain as well as suicidal ideation.  She was signed out to me pending a [facility] psychiatric bed.  I was able to look at the urine from the last shift which looked like it came back as infected.  No antibiotics have been started although culture is currently pending.  I did write for ceftriaxone here.  During my shift there have been no complications with the exception that she is continuing to refuse to take any of her oral medications.  She remains on a hold as both the initial 's, and I, and the mental health  all feel that she is at risk of self-harm where she is to be discharged.  Unfortunately we still are waiting for bed assignment.   continues to work on it.  In the meantime she will be signed out to the oncoming physician pending bed assignment.  Were no bed to become available this afternoon it may be reasonable to seek medical admission.  MD Andre Peñaloza, Brittney Gallegos MD  08/06/21 7721

## 2021-08-06 NOTE — PHARMACY-ADMISSION MEDICATION HISTORY
Pharmacy Medication History  Admission medication history interview status for the 8/5/2021  admission is complete. See EPIC admission navigator for prior to admission medications     Location of Interview: Outside patient room but on unit  Medication history sources: MAR (list from [facility])    Significant changes made to the medication list:  Changed: Potassium to 20 meq  Added: Depakote, lidocaine patch, Voltaren gel    In the past week, patient estimated taking medication this percent of the time: less than 50% due to other    Additional medication history information:   Assisted Living states has refused meds since 7/28    Medication reconciliation completed by provider prior to medication history? No    Time spent in this activity: 20 min    Prior to Admission medications    Medication Sig Last Dose Taking? Auth Provider   acetaminophen (TYLENOL) 325 MG tablet Take 2 tablets (650 mg) by mouth every 6 hours as needed for mild pain or fever  Patient taking differently: Take 650 mg by mouth daily as needed for mild pain or fever  Past Month at Unknown time Yes Jaime Vinson MD   amLODIPine (NORVASC) 5 MG tablet Take 1 tablet (5 mg) by mouth 2 times daily Past Month at Unknown time Yes Jaime Vinson MD   bisacodyl (DULCOLAX) 10 MG suppository Place 10 mg rectally daily as needed for constipation prn at prn Yes Unknown, Entered By History   calcium carbonate 600 mg-vitamin D 400 units (CALTRATE) 600-400 MG-UNIT per tablet Take 1 tablet by mouth 2 times daily Past Month at Unknown time Yes Unknown, Entered By History   Carboxymethylcellulose Sodium (ARTIFICIAL TEARS OP) Place 2 drops into both eyes 3 times daily as needed (dry eye relief) 0.4-0.3% prn at prn Yes Unknown, Entered By History   diclofenac (VOLTAREN) 1 % topical gel Apply topically 2 times daily as needed for moderate pain To bilateral knees and elbows or wrists prn at prn Yes Unknown, Entered By History   divalproex sodium  delayed-release (DEPAKOTE) 125 MG DR tablet Take 125 mg by mouth 2 times daily Past Month at Unknown time Yes Unknown, Entered By History   donepezil (ARICEPT) 5 MG tablet Take 5 mg by mouth At Bedtime Past Month at Unknown time Yes Unknown, Entered By History   hydrALAZINE (APRESOLINE) 10 MG tablet Take 0.5 tablets (5 mg) by mouth 3 times daily  Patient taking differently: Take 5 mg by mouth 3 times daily Hold for sbp < 120 Past Month at Unknown time Yes Charbel Stephen MD   Lidocaine (LIDOCARE) 4 % Patch Place 2 patches onto the skin every 24 hours To prevent lidocaine toxicity, patient should be patch free for 12 hrs daily.  To lower back Past Month at Unknown time Yes Unknown, Entered By History   ondansetron (ZOFRAN) 4 MG tablet Take by mouth every 8 hours as needed for nausea prn at prn Yes Unknown, Entered By History   polyethylene glycol (MIRALAX) 17 g packet Take 17 g by mouth daily as needed for constipation  prn at prn Yes Unknown, Entered By History   potassium chloride ER (KLOR-CON M) 20 MEQ CR tablet Take 20 mEq by mouth daily Past Month at Unknown time Yes Unknown, Entered By History   QUEtiapine (SEROQUEL) 25 MG tablet Take 1 tablet (25 mg) by mouth At Bedtime Past Month at Unknown time Yes Alexis Cash MD   sennosides (SENOKOT) 8.6 MG tablet Take 2 tablets by mouth 2 times daily as needed for constipation prn at prn Yes Unknown, Entered By History   venlafaxine (EFFEXOR-ER) 150 MG 24 hr tablet Take 150 mg by mouth daily Take with 37.5mg for total 187.5mg daily Past Month at Unknown time Yes Unknown, Entered By History   venlafaxine (EFFEXOR-ER) 37.5 MG 24 hr tablet Take 37.5 mg by mouth daily Take with 150mg for total 187.5mg daily Past Month at Unknown time Yes Unknown, Entered By History       The information provided in this note is only as accurate as the sources available at the time of update(s)

## 2021-08-06 NOTE — ED NOTES
"Writer was in patient's room to given morning medications to patient but patient refusing to take the medication. Stated\"go away, leave me alone\"  Patient has been resting with eyes closed most of the morning. VSS.   "

## 2021-08-07 PROCEDURE — 250N000013 HC RX MED GY IP 250 OP 250 PS 637: Performed by: EMERGENCY MEDICINE

## 2021-08-07 PROCEDURE — 99207 PR CDG-CODE CATEGORY CHANGED: CPT | Performed by: INTERNAL MEDICINE

## 2021-08-07 PROCEDURE — 250N000013 HC RX MED GY IP 250 OP 250 PS 637: Performed by: STUDENT IN AN ORGANIZED HEALTH CARE EDUCATION/TRAINING PROGRAM

## 2021-08-07 PROCEDURE — 250N000011 HC RX IP 250 OP 636: Performed by: STUDENT IN AN ORGANIZED HEALTH CARE EDUCATION/TRAINING PROGRAM

## 2021-08-07 PROCEDURE — G0378 HOSPITAL OBSERVATION PER HR: HCPCS

## 2021-08-07 PROCEDURE — 99225 PR SUBSEQUENT OBSERVATION CARE,LEVEL II: CPT | Performed by: INTERNAL MEDICINE

## 2021-08-07 RX ADMIN — HYDRALAZINE HYDROCHLORIDE 5 MG: 10 TABLET ORAL at 14:28

## 2021-08-07 RX ADMIN — DIVALPROEX SODIUM 125 MG: 125 TABLET, DELAYED RELEASE ORAL at 20:29

## 2021-08-07 RX ADMIN — DICLOFENAC SODIUM 2 G: 10 GEL TOPICAL at 09:05

## 2021-08-07 RX ADMIN — DONEPEZIL HYDROCHLORIDE 5 MG: 5 TABLET, FILM COATED ORAL at 20:28

## 2021-08-07 RX ADMIN — DIVALPROEX SODIUM 125 MG: 125 TABLET, DELAYED RELEASE ORAL at 09:03

## 2021-08-07 RX ADMIN — DIVALPROEX SODIUM 125 MG: 125 TABLET, DELAYED RELEASE ORAL at 09:05

## 2021-08-07 RX ADMIN — HYDRALAZINE HYDROCHLORIDE 5 MG: 10 TABLET ORAL at 20:28

## 2021-08-07 RX ADMIN — AMLODIPINE BESYLATE 5 MG: 5 TABLET ORAL at 20:29

## 2021-08-07 RX ADMIN — AMLODIPINE BESYLATE 5 MG: 5 TABLET ORAL at 09:05

## 2021-08-07 RX ADMIN — CEFTRIAXONE SODIUM 2 G: 2 INJECTION, POWDER, FOR SOLUTION INTRAMUSCULAR; INTRAVENOUS at 11:09

## 2021-08-07 RX ADMIN — Medication 1 MG: at 21:45

## 2021-08-07 RX ADMIN — DICLOFENAC SODIUM 2 G: 10 GEL TOPICAL at 21:13

## 2021-08-07 RX ADMIN — VENLAFAXINE HYDROCHLORIDE 187.5 MG: 150 CAPSULE, EXTENDED RELEASE ORAL at 09:04

## 2021-08-07 RX ADMIN — QUETIAPINE FUMARATE 25 MG: 25 TABLET ORAL at 20:28

## 2021-08-07 RX ADMIN — DICLOFENAC SODIUM 2 G: 10 GEL TOPICAL at 18:43

## 2021-08-07 RX ADMIN — HYDRALAZINE HYDROCHLORIDE 5 MG: 10 TABLET ORAL at 09:04

## 2021-08-07 RX ADMIN — DICLOFENAC SODIUM 2 G: 10 GEL TOPICAL at 12:20

## 2021-08-07 ASSESSMENT — ACTIVITIES OF DAILY LIVING (ADL)
ADLS_ACUITY_SCORE: 14
ADLS_ACUITY_SCORE: 13

## 2021-08-07 NOTE — PROGRESS NOTES
RECEIVING UNIT ED HANDOFF REVIEW    ED Nurse Handoff Report was reviewed by: Kyung Estes RN on August 6, 2021 at 9:42 PM

## 2021-08-07 NOTE — ED NOTES
Report received from SALLIE RN.    Pt is waiting for an admission bed. Currently laying in cart waiting and watching TV, makes needs known as able. Asking when she will be admitted upstairs. Pt informed that nurse will find out about bed status. Pleasant, rediractable and denies any needs at this time. Will continue to monitor and notify MD with acute changes.  BP (!) 177/93   Pulse 75   Temp 98.1  F (36.7  C) (Oral)   Resp 16   Wt 90.7 kg (200 lb)   SpO2 99%   BMI 34.31 kg/m      Magui Oreilly RN,.......................................... 8/6/2021   8:18 PM

## 2021-08-07 NOTE — PLAN OF CARE
perry:     DATE & TIME: 8/7/21 1500  Cognitive Concerns/ Orientation : Alert, confused to situation. Calm and cooperative with cares.  BEHAVIOR & AGGRESSION TOOL COLOR: Green  CIWA SCORE: N/A   ABNL VS/O2: N/A  MOBILITY: AX 2 GB/W, was up to commode x1, refused to sit in chair today.  PAIN MANAGMENT: Complains of pain in hands and wrists, Voltaran gel has helped today.  DIET: Regular  BOWEL/BLADDER:Purewick in place, output 500cc today, urine is slightly cloudy, light krista.  ABNL LAB/BG: UA positive,culture pending  DRAIN/DEVICES: PIV   TELEMETRY RHYTHM: N/A  SKIN: No skin issues noted.  TESTS/PROCEDURES: None  D/C DAY/GOALS/PLACE: Awaiting geripsych placement, per note,family wants [facility] only.  OTHER IMPORTANT INFO: Pt has been much more cooperative and pleasant today and more talkative.

## 2021-08-07 NOTE — PLAN OF CARE
Kyung Estes, RN   Registered Nurse   Nursing   Plan of Care   Signed   Date of Service:  8/6/2021 10:50 PM   Creation Time:  8/6/2021 10:50 PM                 []Hide copied text    []Nathalie for details  Admission     Patient arrives to room  via cart from ED  .  Care plan note:         Inpatient nursing criteria listed below were met:     Did you put disposition on whiteboard and in sticky note: Yes  PCD's Documented: Yes  Skin issues/needs documented :No  Isolation education started/completed NA  Patient allergies verified with patient: NA  Verified completion of [place] Risk Assessment Tool:  Yes  Verified completion of Guardianship screening tool: Yes  Fall Prevention: Care plan updated, Education given and documented Yes  Care Plan initiated: Yes  Home medications documented in belongings flowsheet: Yes  Patient belongings documented in belongings flowsheet: NA  Reminder note (belongings/ medications) placed in discharge instructions:NA  Admission profile/ required documentation complete: Yes  Bedside Report Letter given and explained to patient Yes  Visitor Designated? NA  If patient is a 72 hour hold/Commitment are belongings removed from room and locked up? No

## 2021-08-07 NOTE — PLAN OF CARE
"Summary:     DATE & TIME: 8/7/  Cognitive Concerns/ Orientation : Alert to self, unable to determine other criteria as pt refuse to answer questions  BEHAVIOR & AGGRESSION TOOL COLOR: Green  CIWA SCORE: N/A   ABNL VS/O2: N/A  MOBILITY: AX 2 GB/W  PAIN MANAGMENT: Denies pain  DIET: Regular  BOWEL/BLADDER:Incontinent B&B, Pure wick in place, small amount of urine from pure wick, pt refused for brief to be checked, say\"am fine!\"  ABNL LAB/BG: UA positive,culture pending  DRAIN/DEVICES: PIV s/l  TELEMETRY RHYTHM: N/A  SKIN: Pt refused skin assessments  TESTS/PROCEDURES: None  D/C DAY/GOALS/PLACE: Awaiting geripsych placement, per note,family wants [facility] only.  OTHER IMPORTANT INFO: Pt have been refusing meds, assessments, treatment and cares since being here.      "

## 2021-08-07 NOTE — PROGRESS NOTES
"[facility]    Hospitalist Progress Note    Interval History   - Admitted yesterday for depression, unable to find geripsych placement. Today, patient with copious speech, she alternated from one topic to another, from experiences with past doctors, to her  being \"snatched\" away to [place] by his family without her consent. She attributes her depression to her chronic pain but also denies being depressed. Patient was irritable and became quite upset when she was confusing her timeline of when her  disappeared. Overall my impression was that patient did not have good insight into her depression.  - Psychiatry consulted yesterday, they have not seen her today, I will consult them again and hopefully they will see her later today and tomorrow as well    Assessment & Plan   Summary: Elizabeth Joy is a 81 year old female with PMH dementia, depression, HTN, admitted on 8/6/2021 with chronic pain and suicidal ideation.     Suicidal ideation without attempt   Major Depressive disorder   Advanced dementia   Pt resides in a memory care unit and has been reportedly deteriorating recently with worsening depressive symptoms, refusing medications and making suicidal statement. She had a mental health assessment in the ED who recommended inpatient stabilization in a teo-psych facility. Attempts were made to find a facility in the ED, but beds will likely not be available until Monday 8/9; therefore the patient will be admitted until placement can be obtained.   - Continue prior to admission Effexor, Seroquel, Depakote and Donepazil   - Psychiatry consult -- still pending  - CC/SW consult for placement     Possible UTI  UA notable for moderate leukocyte esterase and WBC count of 89.  - She was given ceftriaxone in the ED, will continue for now and follow-up urine cultures --still pending    Chronic back and knee pain   Pt has a hx of falls and chronic pain. She has a chronic compression " fracture of the L1 superior endplate as well as chronic patellar fracture of the right knee.    - Ice/heat PRN   - Acetaminophen PRN    - Lidocaine patch PRN     Hypertension  - Continue prior to admission amlodipine     DVT Prophylaxis: Pneumatic Compression Devices  Code Status: No CPR- Do NOT Intubate  PT/OT: not needed  Diet: Regular Diet Adult      Disposition: Expected discharge when geripsych placement found    - I spent 25 minutes, with greater than 50% spent in counseling and coordination of care with the patient.    Howie Andrade MD  Text Page  (7am to 6pm)  -Data reviewed today: I reviewed all new labs and imaging results over the last 24 hours.    Physical Exam   Temp: 98  F (36.7  C) Temp src: Axillary BP: 131/81 Pulse: 69   Resp: 16 SpO2: 100 % O2 Device: None (Room air)    Vitals:    08/05/21 1352   Weight: 90.7 kg (200 lb)     Vital Signs with Ranges  Temp:  [98  F (36.7  C)-98.1  F (36.7  C)] 98  F (36.7  C)  Pulse:  [69-79] 69  Resp:  [16] 16  BP: (131-177)/(81-93) 131/81  SpO2:  [96 %-100 %] 100 %  I/O last 3 completed shifts:  In: 120 [P.O.:120]  Out: 100 [Urine:100]  O2 requirements: none    Constitutional: Elderly female in NAD  HEENT: Eyes nonicteric, oral mucosa moist  Cardiovascular: RRR, normal S1/2, no m/r/g  Respiratory: CTAB, no wheezing or crackles  Vascular: No LE pitting edema  GI: Normoactive bowel sounds, nontender, nondistended  Skin/Integumen: No rashes  Neuro/Psych: Appropriate affect and mood. <oves all extremities    Medications       amLODIPine  5 mg Oral BID     cefTRIAXone  2 g Intravenous Q24H     diclofenac  2 g Topical 4x Daily     divalproex sodium delayed-release  125 mg Oral BID     donepezil  5 mg Oral At Bedtime     hydrALAZINE  5 mg Oral TID     lidocaine   Transdermal Q8H     QUEtiapine  25 mg Oral At Bedtime     sodium chloride (PF)  3 mL Intracatheter Q8H     venlafaxine  187.5 mg Oral Daily       Data   Recent Labs   Lab 08/05/21 2041 08/05/21 2040    WBC 9.3  --    HGB 10.7*  --    MCV 94  --      --    NA  --  140   POTASSIUM  --  3.6   CHLORIDE  --  108   CO2  --  29   BUN  --  26   CR  --  1.02   ANIONGAP  --  3   MICHELLE  --  8.7   GLC  --  134*       Imaging:   No results found for this or any previous visit (from the past 24 hour(s)).

## 2021-08-07 NOTE — UTILIZATION REVIEW
"  Admission Status; Secondary Review Determination         Under the authority of the Utilization Management Committee, the utilization review process indicated a secondary review on the above patient.  The review outcome is based on review of the medical records, discussions with staff, and applying clinical experience noted on the date of the review.        (x) Observation Status Appropriate - This patient does not meet hospital inpatient criteria and is placed in observation status. If this patient's primary payer is Medicare and was admitted as an inpatient, Condition Code 44 should be used and patient status changed to \"observation\".     RATIONALE FOR DETERMINATION   The patient is an 81-year-old female who was seen in the [facility] through [facility] ED due to suicidal thoughts and comments.  She is in memory care currently.  She has been depressed.  Current plan is to transfer her to [facility] when there is a bed opening at [facility].  She otherwise medically is stable.  She was noted to have possible UTI and was given a dose of ceftriaxone in the emergency department.  Based on current diagnosis and plan, recommend switching her from inpatient to observation status.  Discussed with Dr. Andrade who will make that change.      The severity of illness, intensity of service provided, expected LOS and risk for adverse outcome make the care complex, high risk and appropriate for hospital admission.        The information on this document is developed by the utilization review team in order for the business office to ensure compliance.  This only denotes the appropriateness of proper admission status and does not reflect the quality of care rendered.         The definitions of Inpatient Status and Observation Status used in making the determination above are those provided in the CMS Coverage Manual, Chapter 1 and Chapter 6, section 70.4.      Sincerely,     Jn Gavin MD  Physician Advisor  Utilization Review/ " Case Management  [facility].

## 2021-08-08 LAB
BACTERIA UR CULT: ABNORMAL
BACTERIA UR CULT: ABNORMAL

## 2021-08-08 PROCEDURE — 250N000013 HC RX MED GY IP 250 OP 250 PS 637: Performed by: EMERGENCY MEDICINE

## 2021-08-08 PROCEDURE — 250N000011 HC RX IP 250 OP 636: Performed by: STUDENT IN AN ORGANIZED HEALTH CARE EDUCATION/TRAINING PROGRAM

## 2021-08-08 PROCEDURE — 250N000013 HC RX MED GY IP 250 OP 250 PS 637: Performed by: STUDENT IN AN ORGANIZED HEALTH CARE EDUCATION/TRAINING PROGRAM

## 2021-08-08 PROCEDURE — 99225 PR SUBSEQUENT OBSERVATION CARE,LEVEL II: CPT | Performed by: INTERNAL MEDICINE

## 2021-08-08 PROCEDURE — 96376 TX/PRO/DX INJ SAME DRUG ADON: CPT

## 2021-08-08 PROCEDURE — G0378 HOSPITAL OBSERVATION PER HR: HCPCS

## 2021-08-08 RX ADMIN — AMLODIPINE BESYLATE 5 MG: 5 TABLET ORAL at 20:47

## 2021-08-08 RX ADMIN — DONEPEZIL HYDROCHLORIDE 5 MG: 5 TABLET, FILM COATED ORAL at 21:16

## 2021-08-08 RX ADMIN — DIVALPROEX SODIUM 125 MG: 125 TABLET, DELAYED RELEASE ORAL at 20:47

## 2021-08-08 RX ADMIN — CEFTRIAXONE SODIUM 2 G: 2 INJECTION, POWDER, FOR SOLUTION INTRAMUSCULAR; INTRAVENOUS at 11:07

## 2021-08-08 RX ADMIN — HYDRALAZINE HYDROCHLORIDE 5 MG: 10 TABLET ORAL at 20:47

## 2021-08-08 RX ADMIN — DICLOFENAC SODIUM 2 G: 10 GEL TOPICAL at 12:37

## 2021-08-08 RX ADMIN — DICLOFENAC SODIUM 2 G: 10 GEL TOPICAL at 09:03

## 2021-08-08 RX ADMIN — AMLODIPINE BESYLATE 5 MG: 5 TABLET ORAL at 09:02

## 2021-08-08 RX ADMIN — HYDRALAZINE HYDROCHLORIDE 5 MG: 10 TABLET ORAL at 14:06

## 2021-08-08 RX ADMIN — HYDRALAZINE HYDROCHLORIDE 5 MG: 10 TABLET ORAL at 09:02

## 2021-08-08 RX ADMIN — VENLAFAXINE HYDROCHLORIDE 187.5 MG: 150 CAPSULE, EXTENDED RELEASE ORAL at 09:02

## 2021-08-08 RX ADMIN — QUETIAPINE FUMARATE 25 MG: 25 TABLET ORAL at 21:16

## 2021-08-08 RX ADMIN — DICLOFENAC SODIUM 2 G: 10 GEL TOPICAL at 17:15

## 2021-08-08 RX ADMIN — DIVALPROEX SODIUM 125 MG: 125 TABLET, DELAYED RELEASE ORAL at 09:02

## 2021-08-08 NOTE — PROGRESS NOTES
CM Note:    Notified by UR that patient is Observation status.  Called son Philip at 11:10 this a.m. and left voicemail to return call so could provide information regarding ESTES.  Called again at 1:48 p.m. and unable to reach him.    Addendum @ 1500:  Received a call back from son Philip.  Explained observation status to him.    Venessa Márquez RN, BSN, PHN  Inpatient Care Coordination

## 2021-08-08 NOTE — PROGRESS NOTES
[facility]    Hospitalist Progress Note    Interval History   - No acute issues today, patient endorses positive mood  - Psychiatry/mental health yet to see inpatient, consult already placed    Assessment & Plan   Summary: Elizabeth Joy is a 81 year old female with PMH dementia, depression, HTN, admitted on 8/6/2021 with chronic pain and suicidal ideation.     Suicidal ideation without attempt   Major Depressive disorder   Advanced dementia   Pt resides in a memory care unit and has been reportedly deteriorating recently with worsening depressive symptoms, refusing medications and making suicidal statement. She had a mental health assessment in the ED who recommended inpatient stabilization in a teo-psych facility. Attempts were made to find a facility in the ED, but beds will likely not be available until Monday 8/9; therefore the patient will be admitted until placement can be obtained.   - Continue prior to admission Effexor, Seroquel, Depakote and Donepazil   - Psychiatry/mental health consult -- still pending  - CC/SW consult for placement     Aerococcus UTI  UA notable for moderate leukocyte esterase and WBC count of 89. Unclear if true UTI or colonization.  - Switch Ceftriaxone to Keflex today    Chronic back and knee pain   Pt has a hx of falls and chronic pain. She has a chronic compression fracture of the L1 superior endplate as well as chronic patellar fracture of the right knee.    - Ice/heat PRN   - Acetaminophen PRN    - Lidocaine patch PRN     Hypertension  - Continue prior to admission amlodipine     DVT Prophylaxis: Pneumatic Compression Devices  Code Status: No CPR- Do NOT Intubate  PT/OT: not needed  Diet: Regular Diet Adult      Disposition: Expected discharge when geripsych placement found    - I spent 25 minutes, with greater than 50% spent in counseling and coordination of care with the patient    Howie Andrade MD  Text Page  (7am to 6pm)  -Data reviewed  today: I reviewed all new labs and imaging results over the last 24 hours.    Physical Exam   Temp: 98.4  F (36.9  C) Temp src: Oral BP: 130/80 Pulse: 70   Resp: 18 SpO2: 95 % O2 Device: None (Room air)    Vitals:    08/05/21 1352   Weight: 90.7 kg (200 lb)     Vital Signs with Ranges  Temp:  [97.2  F (36.2  C)-98.4  F (36.9  C)] 98.4  F (36.9  C)  Pulse:  [66-79] 70  Resp:  [16-18] 18  BP: (114-130)/(62-80) 130/80  SpO2:  [93 %-98 %] 95 %  I/O last 3 completed shifts:  In: 120 [P.O.:120]  Out: 500 [Urine:500]  O2 requirements: none    Constitutional: Elderly female in NAD  HEENT: Eyes nonicteric, oral mucosa moist  Cardiovascular: RRR, normal S1/2, no m/r/g  Respiratory: CTAB, no wheezing or crackles  Vascular: No LE pitting edema  GI: Normoactive bowel sounds, nontender, nondistended  Skin/Integumen: No rashes  Neuro/Psych: Appropriate affect and mood. Moves all extremities    Medications       amLODIPine  5 mg Oral BID     [START ON 8/9/2021] cephALEXin  250 mg Oral Q6H TRINH     diclofenac  2 g Topical 4x Daily     divalproex sodium delayed-release  125 mg Oral BID     donepezil  5 mg Oral At Bedtime     hydrALAZINE  5 mg Oral TID     lidocaine   Transdermal Q8H     QUEtiapine  25 mg Oral At Bedtime     sodium chloride (PF)  3 mL Intracatheter Q8H     venlafaxine  187.5 mg Oral Daily       Data   Recent Labs   Lab 08/05/21 2041 08/05/21 2040   WBC 9.3  --    HGB 10.7*  --    MCV 94  --      --    NA  --  140   POTASSIUM  --  3.6   CHLORIDE  --  108   CO2  --  29   BUN  --  26   CR  --  1.02   ANIONGAP  --  3   MICHELLE  --  8.7   GLC  --  134*       Imaging:   No results found for this or any previous visit (from the past 24 hour(s)).

## 2021-08-08 NOTE — CONSULTS
"[facility], Consult & Liaison    Elizabeth Joy  August 8, 2021    Session start: 3:30 pm  Session end: 3:45 pm  Session duration in minutes: 15  CPT CODE: 04206  Patient was seen in-person.    Reason for consult: Psychiatry consult was requested due to depression and suicidal statements, awaiting placement on obs status on station 66. Patient was seen by [facility] Consult & Liaison team.     Identifying information: Elizabeth is an 81 year old female. [facility]  Pt reports that she  lives at \"both home and the [facility]\", stating she has a home in [facility] [place]. Per nurse at facility, patient has been in their facility for over a year.  They report that she was previously in locked memory care due to her dementia, but since she is now less ambulatory due to her knee pain she has reduced services to [facility].  However, she still gets 24 hr care including med mgmt, dressing, ADL's etc.     Presenting problem (including symptoms, strengths risks, resources used):Per records, patient initially came in due to knee pain and that she has been refusing all medications both psychiatric and medical medications since July 28th.  The facility report that this includes her blood pressure medications, Depakote, Aricept, Seroquel, Effexor, pain mgmt meds, vitamins, and OTC medications.  She is not under commitment or Garcia at this time. They report that she has been stating that \"she wants to die\".  Nurse manager states that these are not new statements for the patient.      Patient reports no symptoms today, stating she hasn't been depressed for a while and is overall happy. In individual therapeutic contact with patient today, patient presents with broad affect, euphoric mood. Safety concerns are not present today. Patient is able to recall saying that she was suicidal while in the emergency department. She reports \"is that one statement I made going to follow me the entire time I am here, will I " "have to pay for it the rest of my life?\". Patient also states \"I didn't actually mean it, I could never do that to my children, grandchildren and great grandchildren. I was in a lot of pain and I wasn't sure if the pain would ever stop.\" Patient reports pain has improved. Patient also states she is taking her medications daily now, stating she doesn't understand that someone said she was not taking them, she reports she does not recall this, indicates the medications are helpful, stating she takes 8 medications every six hours with no concerns. Patient reports she would love to go back to [facility] again if she was depressed or suicidal, reporting that it \"saved her life\", but states that she doesn't feel it is needed right now. Patient requests to go back to [facility].     Mental Status Exam   Affect: Appropriate  Appearance: Appropriate   Attention Span/Concentration: Attentive    Eye Contact: Engaged  Fund of Knowledge: Appropriate   Language /Speech Content: Fluent  Language /Speech Volume: Normal   Language /Speech Rate/Productions: Normal   Recent Memory: Variable  Remote Memory: Variable  Mood: Euphoric   Orientation:   Person: Yes   Place: Yes  Time of Day: Yes   Date: Yes   Situation (Do they understand why they are here?): Answer: Unclear, it appears patient knows why she originally came to hospital, but does not know she is now recommended to transfering to psych unit   Psychomotor Behavior: Normal   Thought Content: Clear  Thought Form: Intact      Relevant history: Pt has been previously hospitalized psychiatrically earlier this year at [facility] in [place] and reports being more stable and less sad after hospitalization. Patient reports this is the first time she was diagnosed with depression (unclear on the accuracy of this). Patient has historical diagnosis of dementia as well.     Therapeutic intervention and progress:  Therapeutic intervention consisted of building therapeutic rapport, active " listening and validation. Patient is making progress towards treatment goals as evidenced by improved mood and decrease in suicidal thoughts..     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 Patient is able to recall statements she made while in ED and reports she said them because she was in pain, not that she actually wanted to die or had plan to. Patient states she has been taking medications and pain as improved. Patient reports her reason to live is her family. Patient requests to go home. At this time, patient is not holdable and could likely transfer back to memory care unit if they are willing to accept her. It is my understanding that her son is her financial POA, not medical, however, may be beneficial to contact him prior to discharge to ensure he is in agreement with plan. Per records, son was also possibly looking into placement in [place], unclear the status of this.     Continue care coordination with  on unit for possible discharge.     Please re-consult psychiatry if patient does express suicidal thoughts again or reports increased depression.     Juliet Gutierrez, [facility], [facility], Consult & Liaison Service  [phone]

## 2021-08-08 NOTE — PROGRESS NOTES
Care Management Follow Up    Length of Stay (days): 1    Expected Discharge Date:  Patient is ready for discharge once a geriatric psychiatry bed is found     Concerns to be Addressed:  OLAMIDE called [facility] intake line ([phone]) and spoke with Erin. She states they are at capacity for today. We can check back after 4pm, she states.    Patient plan of care discussed at interdisciplinary rounds: Yes  Anticipated Discharge Disposition:  Geriatric psychiatry unit     Anticipated Discharge Services:    Anticipated Discharge DME:      Patient/family educated on Medicare website which has current facility and service quality ratings:    Education Provided on the Discharge Plan:    Patient/Family in Agreement with the Plan:  Yes, but they prefer the [facility]    Referrals Placed by CM/OLAMIDE:   Private pay costs discussed:     Additional Information: OLAMIDE is following        JOHNNA Roberto

## 2021-08-08 NOTE — PLAN OF CARE
Summary: Admitted for SI and depression  DATE & TIME: 8/7/21 1900-0730  Cognitive Concerns/ Orientation : A&Ox3, calm and cooperative.  BEHAVIOR & AGGRESSION TOOL COLOR: Green  CIWA SCORE: N/A   ABNL VS/O2: N/A  MOBILITY: AX 2 GB/W  PAIN MANAGMENT: Complains of pain in hands and wrists, Voltaran gel effective for pain.  DIET: Regular  BOWEL/BLADDER: Incontinent of bladder at times.  Continent of bowel.  ABNL LAB/BG: UC pending  DRAIN/DEVICES: RPIV SL   TELEMETRY RHYTHM: N/A  SKIN: No skin issues noted.  TESTS/PROCEDURES: None  D/C DAY/GOALS/PLACE: Awaiting geripsych placement.  Per note,family wants [facility] only.

## 2021-08-08 NOTE — PLAN OF CARE
Summary: Admitted for SI and depression  DATE & TIME: 8/8/21 1500  Cognitive Concerns/ Orientation : A&Ox3, calm and cooperative. This AM was irritable, as day went on her mood improved.  BEHAVIOR & AGGRESSION TOOL COLOR: Green  CIWA SCORE: N/A   ABNL VS/O2: VSS, on RA.  MOBILITY: AX 1 GB/W, has been getting up and using BR today.  PAIN MANAGMENT: Complains of pain in hands and wrists, Voltaran gel effective for pain.  DIET: Regular  BOWEL/BLADDER: Incontinent of bowel & bladder at times. Three stools today.  ABNL LAB/BG: UC positive for aerococcus species.  DRAIN/DEVICES: NA   TELEMETRY RHYTHM: N/A  SKIN: No skin issues noted.  TESTS/PROCEDURES: None  D/C DAY/GOALS/PLACE: Awaiting geripsych placement.  Per note,family wants [facility] only. Family now talking about moving her to a care facility in [place] so she can be closer to family members. They are talking about flying her out there, this was per her son Philip AUGUSTE

## 2021-08-09 DIAGNOSIS — Z53.9 DIAGNOSIS NOT YET DEFINED: Primary | ICD-10-CM

## 2021-08-09 PROCEDURE — 99225 PR SUBSEQUENT OBSERVATION CARE,LEVEL II: CPT | Performed by: INTERNAL MEDICINE

## 2021-08-09 PROCEDURE — 99207 PR MD CERTIFICATION HHA PATIENT: CPT | Performed by: INTERNAL MEDICINE

## 2021-08-09 PROCEDURE — 250N000013 HC RX MED GY IP 250 OP 250 PS 637: Performed by: EMERGENCY MEDICINE

## 2021-08-09 PROCEDURE — 250N000013 HC RX MED GY IP 250 OP 250 PS 637: Performed by: STUDENT IN AN ORGANIZED HEALTH CARE EDUCATION/TRAINING PROGRAM

## 2021-08-09 PROCEDURE — 250N000011 HC RX IP 250 OP 636: Performed by: STUDENT IN AN ORGANIZED HEALTH CARE EDUCATION/TRAINING PROGRAM

## 2021-08-09 PROCEDURE — G0378 HOSPITAL OBSERVATION PER HR: HCPCS

## 2021-08-09 PROCEDURE — 250N000013 HC RX MED GY IP 250 OP 250 PS 637: Performed by: INTERNAL MEDICINE

## 2021-08-09 RX ADMIN — Medication 1 MG: at 23:51

## 2021-08-09 RX ADMIN — HYDRALAZINE HYDROCHLORIDE 5 MG: 10 TABLET ORAL at 20:27

## 2021-08-09 RX ADMIN — DIVALPROEX SODIUM 125 MG: 125 TABLET, DELAYED RELEASE ORAL at 20:26

## 2021-08-09 RX ADMIN — ONDANSETRON 4 MG: 4 TABLET, ORALLY DISINTEGRATING ORAL at 17:32

## 2021-08-09 RX ADMIN — DONEPEZIL HYDROCHLORIDE 5 MG: 5 TABLET, FILM COATED ORAL at 22:43

## 2021-08-09 RX ADMIN — DICLOFENAC SODIUM 2 G: 10 GEL TOPICAL at 22:45

## 2021-08-09 RX ADMIN — CEPHALEXIN 250 MG: 250 CAPSULE ORAL at 18:29

## 2021-08-09 RX ADMIN — DICLOFENAC SODIUM 2 G: 10 GEL TOPICAL at 14:25

## 2021-08-09 RX ADMIN — AMLODIPINE BESYLATE 5 MG: 5 TABLET ORAL at 20:27

## 2021-08-09 RX ADMIN — ACETAMINOPHEN 650 MG: 325 TABLET, FILM COATED ORAL at 14:24

## 2021-08-09 RX ADMIN — QUETIAPINE FUMARATE 25 MG: 25 TABLET ORAL at 22:43

## 2021-08-09 RX ADMIN — VENLAFAXINE HYDROCHLORIDE 187.5 MG: 150 CAPSULE, EXTENDED RELEASE ORAL at 07:35

## 2021-08-09 RX ADMIN — DICLOFENAC SODIUM 2 G: 10 GEL TOPICAL at 18:36

## 2021-08-09 RX ADMIN — CEPHALEXIN 250 MG: 250 CAPSULE ORAL at 12:35

## 2021-08-09 RX ADMIN — DIVALPROEX SODIUM 125 MG: 125 TABLET, DELAYED RELEASE ORAL at 07:37

## 2021-08-09 RX ADMIN — HYDRALAZINE HYDROCHLORIDE 5 MG: 10 TABLET ORAL at 07:35

## 2021-08-09 RX ADMIN — CEPHALEXIN 250 MG: 250 CAPSULE ORAL at 23:51

## 2021-08-09 RX ADMIN — DICLOFENAC SODIUM 2 G: 10 GEL TOPICAL at 07:35

## 2021-08-09 RX ADMIN — AMLODIPINE BESYLATE 5 MG: 5 TABLET ORAL at 07:35

## 2021-08-09 NOTE — PLAN OF CARE
Summary: Admitted for SI and depression  DATE & TIME: 8/9/21 [id number]  Cognitive Concerns/ Orientation : A&Ox4, flat affect, calm and cooperative. Lethargic this AM  BEHAVIOR & AGGRESSION TOOL COLOR: Green  CIWA SCORE: N/A   ABNL VS/O2: VSS, on RA.  MOBILITY: Assist of 1 GB/Walker  PAIN MANAGMENT: denies, voltaren gel to back/knees  DIET: Regular  BOWEL/BLADDER: Continent   ABNL LAB/BG: UC positive for aerococcus species.  DRAIN/DEVICES: NA   TELEMETRY RHYTHM: N/A  SKIN: No skin issues noted.  TESTS/PROCEDURES: None  D/C DAY/GOALS/PLACE: Awaiting geripsych placement.  Per note,family wants [facility] only. Family now talking about moving her to a care facility in [place] so she can be closer to family members. They are talking about flying her out there, this was per her son Philip ABDIJuanjose

## 2021-08-09 NOTE — PROGRESS NOTES
[facility]    Hospitalist Progress Note    Interval History   - No acute issues today, patient endorses positive mood  - Called Erich, son in [place], [phone], and spoke with his wife as well. Patient's  moved to [facility] in [place] after multiple family discussions so that he could be with his family, this was a very traumatic change for the patient.   Since then, patient has been doing worse at her CAREY, the [facility]--depression has been worse, not wanting to get out of the room. The [facility] is having trouble with her behavior/mood and have recommended nursing home, which family thinks is not necessary, they think this is purely psychological.   Erich's hope is that patient would move to [place] to [facility], where patient's depression will stabilize and rebound. Patient wants to move to [place] as well. Erich has contacted [facility] in [place], [place], but is full currently. They are also looking at an CAREY called [facility] in [place] as well. The timing of patient moving to [place] is unclear but they are hoping in the next 30 days.  - Psychiatry to see patient today to help determine whether patient can discharge back to [facility] versus still needing to go to [facility]    Assessment & Plan   Summary: Elizabeth Joy is a 81 year old female with PMH dementia, depression, HTN, admitted on 8/6/2021 with chronic pain and suicidal ideation.     Suicidal ideation without attempt   Major Depressive disorder   Advanced dementia   Pt resides in a memory care unit and has been reportedly deteriorating recently with worsening depressive symptoms, refusing medications and making suicidal statement. She had a mental health assessment in the ED who recommended inpatient stabilization in a teo-psych facility. Attempts were made to find a facility in the ED, but beds will likely not be available until Monday 8/9; therefore the patient will be admitted until  placement can be obtained.   - Continue prior to admission Effexor, Seroquel, Depakote and Donepazil   - Psychiatry consulted today  - CC/SW consult for placement     Aerococcus UTI  UA notable for moderate leukocyte esterase and WBC count of 89. Unclear if true UTI or colonization.  - Continue Keflex 5 days    Chronic back and knee pain   Pt has a hx of falls and chronic pain. She has a chronic compression fracture of the L1 superior endplate as well as chronic patellar fracture of the right knee.    - Ice/heat PRN   - Acetaminophen PRN    - Lidocaine patch PRN     Hypertension  - Continue prior to admission amlodipine     DVT Prophylaxis: Pneumatic Compression Devices  Code Status: No CPR- Do NOT Intubate  PT/OT: not needed  Diet: Regular Diet Adult      Disposition: Expected discharge when geripsych placement found    - I spent 35 minutes, with greater than 50% spent in counseling and coordination of care with the patient and son Erich and care coordinators    Howie Andrade MD  Text Page  (7am to 6pm)  -Data reviewed today: I reviewed all new labs and imaging results over the last 24 hours.    Physical Exam   Temp: 98  F (36.7  C) Temp src: Oral BP: 139/72 Pulse: 74   Resp: 16 SpO2: 95 % O2 Device: None (Room air)    Vitals:    08/05/21 1352   Weight: 90.7 kg (200 lb)     Vital Signs with Ranges  Temp:  [97.2  F (36.2  C)-98.6  F (37  C)] 98  F (36.7  C)  Pulse:  [69-79] 74  Resp:  [16-18] 16  BP: (109-139)/(56-72) 139/72  SpO2:  [95 %-97 %] 95 %  I/O last 3 completed shifts:  In: 240 [P.O.:240]  Out: -   O2 requirements: none    Constitutional: Elderly female in NAD  HEENT: Eyes nonicteric, oral mucosa moist  Cardiovascular: RRR, normal S1/2, no m/r/g  Respiratory: CTAB, no wheezing or crackles  Vascular: No LE pitting edema  GI: Normoactive bowel sounds, nontender, nondistended  Skin/Integumen: No rashes  Neuro/Psych: Appropriate affect and mood. Moves all extremities    Medications       amLODIPine  5  mg Oral BID     cephALEXin  250 mg Oral Q6H TRINH     diclofenac  2 g Topical 4x Daily     divalproex sodium delayed-release  125 mg Oral BID     donepezil  5 mg Oral At Bedtime     hydrALAZINE  5 mg Oral TID     lidocaine   Transdermal Q8H     QUEtiapine  25 mg Oral At Bedtime     sodium chloride (PF)  3 mL Intracatheter Q8H     venlafaxine  187.5 mg Oral Daily       Data   Recent Labs   Lab 08/05/21 2041 08/05/21 2040   WBC 9.3  --    HGB 10.7*  --    MCV 94  --      --    NA  --  140   POTASSIUM  --  3.6   CHLORIDE  --  108   CO2  --  29   BUN  --  26   CR  --  1.02   ANIONGAP  --  3   MICHELLE  --  8.7   GLC  --  134*       Imaging:   No results found for this or any previous visit (from the past 24 hour(s)).

## 2021-08-09 NOTE — PLAN OF CARE
Summary: Admitted for SI and depression  DATE & TIME: 8/9/21   Cognitive Concerns/ Orientation : Alert and Oriented x3, calm and cooperative. This AM was irritable, as day went on her mood improved.  BEHAVIOR & AGGRESSION TOOL COLOR: Green  CIWA SCORE: N/A   ABNL VS/O2: VSS, on RA.  MOBILITY: Assist of 1 GB/W,   PAIN MANAGMENT: Reports some pain, refused interventions, offered Voltaren but patient refused.    DIET: Regular  BOWEL/BLADDER: Continent   ABNL LAB/BG: UC positive for aerococcus species.  DRAIN/DEVICES: NA   TELEMETRY RHYTHM: N/A  SKIN: No skin issues noted.  TESTS/PROCEDURES: None  D/C DAY/GOALS/PLACE: Awaiting geripsych placement.  Per note,family wants [facility] only. Family now talking about moving her to a care facility in [place] so she can be closer to family members. They are talking about flying her out there, this was per her son Philip AUGUSTE

## 2021-08-09 NOTE — PROGRESS NOTES
Care Management Follow Up    Length of Stay (days): 1    Expected Discharge Date:       Concerns to be Addressed:       Patient plan of care discussed at interdisciplinary rounds: Yes    Anticipated Discharge Disposition:       Anticipated Discharge Services:    Anticipated Discharge DME:      Patient/family educated on Medicare website which has current facility and service quality ratings:    Education Provided on the Discharge Plan:    Patient/Family in Agreement with the Plan:      Referrals Placed by CM/SW:    Private pay costs discussed:     Additional Information:  Contacted [facility] which is at capacity today.  Also waiting psychiatry opinion if patient is stable to discharge directly back to [facility].  Will follow up tomorrow.      BROOKE ReynaSW

## 2021-08-10 LAB
ALBUMIN SERPL-MCNC: 2.8 G/DL (ref 3.4–5)
ALP SERPL-CCNC: 87 U/L (ref 40–150)
ALT SERPL W P-5'-P-CCNC: 13 U/L (ref 0–50)
ANION GAP SERPL CALCULATED.3IONS-SCNC: 4 MMOL/L (ref 3–14)
ANION GAP SERPL CALCULATED.3IONS-SCNC: NORMAL MMOL/L
AST SERPL W P-5'-P-CCNC: 6 U/L (ref 0–45)
BILIRUB SERPL-MCNC: 0.3 MG/DL (ref 0.2–1.3)
BUN SERPL-MCNC: 27 MG/DL (ref 7–30)
BUN SERPL-MCNC: NORMAL MG/DL
CALCIUM SERPL-MCNC: 8.4 MG/DL (ref 8.5–10.1)
CALCIUM SERPL-MCNC: NORMAL MG/DL
CHLORIDE BLD-SCNC: 107 MMOL/L (ref 94–109)
CHLORIDE BLD-SCNC: NORMAL MMOL/L
CO2 SERPL-SCNC: 28 MMOL/L (ref 20–32)
CO2 SERPL-SCNC: NORMAL MMOL/L
CREAT SERPL-MCNC: 1.14 MG/DL (ref 0.52–1.04)
CREAT SERPL-MCNC: NORMAL MG/DL
ERYTHROCYTE [DISTWIDTH] IN BLOOD BY AUTOMATED COUNT: 12.5 % (ref 10–15)
GFR SERPL CREATININE-BSD FRML MDRD: 45 ML/MIN/1.73M2
GFR SERPL CREATININE-BSD FRML MDRD: NORMAL ML/MIN/{1.73_M2}
GLUCOSE BLD-MCNC: 111 MG/DL (ref 70–99)
GLUCOSE BLD-MCNC: NORMAL MG/DL
HCT VFR BLD AUTO: 32.1 % (ref 35–47)
HGB BLD-MCNC: 10.2 G/DL (ref 11.7–15.7)
MCH RBC QN AUTO: 30.3 PG (ref 26.5–33)
MCHC RBC AUTO-ENTMCNC: 31.8 G/DL (ref 31.5–36.5)
MCV RBC AUTO: 95 FL (ref 78–100)
PLATELET # BLD AUTO: 250 10E3/UL (ref 150–450)
POTASSIUM BLD-SCNC: 3.6 MMOL/L (ref 3.4–5.3)
POTASSIUM BLD-SCNC: NORMAL MMOL/L
PROT SERPL-MCNC: 6 G/DL (ref 6.8–8.8)
RBC # BLD AUTO: 3.37 10E6/UL (ref 3.8–5.2)
SODIUM SERPL-SCNC: 139 MMOL/L (ref 133–144)
SODIUM SERPL-SCNC: NORMAL MMOL/L
WBC # BLD AUTO: 9.5 10E3/UL (ref 4–11)

## 2021-08-10 PROCEDURE — 250N000013 HC RX MED GY IP 250 OP 250 PS 637: Performed by: STUDENT IN AN ORGANIZED HEALTH CARE EDUCATION/TRAINING PROGRAM

## 2021-08-10 PROCEDURE — 96361 HYDRATE IV INFUSION ADD-ON: CPT

## 2021-08-10 PROCEDURE — 99225 PR SUBSEQUENT OBSERVATION CARE,LEVEL II: CPT | Performed by: INTERNAL MEDICINE

## 2021-08-10 PROCEDURE — 36415 COLL VENOUS BLD VENIPUNCTURE: CPT | Performed by: INTERNAL MEDICINE

## 2021-08-10 PROCEDURE — 250N000013 HC RX MED GY IP 250 OP 250 PS 637: Performed by: EMERGENCY MEDICINE

## 2021-08-10 PROCEDURE — 250N000013 HC RX MED GY IP 250 OP 250 PS 637: Performed by: INTERNAL MEDICINE

## 2021-08-10 PROCEDURE — 250N000011 HC RX IP 250 OP 636: Performed by: INTERNAL MEDICINE

## 2021-08-10 PROCEDURE — 85027 COMPLETE CBC AUTOMATED: CPT | Performed by: INTERNAL MEDICINE

## 2021-08-10 PROCEDURE — 258N000003 HC RX IP 258 OP 636: Performed by: INTERNAL MEDICINE

## 2021-08-10 PROCEDURE — 82040 ASSAY OF SERUM ALBUMIN: CPT | Performed by: INTERNAL MEDICINE

## 2021-08-10 PROCEDURE — G0378 HOSPITAL OBSERVATION PER HR: HCPCS

## 2021-08-10 RX ORDER — SODIUM CHLORIDE AND POTASSIUM CHLORIDE 150; 900 MG/100ML; MG/100ML
INJECTION, SOLUTION INTRAVENOUS CONTINUOUS
Status: DISCONTINUED | OUTPATIENT
Start: 2021-08-10 | End: 2021-08-11

## 2021-08-10 RX ADMIN — DICLOFENAC SODIUM 2 G: 10 GEL TOPICAL at 18:07

## 2021-08-10 RX ADMIN — CEPHALEXIN 250 MG: 250 CAPSULE ORAL at 06:31

## 2021-08-10 RX ADMIN — DICLOFENAC SODIUM 2 G: 10 GEL TOPICAL at 12:11

## 2021-08-10 RX ADMIN — VENLAFAXINE HYDROCHLORIDE 187.5 MG: 150 CAPSULE, EXTENDED RELEASE ORAL at 08:11

## 2021-08-10 RX ADMIN — QUETIAPINE FUMARATE 25 MG: 25 TABLET ORAL at 22:25

## 2021-08-10 RX ADMIN — HYDRALAZINE HYDROCHLORIDE 5 MG: 10 TABLET ORAL at 08:11

## 2021-08-10 RX ADMIN — DICLOFENAC SODIUM 2 G: 10 GEL TOPICAL at 22:25

## 2021-08-10 RX ADMIN — HYDRALAZINE HYDROCHLORIDE 5 MG: 10 TABLET ORAL at 20:54

## 2021-08-10 RX ADMIN — DIVALPROEX SODIUM 125 MG: 125 TABLET, DELAYED RELEASE ORAL at 08:11

## 2021-08-10 RX ADMIN — ACETAMINOPHEN 650 MG: 325 TABLET, FILM COATED ORAL at 09:25

## 2021-08-10 RX ADMIN — SODIUM CHLORIDE, POTASSIUM CHLORIDE, SODIUM LACTATE AND CALCIUM CHLORIDE 500 ML: 600; 310; 30; 20 INJECTION, SOLUTION INTRAVENOUS at 15:55

## 2021-08-10 RX ADMIN — CEPHALEXIN 250 MG: 250 CAPSULE ORAL at 12:11

## 2021-08-10 RX ADMIN — CEPHALEXIN 250 MG: 250 CAPSULE ORAL at 18:07

## 2021-08-10 RX ADMIN — AMLODIPINE BESYLATE 5 MG: 5 TABLET ORAL at 08:11

## 2021-08-10 RX ADMIN — POTASSIUM CHLORIDE AND SODIUM CHLORIDE: 900; 150 INJECTION, SOLUTION INTRAVENOUS at 15:56

## 2021-08-10 RX ADMIN — DICLOFENAC SODIUM 2 G: 10 GEL TOPICAL at 08:12

## 2021-08-10 RX ADMIN — DONEPEZIL HYDROCHLORIDE 5 MG: 5 TABLET, FILM COATED ORAL at 22:25

## 2021-08-10 RX ADMIN — DIVALPROEX SODIUM 125 MG: 125 TABLET, DELAYED RELEASE ORAL at 20:54

## 2021-08-10 NOTE — PLAN OF CARE
Summary: SI and depression  DATE & TIME: 8/9/21 1658-3270  Cognitive Concerns/ Orientation : Alert and Oriented x3, calm and cooperative. Hx of dementia.  BEHAVIOR & AGGRESSION TOOL COLOR: Green  ABNL VS/O2: VSS on RA  MOBILITY: Assist +1 GB/W, up in chair for meals  PAIN MANAGMENT: C/O 7/10 knee pain. PRN Tylenol x1 and scheduled Voltaren gel.    DIET: Regular. Nausea with large emesis after lunch (no dinner). PRN Zofran x1.  BOWEL/BLADDER: Incontinent at times. Large/incontinent diarrhea x1.  ABNL LAB/BG: + UA  DRAIN/DEVICES: No IV access, MD aware.  SKIN: No skin issues noted  TESTS/PROCEDURES: None  D/C DAY/GOALS/PLACE: Working with family and SW to determine geripsych vs TCU placement and location.

## 2021-08-10 NOTE — PROGRESS NOTES
[facility]    Hospitalist Progress Note    Date of Service (when I saw the patient): 08/10/2021  Admit date: 8/5/2021    Assessment & Plan     Elizabeth Joy is a 81 year old female with PMH dementia, depression, HTN, admitted on 8/6/2021 with chronic pain and suicidal ideation.      Suicidal ideation without attempt   Major Depressive disorder   Advanced dementia   Pt resides in a memory care unit and has been reportedly deteriorating recently with worsening depressive symptoms, refusing medications and making suicidal statement. She had a mental health assessment in the ED who recommended inpatient stabilization in a teo-psych facility. Attempts were made to find a facility in the ED, but beds will likely not be available until Monday 8/9; therefore the patient will be admitted until placement can be obtained.   - Continue prior to admission Effexor, Seroquel, Depakote and Donepazil   - Psychiatry consult pending regarding discharge to teo-psyche vs detention  - CC/SW consult for placement      Aerococcus UTI  UA notable for moderate leukocyte esterase and WBC count of 89. Unclear if true UTI or colonization.  - Received 3 days of ceftriaxone, followed by 2 days of Keflex, stopped after today's lat dose.  08/10/21.     Chronic back and knee pain   Pt has a hx of falls and chronic pain. She has a chronic compression fracture of the L1 superior endplate as well as chronic patellar fracture of the right knee.    - Ice/heat PRN   - Acetaminophen PRN    - Lidocaine patch PRN     Acute dehydration, diarrhea  Patient has had poor intake, with large volume watery stool x 3 starting 8/9/21. BP running in 70s to 90s on 08/10/21 BUN 27/Cr 1.14, baseline Cr 1. Afebrile, HR 50's. Hgb stable at 10.2. WBC nl. No abdominal pain N/V.   - Give  ml x 1 now then NS + 20 KCl W 100 ml/h  - Enteric precautions, but hold on c.diff for now given no abdominal pain or fever and nl WBC.  - BMP in the AM.   Recent  Labs   Lab 08/10/21  1544 08/05/21  2040   CR 1.14* 1.02     Chronic anemia, normocytic    Recent Labs   Lab 08/10/21  1544 08/05/21  2041   HGB 10.2* 10.7*        Hypertension  - Holding PTA amlodipine in context of above    Diet: Orders Placed This Encounter      Regular Diet Adult     IVF: Started NS + 20 KCl at 100 ml/h after  ml bolus on 08/10/21.   Ram Catheter: Not present     DVT Prophylaxis: Pneumatic Compression Devices, while in bed and AMBULATE.  Code Status: No CPR- Do NOT Intubate     Disposition: Expected discharge once we have placement, assuming BP better tomorrow and labs stable.   Communication: Discussed with RN on 08/10/21    Juliet Browne  Hospitalist Service  [facility]  Securely message with the Vocera Web Console (learn more here)  Text page via [email or web address] Paging/Directory    Interval History   Events over last 24 hours as outlined above.   History obtained from RN as patient was sleeping during my visit.   She has had large volume watery diarrhea starting yesterday with 2 more stools today. No abdominal pain, N/V.   Per RN, she was slumped over and sleepy this AM, with SBP initially in the 70s, then in the 90's. Alert interactive without localized complaints.     -Data reviewed today: I reviewed all new labs and imaging results over the last 24 hours. I personally reviewed no images or EKG's today.    Physical Exam   Temp: 99.2  F (37.3  C) Temp src: Oral BP: 124/77 Pulse: 84   Resp: 16 SpO2: 96 % O2 Device: None (Room air)    Vitals:    08/05/21 1352   Weight: 90.7 kg (200 lb)     Vital Signs with Ranges  Temp:  [97.1  F (36.2  C)-99.2  F (37.3  C)] 99.2  F (37.3  C)  Pulse:  [75-84] 84  Resp:  [16] 16  BP: ()/(48-77) 124/77  SpO2:  [96 %-99 %] 96 %  I/O last 3 completed shifts:  In: 240 [P.O.:240]  Out: 600 [Urine:200; Emesis/NG output:400]    Constitutional: NAD,   Neuropsyche:sleeping during my visit.   Respiratory:  Breathing comfortably, good air  exchange, no wheezes, no crackles.   Cardiovascular:  Regular rate and rhythm, no edema  GI:  soft, NT/ND, BS normal  Skin/Integumen:  No acute rash or sign of bleeding.     Medications   All medications reviewed on 08/10/21.       amLODIPine  5 mg Oral BID     cephALEXin  250 mg Oral Q6H TRINH     diclofenac  2 g Topical 4x Daily     divalproex sodium delayed-release  125 mg Oral BID     donepezil  5 mg Oral At Bedtime     hydrALAZINE  5 mg Oral TID     lidocaine   Transdermal Q8H     QUEtiapine  25 mg Oral At Bedtime     sodium chloride (PF)  3 mL Intracatheter Q8H     venlafaxine  187.5 mg Oral Daily       Data   Recent Labs   Lab 08/05/21 2041 08/05/21 2040   WBC 9.3  --    HGB 10.7*  --    MCV 94  --      --    NA  --  140   POTASSIUM  --  3.6   CHLORIDE  --  108   CO2  --  29   BUN  --  26   CR  --  1.02   ANIONGAP  --  3   MICHELLE  --  8.7   GLC  --  134*       No results found for this or any previous visit (from the past 24 hour(s)).

## 2021-08-10 NOTE — PLAN OF CARE
"DATE & TIME: 8/10/2021 9493-9791    Cognitive Concerns/ Orientation : Pt A&Ox3, disoriented to situation, forgetful   BEHAVIOR & AGGRESSION TOOL COLOR: Green  ABNL VS/O2: VSS on RA  MOBILITY: Assist x1 with gait belt and walker, up to chair x1  PAIN MANAGMENT: Complaints of chronic back/knee pain, managed with tylenol and voltaren gel   DIET: Regular- fair appetite, needs encouragement otherwise will say \"I don't want anything\"  BOWEL/BLADDER: Incontinent of bowel and bladder, purewick overnight. Large loose watery brown BM x2-3, MD aware, placed in contact iso for possibly something viral, page if abdominal pain or elevated WBC then MD will order cdiff   DRAIN/DEVICES: IV SL  SKIN: Scattered  bruising. BLE +2, elevating on pillows.   D/C DATE: Discharge pending placement, back to [facility] vs [facility] pending psych reconsult (they did not see patient today...)  OTHER IMPORTANT INFO: Patient did vasovagal getting up to bathroom, once back to bed BP/mentation improved, neuro intact (MD aware), given 500 ml bolus LR and started on IVF at 100 ml/hr.   "

## 2021-08-10 NOTE — PLAN OF CARE
DATE & TIME: 8/9/21 1409-2308    Cognitive Concerns/ Orientation : Pt A/Ox3, disoriented to situation, forgetful   BEHAVIOR & AGGRESSION TOOL COLOR: Green   ABNL VS/O2: VSS on RA  MOBILITY: Assist x1 with gait belt and walker, fall risk  PAIN MANAGMENT: Complaints of neck pain, managed with heat  DIET: Regular  BOWEL/BLADDER: Incontinent of urine at times. Purewick overnight.  DRAIN/DEVICES: IV SL  SKIN: Scattered  bruising. Edema +1 on lower extremities.  D/C DATE: Discharge pending placement, geripsych vs TCU  OTHER IMPORTANT INFO: Pt given Melatonin at bedtime

## 2021-08-11 ENCOUNTER — PATIENT OUTREACH (OUTPATIENT)
Dept: CARE COORDINATION | Facility: CLINIC | Age: 81
End: 2021-08-11

## 2021-08-11 LAB
ANION GAP SERPL CALCULATED.3IONS-SCNC: 2 MMOL/L (ref 3–14)
BUN SERPL-MCNC: 21 MG/DL (ref 7–30)
CALCIUM SERPL-MCNC: 8.2 MG/DL (ref 8.5–10.1)
CHLORIDE BLD-SCNC: 111 MMOL/L (ref 94–109)
CO2 SERPL-SCNC: 27 MMOL/L (ref 20–32)
CREAT SERPL-MCNC: 0.92 MG/DL (ref 0.52–1.04)
GFR SERPL CREATININE-BSD FRML MDRD: 59 ML/MIN/1.73M2
GLUCOSE BLD-MCNC: 84 MG/DL (ref 70–99)
POTASSIUM BLD-SCNC: 3.9 MMOL/L (ref 3.4–5.3)
SODIUM SERPL-SCNC: 140 MMOL/L (ref 133–144)

## 2021-08-11 PROCEDURE — 99207 PR CDG-CODE CATEGORY CHANGED: CPT | Performed by: PSYCHIATRY & NEUROLOGY

## 2021-08-11 PROCEDURE — 250N000013 HC RX MED GY IP 250 OP 250 PS 637: Performed by: INTERNAL MEDICINE

## 2021-08-11 PROCEDURE — 99225 PR SUBSEQUENT OBSERVATION CARE,LEVEL II: CPT | Performed by: INTERNAL MEDICINE

## 2021-08-11 PROCEDURE — 250N000013 HC RX MED GY IP 250 OP 250 PS 637: Performed by: STUDENT IN AN ORGANIZED HEALTH CARE EDUCATION/TRAINING PROGRAM

## 2021-08-11 PROCEDURE — 250N000011 HC RX IP 250 OP 636: Performed by: INTERNAL MEDICINE

## 2021-08-11 PROCEDURE — 80048 BASIC METABOLIC PNL TOTAL CA: CPT | Performed by: INTERNAL MEDICINE

## 2021-08-11 PROCEDURE — G0378 HOSPITAL OBSERVATION PER HR: HCPCS

## 2021-08-11 PROCEDURE — 36415 COLL VENOUS BLD VENIPUNCTURE: CPT | Performed by: INTERNAL MEDICINE

## 2021-08-11 PROCEDURE — 250N000013 HC RX MED GY IP 250 OP 250 PS 637: Performed by: PSYCHIATRY & NEUROLOGY

## 2021-08-11 PROCEDURE — 250N000013 HC RX MED GY IP 250 OP 250 PS 637: Performed by: EMERGENCY MEDICINE

## 2021-08-11 PROCEDURE — 96361 HYDRATE IV INFUSION ADD-ON: CPT

## 2021-08-11 PROCEDURE — 99214 OFFICE O/P EST MOD 30 MIN: CPT | Performed by: PSYCHIATRY & NEUROLOGY

## 2021-08-11 RX ORDER — VENLAFAXINE HYDROCHLORIDE 150 MG/1
150 CAPSULE, EXTENDED RELEASE ORAL DAILY
Status: DISCONTINUED | OUTPATIENT
Start: 2021-08-11 | End: 2021-08-19 | Stop reason: HOSPADM

## 2021-08-11 RX ORDER — DIVALPROEX SODIUM 125 MG/1
250 TABLET, DELAYED RELEASE ORAL 2 TIMES DAILY
Status: DISCONTINUED | OUTPATIENT
Start: 2021-08-11 | End: 2021-08-19 | Stop reason: HOSPADM

## 2021-08-11 RX ORDER — AMLODIPINE BESYLATE 5 MG/1
5 TABLET ORAL DAILY
Status: DISCONTINUED | OUTPATIENT
Start: 2021-08-11 | End: 2021-08-13

## 2021-08-11 RX ADMIN — AMLODIPINE BESYLATE 5 MG: 5 TABLET ORAL at 12:18

## 2021-08-11 RX ADMIN — QUETIAPINE FUMARATE 25 MG: 25 TABLET ORAL at 21:24

## 2021-08-11 RX ADMIN — POTASSIUM CHLORIDE AND SODIUM CHLORIDE: 900; 150 INJECTION, SOLUTION INTRAVENOUS at 02:38

## 2021-08-11 RX ADMIN — VENLAFAXINE HYDROCHLORIDE 150 MG: 150 CAPSULE, EXTENDED RELEASE ORAL at 08:31

## 2021-08-11 RX ADMIN — DIVALPROEX SODIUM 250 MG: 125 TABLET, DELAYED RELEASE ORAL at 21:24

## 2021-08-11 RX ADMIN — DIVALPROEX SODIUM 250 MG: 125 TABLET, DELAYED RELEASE ORAL at 08:31

## 2021-08-11 RX ADMIN — HYDRALAZINE HYDROCHLORIDE 5 MG: 10 TABLET ORAL at 08:31

## 2021-08-11 RX ADMIN — HYDRALAZINE HYDROCHLORIDE 5 MG: 10 TABLET ORAL at 13:54

## 2021-08-11 RX ADMIN — ACETAMINOPHEN 650 MG: 325 TABLET, FILM COATED ORAL at 18:46

## 2021-08-11 RX ADMIN — HYDRALAZINE HYDROCHLORIDE 5 MG: 10 TABLET ORAL at 21:24

## 2021-08-11 RX ADMIN — DONEPEZIL HYDROCHLORIDE 5 MG: 5 TABLET, FILM COATED ORAL at 21:24

## 2021-08-11 RX ADMIN — DICLOFENAC SODIUM 2 G: 10 GEL TOPICAL at 08:31

## 2021-08-11 NOTE — PLAN OF CARE
"DATE & TIME: 8/11/2021 3817-4713               Cognitive Concerns/ Orientation : Pt A&Ox2-3, disoriented to situation/time, forgetful   BEHAVIOR & AGGRESSION TOOL COLOR: Green, mood waxes and wanes, occasionally talkative while other times flat and tells staff to leave her alone  ABNL VS/O2: VSS on RA  MOBILITY: Assist x1 with GB/walker, up to chair x2, turn/reposition in bed   PAIN MANAGMENT: Complaints of chronic back/knee pain, managed with voltaren gel and tylenol  DIET: Regular- good appetite, needs encouragement otherwise will say \"I don't want anything\"  BOWEL/BLADDER: Incontinent of bowel and bladder, purewick overnight. No stools this shift  DRAIN/DEVICES: IV SL, IVF discontinued  SKIN: Scattered  bruising. BLE +2, elevating on pillows.   D/C DATE: pending placement, psych recommending 24 hour supervision memory care or geripsych, family son Erich was updated with patient permission  OTHER IMPORTANT INFO: psych decreased patients Effexor and increased Depakote.   "

## 2021-08-11 NOTE — CONSULTS
"Consult Date: 08/05/2021    REQUESTING PHYSICIAN:  Dr. Andrade.    REASON FOR CONSULTATION:  Depression and suicidal ideation.    IDENTIFYING DATA:  The patient is an 81-year-old  female with known dementia and behavioral issues.  She comes to us from assisted living.  She is convalescing on station 66.  She came in with complaints of pain, but she was agitated, refusing to take all of her medications, making comments that she wanted to die.  There was some question about whether she was depressed and deteriorating from a mental health standpoint.  She was described as confused and tangential at the time of admission and slightly paranoid.    CHIEF COMPLAINT:  \"What is this is all about?\"    HISTORY OF PRESENT ILLNESS:  The patient is an 81-year-old woman with a known major neurocognitive disorder, who comes in with the above history.  She is convalescing on station 66.  She has been seen a couple of times by mental health social workers here.  She has a history of being in assisted living with deteriorating functioning.  I saw her back in 12/2020, and at that time, she was confused and delirious.  She had a UTI.  Currently, she is on venlafaxine, Seroquel and a low dose of Depakote, but it sounds like prior to admission, she probably was not taking any of these.  She cannot give me meaningful history, lies in bed with her eyes closed.  She is irritable and tells me she wants to sleep.  She has a slightly guarded demeanor, annoyed by my questions, which is similar to how she was when I saw her 8 months ago.  There has been discussion about a geriatric psychiatry referral.  Family has resisted the idea that she probably needs to go to a nursing home despite clear evidence that she is confused and really cannot manage her affairs.  She would not participate in an interview with me this morning, would not answer questions about depression, but does not acknowledge being suicidal.  I spoke with nursing staff and " "they say that she has had variable periods where at times she is more activated and hyperverbal and at other times resistant and lies in bed with little interaction.    Due to the level of impairment with her cognition, I really cannot garnish much meaningful history from her today.  There is no Depakote level available for my review.  I get the sense that she is sleeping okay, but has been marginally cooperative with her cares.  She has had some incontinence and requires assistance using a gait belt and a walker.    PAST PSYCHIATRIC HISTORY:  As above.    PAST CHEMICAL DEPENDENCY HISTORY:  Negative.    PAST MEDICAL HISTORY:  Per chart review includes possible UTI, chronic back and knee pain, hypertension, lichen sclerosus of the vulva, polymyalgia rheumatica, syncope, ventricular tachycardia.    FAMILY HISTORY AND SOCIAL HISTORY:  The patient is , and I believe that he may be living in [place] with his daughter.  She has been in a transitional she has been in an assisted living facility and pre-visit chart review suggests she has a sibling, 3 grown kids.  She used to be an executive for a nonprofit.  Mental health and chemical dependency history in the family is unknown.    REVIEW OF SYSTEMS:  A 10-point review of systems currently negative, though she does have clear evidence of weakness and gait disturbance, requiring use of a walker on neurologic exam.    PHYSICAL EXAMINATION:    VITAL SIGNS:  Blood pressure 117/73, temperature 98.1, pulse 69, respiratory rate 16, oxygen saturation 97%.  MENTAL STATUS EXAMINATION:  The patient is lying in bed with her eyes closed.  She did startle when I came into the room.  She has a very irritable demeanor.  She is a poor historian.  Speech is somewhat garbled.  Use of language is fair.  Motor exam calm.  She is huddled under her bed sheets.  Coordination, station and gait impaired.  Muscle strength and tone diminished.  Affect is irritable.  Mood \"fine.\" Thought " process appears somewhat tangential, but no loosening of associations or flight of ideas or formal thought disorder.  Thought content negative for current hallucinations, delusions, suicidal or homicidal ideation.  She has a slightly paranoid demeanor, refuses to answer my questions.  When I queried her about suicidal thoughts, she did not acknowledge any thoughts of self-harm.  Insight and judgment poor.  Cognitive exam:  The patient is alert, oriented to person.  She knows she is in the hospital, but otherwise concentration is poor.  Recent and remote memory is poor.  General fund of knowledge is poor.    IMPRESSION:  The patient is an 81-year-old woman with known dementia who has some behavioral dysregulation.  At this juncture, I really think she needs a memory care unit.  She is in no way capable of moving back to an assisted living setting given the current presentation.  Given the fact that she is somewhat disinhibited at times, we can adjust her Depakote, lower her antidepressant dosing.  We will continue Seroquel and have a p.r.n. available.    DIAGNOSES:   1.  Major neurocognitive disorder with behavioral disturbance.  2.  Unspecified depressive disorder.  3.  Multiple medical comorbidities including possible urinary tract infection.  4.  Rule out delirium secondary to urinary tract infection.    PLAN:     1.  Lower Effexor XR to 150 mg daily, adjust Depakote to 250 mg b.i.d. using sprinkle caps.  We can check a level in a couple of days.  2.  Continue low-dose Seroquel to target sleep and agitation.  3.  At this juncture, she cannot return to assisted living.  Either geriatric psychiatry or a memory care unit would be advisable.  If she maintains behavioral control and is not actively suicidal, then a unit such as a memory care unit with 24-hour supervision would seem to be appropriate.  I would expect that things could progress given the underlying dementia diagnosis necessitating that level of  care.    Marky Smith MD        D: 2021   T: 2021   MT: HAILE    Name:     AMELIA LEEJuanjose  MRN:      [id number]        Account:      [id number]   :      1940           Consult Date: 2021     Document: [id number]

## 2021-08-11 NOTE — PROGRESS NOTES
Clinic Care Coordination Contact    Situation: Patient chart reviewed by care coordinator.    Background: Pt due for outreach by CRISS QUIÑONEZ as she was admitted to TCU after a fall.    Assessment: Pt is currently in hospital due to knee and back pain. Additionally, concerns were presented that she has been refusing her medication and making suicidal statements.    Plan/Recommendations: CRISS QUIÑONEZ will follow up with pt upon discharge for ongoing support.    Lory Durant [facility]  Clinic Care Coordinator  [facility] [place]  [facility]  [phone]  [email or web address]

## 2021-08-11 NOTE — PROGRESS NOTES
"Care Management Follow Up    Length of Stay (days): 1    Expected Discharge Date: 08/12/2021     Concerns to be Addressed: discharge planning     Patient plan of care discussed at interdisciplinary rounds: Yes    Anticipated Discharge Disposition: Inpatient Mental Health     Anticipated Discharge Services:    Anticipated Discharge DME:      Patient/family educated on Medicare website which has current facility and service quality ratings:    Education Provided on the Discharge Plan:    Patient/Family in Agreement with the Plan:      Referrals Placed by CM/SW: Post Acute Facilities  Private pay costs discussed: Not applicable    Additional Information:  Per psych today \"Either geriatric psychiatry or a memory care unit would be advisable.\" [facility] ([facility]) continues to be full. SW will continue to call on a daily basis. OLAMIDE spoke with Daria, nursing supervisor at [facility], [phone]. Pt is in a regular assisted living apartment. Per Daria, pt is not safe to return to that apartment due to self-neglect. She has been refusing cares. Discussed moving pt to the memory care unit but that unit is full and no anticipated openings at this time. Nursing at [facility] had a care conference with family recently to discuss their concerns related to her refusing cares. Son Erich has been working on moving her to [place] but the facility he has chosen does not have availability for 2-3 months. OLAMIDE spoke with him and his wife, Cristina. Their preference remains moving pt to [place]. They will look in to other options in the [place] area and call back.       PRIMO Proctor, [facility]  [phone]  [facility]    Addendum: Cameron Palacios also updated.       "

## 2021-08-11 NOTE — PLAN OF CARE
Cognitive Concerns/ Orientation: Pt A&Ox3, disoriented to situation, forgetful   BEHAVIOR & AGGRESSION TOOL COLOR: Green  ABNL VS/O2: VSS on RA  MOBILITY: Assist x1 with gait belt and walker  PAIN MANAGMENT: Denies pain- on voltaren gel   DIET: Regular- poor appetite  BOWEL/BLADDER: Incontinent of bowel and bladder, Small loose watery brown BM x2.   DRAIN/DEVICES: IV SL, Purewick in place  SKIN: Scattered  bruising. BLE +2.   D/C DATE: Discharge pending placement, back to [facility] vs geripsych pending psych reconsult  OTHER IMPORTANT INFO: Contact precaution maintained

## 2021-08-11 NOTE — PROGRESS NOTES
"[facility]    Hospitalist Progress Note    Date of Service (when I saw the patient): 08/11/2021  Admit date: 8/5/2021    Assessment & Plan     Elizabeth Joy is a 81 year old female with PMH dementia, depression, HTN, admitted on 8/6/2021 with chronic pain and suicidal ideation.      Suicidal ideation without attempt   Major Depressive disorder   Advanced dementia   Pt resides in a memory care unit and has been reportedly deteriorating recently with worsening depressive symptoms, refusing medications and making suicidal statement. She had a mental health assessment in the ED who recommended inpatient stabilization in a teo-psych facility. Attempts were made to find a facility in the ED, but beds will likely not be available until Monday 8/9; therefore the patient will be admitted until placement can be obtained.   - Continue prior to admission Effexor, Seroquel, Depakote and Donepazil   - Psychiatry consult on 08/11/21 regarding placement. They recommend teo-psyche at this time. Or memory care with 24 hour supervision. Not CAREY at this time.   - Per 08/11/21 psychiatry note::\" We can check a [depakote] level in a couple of days.\"   - CC/SW consult for placement      Aerococcus UTI  UA notable for moderate leukocyte esterase and WBC count of 89. Unclear if true UTI or colonization. Received 3 days of ceftriaxone, followed by 2 days of Keflex, last dose 08/10/21.     Chronic back and knee pain   Pt has a hx of falls and chronic pain. She has a chronic compression fracture of the L1 superior endplate as well as chronic patellar fracture of the right knee.    - Ice/heat PRN   - Acetaminophen PRN    - Lidocaine patch PRN     Acute dehydration, diarrhea, RESOLVED  Patient has had poor intake, with large volume watery stool x 3 starting 8/9/21. BP running in 70s to 90s on 08/10/21 BUN 27/Cr 1.14, baseline Cr 1. Afebrile, HR 50's. Hgb stable at 10.2. WBC nl. No abdominal pain N/V.   - Give  " ml x 1 now then NS + 20 KCl W 100 ml/h  - Diarrhea resolved, BP up and Cr down on 08/11  - Stop IVF. Can remove enteric precautions.   Recent Labs   Lab 08/11/21  0812 08/10/21  1544 08/05/21  2040   CR 0.92 1.14* 1.02     Chronic anemia, normocytic    Recent Labs   Lab 08/10/21  1544 08/05/21  2041   HGB 10.2* 10.7*        Hypertension  - Resume PTA amlodipine on 08/11/21 at lower dose of 5 mg daily (held 8/10/21 for low BP with diarrhea)     Diet: Orders Placed This Encounter      Regular Diet Adult     IVF: Started NS + 20 KCl at 100 ml/h after  ml bolus on 08/10/21.   Ram Catheter: Not present     DVT Prophylaxis: Pneumatic Compression Devices, while in bed and AMBULATE.  Code Status: No CPR- Do NOT Intubate     Disposition: Expected discharge once we have placement, assuming BP better tomorrow and labs stable.   Communication: Discussed with RN on 08/10/21    Juliet Browne  Hospitalist Service  [facility]  Securely message with the Vocera Web Console (learn more here)  Text page via [employer] Paging/Directory    Interval History   Events over last 24 hours as outlined above.  Patient diarrhea resolved. She is eating but needs encouragement. We discussed plan. Although she recalls where she is, she is does not track overall course of care.   Patient denies any SOB, CP, abdominal pain, N/V.     -Data reviewed today: I reviewed all new labs and imaging results over the last 24 hours. I personally reviewed no images or EKG's today.    Physical Exam   Temp: 97.9  F (36.6  C) Temp src: Oral BP: 138/82 Pulse: 71   Resp: 16 SpO2: 93 % O2 Device: None (Room air)    Vitals:    08/05/21 1352   Weight: 90.7 kg (200 lb)     Vital Signs with Ranges  Temp:  [97.4  F (36.3  C)-98.1  F (36.7  C)] 97.9  F (36.6  C)  Pulse:  [66-71] 71  Resp:  [16-20] 16  BP: ()/(51-82) 138/82  SpO2:  [92 %-97 %] 93 %  I/O last 3 completed shifts:  In: 1692 [P.O.:240; I.V.:1452]  Out: -      Constitutional: NAD,   Neuropsyche: alert, oriented to hospital, but does not recall plan of care.   Respiratory:  Breathing comfortably, good air exchange, no wheezes, no crackles.   Cardiovascular:  Regular rate and rhythm, no edema  GI:  soft, NT/ND, BS normal  Skin/Integumen:  No acute rash or sign of bleeding.     Medications   All medications reviewed on 08/11/21.     0.9% sodium chloride + KCl 20 mEq/L 100 mL/hr at 08/11/21 0238       amLODIPine  5 mg Oral Daily     diclofenac  2 g Topical 4x Daily     divalproex sodium delayed-release  250 mg Oral BID     donepezil  5 mg Oral At Bedtime     hydrALAZINE  5 mg Oral TID     lidocaine   Transdermal Q8H     QUEtiapine  25 mg Oral At Bedtime     sodium chloride (PF)  3 mL Intracatheter Q8H     venlafaxine  150 mg Oral Daily       Data   Recent Labs   Lab 08/11/21  0812 08/10/21  1544 08/05/21  2041 08/05/21  2040   WBC  --  9.5 9.3  --    HGB  --  10.2* 10.7*  --    MCV  --  95 94  --    PLT  --  250 285  --     139  --  140   POTASSIUM 3.9 3.6  --  3.6   CHLORIDE 111* 107  --  108   CO2 27 28  --  29   BUN 21 27  --  26   CR 0.92 1.14*  --  1.02   ANIONGAP 2* 4  --  3   MICHELLE 8.2* 8.4*  --  8.7   GLC 84 111*  --  134*   ALBUMIN  --  2.8*  --   --    PROTTOTAL  --  6.0*  --   --    BILITOTAL  --  0.3  --   --    ALKPHOS  --  87  --   --    ALT  --  13  --   --    AST  --  6  --   --        No results found for this or any previous visit (from the past 24 hour(s)).

## 2021-08-12 LAB — VALPROATE SERPL-MCNC: 45 MG/L

## 2021-08-12 PROCEDURE — 250N000013 HC RX MED GY IP 250 OP 250 PS 637: Performed by: STUDENT IN AN ORGANIZED HEALTH CARE EDUCATION/TRAINING PROGRAM

## 2021-08-12 PROCEDURE — 36415 COLL VENOUS BLD VENIPUNCTURE: CPT | Performed by: PSYCHIATRY & NEUROLOGY

## 2021-08-12 PROCEDURE — 250N000013 HC RX MED GY IP 250 OP 250 PS 637: Performed by: INTERNAL MEDICINE

## 2021-08-12 PROCEDURE — 250N000013 HC RX MED GY IP 250 OP 250 PS 637: Performed by: PSYCHIATRY & NEUROLOGY

## 2021-08-12 PROCEDURE — G0378 HOSPITAL OBSERVATION PER HR: HCPCS

## 2021-08-12 PROCEDURE — 250N000013 HC RX MED GY IP 250 OP 250 PS 637: Performed by: EMERGENCY MEDICINE

## 2021-08-12 PROCEDURE — 99225 PR SUBSEQUENT OBSERVATION CARE,LEVEL II: CPT | Performed by: INTERNAL MEDICINE

## 2021-08-12 PROCEDURE — 80164 ASSAY DIPROPYLACETIC ACD TOT: CPT | Performed by: PSYCHIATRY & NEUROLOGY

## 2021-08-12 RX ADMIN — ACETAMINOPHEN 650 MG: 325 TABLET, FILM COATED ORAL at 08:41

## 2021-08-12 RX ADMIN — AMLODIPINE BESYLATE 5 MG: 5 TABLET ORAL at 08:28

## 2021-08-12 RX ADMIN — DICLOFENAC SODIUM 2 G: 10 GEL TOPICAL at 14:33

## 2021-08-12 RX ADMIN — DIVALPROEX SODIUM 250 MG: 125 TABLET, DELAYED RELEASE ORAL at 08:27

## 2021-08-12 RX ADMIN — HYDRALAZINE HYDROCHLORIDE 5 MG: 10 TABLET ORAL at 08:27

## 2021-08-12 RX ADMIN — VENLAFAXINE HYDROCHLORIDE 150 MG: 150 CAPSULE, EXTENDED RELEASE ORAL at 08:28

## 2021-08-12 RX ADMIN — QUETIAPINE FUMARATE 25 MG: 25 TABLET ORAL at 20:22

## 2021-08-12 RX ADMIN — ACETAMINOPHEN 650 MG: 325 TABLET, FILM COATED ORAL at 20:21

## 2021-08-12 RX ADMIN — DIVALPROEX SODIUM 250 MG: 125 TABLET, DELAYED RELEASE ORAL at 20:22

## 2021-08-12 RX ADMIN — DICLOFENAC SODIUM 2 G: 10 GEL TOPICAL at 20:30

## 2021-08-12 RX ADMIN — HYDRALAZINE HYDROCHLORIDE 5 MG: 10 TABLET ORAL at 14:34

## 2021-08-12 RX ADMIN — DONEPEZIL HYDROCHLORIDE 5 MG: 5 TABLET, FILM COATED ORAL at 20:23

## 2021-08-12 RX ADMIN — HYDRALAZINE HYDROCHLORIDE 5 MG: 10 TABLET ORAL at 20:22

## 2021-08-12 NOTE — PROGRESS NOTES
"[facility]    Hospitalist Progress Note    Date of Service (when I saw the patient): 08/12/2021  Admit date: 8/5/2021    Assessment & Plan     Elizabeth Joy is a 81 year old female with PMH dementia, depression, HTN, admitted on 8/6/2021 with chronic pain and suicidal ideation.      Suicidal ideation without attempt   Major Depressive disorder   Advanced dementia   Pt resides in a memory care unit and has been reportedly deteriorating recently with worsening depressive symptoms, refusing medications and making suicidal statement. She had a mental health assessment in the ED who recommended inpatient stabilization in a teo-psych facility. Attempts were made to find a facility in the ED, but beds will likely not be available until Monday 8/9; therefore the patient will be admitted until placement can be obtained.   - Continue prior to admission Effexor, Seroquel, Depakote and Donepazil   - Psychiatry consult on 08/11/21 regarding placement. They recommend teo-psyche at this time. Or memory care with 24 hour supervision. Not CAREY at this time.   - Per 08/11/21 psychiatry note::\" We can check a [depakote] level in a couple of days.\"   - On 08/12/21, patient up and walking in room, with minimal SBA. She is laughing and making jokes. She does not recall who I am or reasoning behind hospitalization.   - Discussed with son, Erich on 08/12/21. He agrees with plan for teo-psyche if needed for her depression. He is working on a way to get her to [place], and wonders how much time he has before she is discharged. Since we are waiting on placement, will ask care management to call family.    - Psychiatry consulted. Please discuss recommendations with son Erich after your assessment. Thank you.   - CC/SW consult for placement.      Aerococcus UTI  UA notable for moderate leukocyte esterase and WBC count of 89. Unclear if true UTI or colonization. Received 3 days of ceftriaxone, followed by 2 days of " Keflex, last dose 08/10/21.     Chronic back and knee pain   Pt has a hx of falls and chronic pain. She has a chronic compression fracture of the L1 superior endplate as well as chronic patellar fracture of the right knee.    - On 08/12/21, patient up and walking denies any pain.   - Ice/heat PRN   - Acetaminophen PRN    - Lidocaine patch PRN     Acute dehydration, diarrhea, RESOLVED  Patient has had poor intake, with large volume watery stool x 3 starting 8/9/21. BP running in 70s to 90s on 08/10/21 BUN 27/Cr 1.14, baseline Cr 1. Afebrile, HR 50's. Hgb stable at 10.2. WBC nl. No abdominal pain N/V.   - Give  ml x 1 now then NS + 20 KCl W 100 ml/h  - Diarrhea resolved, BP up and Cr down on 08/11  - Stop IVF. Can remove enteric precautions.   Recent Labs   Lab 08/11/21  0812 08/10/21  1544 08/05/21  2040   CR 0.92 1.14* 1.02     Chronic anemia, normocytic    Recent Labs   Lab 08/10/21  1544 08/05/21  2041   HGB 10.2* 10.7*        Hypertension  - Resume PTA amlodipine on 08/11/21 at lower dose of 5 mg daily (held 8/10/21 for low BP with diarrhea)     Diet: Orders Placed This Encounter      Regular Diet Adult     IVF: Started NS + 20 KCl at 100 ml/h after  ml bolus on 08/10/21.   Ram Catheter: Not present     DVT Prophylaxis: Pneumatic Compression Devices, while in bed and AMBULATE.  Code Status: No CPR- Do NOT Intubate     Disposition: Expected discharge once we have placement, assuming BP better tomorrow and labs stable.   Communication: Discussed with RN on 08/10/21    Juliet Browne  Hospitalist Service  [facility]  Securely message with the Vocera Web Console (learn more here)  Text page via [employer] Paging/Directory    Total time spent:  > 25 minutes  Time spent counseling, coordination of care: 25 minutes including discussion with care team and sonRoxane.    Interval History   Events over last 24 hours as outlined above.  No complains, joking. Does not recall who I am.    Denies back pain.   Patient denies any SOB, CP, abdominal pain, N/V.     -Data reviewed today: I reviewed all new labs and imaging results over the last 24 hours. I personally reviewed no images or EKG's today.    Physical Exam   Temp: 97.8  F (36.6  C) Temp src: Oral BP: 116/65 Pulse: 77   Resp: 16 SpO2: 97 % O2 Device: None (Room air)    Vitals:    08/05/21 1352   Weight: 90.7 kg (200 lb)     Vital Signs with Ranges  Temp:  [97.8  F (36.6  C)-98.7  F (37.1  C)] 97.8  F (36.6  C)  Pulse:  [65-77] 77  Resp:  [16] 16  BP: (116-134)/(65-87) 116/65  SpO2:  [97 %-99 %] 97 %  I/O last 3 completed shifts:  In: 560 [P.O.:560]  Out: 1700 [Urine:1700]    Constitutional: NAD,   Neuropsyche: alert, oriented to hospital, but does not recall plan of care or who I am.   Respiratory:  Breathing comfortably, good air exchange, no wheezes, no crackles.   Cardiovascular:  Regular rate and rhythm, no edema  GI:  soft, NT/ND, BS normal  Skin/Integumen:  No acute rash or sign of bleeding.     Medications   All medications reviewed on 08/12/21.       amLODIPine  5 mg Oral Daily     diclofenac  2 g Topical 4x Daily     divalproex sodium delayed-release  250 mg Oral BID     donepezil  5 mg Oral At Bedtime     hydrALAZINE  5 mg Oral TID     lidocaine   Transdermal Q8H     QUEtiapine  25 mg Oral At Bedtime     sodium chloride (PF)  3 mL Intracatheter Q8H     venlafaxine  150 mg Oral Daily       Data   Recent Labs   Lab 08/11/21  0812 08/10/21  1544 08/05/21  2041 08/05/21  2040   WBC  --  9.5 9.3  --    HGB  --  10.2* 10.7*  --    MCV  --  95 94  --    PLT  --  250 285  --     139  --  140   POTASSIUM 3.9 3.6  --  3.6   CHLORIDE 111* 107  --  108   CO2 27 28  --  29   BUN 21 27  --  26   CR 0.92 1.14*  --  1.02   ANIONGAP 2* 4  --  3   MICHELLE 8.2* 8.4*  --  8.7   GLC 84 111*  --  134*   ALBUMIN  --  2.8*  --   --    PROTTOTAL  --  6.0*  --   --    BILITOTAL  --  0.3  --   --    ALKPHOS  --  87  --   --    ALT  --  13  --   --    AST  --   6  --   --        No results found for this or any previous visit (from the past 24 hour(s)).

## 2021-08-12 NOTE — PROGRESS NOTES
Care Management Follow Up    Length of Stay (days): 1    Expected Discharge Date: 08/13/2021     Concerns to be Addressed: discharge planning     Patient plan of care discussed at interdisciplinary rounds: Yes    Anticipated Discharge Disposition: Inpatient Mental Health vs memory care     Anticipated Discharge Services:    Anticipated Discharge DME:      Patient/family educated on Medicare website which has current facility and service quality ratings:    Education Provided on the Discharge Plan:    Patient/Family in Agreement with the Plan:      Referrals Placed by CM/SW: Post Acute Facilities  Private pay costs discussed: Not applicable    Additional Information:  Spoke with dtr in [place]. She has found a memory care facility in [place] with availability. It is called [facility] in [place], [place]. OLAMIDE spoke with ULISSES Pollack, p:[phone]. f:[phone]. Faxed her information. She will call bed side nurse to do an assessment. Family would like to fly her out there. Facility would need 10 days worth of medications. Waiting on official acceptance and timeline from family for move.      PRIMO Proctor, [facility]  [phone]  [facility]

## 2021-08-12 NOTE — PLAN OF CARE
Cognitive Concerns/ Orientation : Pt A&Ox2-3, disoriented to situation/time, forgetful   BEHAVIOR & AGGRESSION TOOL COLOR: Green  ABNL VS/O2: VSS on RA  MOBILITY: Assist x1 with gait belt and walker, turn/reposition in bed   PAIN MANAGMENT: Denies pain   DIET: Regular  BOWEL/BLADDER: Incontinent of bowel and bladder, purewick overnight. Had  an Xlarge loose stool x1  DRAIN/DEVICES: IV SL  SKIN: Scattered  bruising. BLE +2, elevating on pillows.   D/C DATE: pending placement, psych recommending 24 hour supervision memory care or geripsych

## 2021-08-12 NOTE — PLAN OF CARE
DATE & TIME: 8/12/2021 7-1530     Cognitive Concerns/ Orientation :A&Ox2, disoriented to situation/time, forgetful, pleasant and coop.   BEHAVIOR & AGGRESSION TOOL COLOR: Green  ABNL VS/O2: VSS on RA  MOBILITY: Assist x1 with GB/walker, chair for meals, turn/repos when in bed.   PAIN MANAGMENT: C/O gen pain (vickie hands, back, knees, etc). PRN tylenol effective. Declines voltaren gel.   DIET: Regular, requires tray set up per pt request.   BOWEL/BLADDER: Incont of b/b, offered freq toileting. Purewick in place when in bed.   DRAIN/DEVICES: PIV SL  SKIN: Intact, scattered bruising. BLE +2, elevating on pillows.   D/C DATE: Pend placement, Psych recommending 24 hour supervision per memory care or geripsych. Spoke with the facility in [place] and provided an assessment.   OTHER IMPORTANT INFO: Obs status. Resting between cares.

## 2021-08-13 PROCEDURE — 250N000013 HC RX MED GY IP 250 OP 250 PS 637: Performed by: STUDENT IN AN ORGANIZED HEALTH CARE EDUCATION/TRAINING PROGRAM

## 2021-08-13 PROCEDURE — 250N000013 HC RX MED GY IP 250 OP 250 PS 637: Performed by: PSYCHIATRY & NEUROLOGY

## 2021-08-13 PROCEDURE — 99207 PR CDG-CODE CATEGORY CHANGED: CPT | Performed by: PSYCHIATRY & NEUROLOGY

## 2021-08-13 PROCEDURE — 99225 PR SUBSEQUENT OBSERVATION CARE,LEVEL II: CPT | Performed by: INTERNAL MEDICINE

## 2021-08-13 PROCEDURE — 250N000013 HC RX MED GY IP 250 OP 250 PS 637: Performed by: INTERNAL MEDICINE

## 2021-08-13 PROCEDURE — G0378 HOSPITAL OBSERVATION PER HR: HCPCS

## 2021-08-13 PROCEDURE — 99214 OFFICE O/P EST MOD 30 MIN: CPT | Performed by: PSYCHIATRY & NEUROLOGY

## 2021-08-13 PROCEDURE — 250N000013 HC RX MED GY IP 250 OP 250 PS 637: Performed by: EMERGENCY MEDICINE

## 2021-08-13 RX ORDER — AMLODIPINE BESYLATE 10 MG/1
10 TABLET ORAL DAILY
Status: DISCONTINUED | OUTPATIENT
Start: 2021-08-14 | End: 2021-08-19 | Stop reason: HOSPADM

## 2021-08-13 RX ADMIN — HYDRALAZINE HYDROCHLORIDE 5 MG: 10 TABLET ORAL at 08:21

## 2021-08-13 RX ADMIN — ACETAMINOPHEN 650 MG: 325 TABLET, FILM COATED ORAL at 15:05

## 2021-08-13 RX ADMIN — HYDRALAZINE HYDROCHLORIDE 5 MG: 10 TABLET ORAL at 13:47

## 2021-08-13 RX ADMIN — DICLOFENAC SODIUM 2 G: 10 GEL TOPICAL at 21:33

## 2021-08-13 RX ADMIN — AMLODIPINE BESYLATE 5 MG: 5 TABLET ORAL at 08:20

## 2021-08-13 RX ADMIN — DIVALPROEX SODIUM 250 MG: 125 TABLET, DELAYED RELEASE ORAL at 08:21

## 2021-08-13 RX ADMIN — VENLAFAXINE HYDROCHLORIDE 150 MG: 150 CAPSULE, EXTENDED RELEASE ORAL at 08:20

## 2021-08-13 NOTE — PROGRESS NOTES
"[facility]    Hospitalist Progress Note    Date of Service (when I saw the patient): 08/13/2021  Admit date: 8/5/2021    Assessment & Plan     Elizabeth Joy is a 81 year old female with PMH dementia, depression, HTN, admitted on 8/6/2021 with chronic pain and suicidal ideation.      Suicidal ideation without attempt   Major Depressive disorder   Advanced dementia   Pt resides in a memory care unit and has been reportedly deteriorating recently with worsening depressive symptoms, refusing medications and making suicidal statement. She had a mental health assessment in the ED who recommended inpatient stabilization in a teo-psych facility. Attempts were made to find a facility in the ED, but beds will likely not be available until Monday 8/9; therefore the patient will be admitted until placement can be obtained.   - Continue prior to admission Effexor, Seroquel, Depakote and Donepazil   - Depakote increased by psychiatry on 8/11.   - Psychiatry consult on 08/13/21 regarding patient placement. No longer recommending teo-psychiatry.   memory care with 24 hour supervision. [employer] Cognitive Evaluation: Score 22/30, lost points for fluency, very poor clock drawing, day of week \"Sunday\"   - Per 08/11/21 psychiatry note::\" We can check a [depakote] level in a couple of days.\"   - CC/SW consult for placement. Working with family to get her to assisted living in [place] at discharge.      Aerococcus UTI  UA notable for moderate leukocyte esterase and WBC count of 89. Unclear if true UTI or colonization. Received 3 days of ceftriaxone, followed by 2 days of Keflex, last dose 08/10/21.     Chronic back and knee pain   Pt has a hx of falls and chronic pain. She has a chronic compression fracture of the L1 superior endplate as well as chronic patellar fracture of the right knee.    - On 08/12/21, patient up and walking denies any pain.   - Ice/heat PRN   - Acetaminophen PRN    - " Lidocaine patch PRN     Acute dehydration, diarrhea, RESOLVED  Patient has had poor intake, with large volume watery stool x 3 starting 8/9/21. BP running in 70s to 90s on 08/10/21 BUN 27/Cr 1.14, baseline Cr 1. Afebrile, HR 50's. Hgb stable at 10.2. WBC nl. No abdominal pain N/V. Gave IVF and held BP medications. Diarrhea resolved, BP up and Cr down on 08/11. Stopped IVF.     Recent Labs   Lab 08/11/21  0812 08/10/21  1544   CR 0.92 1.14*     Chronic anemia, normocytic    Recent Labs   Lab 08/10/21  1544   HGB 10.2*        Hypertension  - BP up a bit on 08/13/21.   - Resumed PTA amlodipine on 08/11/21 at lower dose of 5 mg daily (held 8/10/21 for low BP with diarrhea) > increase back to 10 mg on 8/14/21.   - Continue PTA hydralazine 10 mg TID.     Diet: Orders Placed This Encounter      Regular Diet Adult     IVF: Started NS + 20 KCl at 100 ml/h after  ml bolus on 08/10/21.   Ram Catheter: Not present     DVT Prophylaxis: Pneumatic Compression Devices, while in bed and AMBULATE.  Code Status: No CPR- Do NOT Intubate     Disposition: Expected discharge once we have placement, assuming BP better tomorrow and labs stable.   Communication: Discussed with RN, Care Coordinator, and social work on 08/13/21. Called son, Erich, and left message.      Juliet Browne  Hospitalist Service  [facility]  Securely message with the Vocera Web Console (learn more here)  Text page via [employer] Paging/Directory    Interval History   Events over last 24 hours as outlined above.  Patient remains happy. She has no concerns. She talks about how upcoming holiday, [place]osh [place], and all she wants to do for her family. She is hopeful.   Patient denies any SOB, CP, abdominal pain, N/V.     -Data reviewed today: I reviewed all new labs and imaging results over the last 24 hours. I personally reviewed no images or EKG's today.    Physical Exam   Temp: 97.4  F (36.3  C) Temp src: Oral BP: 122/76 Pulse: 81   Resp:  18 SpO2: 93 % O2 Device: None (Room air)    Vitals:    08/05/21 1352   Weight: 90.7 kg (200 lb)     Vital Signs with Ranges  Temp:  [97.4  F (36.3  C)-98  F (36.7  C)] 97.4  F (36.3  C)  Pulse:  [64-81] 81  Resp:  [18] 18  BP: (118-160)/(50-86) 122/76  SpO2:  [93 %-97 %] 93 %  I/O last 3 completed shifts:  In: 840 [P.O.:840]  Out: 700 [Urine:700]    Constitutional: NAD,   Neuropsyche: alert, oriented to hospital, but does not recall plan of care, but recalls me today.   Respiratory:  Breathing comfortably, good air exchange, no wheezes, no crackles.   Cardiovascular:  Regular rate and rhythm, no edema  GI:  soft, NT/ND, BS normal  Skin/Integumen:  No acute rash or sign of bleeding.     Medications   All medications reviewed on 08/12/21.       [START ON 8/14/2021] amLODIPine  10 mg Oral Daily     diclofenac  2 g Topical 4x Daily     divalproex sodium delayed-release  250 mg Oral BID     donepezil  5 mg Oral At Bedtime     hydrALAZINE  5 mg Oral TID     lidocaine   Transdermal Q8H     QUEtiapine  25 mg Oral At Bedtime     sodium chloride (PF)  3 mL Intracatheter Q8H     venlafaxine  150 mg Oral Daily       Data   Recent Labs   Lab 08/11/21  0812 08/10/21  1544   WBC  --  9.5   HGB  --  10.2*   MCV  --  95   PLT  --  250    139   POTASSIUM 3.9 3.6   CHLORIDE 111* 107   CO2 27 28   BUN 21 27   CR 0.92 1.14*   ANIONGAP 2* 4   MICHELLE 8.2* 8.4*   GLC 84 111*   ALBUMIN  --  2.8*   PROTTOTAL  --  6.0*   BILITOTAL  --  0.3   ALKPHOS  --  87   ALT  --  13   AST  --  6       No results found for this or any previous visit (from the past 24 hour(s)).

## 2021-08-13 NOTE — CONSULTS
[facility] Psychiatric Consult Progress Note    Interval History:   Patient was evaluated by nurse practitioner student Lisseth Kumar and this writer.     Pt seen, chart reviewed, case discussed with nursing staff and treating clinicians. Patient reports she is doing well. Denies feeling depressed or anxious. She reports she absolutely has no suicidal ideation. She brings up her grandchildren and great grandchildren as definitive reasons she would never act on such thoughts. She reports she made statements about suicide only in the context of frustration about her pain level but did not literally have suicidal thoughts. She reports she is taking her medications without side effects. Believes they are effective. Appetite is good. Denies she slept well last night, her recollection is she did not sleep much at all though I don't see documentation from nursing overnight that she was not able to sleep. Nursing reports she remains assist of 1, needs walking aids. She is incontinent of bowel and bladder. Hospitalist follow up documentation indicates she got treatment for possible UTI though it could have been colonization. Social work notes indicates family is requesting paitnet transfer to a memory care facility in [place].       Review of systems:   10 point Review of Systems completed by Dr. Lucia, and is  is negative other than noted in the HPI     Medications:       amLODIPine  5 mg Oral Daily     diclofenac  2 g Topical 4x Daily     divalproex sodium delayed-release  250 mg Oral BID     donepezil  5 mg Oral At Bedtime     hydrALAZINE  5 mg Oral TID     lidocaine   Transdermal Q8H     QUEtiapine  25 mg Oral At Bedtime     sodium chloride (PF)  3 mL Intracatheter Q8H     venlafaxine  150 mg Oral Daily     acetaminophen **OR** acetaminophen, Lidocaine, lidocaine 4%, lidocaine (buffered or not buffered), melatonin, ondansetron, polyethylene glycol, sodium chloride (PF)    Mental Status Examination:  "    Appearance:  awake, alert  Eye Contact:  good  Speech:  clear, coherent  Language:Normal  Psychomotor Behavior:  no evidence of tardive dyskinesia, dystonia, or tics  Mood:  better  Affect:  mood congruent and intensity is normal  Thought Process:  goal oriented no loose associations  Thought Content:  no evidence of suicidal ideation or homicidal ideation, no auditory hallucinations present and no visual hallucinations present  Oriented to:     Attention Span and Concentration:  fair  Recent and Remote Memory:  fair  Fund of Knowledge: delayed  Muscle Strength and Tone: normal  Gait and Station: Not evaluated  Insight:  fair  Judgment:  fair  [employer] Cognitive Evaluation: Score 22/30, lost points for fluency, very poor clock drawing, day of week \"Sunday\":      Labs/Vitals:   No results found for this or any previous visit (from the past 24 hour(s)).  B/P: 149/78, T: 97.4, P: 64, R: 18    Impression:   The patient is an 81-year-old woman with previously diagnosed dementia who has some behavioral dysregulation.  8/13/21 Patient has stabilized from depression standpoint with recent medication changes and perhaps related to medical cares in hospital setting. Has been consistently without suicidal ideation and reporting her depression and anxiety are fine. She would like to continue her current medications. Cognitive status seems improved from what was documented earlier but still having intermittent disorientation, incontinence, needing assist, and SLUMs indicates poor executive functioning and some evidence of continued substantial cognitive impairment. Interesting her memory per se on SLUMS was reasonably good, so executive functioning impairment is more likely effected.       DIagnoses:   1.  Major neurocognitive disorder with behavioral disturbance.  2.  Unspecified depressive disorder.  3.  Multiple medical comorbidities including possible urinary tract infection.  4.  Rule out delirium " secondary to urinary tract infection         Plan:   1. Patient is doing better on current medication regimen. Recommend continuing as is without change today. Rec outpatient follow up for further monitoring of her psychiatric symptoms and medication management  2. Patient does not require Aislinn-psych at this time.       Addendum:  --Depakote level was 45 on 8/12/21 (AM draw). Given her agitation has been improving and she is near the therapeutic range, and given her age I prefer continuing the Depakote dosing as is for now. If agitation recurs we have room to increase by another 250 mg to 250 mg TID. If that were required would recheck level another 4-5 days from dose increase.     Attestation:  Patient has been seen and evaluated by me,  Stephen Lucia MD

## 2021-08-13 NOTE — PLAN OF CARE
"DATE & TIME: 8/13/21   Cognitive Concerns/ Orientation :A&Ox2-3; disoriented to time/situation, forgetful pleasant. States she has\"an excellent memory\".  BEHAVIOR & AGGRESSION TOOL COLOR: Green  ABNL VS/O2: VSS on RA   MOBILITY: Assist of 1/GB/walker, chair for meals, amb in fields and to BR. Enc Turn/repos when.  PAIN MANAGMENT: Denies pain, PRN tylenol and Voltaren gel available.   DIET: Regular, requires tray set up per pt request.  BOWEL/BLADDER: Incont of b/b, occ requests purewick, also sched toileting and enc BR use. No BM this shift.   DRAIN/DEVICES: PIV SL  SKIN: Intact, scattered bruising. BLE +2, elevating on pillows.   D/C DATE: Pend placement in [place],  waiting to hear back from facility.  OTHER IMPORTANT INFO: Obs status. Psych saw, no longer requires Aislinn-psych.         Observation goals PER UNIT ROUTINE     Comments: Placement found. Not met       "

## 2021-08-13 NOTE — PLAN OF CARE
DATE & TIME: 8/12/21-8/13/21 [id number]  Cognitive Concerns/ Orientation :A&Ox2-3; able to answer most orientation questions but generally confused and forgetful. Disoriented to situation/and occasionally time.   BEHAVIOR & AGGRESSION TOOL COLOR: Green, calm/cooperative  ABNL VS/O2: VSS on RA ex HTN  MOBILITY: Assist of 1/GB/walker, chair for meals. Turn/repositioned self in bed overnight. Up to BR.   PAIN MANAGMENT: Denies pain, PRN tylenol and Voltaren gel available.   DIET: Regular, requires tray set up per pt request  BOWEL/BLADDER: Incont of b/b, pt originally refused purewick overnight but after episodes of incontinence agreed to use purewick overnight. No BM this shift.   DRAIN/DEVICES: PIV SL  SKIN: Intact, scattered bruising. BLE +2, elevating on pillows.   D/C DATE: Pend placement, Psych recommending 24 hour supervision per memory care or geripsych. Spoke with the facility in [place] and provided an assessment.   OTHER IMPORTANT INFO: Obs status. Psych consult placed for depression/suicidal ideation.

## 2021-08-13 NOTE — PROGRESS NOTES
Care Management Follow Up    Length of Stay (days): 1    Expected Discharge Date: 08/13/2021     Concerns to be Addressed: discharge planning     Patient plan of care discussed at interdisciplinary rounds: Yes    Anticipated Discharge Disposition: Iassited living     Anticipated Discharge Services:    Anticipated Discharge DME:      Patient/family educated on Medicare website which has current facility and service quality ratings:    Education Provided on the Discharge Plan:  yes  Patient/Family in Agreement with the Plan:  yes    Referrals Placed by CM/SW: Post Acute Facilities  Private pay costs discussed: Not applicable    Additional Information:  Spoke with ULISSES Pollack at [facility] in [place]. Pt accepted and family signed paperwork this morning. OLAMIDE spoke with daughter in law, Cristina, phone [phone]. Son Philip will fly with pt out to [place]. Asked Cristina to call Monday morning to let us know what they have arranged. Jaki suggests MD may want to send family with ativan on the flight for patient. Jaki out of town next week. Lola is the , [phone]. Main number to [facility] is [phone]. Pt will need to arrive with 10 days worth of meds.       PRIMO Proctor, LGSW  [phone]  [facility]

## 2021-08-13 NOTE — PLAN OF CARE
DATE & TIME: 8/12/2021 3-11p   Cognitive Concerns/ Orientation :A&Ox2, disoriented to situation/time, forgetful, pleasant and coop.   BEHAVIOR & AGGRESSION TOOL COLOR: Green, calm/cooperative  ABNL VS/O2: VSS on RA  MOBILITY: Assist of 1/GB/walker, chair for meals, turn/repos when in bed.   PAIN MANAGMENT: C/O gen pain (vickie hands, back, knees, etc). PRN tylenol x1-effective. Voltaren gel scheduled, declined Lidocaine patch  DIET: Regular, requires tray set up per pt request, good po  BOWEL/BLADDER: Incont of b/b, offered freq toileting. Purewick in place when in bed.   DRAIN/DEVICES: PIV SL  SKIN: Intact, scattered bruising. BLE +2, elevating on pillows.   D/C DATE: Pend placement, Psych recommending 24 hour supervision per memory care or geripsych. Spoke with the facility in [place] and provided an assessment.   OTHER IMPORTANT INFO: Obs status. Resting between cares. Psych consult placed.

## 2021-08-14 LAB — SARS-COV-2 RNA RESP QL NAA+PROBE: NEGATIVE

## 2021-08-14 PROCEDURE — 99225 PR SUBSEQUENT OBSERVATION CARE,LEVEL II: CPT | Performed by: INTERNAL MEDICINE

## 2021-08-14 PROCEDURE — 250N000013 HC RX MED GY IP 250 OP 250 PS 637: Performed by: PSYCHIATRY & NEUROLOGY

## 2021-08-14 PROCEDURE — 250N000013 HC RX MED GY IP 250 OP 250 PS 637: Performed by: STUDENT IN AN ORGANIZED HEALTH CARE EDUCATION/TRAINING PROGRAM

## 2021-08-14 PROCEDURE — 87635 SARS-COV-2 COVID-19 AMP PRB: CPT | Performed by: INTERNAL MEDICINE

## 2021-08-14 PROCEDURE — 250N000013 HC RX MED GY IP 250 OP 250 PS 637: Performed by: INTERNAL MEDICINE

## 2021-08-14 PROCEDURE — G0378 HOSPITAL OBSERVATION PER HR: HCPCS

## 2021-08-14 PROCEDURE — 250N000013 HC RX MED GY IP 250 OP 250 PS 637: Performed by: EMERGENCY MEDICINE

## 2021-08-14 RX ADMIN — DONEPEZIL HYDROCHLORIDE 5 MG: 5 TABLET, FILM COATED ORAL at 21:50

## 2021-08-14 RX ADMIN — ACETAMINOPHEN 650 MG: 325 TABLET, FILM COATED ORAL at 13:59

## 2021-08-14 RX ADMIN — HYDRALAZINE HYDROCHLORIDE 5 MG: 10 TABLET ORAL at 19:37

## 2021-08-14 RX ADMIN — DIVALPROEX SODIUM 250 MG: 125 TABLET, DELAYED RELEASE ORAL at 21:50

## 2021-08-14 RX ADMIN — HYDRALAZINE HYDROCHLORIDE 5 MG: 10 TABLET ORAL at 13:59

## 2021-08-14 RX ADMIN — DIVALPROEX SODIUM 250 MG: 125 TABLET, DELAYED RELEASE ORAL at 11:12

## 2021-08-14 RX ADMIN — VENLAFAXINE HYDROCHLORIDE 150 MG: 150 CAPSULE, EXTENDED RELEASE ORAL at 11:16

## 2021-08-14 RX ADMIN — QUETIAPINE FUMARATE 25 MG: 25 TABLET ORAL at 21:50

## 2021-08-14 RX ADMIN — DICLOFENAC SODIUM 2 G: 10 GEL TOPICAL at 11:14

## 2021-08-14 RX ADMIN — AMLODIPINE BESYLATE 10 MG: 10 TABLET ORAL at 11:12

## 2021-08-14 RX ADMIN — HYDRALAZINE HYDROCHLORIDE 5 MG: 10 TABLET ORAL at 11:11

## 2021-08-14 RX ADMIN — DICLOFENAC SODIUM 2 G: 10 GEL TOPICAL at 19:35

## 2021-08-14 RX ADMIN — ACETAMINOPHEN 650 MG: 325 TABLET, FILM COATED ORAL at 19:35

## 2021-08-14 NOTE — PROGRESS NOTES
"[facility]    Hospitalist Progress Note    Date of Service (when I saw the patient): 08/14/2021  Admit date: 8/5/2021    Assessment & Plan     Elizabeth Joy is a 81 year old female with PMH dementia, depression, HTN, admitted on 8/6/2021 with chronic pain and suicidal ideation.      Suicidal ideation without attempt   Major Depressive disorder   Advanced dementia   Pt resides in a memory care unit and has been reportedly deteriorating recently with worsening depressive symptoms, refusing medications and making suicidal statement. She had a mental health assessment in the ED who recommended inpatient stabilization in a teo-psych facility. Attempts were made to find a facility in the ED, but beds will likely not be available until Monday 8/9; therefore the patient will be admitted until placement can be obtained.   * Psychiatry continued to recommend teo-psyche on 8/11.   * Psychiatry consulted on 08/13/21 to re-assess and discuss recommendation with family. They performed [employer] Cognitive Evaluation: Score 22/30 and no longer recommended teo-psychiatry. They unfortunately did not speak with family.     - Continue prior to admission Effexor, Seroquel, Depakote and Donepazil   - Depakote increased by psychiatry on 8/11. Per psychiatry note: \"We can check a [depakote] level in a couple of days.\" I have consulted pharmacy to assist in monitoring levels as needed after dose changes on 08/14/21. This is appreciated.   - CC/SW consult for placement. She has been accepted at AL in [place] and working with family to arrange discharge. Son is flying to [place] on Wednesday to pick her up and fly back to [facility] with her.   - [facility] [place] [facility]. Given her dementia, seroquel is preferred and patient has tolerated this in the past.      Aerococcus UTI  UA notable for moderate leukocyte esterase and WBC count of 89. Unclear if true UTI or " colonization. Received 3 days of ceftriaxone, followed by 2 days of Keflex, last dose 08/10/21.     Chronic back and knee pain   Pt has a hx of falls and chronic pain. She has a chronic compression fracture of the L1 superior endplate as well as chronic patellar fracture of the right knee.    - On 08/12/21, patient up and walking denies any pain.   - Ice/heat PRN   - Acetaminophen PRN    - Lidocaine patch PRN     Acute dehydration, diarrhea, RESOLVED  Patient has had poor intake, with large volume watery stool x 3 starting 8/9/21. BP running in 70s to 90s on 08/10/21 BUN 27/Cr 1.14, baseline Cr 1. Afebrile, HR 50's. Hgb stable at 10.2. WBC nl. No abdominal pain N/V. Gave IVF and held BP medications. Diarrhea resolved, BP up and Cr down on 08/11. Stopped IVF.     Recent Labs   Lab 08/11/21  0812 08/10/21  1544   CR 0.92 1.14*     Chronic anemia, normocytic    Recent Labs   Lab 08/10/21  1544   HGB 10.2*        Hypertension  - BP up a bit on 08/13/21.   - Continue on PTA amlodipine 10 mg daily    - Continue PTA hydralazine 10 mg TID.     Diet: Orders Placed This Encounter      Regular Diet Adult     IVF: None   Ram Catheter: Not present     DVT Prophylaxis: Pneumatic Compression Devices, while in bed and AMBULATE.  Code Status: No CPR- Do NOT Intubate     Disposition: Expected discharge once we have placement, assuming BP better tomorrow and labs stable.   Communication: Discussed with patient and son, Erich on 08/14/21.      Juliet Browne  Hospitalist Service  [facility]  Securely message with the Vocera Web Console (learn more here)  Text page via [employer] Paging/Directory    Interval History   Events over last 24 hours as outlined above.  Patient eating lunch. Denies any discomfort. Happy with plan to return to [place] with family.   Patient denies any SOB, CP, abdominal pain, N/V.     -Data reviewed today: I reviewed all new labs and imaging results over the last 24 hours. I personally  reviewed no images or EKG's today.    Physical Exam   Temp: 97.8  F (36.6  C) Temp src: Oral BP: (!) 136/90 Pulse: 80   Resp: 18 SpO2: 95 % O2 Device: None (Room air)    Vitals:    08/05/21 1352   Weight: 90.7 kg (200 lb)     Vital Signs with Ranges  Temp:  [97.4  F (36.3  C)-97.8  F (36.6  C)] 97.8  F (36.6  C)  Pulse:  [64-83] 80  Resp:  [16-20] 18  BP: (122-150)/(76-90) 136/90  SpO2:  [93 %-96 %] 95 %  I/O last 3 completed shifts:  In: -   Out: 1100 [Urine:1100]    Constitutional: NAD,   Neuropsyche: alert, oriented to hospital, but does not recall plan of care, but recalls me today.   Respiratory:  Breathing comfortably, good air exchange, no wheezes, no crackles.   Cardiovascular:  Regular rate and rhythm, no edema  GI:  soft, NT/ND, BS normal  Skin/Integumen:  No acute rash or sign of bleeding.     Medications   All medications reviewed on 08/14/21.       amLODIPine  10 mg Oral Daily     diclofenac  2 g Topical 4x Daily     divalproex sodium delayed-release  250 mg Oral BID     donepezil  5 mg Oral At Bedtime     hydrALAZINE  5 mg Oral TID     lidocaine   Transdermal Q8H     QUEtiapine  25 mg Oral At Bedtime     sodium chloride (PF)  3 mL Intracatheter Q8H     venlafaxine  150 mg Oral Daily       Data   Recent Labs   Lab 08/11/21  0812 08/10/21  1544   WBC  --  9.5   HGB  --  10.2*   MCV  --  95   PLT  --  250    139   POTASSIUM 3.9 3.6   CHLORIDE 111* 107   CO2 27 28   BUN 21 27   CR 0.92 1.14*   ANIONGAP 2* 4   MICHELLE 8.2* 8.4*   GLC 84 111*   ALBUMIN  --  2.8*   PROTTOTAL  --  6.0*   BILITOTAL  --  0.3   ALKPHOS  --  87   ALT  --  13   AST  --  6       No results found for this or any previous visit (from the past 24 hour(s)).

## 2021-08-14 NOTE — PROGRESS NOTES
Pt is AOx2; disoriented to place and situation. Pt is confused and impulsive at times. Pt refuses medications at times. VSS on RA. Ax1; GB/walker. Denies pain. Tolerating regular diet. Incontinent B/B; purewick is in place. No BM this shift. PIV; SL. +2 edema BLE. Discharge pending placement in [place]; continue to monitor

## 2021-08-14 NOTE — PLAN OF CARE
DATE & TIME: 8/13/21 3-11pm  Cognitive Concerns/ Orientation :A&disoriented to place and situation. Forgetful.   BEHAVIOR & AGGRESSION TOOL COLOR: yellow. Confuse. Impulsive with getting up. Bed alarm on zone 2.  ABNL VS/O2: VSS on RA   MOBILITY: Assist of 1/GB/walker. Up in chair.  PAIN MANAGMENT:  Denies. On schedule Voltaren gel.  DIET: Regular.  BOWEL/BLADDER: Incont of bladder at times. Pure wick placed at bedtime. No BM this shift.   DRAIN/DEVICES: PIV SL  SKIN: Intact, scattered bruising. BLE +1-2 edema, elevating on pillows.   D/C DATE: Pend placement in [place],  waiting to hear back from facility.  OTHER IMPORTANT INFO: Obs status. Psych saw, no longer requires Aislinn-psych. Patient refused her bedtime meds.

## 2021-08-14 NOTE — UTILIZATION REVIEW
"Admission Status; Secondary Review Determination     Admission Date: 8/5/2021  1:48 PM       Under the authority of the Utilization Management Committee, the utilization review process indicated a secondary review on the above patient.  The review outcome is based on review of the medical records, discussions with staff, and applying clinical experience noted on the date of the review.          (x) Observation Status Appropriate - This patient does not meet hospital inpatient criteria and is placed in observation status. If this patient's primary payer is Medicare and was admitted as an inpatient, Condition Code 44 should be used and patient status changed to \"observation\".       RATIONALE FOR DETERMINATION      Brief clinical presentation, information copied from the chart, abbreviated and edited for relevant content:     Patient was going to be admitted to [facility] but now just a placement issue. Awaiting placement.     Elizabeth Joy is a 81 year old female with PMH dementia, depression, HTN, admitted on 8/6/2021 with chronic pain and suicidal ideation. Pt resided in a memory care unit and has been reportedly deteriorating recently with worsening depressive symptoms, refusing medications and making suicidal statemenst. She had a mental health assessment in the ED who recommended inpatient stabilization in a teo-psych facility.   Psychiatry consulted on 08/13/21 to re-assess and discuss recommendation with family. They performed [employer] Cognitive Evaluation: Score 22/30 and no longer recommended teo-psychiatry. Depakote increased by psychiatry on 8/11. CC/SW consult for placement. She has been accepted at AL in [place] and working with family to arrange discharge. Son is flying to [place] on Wednesday to pick her up and fly back to [facility] with her.       Patient is clinically improving and there is no clear indication to change the patient's status to inpatient. The severity of illness, " intensity of cares provided, risk for adverse outcome, and expected LOS make the care appropriate for observation.       The information on this document is developed by the utilization review team in order for the business office to ensure compliance.  This only denotes the appropriateness of proper admission status and does not reflect the quality of care rendered.         The definitions of Inpatient Status and Observation Status used in making the determination above are those provided in the CMS Coverage Manual, Chapter 1 and Chapter 6, section 70.4.      Sincerely,     Maxine Abarca MD   Utilization Review/ Case Management  [facility].

## 2021-08-14 NOTE — PLAN OF CARE
"VSS, no c/o pain, lethargic most of morning, confused and oriented to self only, multiple attempts to get patient up and out of bed met with refusal and \"just go away\", incontinent of urine--purewick in place, plan is to memory care center once placement found, continue to monitor.   "

## 2021-08-15 LAB — VALPROATE SERPL-MCNC: 51 MG/L

## 2021-08-15 PROCEDURE — 36415 COLL VENOUS BLD VENIPUNCTURE: CPT

## 2021-08-15 PROCEDURE — 250N000013 HC RX MED GY IP 250 OP 250 PS 637: Performed by: INTERNAL MEDICINE

## 2021-08-15 PROCEDURE — G0378 HOSPITAL OBSERVATION PER HR: HCPCS

## 2021-08-15 PROCEDURE — 250N000013 HC RX MED GY IP 250 OP 250 PS 637: Performed by: EMERGENCY MEDICINE

## 2021-08-15 PROCEDURE — 250N000013 HC RX MED GY IP 250 OP 250 PS 637: Performed by: PSYCHIATRY & NEUROLOGY

## 2021-08-15 PROCEDURE — 80164 ASSAY DIPROPYLACETIC ACD TOT: CPT

## 2021-08-15 PROCEDURE — 99225 PR SUBSEQUENT OBSERVATION CARE,LEVEL II: CPT | Performed by: INTERNAL MEDICINE

## 2021-08-15 PROCEDURE — 250N000013 HC RX MED GY IP 250 OP 250 PS 637: Performed by: STUDENT IN AN ORGANIZED HEALTH CARE EDUCATION/TRAINING PROGRAM

## 2021-08-15 RX ADMIN — ACETAMINOPHEN 650 MG: 325 TABLET, FILM COATED ORAL at 10:53

## 2021-08-15 RX ADMIN — VENLAFAXINE HYDROCHLORIDE 150 MG: 150 CAPSULE, EXTENDED RELEASE ORAL at 10:40

## 2021-08-15 RX ADMIN — ACETAMINOPHEN 650 MG: 325 TABLET, FILM COATED ORAL at 19:59

## 2021-08-15 RX ADMIN — HYDRALAZINE HYDROCHLORIDE 5 MG: 10 TABLET ORAL at 10:40

## 2021-08-15 RX ADMIN — DIVALPROEX SODIUM 250 MG: 125 TABLET, DELAYED RELEASE ORAL at 21:10

## 2021-08-15 RX ADMIN — DICLOFENAC SODIUM 2 G: 10 GEL TOPICAL at 21:14

## 2021-08-15 RX ADMIN — QUETIAPINE FUMARATE 25 MG: 25 TABLET ORAL at 21:10

## 2021-08-15 RX ADMIN — DICLOFENAC SODIUM 2 G: 10 GEL TOPICAL at 17:56

## 2021-08-15 RX ADMIN — HYDRALAZINE HYDROCHLORIDE 5 MG: 10 TABLET ORAL at 13:28

## 2021-08-15 RX ADMIN — DIVALPROEX SODIUM 250 MG: 125 TABLET, DELAYED RELEASE ORAL at 10:39

## 2021-08-15 RX ADMIN — HYDRALAZINE HYDROCHLORIDE 5 MG: 10 TABLET ORAL at 19:59

## 2021-08-15 RX ADMIN — DICLOFENAC SODIUM 2 G: 10 GEL TOPICAL at 10:53

## 2021-08-15 RX ADMIN — DICLOFENAC SODIUM 2 G: 10 GEL TOPICAL at 13:28

## 2021-08-15 RX ADMIN — AMLODIPINE BESYLATE 10 MG: 10 TABLET ORAL at 10:40

## 2021-08-15 RX ADMIN — DONEPEZIL HYDROCHLORIDE 5 MG: 5 TABLET, FILM COATED ORAL at 21:10

## 2021-08-15 NOTE — PROGRESS NOTES
"[facility]    Hospitalist Progress Note    Date of Service (when I saw the patient): 08/15/2021  Admit date: 8/5/2021    Assessment & Plan     Elizabeth Joy is a 81 year old female with PMH dementia, depression, HTN, admitted on 8/6/2021 with chronic pain and suicidal ideation.      Suicidal ideation without attempt, RESOLVED  Major Depressive disorder, RESOLVING  Advanced dementia,  Pt resides in a memory care unit and has been reportedly deteriorating recently with worsening depressive symptoms, refusing medications and making suicidal statement. She had a mental health assessment in the ED who recommended inpatient stabilization in a teo-psych facility. Attempts were made to find a facility in the ED, but beds will likely not be available until Monday 8/9; therefore the patient will be admitted until placement can be obtained.   * Psychiatry continued to recommend teo-psyche on 8/11.   * Psychiatry consulted on 08/13/21 to re-assess and discuss recommendation with family. They performed [employer] Cognitive Evaluation: Score 22/30 and no longer recommended teo-psychiatry. They unfortunately did not speak with family.     - Continue prior to admission Effexor, Seroquel, Depakote and Donepazil   - Depakote increased by psychiatry on 8/11. Per psychiatry note: \"We can check a [depakote] level in a couple of days.\" I have consulted pharmacy to monitor levels: Depakote in therapeutic range (51) on 08/15/21.   - CC/SW consult for placement. She has been accepted at AL in [place] and working with family to arrange discharge. Son is flying to [place] on Wednesday to pick her up and fly back to [facility] with her.   - Staff at [facility] in [place] requested PRN ativan for flight. Given her dementia, seroquel is preferred and patient has tolerated this in the past.      Aerococcus UTI  UA notable for moderate leukocyte esterase and WBC count of 89. Unclear if true UTI or " colonization. Received 3 days of ceftriaxone, followed by 2 days of Keflex, last dose 08/10/21.     Chronic back and knee pain   Pt has a hx of falls and chronic pain. She has a chronic compression fracture of the L1 superior endplate as well as chronic patellar fracture of the right knee.    - On 08/12/21, patient up and walking denies any pain.   - Ice/heat PRN   - Acetaminophen PRN    - Lidocaine patch PRN     Acute dehydration, diarrhea, RESOLVED  Patient has had poor intake, with large volume watery stool x 3 starting 8/9/21. BP running in 70s to 90s on 08/10/21 BUN 27/Cr 1.14, baseline Cr 1. Afebrile, HR 50's. Hgb stable at 10.2. WBC nl. No abdominal pain N/V. Gave IVF and held BP medications. Diarrhea resolved, BP up and Cr down on 08/11. Stopped IVF.     Recent Labs   Lab 08/11/21  0812 08/10/21  1544   CR 0.92 1.14*     Chronic anemia, normocytic    Recent Labs   Lab 08/10/21  1544   HGB 10.2*        Hypertension  - BP controlled on 08/15/21.   - Continue on PTA amlodipine 10 mg daily    - Continue PTA hydralazine 10 mg TID.     Diet: Orders Placed This Encounter      Regular Diet Adult     IVF: None   Ram Catheter: Not present     DVT Prophylaxis: Pneumatic Compression Devices, while in bed and AMBULATE.  Code Status: No CPR- Do NOT Intubate     Disposition: Expected discharge once we have placement, assuming BP better tomorrow and labs stable.   Communication: Discussed with patient and son, Erich on 08/14/21.      Juliet Browne  Hospitalist Service  [facility]  Securely message with the Vocera Web Console (learn more here)  Text page via [employer] Paging/Directory    Interval History   Events over last 24 hours as outlined above.  Patient eating lunch. Denies any discomfort. Happy with plan to return to [place] with family.   Patient denies any SOB, CP, abdominal pain, N/V.     -Data reviewed today: I reviewed all new labs and imaging results over the last 24 hours. I personally  reviewed no images or EKG's today.    Physical Exam   Temp: 98.2  F (36.8  C) Temp src: Axillary BP: 135/75 Pulse: 67   Resp: 18 SpO2: 96 % O2 Device: None (Room air)    Vitals:    08/05/21 1352   Weight: 90.7 kg (200 lb)     Vital Signs with Ranges  Temp:  [98.2  F (36.8  C)-98.8  F (37.1  C)] 98.2  F (36.8  C)  Pulse:  [67-76] 67  Resp:  [18] 18  BP: ()/(57-84) 135/75  SpO2:  [92 %-97 %] 96 %  I/O last 3 completed shifts:  In: 150 [P.O.:150]  Out: -     Constitutional: NAD,   Neuropsyche: alert, oriented to hospital, but does not recall plan of care, but is agreeable.   Respiratory:  Breathing comfortably, good air exchange, no wheezes, no crackles.   Cardiovascular:  Regular rate and rhythm, no edema  GI:  soft, NT/ND, BS normal  Skin/Integumen:  No acute rash or sign of bleeding.     Medications   All medications reviewed on 08/15/21.       amLODIPine  10 mg Oral Daily     diclofenac  2 g Topical 4x Daily     divalproex sodium delayed-release  250 mg Oral BID     donepezil  5 mg Oral At Bedtime     hydrALAZINE  5 mg Oral TID     lidocaine   Transdermal Q8H     QUEtiapine  25 mg Oral At Bedtime     sodium chloride (PF)  3 mL Intracatheter Q8H     venlafaxine  150 mg Oral Daily       Data   Recent Labs   Lab 08/11/21  0812 08/10/21  1544   WBC  --  9.5   HGB  --  10.2*   MCV  --  95   PLT  --  250    139   POTASSIUM 3.9 3.6   CHLORIDE 111* 107   CO2 27 28   BUN 21 27   CR 0.92 1.14*   ANIONGAP 2* 4   MICHELLE 8.2* 8.4*   GLC 84 111*   ALBUMIN  --  2.8*   PROTTOTAL  --  6.0*   BILITOTAL  --  0.3   ALKPHOS  --  87   ALT  --  13   AST  --  6       No results found for this or any previous visit (from the past 24 hour(s)).

## 2021-08-15 NOTE — PLAN OF CARE
Date/Time: 8/15/21 1900  Cognitive Concerns/ Orientation : Alert and oriented x 4 but Forgetful.  BEHAVIOR & AGGRESSION TOOL COLOR: Green     ABNL VS/O2: VSS on RA.   MOBILITY: Ax1 GB/W. Was up in chair and walked to bathroom  PAIN MANAGMENT: Reports hand pain, Voltaren gel.  DIET: Regular diet, tolerating. Assistance needed with ordering.   BOWEL/BLADDER: Diarrhea x 3 today. Pt able to get up to toilet.   SKIN: Trace edema to BLE. Pale. Scattered bruising.   D/C DAY/GOALS/PLACE: Placement at [facility] [place] in [place]. Discharge pending safe travel plans.   OTHER IMPORTANT INFO: Bed alarm on for safety. Slept well through the night. Visualized frequently. Pt encouraged to get up and use toilet.

## 2021-08-15 NOTE — PLAN OF CARE
Cognitive Concerns/ Orientation : Alert to self and place. Forgetful of situation and time. Calm and cooperative this evening. Calling to make needs known.    BEHAVIOR & AGGRESSION TOOL COLOR: Green   CIWA SCORE: NA    ABNL VS/O2: VSS with softer pressures this evening in the upper 90's. On RA. LS clear/diminished.   MOBILITY: Assisted with positioning in bed. Pt refused walk tonight, but purewick removed and pt instructed to call to get to bathroom as needed.  PAIN MANAGMENT: Bilateral knee pain and mild swelling. Neck discomfort. Tolerable with PRN Tylenol, Voltaren cream, and heat packs.   DIET: Regular. Good appetite and fluid intake. Pt needs assist with ordering. Breakfast ordered for pt.  BOWEL/BLADDER: Pt asked for purewick to be removed. No BM this evening.   ABNL LAB/BG: No new labs   DRAIN/DEVICES: PIV in LFA SL   TELEMETRY RHYTHM: NA   SKIN: Pale in color with small, scattered bruising.   TESTS/PROCEDURES: NA   D/C DATE: Pending transportation plan. Family has found pt a bed in [place]. Family member will assist with travel. Discharge date and plan being worked out.   Discharge Barriers: Memory care focus needs.   OTHER IMPORTANT INFO: Bed alarm for safety. Rounded on freq. Calls as needed. Pt encouraged to get up and moving.

## 2021-08-15 NOTE — PLAN OF CARE
DATE & TIME: 8/15/2021 8187-1126    Cognitive Concerns/ Orientation : Alert to self. Forgetful.  BEHAVIOR & AGGRESSION TOOL COLOR: Green    ABNL VS/O2: VSS on RA.   MOBILITY: Ax1 GB/W. Pt refused to get out of bed this shift.   PAIN MANAGMENT: Denies pain. Heat applied to R knee.   DIET: Regular diet, tolerating. Assistance needed with ordering.   BOWEL/BLADDER: Inc of B/B. Pt refuses purewick and refuses to get up to toilet.   SKIN: Trace edema to BLE. Pale. Scattered bruising.   D/C DAY/GOALS/PLACE: Placement at [facility] in [place]. Discharge pending safe travel plans.   OTHER IMPORTANT INFO: Bed alarm on for safety. Slept well through the night. Visualized frequently. Pt encouraged to get up and use toilet, refused.

## 2021-08-16 PROCEDURE — 250N000013 HC RX MED GY IP 250 OP 250 PS 637: Performed by: EMERGENCY MEDICINE

## 2021-08-16 PROCEDURE — G0378 HOSPITAL OBSERVATION PER HR: HCPCS

## 2021-08-16 PROCEDURE — 250N000013 HC RX MED GY IP 250 OP 250 PS 637: Performed by: STUDENT IN AN ORGANIZED HEALTH CARE EDUCATION/TRAINING PROGRAM

## 2021-08-16 PROCEDURE — 250N000013 HC RX MED GY IP 250 OP 250 PS 637: Performed by: PSYCHIATRY & NEUROLOGY

## 2021-08-16 PROCEDURE — 250N000013 HC RX MED GY IP 250 OP 250 PS 637: Performed by: INTERNAL MEDICINE

## 2021-08-16 PROCEDURE — 99225 PR SUBSEQUENT OBSERVATION CARE,LEVEL II: CPT | Performed by: HOSPITALIST

## 2021-08-16 RX ADMIN — VENLAFAXINE HYDROCHLORIDE 150 MG: 150 CAPSULE, EXTENDED RELEASE ORAL at 08:24

## 2021-08-16 RX ADMIN — DIVALPROEX SODIUM 250 MG: 125 TABLET, DELAYED RELEASE ORAL at 20:21

## 2021-08-16 RX ADMIN — DICLOFENAC SODIUM 2 G: 10 GEL TOPICAL at 22:29

## 2021-08-16 RX ADMIN — DONEPEZIL HYDROCHLORIDE 5 MG: 5 TABLET, FILM COATED ORAL at 22:29

## 2021-08-16 RX ADMIN — HYDRALAZINE HYDROCHLORIDE 5 MG: 10 TABLET ORAL at 08:24

## 2021-08-16 RX ADMIN — AMLODIPINE BESYLATE 10 MG: 10 TABLET ORAL at 08:23

## 2021-08-16 RX ADMIN — DIVALPROEX SODIUM 250 MG: 125 TABLET, DELAYED RELEASE ORAL at 08:24

## 2021-08-16 RX ADMIN — HYDRALAZINE HYDROCHLORIDE 5 MG: 10 TABLET ORAL at 14:28

## 2021-08-16 RX ADMIN — QUETIAPINE FUMARATE 25 MG: 25 TABLET ORAL at 22:29

## 2021-08-16 RX ADMIN — DICLOFENAC SODIUM 2 G: 10 GEL TOPICAL at 18:22

## 2021-08-16 RX ADMIN — HYDRALAZINE HYDROCHLORIDE 5 MG: 10 TABLET ORAL at 20:20

## 2021-08-16 RX ADMIN — DICLOFENAC SODIUM 2 G: 10 GEL TOPICAL at 14:28

## 2021-08-16 NOTE — PLAN OF CARE
No Change  Cognitive Concerns/ Orientation : Alert and oriented x 4 but Forgetful.  BEHAVIOR & AGGRESSION TOOL COLOR: Green     ABNL VS/O2: VSS on RA.   MOBILITY: Ax1 GB/W. Was up in chair and walked to bathroom  PAIN MANAGMENT: Voltaren gel.  DIET: Regular diet, tolerating. Assistance needed with ordering.   BOWEL/BLADDER: Inc of B/B. Pt able to get up to toilet.   SKIN: Trace edema to BLE. Pale. Scattered bruising.   D/C DAY/GOALS/PLACE: Placement at [facility] in [place] planned for 8/18 Am, riding with son. Facility requesting 10 days of medications to accompany patient.  OTHER IMPORTANT INFO:

## 2021-08-16 NOTE — PLAN OF CARE
Cognitive Concerns/ Orientation : Alert and oriented x 4 but Forgetful.  BEHAVIOR & AGGRESSION TOOL COLOR: Green     ABNL VS/O2: VSS on RA.   MOBILITY: Ax1 GB/W. Was up in chair and walked to bathroom  PAIN MANAGMENT: Reports hand pain, Voltaren gel.  DIET: Regular diet, tolerating. Assistance needed with ordering.   BOWEL/BLADDER: Inc of B/B. Pt able to get up to toilet.   SKIN: Trace edema to BLE. Pale. Scattered bruising.   D/C DAY/GOALS/PLACE: Placement at [facility] in [place]. Discharge pending safe travel plans.   OTHER IMPORTANT INFO: Bed alarm on for safety. Slept well through the night. Visualized frequently. Pt encouraged to get up and use toilet.

## 2021-08-16 NOTE — PROGRESS NOTES
[facility]    Medicine Progress Note - Hospitalist Service       Date of Admission:  8/5/2021    Assessment & Plan         Elizabeth Joy is a 81 year old female admitted on 8/5/2021.  PMH dementia, depression, HTN, admitted on 8/6/2021 with chronic pain and suicidal ideation.      Suicidal ideation without attempt, RESOLVED  Major Depressive disorder, RESOLVING  Advanced dementia,  Pt resides in a memory care unit and has been reportedly deteriorating recently with worsening depressive symptoms, refusing medications and making suicidal statement. She had a mental health assessment in the ED who recommended inpatient stabilization in a teo-psych facility. Attempts were made to find a facility in the ED, but beds will likely not be available until Monday 8/9; therefore the patient will be admitted until placement can be obtained.   * Psychiatry continued to recommend teo-psyche on 8/11; increased depakote and decreased Effexor.   * Psychiatry consulted on 08/13/21 to re-assess and discuss recommendation with family. They performed [employer] Cognitive Evaluation: Score 22/30 and no longer recommended teo-psychiatry.     - Continue Effexor (dose decreased 8/11), Seroquel (PTA dose), Depakote (increased 8/11 with level 8/14 wnl) and Donepazil    - CC/SW consult for placement. She has been accepted at AL in [place] and working with family to arrange discharge. Son is flying to [place] on Wednesday to pick her up and fly back to [facility] with her.   - Staff at [facility] in [place] requested PRN ativan for flight. Given her dementia, seroquel is preferred and patient has tolerated this in the past.      Aerococcus UTI, resolved  UA notable for moderate leukocyte esterase and WBC count of 89. Unclear if true UTI or colonization. Received 3 days of ceftriaxone, followed by 2 days of Keflex, last dose 08/10/21.      Chronic back and knee pain   Pt has a hx of falls and chronic pain.  She has a chronic compression fracture of the L1 superior endplate as well as chronic patellar fracture of the right knee.    - voltaren gel qid  - Ice/heat PRN   - Acetaminophen PRN    - Lidocaine patch PRN      Acute dehydration, diarrhea, RESOLVED    Large watery stool x3 with associated hypotension 8/9.  Diarrhea resolved without intervention and BP normalized with IVF. See Progress Note 8/15 for further details.      Chronic anemia, normocytic   Baseline hgb 10-11 g/dL, stable.    Hypertension  - Continue on PTA amlodipine 10 mg daily    - Continue PTA hydralazine 10 mg TID.          Diet: Regular Diet Adult  Room Service    DVT Prophylaxis: Low Risk/Ambulatory with no VTE prophylaxis indicated  Ram Catheter: Not present  Central Lines: None  Code Status: No CPR- Do NOT Intubate      Disposition Plan   Expected discharge: 8/18/21 recommended to [facility] [place] [facility] in [place] once son able to pick her up to accompany travel.     The patient's care was discussed with the Bedside Nurse, Patient and Patient's Family.    Marky Alexandra MD  Hospitalist Service  [facility]  Securely message with the Vocera Web Console (learn more here)  Text page via [employer] Paging/Directory      Clinically Significant Risk Factors Present on Admission                   ______________________________________________________________________    Interval History   She is feeling well other than reporting ongoing arthritis pain which is at baseline.  Reports lower extremity edema is slightly worse than baseline, is open to using compression stockings.     Data reviewed today: I reviewed all medications, new labs and imaging results over the last 24 hours. I personally reviewed no images or EKG's today.    Physical Exam   Vital Signs: Temp: 97.8  F (36.6  C) Temp src: Oral BP: 110/64 Pulse: 72   Resp: 18 SpO2: 98 % O2 Device: None (Room air)    Weight: 200 lbs 0 oz  General Appearance: Well nourished elderly female in  NAD  Respiratory: lungs CTAB, no wheezes or crackles, no tachypnea   Cardiovascular: RRR, normal s1/s2 without murmur  GI: abdomen soft, normal bowel sounds  Skin: 1+ pitting LE edema   Other: Alert and appropriate, cranial nerves grossly intact      Data   Recent Labs   Lab 08/11/21  0812 08/10/21  1544   WBC  --  9.5   HGB  --  10.2*   MCV  --  95   PLT  --  250    139   POTASSIUM 3.9 3.6   CHLORIDE 111* 107   CO2 27 28   BUN 21 27   CR 0.92 1.14*   ANIONGAP 2* 4   MICHELLE 8.2* 8.4*   GLC 84 111*   ALBUMIN  --  2.8*   PROTTOTAL  --  6.0*   BILITOTAL  --  0.3   ALKPHOS  --  87   ALT  --  13   AST  --  6

## 2021-08-16 NOTE — PROGRESS NOTES
Care Management Follow Up    Length of Stay (days): 1    Expected Discharge Date: 08/18/2021     Concerns to be Addressed: discharge planning     Patient plan of care discussed at interdisciplinary rounds: Yes    Anticipated Discharge Disposition: Assisted Living     Anticipated Discharge Services:    Anticipated Discharge DME:      Patient/family educated on Medicare website which has current facility and service quality ratings:    Education Provided on the Discharge Plan:    Patient/Family in Agreement with the Plan: yes    Referrals Placed by CM/SW: Post Acute Facilities  Private pay costs discussed: Not applicable    Additional Information:    Get confirmed with Cristina OCHOA (ph:  [phone]) that pt will discharge on Wed, 8/18/21 at 0600AM with sonPhilip, with 10 days worth of medications sent along with pt. Pt has a flight at 0900 that morning.      Get called Lola at [facility] (ph:  [phone]; fax:  [phone]) and confirmed that they are expecting pt mid- week.  Per Lola, they need 10 days worth of medications and asked that medical info be faxed over; Get faxed.  Lola asked that discharge orders also be faxed once completed.        Sol Shields, Northern Light Inland HospitalSW

## 2021-08-17 PROBLEM — K59.09 OTHER CONSTIPATION: Status: ACTIVE | Noted: 2021-08-17

## 2021-08-17 PROBLEM — H04.123 DRY EYES: Status: ACTIVE | Noted: 2021-08-17

## 2021-08-17 PROBLEM — R11.0 NAUSEA: Status: ACTIVE | Noted: 2021-08-17

## 2021-08-17 LAB
ANION GAP SERPL CALCULATED.3IONS-SCNC: 6 MMOL/L (ref 3–14)
BUN SERPL-MCNC: 26 MG/DL (ref 7–30)
CALCIUM SERPL-MCNC: 8.9 MG/DL (ref 8.5–10.1)
CHLORIDE BLD-SCNC: 110 MMOL/L (ref 94–109)
CO2 SERPL-SCNC: 26 MMOL/L (ref 20–32)
CREAT SERPL-MCNC: 1.03 MG/DL (ref 0.52–1.04)
GFR SERPL CREATININE-BSD FRML MDRD: 51 ML/MIN/1.73M2
GLUCOSE BLD-MCNC: 83 MG/DL (ref 70–99)
POTASSIUM BLD-SCNC: 3.5 MMOL/L (ref 3.4–5.3)
SODIUM SERPL-SCNC: 142 MMOL/L (ref 133–144)

## 2021-08-17 PROCEDURE — 80048 BASIC METABOLIC PNL TOTAL CA: CPT | Performed by: HOSPITALIST

## 2021-08-17 PROCEDURE — 250N000013 HC RX MED GY IP 250 OP 250 PS 637: Performed by: PSYCHIATRY & NEUROLOGY

## 2021-08-17 PROCEDURE — 99207 PR CDG-CODE CATEGORY CHANGED: CPT | Performed by: HOSPITALIST

## 2021-08-17 PROCEDURE — 250N000013 HC RX MED GY IP 250 OP 250 PS 637: Performed by: EMERGENCY MEDICINE

## 2021-08-17 PROCEDURE — 250N000013 HC RX MED GY IP 250 OP 250 PS 637: Performed by: INTERNAL MEDICINE

## 2021-08-17 PROCEDURE — G0378 HOSPITAL OBSERVATION PER HR: HCPCS

## 2021-08-17 PROCEDURE — 36415 COLL VENOUS BLD VENIPUNCTURE: CPT | Performed by: HOSPITALIST

## 2021-08-17 PROCEDURE — 99225 PR SUBSEQUENT OBSERVATION CARE,LEVEL II: CPT | Performed by: HOSPITALIST

## 2021-08-17 PROCEDURE — 250N000013 HC RX MED GY IP 250 OP 250 PS 637: Performed by: STUDENT IN AN ORGANIZED HEALTH CARE EDUCATION/TRAINING PROGRAM

## 2021-08-17 RX ORDER — POLYETHYLENE GLYCOL 3350 17 G/17G
17 POWDER, FOR SOLUTION ORAL DAILY
Qty: 510 G | Refills: 0 | Status: SHIPPED | OUTPATIENT
Start: 2021-08-17

## 2021-08-17 RX ORDER — HYDRALAZINE HYDROCHLORIDE 10 MG/1
5 TABLET, FILM COATED ORAL 3 TIMES DAILY
Qty: 30 TABLET | Refills: 0 | Status: SHIPPED | OUTPATIENT
Start: 2021-08-17

## 2021-08-17 RX ORDER — DONEPEZIL HYDROCHLORIDE 5 MG/1
5 TABLET, FILM COATED ORAL AT BEDTIME
Qty: 10 TABLET | Refills: 0 | Status: SHIPPED | OUTPATIENT
Start: 2021-08-17

## 2021-08-17 RX ORDER — QUETIAPINE FUMARATE 25 MG/1
12.5 TABLET, FILM COATED ORAL 2 TIMES DAILY PRN
Qty: 1 TABLET | Refills: 0 | Status: SHIPPED | OUTPATIENT
Start: 2021-08-17

## 2021-08-17 RX ORDER — CARBOXYMETHYLCELLULOSE SODIUM 10 MG/ML
1 SOLUTION/ DROPS OPHTHALMIC 3 TIMES DAILY PRN
Qty: 15 ML | Refills: 0 | Status: SHIPPED | OUTPATIENT
Start: 2021-08-17

## 2021-08-17 RX ORDER — AMLODIPINE BESYLATE 10 MG/1
10 TABLET ORAL DAILY
Qty: 10 TABLET | Refills: 0 | Status: SHIPPED | OUTPATIENT
Start: 2021-08-17

## 2021-08-17 RX ORDER — DIVALPROEX SODIUM 250 MG/1
250 TABLET, DELAYED RELEASE ORAL 2 TIMES DAILY
Qty: 20 TABLET | Refills: 0 | Status: SHIPPED | OUTPATIENT
Start: 2021-08-17 | End: 2021-09-09

## 2021-08-17 RX ORDER — BISACODYL 10 MG
10 SUPPOSITORY, RECTAL RECTAL DAILY PRN
Qty: 2 SUPPOSITORY | Refills: 0 | Status: SHIPPED | OUTPATIENT
Start: 2021-08-17

## 2021-08-17 RX ORDER — LIDOCAINE 4 G/G
2 PATCH TOPICAL DAILY PRN
Qty: 10 PATCH | Refills: 0 | Status: SHIPPED | OUTPATIENT
Start: 2021-08-17

## 2021-08-17 RX ORDER — QUETIAPINE FUMARATE 25 MG/1
25 TABLET, FILM COATED ORAL AT BEDTIME
Qty: 10 TABLET | Refills: 0 | Status: SHIPPED | OUTPATIENT
Start: 2021-08-17

## 2021-08-17 RX ORDER — SENNOSIDES 8.6 MG
2 TABLET ORAL 2 TIMES DAILY PRN
Qty: 10 TABLET | Refills: 0 | Status: SHIPPED | OUTPATIENT
Start: 2021-08-17

## 2021-08-17 RX ORDER — ACETAMINOPHEN 325 MG/1
650 TABLET ORAL EVERY 6 HOURS PRN
Qty: 30 TABLET | Refills: 0 | Status: SHIPPED | OUTPATIENT
Start: 2021-08-17

## 2021-08-17 RX ORDER — ONDANSETRON 4 MG/1
4 TABLET, FILM COATED ORAL EVERY 8 HOURS PRN
Qty: 3 TABLET | Refills: 0 | Status: SHIPPED | OUTPATIENT
Start: 2021-08-17

## 2021-08-17 RX ORDER — VENLAFAXINE HYDROCHLORIDE 150 MG/1
150 TABLET, EXTENDED RELEASE ORAL DAILY
Qty: 10 TABLET | Refills: 0 | Status: SHIPPED | OUTPATIENT
Start: 2021-08-17

## 2021-08-17 RX ADMIN — DIVALPROEX SODIUM 250 MG: 125 TABLET, DELAYED RELEASE ORAL at 09:38

## 2021-08-17 RX ADMIN — DICLOFENAC SODIUM 2 G: 10 GEL TOPICAL at 09:38

## 2021-08-17 RX ADMIN — HYDRALAZINE HYDROCHLORIDE 5 MG: 10 TABLET ORAL at 09:37

## 2021-08-17 RX ADMIN — DICLOFENAC SODIUM 2 G: 10 GEL TOPICAL at 13:02

## 2021-08-17 RX ADMIN — ACETAMINOPHEN 650 MG: 325 TABLET, FILM COATED ORAL at 21:16

## 2021-08-17 RX ADMIN — VENLAFAXINE HYDROCHLORIDE 150 MG: 150 CAPSULE, EXTENDED RELEASE ORAL at 09:37

## 2021-08-17 RX ADMIN — AMLODIPINE BESYLATE 10 MG: 10 TABLET ORAL at 09:37

## 2021-08-17 RX ADMIN — DIVALPROEX SODIUM 250 MG: 125 TABLET, DELAYED RELEASE ORAL at 21:14

## 2021-08-17 RX ADMIN — QUETIAPINE FUMARATE 25 MG: 25 TABLET ORAL at 21:14

## 2021-08-17 RX ADMIN — DICLOFENAC SODIUM 2 G: 10 GEL TOPICAL at 17:05

## 2021-08-17 RX ADMIN — DICLOFENAC SODIUM 2 G: 10 GEL TOPICAL at 21:15

## 2021-08-17 RX ADMIN — HYDRALAZINE HYDROCHLORIDE 5 MG: 10 TABLET ORAL at 17:03

## 2021-08-17 RX ADMIN — DONEPEZIL HYDROCHLORIDE 5 MG: 5 TABLET, FILM COATED ORAL at 21:15

## 2021-08-17 RX ADMIN — HYDRALAZINE HYDROCHLORIDE 5 MG: 10 TABLET ORAL at 21:14

## 2021-08-17 NOTE — PLAN OF CARE
Cognitive Concerns/ Orientation: Alert and oriented x 3, Forgetful.  BEHAVIOR & AGGRESSION TOOL COLOR: Green. Irritable today. Refusing OOB activity several times.  ABNL VS/O2: VSS on RA.     MOBILITY: Ax1 GB/W. Up to bedside commode and BR.  PAIN MANAGMENT: Scheduled Voltaren gel. Refusing lidocaine patches.  DIET: Regular diet. Assistance needed with ordering.   BOWEL/BLADDER: Inc of B/B. Refusing purewick overnight and today.    SKIN: Trace edema to BLE. Pale. Scattered bruising.     D/C DAY/GOALS/PLACE:  Discharge discontinued. Pending plans for son's arrival after missing flight today. Tentatively Thursday per day shift charge RN.    OTHER IMPORTANT INFO:   Sleepy today  Informed by day charge RN that son has missed his flight to here. This will effectively cancel discharge plans. Hospitalist is aware. Discharge cancelled.

## 2021-08-17 NOTE — PLAN OF CARE
DATE & TIME: 8/16/21-8/17/21 1900-0730  Cognitive Concerns/ Orientation : Alert and oriented x 3, Forgetful.  BEHAVIOR & AGGRESSION TOOL COLOR: Green     ABNL VS/O2: VSS on RA.   MOBILITY: Ax1 GB/W. Up to bedside commode and BR.  PAIN MANAGMENT: Scheduled Voltaren gel and lidocaine patches. No patch in place this shift  DIET: Regular diet. Assistance needed with ordering.   BOWEL/BLADDER: Inc of B/B. Pt able to get up to bedside  commode, pt requesting purewick overnight then refused it 30 minutes later.    SKIN: Trace edema to BLE. Pale. Scattered bruising.   D/C DAY/GOALS/PLACE: Placement at [facility] in [place] Wednesday 8/18/21. Pt to discharge at 0600 with son Philip christa QUIÑONEZ, flight to [place] at 0900, needs to be sent with 10 days worth of medication.   OTHER IMPORTANT INFO: No changes this shift.

## 2021-08-17 NOTE — PROGRESS NOTES
Care Management Discharge Note    Discharge Date: 08/18/2021  Expected Time of Departure: 6am    Discharge Disposition: Assisted Living    Discharge Services: None    Discharge DME: None    Discharge Transportation: family or friend will provide, other (see comments) (Flight at 9am with son. Discharging at 6am. )    Private pay costs discussed: Not applicable    PAS Confirmation Code:    Patient/family educated on Medicare website which has current facility and service quality ratings: yes    Education Provided on the Discharge Plan:  Yes  Persons Notified of Discharge Plans: Patients son and daughter  Patient/Family in Agreement with the Plan: yes    Handoff Referral Completed: Yes    Additional Information:  Writer called son Philip and left a message regarding discharge instructions. Writer explained to Philip that he needs to come to door 6 in the morning when he arrives and they will let him in so he can discharge his mom. There was no return call from Philip. Writer talked to [facility]  Lola to make sure that they are ready for patient and did not need anything else from us. Lola confirmed they just need orders as soon as possible and 10 days of medications brought with patient. Writer faxed orders to Lola at 2:30pm on 8/17.     Addendum: Writer received a call from Philip who states he missed his flight and now had to change the entire flight to Thursday 8/19 at 6am. Patient will not be discharging until 8/19        MARTHA Bernal

## 2021-08-17 NOTE — PROGRESS NOTES
[facility]    Medicine Progress Note - Hospitalist Service       Date of Admission:  8/5/2021    Assessment & Plan         Elizabeth Joy is a 81 year old female admitted on 8/5/2021.  PMH dementia, depression, HTN, admitted on 8/6/2021 with chronic pain and suicidal ideation.      Suicidal ideation without attempt, RESOLVED  Major Depressive disorder, RESOLVING  Advanced dementia,  Pt resides in a memory care unit and has been reportedly deteriorating recently with worsening depressive symptoms, refusing medications and making suicidal statement. She had a mental health assessment in the ED who recommended inpatient stabilization in a teo-psych facility. Attempts were made to find a facility in the ED, but beds will likely not be available until Monday 8/9; therefore the patient will be admitted until placement can be obtained.   * Psychiatry continued to recommend teo-psyche on 8/11; increased depakote and decreased Effexor.   * Psychiatry consulted on 08/13/21 to re-assess and discuss recommendation with family. They performed [employer] Cognitive Evaluation: Score 22/30 and no longer recommended teo-psychiatry.     - Continue Effexor (dose decreased 8/11), Seroquel (PTA dose), Depakote (increased 8/11 with level 8/14 wnl) and Donepazil    - CC/SW consult for placement. She has been accepted at AL in [place] and working with family to arrange discharge. Son is flying to [place] on Wednesday to pick her up and fly back to [facility] with her.   - Staff at [facility] in [place] requested PRN ativan for flight. Given her dementia, seroquel is preferred and patient has tolerated this in the past.      Aerococcus UTI, resolved  UA notable for moderate leukocyte esterase and WBC count of 89. Unclear if true UTI or colonization. Received 3 days of ceftriaxone, followed by 2 days of Keflex, last dose 08/10/21.      Chronic back and knee pain   Pt has a hx of falls and chronic pain.  She has a chronic compression fracture of the L1 superior endplate as well as chronic patellar fracture of the right knee.    - voltaren gel qid  - Ice/heat PRN   - Acetaminophen PRN    - Lidocaine patch PRN      Acute dehydration, diarrhea, RESOLVED    Large watery stool x3 with associated hypotension 8/9.  Diarrhea resolved without intervention and BP normalized with IVF. See Progress Note 8/15 for further details.      Chronic anemia, normocytic   Baseline hgb 10-11 g/dL, stable.    Hypertension  - Continue on PTA amlodipine 10 mg daily    - Continue PTA hydralazine 10 mg TID.          Diet: Regular Diet Adult  Room Service  Diet    DVT Prophylaxis: Low Risk/Ambulatory with no VTE prophylaxis indicated  Ram Catheter: Not present  Central Lines: None  Code Status: No CPR- Do NOT Intubate      Disposition Plan   Expected discharge: 8/18/21 recommended to [facility] in [place] once son able to pick her up to accompany travel.     The patient's care was discussed with the Bedside Nurse and Patient.  Attempted to call sonErich, left voicemail.     Time Spent on this Encounter   I spent >35 minutes on the unit/floor managing the care of Elizabeth Joy. Over 50% of my time was spent on the following:   - Counseling the patient and/or family regarding: recommended follow-up  - Coordination of care with the: care coordinator/ and nurse    Also time spent preparing discharge orders and note as will be discharging early AM 8/18/21.    MD Marky Camargo MD  Hospitalist Service  [facility]  Securely message with the Vocera Web Console (learn more here)  Text page via [employer] Paging/Directory      Clinically Significant Risk Factors Present on Admission                   ______________________________________________________________________    Interval History   She is sleepy this morning at time of visit.  Reports having a good night, denies any pain, denies any dyspnea  or other complaints.     Data reviewed today: I reviewed all medications, new labs and imaging results over the last 24 hours. I personally reviewed no images or EKG's today.    Physical Exam   Vital Signs: Temp: 98.5  F (36.9  C) Temp src: Oral BP: (!) 147/84 Pulse: 66   Resp: 16 SpO2: 95 % O2 Device: None (Room air)    Weight: 200 lbs 0 oz  General Appearance: Well nourished elderly female in NAD, sleeping in bed  Respiratory: lungs CTAB, no wheezes or crackles, no tachypnea   Cardiovascular: RRR, normal s1/s2 without murmur  GI: abdomen soft, normal bowel sounds  Skin: no LE edema   Other: Arouses easily to voice, cranial nerves grossly intact      Data   Recent Labs   Lab 08/17/21  0754 08/11/21  0812 08/10/21  1544   WBC  --   --  9.5   HGB  --   --  10.2*   MCV  --   --  95   PLT  --   --  250    140 139   POTASSIUM 3.5 3.9 3.6   CHLORIDE 110* 111* 107   CO2 26 27 28   BUN 26 21 27   CR 1.03 0.92 1.14*   ANIONGAP 6 2* 4   MICHELLE 8.9 8.2* 8.4*   GLC 83 84 111*   ALBUMIN  --   --  2.8*   PROTTOTAL  --   --  6.0*   BILITOTAL  --   --  0.3   ALKPHOS  --   --  87   ALT  --   --  13   AST  --   --  6

## 2021-08-17 NOTE — DISCHARGE SUMMARY
[facility]  Hospitalist Discharge Summary      Date of Admission:  8/5/2021  Date of Discharge:  8/18/21  Discharging Provider: Marky Alexandra MD      Discharge Diagnoses   Suicidal ideation without attempt  MDD  Advanced dementia   Aerococcus UTI  Chronic back and knee pain  Acute dehydration  Diarrhea  Chronic normocytic anemia  HTN    Follow-ups Needed After Discharge   Follow-up Appointments     Follow-up and recommended labs and tests       Follow up to establish primary care provider, within 7-14 days for   hospital follow- up.  No follow up labs or test are needed.             Discharge Disposition   Discharged to assisted living  Condition at discharge: Stable    Hospital Course           Elizabeth Joy is a 81 year old female admitted on 8/5/2021.  PMH dementia, depression, HTN, admitted on 8/6/2021 with chronic pain and suicidal ideation.      Suicidal ideation without attempt, RESOLVED  Major Depressive disorder, RESOLVING  Advanced dementia,  Pt resides in a memory care unit and has been reportedly deteriorating recently with worsening depressive symptoms, refusing medications and making suicidal statements.  Psychiatry was consulted on 8/11; increased depakote and decreased Effexor.  With these changes, her mood improved with resolution of SI.  On follow up assessment 8/13, Psychiatry recommended no additional changes to her regimen and felt she no longer required teo-psych placement for further stabilization.  Her family arranged for her to enter an AL facility in [place] and escorted her in travel.  Per request of staff at [facility], she was sent with prn seroquel for any anxiety experienced en route.      Aerococcus UTI, resolved  UA notable for moderate leukocyte esterase and WBC count of 89. Unclear if true UTI or colonization. Received 3 days of ceftriaxone, followed by 2 days of Keflex, last dose 08/10/21.      Chronic back and knee pain   Pt has a hx of falls and  chronic pain. She has a chronic compression fracture of the L1 superior endplate as well as chronic patellar fracture of the right knee.  Pain was controlled with voltaren gel, tylenol and lidocaine patches.      Acute dehydration, diarrhea, RESOLVED    Large watery stool x3 with associated hypotension 8/9.  Diarrhea resolved without intervention and BP normalized with IVF.      Chronic anemia, normocytic   Baseline hgb 10-11 g/dL, stable.    Hypertension  Continue on PTA amlodipine and hydralazine.         Consultations This Hospital Stay   PSYCHIATRY IP CONSULT  CARE MANAGEMENT / SOCIAL WORK IP CONSULT  PSYCHIATRY IP CONSULT  PSYCHIATRY IP CONSULT  PSYCHIATRY IP CONSULT  PHARMACY IP CONSULT    Code Status   No CPR- Do NOT Intubate    Time Spent on this Encounter   I, Marky Alexandra MD, personally saw the patient today and spent greater than 30 minutes discharging this patient.       Marky Alexandra MD  [place] [address] [facility]  [address] [place] [address]  Phone: [phone]  ______________________________________________________________________    Physical Exam   Vital Signs: Temp: 98.5  F (36.9  C) Temp src: Oral BP: (!) 147/84 Pulse: 66   Resp: 16 SpO2: 95 % O2 Device: None (Room air)    Weight: 200 lbs 0 oz  She was not seen or examined on day of discharge.       Primary Care Physician   Marky Josue    Discharge Orders      Reason for your hospital stay    You were admitted for mood disorder which has improved with medication adjustment.     Follow-up and recommended labs and tests     Follow up to establish primary care provider, within 7-14 days for hospital follow- up.  No follow up labs or test are needed.     Activity    Your activity upon discharge: activity as tolerated     Diet    Follow this diet upon discharge:       Regular Diet Adult       Significant Results and Procedures   Most Recent 3 CBC's:Recent Labs   Lab Test 08/10/21  1544 08/05/21  2041  06/18/21  0600   WBC 9.5 9.3 11.3*   HGB 10.2* 10.7* 11.9*   MCV 95 94 94    285 376     Most Recent 3 BMP's:Recent Labs   Lab Test 08/17/21  0754 08/11/21  0812 08/10/21  1544    140 139   POTASSIUM 3.5 3.9 3.6   CHLORIDE 110* 111* 107   CO2 26 27 28   BUN 26 21 27   CR 1.03 0.92 1.14*   ANIONGAP 6 2* 4   MICHELLE 8.9 8.2* 8.4*   GLC 83 84 111*     Most Recent 2 LFT's:Recent Labs   Lab Test 08/10/21  1544 06/08/21  0820   AST 6 12   ALT 13 20   ALKPHOS 87 90   BILITOTAL 0.3 0.3   ,   Results for orders placed or performed during the hospital encounter of 08/05/21   XR Knee Right 3 Views    Narrative    KNEE THREE VIEWS RIGHT  8/5/2021 8:26 PM     HISTORY: Right knee pain    COMPARISON: 6/3/2021      Impression    IMPRESSION: Chronic nondisplaced vertical patellar fracture with  increased osseous bridging. Normal joint alignment is maintained.  Moderate lateral compartment joint space narrowing. Moderate knee  joint effusion. Chondrocalcinosis. Osteopenia. Atherosclerosis.     KIM CEDILLO MD         SYSTEM ID:  [id number]       Discharge Medications   Current Discharge Medication List      CONTINUE these medications which have CHANGED    Details   acetaminophen (TYLENOL) 325 MG tablet Take 2 tablets (650 mg) by mouth every 6 hours as needed for mild pain or fever  Qty: 30 tablet, Refills: 0    Associated Diagnoses: Recurrent falls      amLODIPine (NORVASC) 10 MG tablet Take 1 tablet (10 mg) by mouth daily  Qty: 10 tablet, Refills: 0    Associated Diagnoses: Benign essential hypertension      bisacodyl (DULCOLAX) 10 MG suppository Place 1 suppository (10 mg) rectally daily as needed for constipation  Qty: 2 suppository, Refills: 0    Associated Diagnoses: Other constipation      calcium carbonate 600 mg-vitamin D 400 units (CALTRATE) 600-400 MG-UNIT per tablet Take 1 tablet by mouth 2 times daily  Qty: 20 tablet, Refills: 0    Associated Diagnoses: Closed nondisplaced fracture of right patella,  unspecified fracture morphology, initial encounter      carboxymethylcellulose (ARTIFICIAL TEARS) 1 % ophthalmic solution Place 1 drop into both eyes 3 times daily as needed (dry eye relief) 0.4-0.3%  Qty: 15 mL, Refills: 0    Associated Diagnoses: Dry eyes      diclofenac (VOLTAREN) 1 % topical gel Apply 2 g topically 4 times daily To bilateral knees and elbows or wrists or back  Qty: 350 g, Refills: 0    Associated Diagnoses: Multiple joint pain      divalproex sodium delayed-release (DEPAKOTE) 250 MG DR tablet Take 1 tablet (250 mg) by mouth 2 times daily  Qty: 20 tablet, Refills: 0    Associated Diagnoses: Mild major depression (H)      donepezil (ARICEPT) 5 MG tablet Take 1 tablet (5 mg) by mouth At Bedtime  Qty: 10 tablet, Refills: 0    Associated Diagnoses: Dementia with behavioral disturbance, unspecified dementia type (H)      hydrALAZINE (APRESOLINE) 10 MG tablet Take 0.5 tablets (5 mg) by mouth 3 times daily Hold for sbp < 120  Qty: 30 tablet, Refills: 0    Associated Diagnoses: Benign essential hypertension      Lidocaine (LIDOCARE) 4 % Patch Place 2 patches onto the skin daily as needed for moderate pain To prevent lidocaine toxicity, patient should be patch free for 12 hrs daily. To lower back.  Do NOT apply heat over patch.  Qty: 10 patch, Refills: 0    Associated Diagnoses: Multiple joint pain      ondansetron (ZOFRAN) 4 MG tablet Take 1 tablet (4 mg) by mouth every 8 hours as needed for nausea  Qty: 3 tablet, Refills: 0    Associated Diagnoses: Nausea      polyethylene glycol (MIRALAX) 17 GM/Dose powder Take 17 g by mouth daily  Qty: 510 g, Refills: 0    Associated Diagnoses: Other constipation      !! QUEtiapine (SEROQUEL) 25 MG tablet Take 1 tablet (25 mg) by mouth At Bedtime  Qty: 10 tablet, Refills: 0    Associated Diagnoses: Current mild episode of major depressive disorder, unspecified whether recurrent (H)      !! QUEtiapine (SEROQUEL) 25 MG tablet Take 0.5 tablets (12.5 mg) by mouth 2 times  daily as needed (flight anxiety) May give second dose 1 hour after first, if ineffective  Qty: 1 tablet, Refills: 0    Associated Diagnoses: Mild major depression (H)      sennosides (SENOKOT) 8.6 MG tablet Take 2 tablets by mouth 2 times daily as needed for constipation  Qty: 10 tablet, Refills: 0    Associated Diagnoses: Other constipation      venlafaxine (EFFEXOR-ER) 150 MG 24 hr tablet Take 1 tablet (150 mg) by mouth daily  Qty: 10 tablet, Refills: 0    Associated Diagnoses: Mild major depression (H)       !! - Potential duplicate medications found. Please discuss with provider.      STOP taking these medications       potassium chloride ER (KLOR-CON M) 20 MEQ CR tablet Comments:   Reason for Stopping:             Allergies   No Known Allergies

## 2021-08-18 PROCEDURE — 250N000013 HC RX MED GY IP 250 OP 250 PS 637: Performed by: PSYCHIATRY & NEUROLOGY

## 2021-08-18 PROCEDURE — G0378 HOSPITAL OBSERVATION PER HR: HCPCS

## 2021-08-18 PROCEDURE — 99225 PR SUBSEQUENT OBSERVATION CARE,LEVEL II: CPT | Performed by: STUDENT IN AN ORGANIZED HEALTH CARE EDUCATION/TRAINING PROGRAM

## 2021-08-18 PROCEDURE — 99207 PR CDG-CODE CATEGORY CHANGED: CPT | Performed by: STUDENT IN AN ORGANIZED HEALTH CARE EDUCATION/TRAINING PROGRAM

## 2021-08-18 PROCEDURE — 250N000013 HC RX MED GY IP 250 OP 250 PS 637: Performed by: STUDENT IN AN ORGANIZED HEALTH CARE EDUCATION/TRAINING PROGRAM

## 2021-08-18 PROCEDURE — 250N000013 HC RX MED GY IP 250 OP 250 PS 637: Performed by: INTERNAL MEDICINE

## 2021-08-18 PROCEDURE — 250N000013 HC RX MED GY IP 250 OP 250 PS 637: Performed by: EMERGENCY MEDICINE

## 2021-08-18 RX ADMIN — HYDRALAZINE HYDROCHLORIDE 5 MG: 10 TABLET ORAL at 20:06

## 2021-08-18 RX ADMIN — HYDRALAZINE HYDROCHLORIDE 5 MG: 10 TABLET ORAL at 13:26

## 2021-08-18 RX ADMIN — DIVALPROEX SODIUM 250 MG: 125 TABLET, DELAYED RELEASE ORAL at 21:25

## 2021-08-18 RX ADMIN — ACETAMINOPHEN 650 MG: 325 TABLET, FILM COATED ORAL at 09:04

## 2021-08-18 RX ADMIN — VENLAFAXINE HYDROCHLORIDE 150 MG: 150 CAPSULE, EXTENDED RELEASE ORAL at 09:04

## 2021-08-18 RX ADMIN — DICLOFENAC SODIUM 2 G: 10 GEL TOPICAL at 18:08

## 2021-08-18 RX ADMIN — AMLODIPINE BESYLATE 10 MG: 10 TABLET ORAL at 09:04

## 2021-08-18 RX ADMIN — DIVALPROEX SODIUM 250 MG: 125 TABLET, DELAYED RELEASE ORAL at 09:04

## 2021-08-18 RX ADMIN — DONEPEZIL HYDROCHLORIDE 5 MG: 5 TABLET, FILM COATED ORAL at 21:25

## 2021-08-18 RX ADMIN — HYDRALAZINE HYDROCHLORIDE 5 MG: 10 TABLET ORAL at 09:04

## 2021-08-18 RX ADMIN — QUETIAPINE FUMARATE 25 MG: 25 TABLET ORAL at 21:25

## 2021-08-18 RX ADMIN — DICLOFENAC SODIUM 2 G: 10 GEL TOPICAL at 21:29

## 2021-08-18 RX ADMIN — DICLOFENAC SODIUM 2 G: 10 GEL TOPICAL at 13:23

## 2021-08-18 NOTE — PLAN OF CARE
DATE & TIME: 8/18/21 4681-8053  Cognitive Concerns/ Orientation: A&O2-3, requesting to sleep in this AM, pleasant and cooperative, worked with PT.   BEHAVIOR & AGGRESSION TOOL COLOR: Green     ABNL VS/O2: VSS on RA, lungs clear.  MOBILITY: Ax1 with GB/W. Amb to BR x1, in fields to shower x1.   PAIN MANAGMENT: PRN tylenol given for gen pain. Voltaren gel applied to bilateral hands, PRN lidocaine patch available-family would like to have patches applied to bilateral hips and back for plane ride tomorrow morning, discharging at [id number].  DIET: Tolerating Regular diet, tray set up and assist to order.  BOWEL/BLADDER: Inc of B/B. Had large loose this afternoon.  SKIN: Trace edema to BLE. Pale. Scattered bruising.   D/C DAY/GOALS/PLACE: Placement at [facility] in [place]. Pt was supposed to discharge at 0600 8/18 however son missed flight and discharge date tentatively changed to 8/19 at 0600. So pt can catch flight to [place] with son Philip.   OTHER IMPORTANT INFO: Discharge meds sent home with Philip and Randee. Clothing at bedside.

## 2021-08-18 NOTE — PROGRESS NOTES
Care Management Follow Up    Length of Stay (days): 1    Expected Discharge Date: 08/19/2021  Expected Time of Departure: 6am  Concerns to be Addressed: discharge planning     Patient plan of care discussed at interdisciplinary rounds: Yes    Anticipated Discharge Disposition: Assisted Living     Anticipated Discharge Services: None  Anticipated Discharge DME: None    Patient/family educated on Medicare website which has current facility and service quality ratings: yes  Education Provided on the Discharge Plan:    Patient/Family in Agreement with the Plan: yes    Referrals Placed by CM/SW: Post Acute Facilities  Private pay costs discussed: Not applicable    Additional Information:    Sw confirmed with sonErich ([phone]) that Philip chairez will  pt tomorrow morning, 8/18, at 0600am to then fly to [place] to admit into [facility] in [place] ([place], ph:  [phone]; fax:  [phone]) .  Pt will need 10 days worth of medications at discharge and discharge orders will need to be faxed to:  [phone]      GLORIA Lino

## 2021-08-18 NOTE — PROGRESS NOTES
Observation Goal:     Placement Found: Met, Will discharge to [place] with son on 8/19 at 6AM per previous notes.       DATE & TIME: 8/18/21   Cognitive Concerns/ Orientation: A&O2-3, requesting to sleep in this AM, pleasant and coop.   BEHAVIOR & AGGRESSION TOOL COLOR: Green     ABNL VS/O2: VSS on RA.   MOBILITY: Ax1 with GB/W. Amb to BR x1, in fields to shower x1.   PAIN MANAGMENT: PRN tylenol given for gen pain.   DIET: Stephen Regular diet, tray set up and assist to order.  BOWEL/BLADDER: Inc of B/B.  SKIN: Trace edema to BLE. Pale. Scattered bruising.   D/C DAY/GOALS/PLACE: Placement at [facility] in [place]. Pt was supposed to discharge at 0600 8/18 however son missed flight and discharge date tentatively changed to 8/19 at 0600. So pt can catch flight to [place] with son Philip.   OTHER IMPORTANT INFO: Discharge meds in room with other belongings.

## 2021-08-18 NOTE — PROGRESS NOTES
Spoke with dtr-in-law Cristina. Discussed that patient does not have an outfit to wear for her travel. Cristina stated she will have patients son Philip bring in clothes and shoes for travel for patient this evening.

## 2021-08-18 NOTE — PROGRESS NOTES
[facility]    Medicine Progress Note - Hospitalist Service       Date of Admission:  8/5/2021    Assessment & Plan         Elizabeth Joy is a 81 year old female admitted on 8/5/2021.  PMH dementia, depression, HTN, admitted on 8/6/2021 with chronic pain and suicidal ideation.      Suicidal ideation without attempt, RESOLVED  Major Depressive disorder, RESOLVING  Advanced dementia,  Pt resides in a memory care unit and has been reportedly deteriorating recently with worsening depressive symptoms, refusing medications and making suicidal statement. She had a mental health assessment in the ED who recommended inpatient stabilization in a teo-psych facility. Attempts were made to find a facility in the ED, but beds will likely not be available until Monday 8/9; therefore the patient will be admitted until placement can be obtained.   * Psychiatry continued to recommend teo-psyche on 8/11; increased depakote and decreased Effexor.   * Psychiatry consulted on 08/13/21 to re-assess and discuss recommendation with family. They performed [employer] Cognitive Evaluation: Score 22/30 and no longer recommended teo-psychiatry.     - Continue Effexor (dose decreased 8/11), Seroquel (PTA dose), Depakote (increased 8/11 with level 8/14 wnl) and Donepazil    - CC/SW consult for placement. She has been accepted at AL in [place] and working with family to arrange discharge. Son is flying to [place] on Wednesday to pick her up and fly back to [facility] with her.   - Staff at [facility] in [place] requested PRN ativan for flight. Given her dementia, seroquel is preferred and patient has tolerated this in the past.      Aerococcus UTI, resolved  UA notable for moderate leukocyte esterase and WBC count of 89. Unclear if true UTI or colonization. Received 3 days of ceftriaxone, followed by 2 days of Keflex, last dose 08/10/21.      Chronic back and knee pain   Pt has a hx of falls and chronic pain.  She has a chronic compression fracture of the L1 superior endplate as well as chronic patellar fracture of the right knee.    - voltaren gel qid  - Ice/heat PRN   - Acetaminophen PRN    - Lidocaine patch PRN      Acute dehydration, diarrhea, RESOLVED    Large watery stool x3 with associated hypotension 8/9.  Diarrhea resolved without intervention and BP normalized with IVF. See Progress Note 8/15 for further details.      Chronic anemia, normocytic   Baseline hgb 10-11 g/dL, stable.    Hypertension  - Continue on PTA amlodipine 10 mg daily    - Continue PTA hydralazine 10 mg TID.          Diet: Regular Diet Adult  Room Service  Diet    DVT Prophylaxis: Low Risk/Ambulatory with no VTE prophylaxis indicated  Ram Catheter: Not present  Central Lines: None  Code Status: No CPR- Do NOT Intubate      Disposition Plan   Expected discharge: 8/18/21 recommended to [facility] in [place] once son able to pick her up to accompany travel.     The patient's care was discussed with the Bedside Nurse and Patient.        Howard Romero MD  Hospitalist Service  [facility]  Securely message with the Vocera Web Console (learn more here)  Text page via [facility] Paging/Directory      Clinically Significant Risk Factors Present on Admission                   ______________________________________________________________________    Interval History   She is sleepy this morning at time of visit.  Reports having a good night, denies any pain, denies any dyspnea or other complaints.     Data reviewed today: I reviewed all medications, new labs and imaging results over the last 24 hours. I personally reviewed no images or EKG's today.    Physical Exam   Vital Signs: Temp: 97.9  F (36.6  C) Temp src: Oral BP: 101/53 Pulse: 65   Resp: 16 SpO2: 98 % O2 Device: None (Room air)    Weight: 200 lbs 0 oz  General Appearance: Well nourished elderly female in NAD, sleeping in bed  Respiratory: lungs CTAB, no wheezes or crackles, no  tachypnea   Cardiovascular: RRR, normal s1/s2 without murmur  GI: abdomen soft, normal bowel sounds  Skin: no LE edema   Other: Arouses easily to voice, cranial nerves grossly intact      Data   Recent Labs   Lab 08/17/21  0754      POTASSIUM 3.5   CHLORIDE 110*   CO2 26   BUN 26   CR 1.03   ANIONGAP 6   MICHELLE 8.9   GLC 83

## 2021-08-19 ENCOUNTER — MEDICAL CORRESPONDENCE (OUTPATIENT)
Dept: HEALTH INFORMATION MANAGEMENT | Facility: CLINIC | Age: 81
End: 2021-08-19

## 2021-08-19 VITALS
RESPIRATION RATE: 16 BRPM | BODY MASS INDEX: 34.31 KG/M2 | WEIGHT: 200 LBS | TEMPERATURE: 98.3 F | DIASTOLIC BLOOD PRESSURE: 62 MMHG | HEART RATE: 66 BPM | OXYGEN SATURATION: 98 % | SYSTOLIC BLOOD PRESSURE: 112 MMHG

## 2021-08-19 PROCEDURE — 250N000013 HC RX MED GY IP 250 OP 250 PS 637: Performed by: STUDENT IN AN ORGANIZED HEALTH CARE EDUCATION/TRAINING PROGRAM

## 2021-08-19 PROCEDURE — 99217 PR OBSERVATION CARE DISCHARGE: CPT | Performed by: STUDENT IN AN ORGANIZED HEALTH CARE EDUCATION/TRAINING PROGRAM

## 2021-08-19 PROCEDURE — G0378 HOSPITAL OBSERVATION PER HR: HCPCS

## 2021-08-19 RX ADMIN — LIDOCAINE 2 PATCH: 246 PATCH TOPICAL at 05:27

## 2021-08-19 RX ADMIN — ACETAMINOPHEN 650 MG: 325 TABLET, FILM COATED ORAL at 01:58

## 2021-08-19 NOTE — PROGRESS NOTES
Clinic Care Coordination Contact  Care Team Conversations    CC SW talked to Patient's son Philip. Patient was discharged this morning and she is on her way to [place] with her son to move into an [facility]. Patient is not a candidate for CC [facility] as she is now living in [place].     EMILY Orta  Clinic Care Coordinator  [facility]  [phone]  [email or web address]

## 2021-08-19 NOTE — PLAN OF CARE
Discharge    Patient discharged to [facility] in [place], family is picking up from the hospital and will be flying out to [place] this morning. Discharge set for 6 am.      Care plan note  SUMMARY: Depression/SI  DATE & TIME: 8/18/21 4272-4910  Cognitive Concerns/ Orientation: A&Ox2, calm/cooperative. Disoriented to place and time.   BEHAVIOR & AGGRESSION TOOL COLOR: Green     ABNL VS/O2: VSS on RA  MOBILITY: Assist+1 with GB/W. Up to the bathroom overnight.  PAIN MANAGMENT: Arthritic pain. Tylenol x 1 given. Lidocaine patches applied to vickie hip areas.   DIET: Regular diet, tolerating.   BOWEL/BLADDER: Inc of B/B at times. Purewick overnight.  SKIN: Trace edema to BLE. Pale. Scattered bruising.   D/C DAY/GOALS/PLACE: Will discharge to [facility] in [place] on 8/19 at 0600.   OTHER IMPORTANT INFO: Discharge meds sent home with Alexa (children).     Listed belongings gathered and returned to patient. Yes  Belongings returned to patient from security/pharmacy Yes  Care Plan and Patient education resolved: Yes  Prescriptions if needed, hard copies sent with patient  NA  Home and hospital acquired medications returned to patient: NA  Medication Bin checked and emptied on discharge Yes  Follow up appointment made for patient: No

## 2021-08-19 NOTE — DISCHARGE SUMMARY
[facility]  Hospitalist Discharge Summary      Date of Admission:  8/5/2021  Date of Discharge:  8/19/21  Discharging Provider: Howard Romero MD      Discharge Diagnoses   Suicidal ideation without attempt  MDD  Advanced dementia   Aerococcus UTI  Chronic back and knee pain  Acute dehydration  Diarrhea  Chronic normocytic anemia  HTN    Follow-ups Needed After Discharge   Follow-up Appointments     Follow-up and recommended labs and tests       Follow up to establish primary care provider, within 7-14 days for   hospital follow- up.  No follow up labs or test are needed.             Discharge Disposition   Discharged to assisted living  Condition at discharge: Stable    Hospital Course           Elizabeth Joy is a 81 year old female admitted on 8/5/2021.  PMH dementia, depression, HTN, admitted on 8/6/2021 with chronic pain and suicidal ideation.      Suicidal ideation without attempt, RESOLVED  Major Depressive disorder, RESOLVING  Advanced dementia,  Pt resides in a memory care unit and has been reportedly deteriorating recently with worsening depressive symptoms, refusing medications and making suicidal statements.  Psychiatry was consulted on 8/11; increased depakote and decreased Effexor.  With these changes, her mood improved with resolution of SI.  On follow up assessment 8/13, Psychiatry recommended no additional changes to her regimen and felt she no longer required teo-psych placement for further stabilization.  Her family arranged for her to enter an AL facility in [place] and escorted her in travel.  Per request of staff at [facility], she was sent with prn seroquel for any anxiety experienced en route.      Aerococcus UTI, resolved  UA notable for moderate leukocyte esterase and WBC count of 89. Unclear if true UTI or colonization. Received 3 days of ceftriaxone, followed by 2 days of Keflex, last dose 08/10/21.      Chronic back and knee pain   Pt has a hx of falls  and chronic pain. She has a chronic compression fracture of the L1 superior endplate as well as chronic patellar fracture of the right knee.  Pain was controlled with voltaren gel, tylenol and lidocaine patches.      Acute dehydration, diarrhea, RESOLVED    Large watery stool x3 with associated hypotension 8/9.  Diarrhea resolved without intervention and BP normalized with IVF.      Chronic anemia, normocytic   Baseline hgb 10-11 g/dL, stable.    Hypertension  Continue on PTA amlodipine and hydralazine.         Consultations This Hospital Stay   PSYCHIATRY IP CONSULT  CARE MANAGEMENT / SOCIAL WORK IP CONSULT  PSYCHIATRY IP CONSULT  PSYCHIATRY IP CONSULT  PSYCHIATRY IP CONSULT  PHARMACY IP CONSULT    Code Status   Prior    Time Spent on this Encounter   I, Marky Alexandra MD, personally saw the patient today and spent greater than 30 minutes discharging this patient.       Howard Romero MD  [place] [address] [facility]  [address] [place] [address]  Phone: [phone]  ______________________________________________________________________    Physical Exam   Vital Signs: Temp: 98.3  F (36.8  C) Temp src: Oral BP: 112/62 Pulse: 66   Resp: 16 SpO2: 98 % O2 Device: None (Room air)    Weight: 200 lbs 0 oz  She was not seen or examined on day of discharge.       Primary Care Physician   Marky Josue    Discharge Orders      Care Coordination Referral      Reason for your hospital stay    You were admitted for mood disorder which has improved with medication adjustment.     Follow-up and recommended labs and tests     Follow up to establish primary care provider, within 7-14 days for hospital follow- up.  No follow up labs or test are needed.     Activity    Your activity upon discharge: activity as tolerated     Diet    Follow this diet upon discharge:       Regular Diet Adult       Significant Results and Procedures   Most Recent 3 CBC's:  Recent Labs   Lab Test 08/10/21  1544  08/05/21 2041 06/18/21  0600   WBC 9.5 9.3 11.3*   HGB 10.2* 10.7* 11.9*   MCV 95 94 94    285 376     Most Recent 3 BMP's:  Recent Labs   Lab Test 08/17/21  0754 08/11/21  0812 08/10/21  1544    140 139   POTASSIUM 3.5 3.9 3.6   CHLORIDE 110* 111* 107   CO2 26 27 28   BUN 26 21 27   CR 1.03 0.92 1.14*   ANIONGAP 6 2* 4   MICHELLE 8.9 8.2* 8.4*   GLC 83 84 111*     Most Recent 2 LFT's:  Recent Labs   Lab Test 08/10/21  1544 06/08/21  0820   AST 6 12   ALT 13 20   ALKPHOS 87 90   BILITOTAL 0.3 0.3   ,   Results for orders placed or performed during the hospital encounter of 08/05/21   XR Knee Right 3 Views    Narrative    KNEE THREE VIEWS RIGHT  8/5/2021 8:26 PM     HISTORY: Right knee pain    COMPARISON: 6/3/2021      Impression    IMPRESSION: Chronic nondisplaced vertical patellar fracture with  increased osseous bridging. Normal joint alignment is maintained.  Moderate lateral compartment joint space narrowing. Moderate knee  joint effusion. Chondrocalcinosis. Osteopenia. Atherosclerosis.     KIM CEDILLO MD         SYSTEM ID:  [id number]       Discharge Medications   Discharge Medication List as of 8/19/2021  5:33 AM      CONTINUE these medications which have CHANGED    Details   acetaminophen (TYLENOL) 325 MG tablet Take 2 tablets (650 mg) by mouth every 6 hours as needed for mild pain or fever, Disp-30 tablet, R-0, E-Prescribe      amLODIPine (NORVASC) 10 MG tablet Take 1 tablet (10 mg) by mouth daily, Disp-10 tablet, R-0, E-Prescribe      bisacodyl (DULCOLAX) 10 MG suppository Place 1 suppository (10 mg) rectally daily as needed for constipation, Disp-2 suppository, R-0, E-Prescribe      calcium carbonate 600 mg-vitamin D 400 units (CALTRATE) 600-400 MG-UNIT per tablet Take 1 tablet by mouth 2 times daily, Disp-20 tablet, R-0, E-Prescribe      carboxymethylcellulose (ARTIFICIAL TEARS) 1 % ophthalmic solution Place 1 drop into both eyes 3 times daily as needed (dry eye relief) 0.4-0.3%, Disp-15  mL, R-0, E-Prescribe      diclofenac (VOLTAREN) 1 % topical gel Apply 2 g topically 4 times daily To bilateral knees and elbows or wrists or back, Disp-350 g, R-0, E-Prescribe      divalproex sodium delayed-release (DEPAKOTE) 250 MG DR tablet Take 1 tablet (250 mg) by mouth 2 times daily, Disp-20 tablet, R-0, E-Prescribe      donepezil (ARICEPT) 5 MG tablet Take 1 tablet (5 mg) by mouth At Bedtime, Disp-10 tablet, R-0, E-Prescribe      hydrALAZINE (APRESOLINE) 10 MG tablet Take 0.5 tablets (5 mg) by mouth 3 times daily Hold for sbp < 120, Disp-30 tablet, R-0, E-Prescribe      Lidocaine (LIDOCARE) 4 % Patch Place 2 patches onto the skin daily as needed for moderate pain To prevent lidocaine toxicity, patient should be patch free for 12 hrs daily. To lower back.  Do NOT apply heat over patch.Disp-10 patch, [id number]      ondansetron (ZOFRAN) 4 MG tablet Take 1 tablet (4 mg) by mouth every 8 hours as needed for nausea, Disp-3 tablet, R-0, E-Prescribe      polyethylene glycol (MIRALAX) 17 GM/Dose powder Take 17 g by mouth daily, Disp-510 g, R-0, E-Prescribe      !! QUEtiapine (SEROQUEL) 25 MG tablet Take 1 tablet (25 mg) by mouth At Bedtime, Disp-10 tablet, R-0, E-Prescribe      !! QUEtiapine (SEROQUEL) 25 MG tablet Take 0.5 tablets (12.5 mg) by mouth 2 times daily as needed (flight anxiety) May give second dose 1 hour after first, if ineffective, Disp-1 tablet, R-0, E-Prescribe      sennosides (SENOKOT) 8.6 MG tablet Take 2 tablets by mouth 2 times daily as needed for constipation, Disp-10 tablet, R-0, E-Prescribe      venlafaxine (EFFEXOR-ER) 150 MG 24 hr tablet Take 1 tablet (150 mg) by mouth daily, Disp-10 tablet, R-0, E-Prescribe       !! - Potential duplicate medications found. Please discuss with provider.      STOP taking these medications       potassium chloride ER (KLOR-CON M) 20 MEQ CR tablet Comments:   Reason for Stopping:             Allergies   No Known Allergies

## 2021-08-19 NOTE — PLAN OF CARE
SUMMARY: Depression/SI  DATE & TIME: 8/18/21 2837-1371  Cognitive Concerns/ Orientation: A&Ox3, calm/cooperative  BEHAVIOR & AGGRESSION TOOL COLOR: Green     ABNL VS/O2: VSS on RA  MOBILITY: Assist+1 with GB/W  PAIN MANAGMENT: Arthritic pain. Scheduled Voltaren gel applied to bilateral hands and knees.  DIET: Regular diet, tray set up and assist to order.  BOWEL/BLADDER: Inc of B/B at times. Purewick overnight.  SKIN: Trace edema to BLE. Pale. Scattered bruising.   D/C DAY/GOALS/PLACE: Will discharge to [facility] in [place] on 8/19 at 0600. Please have patient dressed and ready. Family requests a Lidocaine patch be applied to bilateral hips and lower back before discharge.  OTHER IMPORTANT INFO: Discharge meds sent home with Alexa (children). Clothing at bedside.

## 2021-08-20 ENCOUNTER — MEDICAL CORRESPONDENCE (OUTPATIENT)
Dept: HEALTH INFORMATION MANAGEMENT | Facility: CLINIC | Age: 81
End: 2021-08-20

## 2021-09-07 DIAGNOSIS — F32.0 MILD MAJOR DEPRESSION (H): ICD-10-CM

## 2021-09-07 NOTE — TELEPHONE ENCOUNTER
Pharmacy is requesting refill of Depakote 125MG with instruction to take 1 tab twice daily    Last RX filled by pt was for 250MG with instruction to take 1 tab twice daily

## 2021-09-09 RX ORDER — DIVALPROEX SODIUM 250 MG/1
250 TABLET, DELAYED RELEASE ORAL 2 TIMES DAILY
Qty: 180 TABLET | Refills: 1 | Status: SHIPPED | OUTPATIENT
Start: 2021-09-09

## 2021-09-09 NOTE — TELEPHONE ENCOUNTER
Pharmacy is requesting refill of Depakote 125MG with instruction to take 1 tab twice daily     Last RX filled by pt was for 250MG with instruction to take 1 tab twice daily    Routing refill request to provider for review/approval because:      Last office vist: 4/19/2021    Pended 90 day supply & 1 refills. Please Review.    Next office visit: none      Marion Sinclair RN  [facility]

## 2021-09-15 ENCOUNTER — DOCUMENTATION ONLY (OUTPATIENT)
Dept: OTHER | Facility: CLINIC | Age: 81
End: 2021-09-15

## 2021-09-21 ENCOUNTER — MEDICAL CORRESPONDENCE (OUTPATIENT)
Dept: HEALTH INFORMATION MANAGEMENT | Facility: CLINIC | Age: 81
End: 2021-09-21
Payer: COMMERCIAL

## 2021-09-22 ENCOUNTER — TELEPHONE (OUTPATIENT)
Dept: OPHTHALMOLOGY | Facility: CLINIC | Age: 81
End: 2021-09-22

## 2021-09-22 NOTE — TELEPHONE ENCOUNTER
"Spoke with Nurses Station for Room 343 regarding rescheduling missed Eye Appointment with [facility] for further \"Evaluation and consideration of repair of orbital floor fracture\". Nurse stated the patient has moved to [place]. -Per Patient's Nurses Station at [facility]"

## 2021-10-19 PROBLEM — F32.9 MAJOR DEPRESSION: Status: ACTIVE | Noted: 2020-08-01

## 2021-11-09 ENCOUNTER — TELEPHONE (OUTPATIENT)
Dept: FAMILY MEDICINE | Facility: CLINIC | Age: 81
End: 2021-11-09
Payer: COMMERCIAL

## 2021-11-09 NOTE — TELEPHONE ENCOUNTER
[facility] faxed orders on 11/1/21 and hasn't received them back yet? It is a discharge order  Needs to be signed by the provider and faxed back asap      Thank You,    Sarah GILLRN [facility]  [phone]

## 2021-12-09 ENCOUNTER — MEDICAL CORRESPONDENCE (OUTPATIENT)
Dept: HEALTH INFORMATION MANAGEMENT | Facility: CLINIC | Age: 81
End: 2021-12-09
Payer: COMMERCIAL

## 2022-08-18 NOTE — TELEPHONE ENCOUNTER
- Controlled   - Latest BP and vitals reviewed  - Home meds for HTN:   Hypertension Medications             lisinopriL (PRINIVIL,ZESTRIL) 20 MG tablet Take 1 tablet (20 mg total) by mouth once daily.        - Hospital antihypertensives:   - Goal -140. Utilize p.r.n. antihypertensives only if patient's BP >180/110 & develop symptoms of worsening CP or SOB.    The patient wants us to send the Rx for the 5 mg   She said she is not going to  the 10 MG from 11/13  She is not going to put that through her insurance and she does not want her dates to be off

## 2023-09-22 NOTE — TELEPHONE ENCOUNTER
"Last Written Prescription Date:  9/04/18  Last Fill Quantity: 90 tablet,  # refills: 3   Last office visit: 7/16/2019 with prescribing provider:  Joselo   Future Office Visit:      Requested Prescriptions   Pending Prescriptions Disp Refills     verapamil ER (CALAN-SR) 240 MG CR tablet [Pharmacy Med Name: VERAPAMIL ER 240MG TABLETS] 90 tablet 0     Sig: TAKE 1 TABLET BY MOUTH ONCE DAILY       Calcium Channel Blockers Protocol  Failed - 11/14/2019 10:05 AM        Failed - Blood pressure under 140/90 in past 12 months     BP Readings from Last 3 Encounters:   07/16/19 144/87   06/10/19 138/82   05/16/19 150/89                 Failed - Normal ALT in past 12 months     No lab results found.          Passed - Recent (12 mo) or future (30 days) visit within the authorizing provider's specialty     Patient has had an office visit with the authorizing provider or a provider within the authorizing providers department within the previous 12 mos or has a future within next 30 days. See \"Patient Info\" tab in inbasket, or \"Choose Columns\" in Meds & Orders section of the refill encounter.              Passed - Medication is active on med list        Passed - Patient is age 18 or older        Passed - No active pregnancy on record        Passed - Normal serum creatinine on file in past 12 months     Recent Labs   Lab Test 07/16/19  1038   CR 0.98             Passed - No positive pregnancy test in past 12 months          "
4th request from pharmacy  Radha Xavier RT (R)    
Due for follow up.  10/7/19: cancelled her appointment  10/31/19: no show  11/19/19: cancelled her appointment.    VM left asking patient to call clinic.  Roxana Carmichael RN    
Pt is calling to check on the status of this medication.  She is very anxious about getting this sooner than later.    
Routing refill request to provider for review/approval because:  Labs not current:  ALT needed yearly   Pt saw you yesterday  Trina PANCHAL RN          
Second request from pharmacy  Radha Xavier, RT (R)    
Third request from pharmacy  Radha Xavier RT (R)    
bathing/toileting

## 2023-12-29 NOTE — PROGRESS NOTES
11/29/20 1430   Quick Adds   Type of Visit Initial Occupational Therapy Evaluation       Present no   Living Environment   People in home alone   Current Living Arrangements apartment   Home Accessibility no concerns   Transportation Anticipated agency   Self-Care   Usual Activity Tolerance moderate   Current Activity Tolerance fair   Equipment Currently Used at Home grab bar, toilet;grab bar, tub/shower;shower chair   Activity/Exercise/Self-Care Comment Pt reports being independent with mobility and ADLs, however, during evaluation she gets a phone call from someone she states she hired as a helper for her  who has dementia before he was admitted to memory care. Pt is unclear on if this person also assisted her, phone conversation indicates that he may have.    Disability/Function   Hearing Difficulty or Deaf no   Wear Glasses or Blind no   Number of times patient has fallen within last six months 1   Change in Functional Status Since Onset of Current Illness/Injury yes   General Information   Onset of Illness/Injury or Date of Surgery 11/27/20   Referring Physician Ramonita Dee PA-C   Patient/Family Therapy Goal Statement (OT) Unstated during session    Additional Occupational Profile Info/Pertinent History of Current Problem High complexity   Performance Patterns (Routines, Roles, Habits) Unclear. Pt very talkative and tangential during evaluation, easily distracted.    Existing Precautions/Restrictions fall   General Observations and Info Elizabeth Joy is a 80 year old year old female who has a PMH significant for HTN with History of postural hypotension, polymyalgia rheumatica on chronic low dose steroids, depression, history of mild underlying dementia, CKD stage III, . Pt was admitted to [facility] unit on 11/27/2020 after being brought to the emergency department after a fall and inability to ambulate with a urinary tract infection.   Take bumex twice a day for 3 days.     Cognitive Status Examination   Orientation Status orientation to person, place and time   Affect/Mental Status (Cognitive) other (see comments)  (SLUMs completed, pt scored 20/30 indicating dementia)   Follows Commands repetition of directions required   Memory Deficit moderate deficit   Attention Deficit severe deficit   Executive Function Deficit abstract thinking;insight/awareness of deficits   Cognitive Status Comments SLUMS score 20/30   Visual Perception   Visual Impairment/Limitations WNL   Impact of Vision Impairment on Function (Vision) Pt notes no visual deficits    Sensory   Sensory Quick Adds No deficits were identified   Pain Assessment   Patient Currently in Pain No   Integumentary/Edema   Integumentary/Edema no deficits were identifed   Posture   Posture not impaired   Range of Motion Comprehensive   General Range of Motion no range of motion deficits identified   Strength Comprehensive (MMT)   General Manual Muscle Testing (MMT) Assessment no strength deficits identified   Comment, General Manual Muscle Testing (MMT) Assessment BUE WFL   Muscle Tone Assessment   Muscle Tone Quick Adds No deficits were identified   Coordination   Upper Extremity Coordination No deficits were identified   Bed Mobility   Assistive Device (Bed Mobility) bed rails   Comment (Bed Mobility) Pt moves supine to sit EOB with SBA, requires verbal cues to return to supine and reposition   Transfers   Transfer Comments Pt transfers and ambulates with GB/walker/CGA   Balance   Balance Comments No overt LOB noted during ambulation, OT provided verbal cues for walker placement/safety   Activities of Daily Living   BADL Assessment/Intervention toileting;grooming   Grooming Assessment/Training   [place] Level (Grooming) supervision   Position (Grooming) sink side   Comment (Grooming) close SBA in stance at sink    Toileting   [place] Level (Toileting) supervision   Position (Toileting) unsupported sitting   Comment  (Toileting) close SBA   Instrumental Activities of Daily Living (IADL)   IADL Comments Unclear on level of independence with IADL. Per pt she has been independent, however may have received help from hired caregiver. Pt easily distracted during evaluation, tangential with answers to questions.    Clinical Impression   Criteria for Skilled Therapeutic Interventions Met (OT) other (see comments)  (OBS pt, will defer tx, pt will dc to TCU )   OT Diagnosis Decreased independence with ADL/IADL, functional mobility, and functional cognition    OT Problem List-Impairments impacting ADL activity tolerance impaired;balance;cognition;mobility;strength   Assessment of Occupational Performance 3-5 Performance Deficits   Identified Performance Deficits ADL, IADL, functional mobility, cognition    Planned Therapy Interventions (OT) ADL retraining;IADL retraining;cognition;strengthening;transfer training;progressive activity/exercise   Clinical Decision Making Complexity (OT) high complexity   Anticipated Equipment Needs Upon Discharge (OT) walker, rolling;shower chair   Risks and Benefits of Treatment have been explained. Yes   Patient, Family & other staff in agreement with plan of care Yes   Comment-Clinical Impression Pt with limiting factors of impaired cognition, impaired activity tolerance and decreased independence/safety with ADL/IADL.    OT Discharge Planning    OT Discharge Recommendation (DC Rec) Transitional Care Facility   OT Rationale for DC Rec OT: Pt demo impairments with ADL/IADL task performance, functional mobility, and functional cognition. Pt will benefit from ongoing therapy in TCU setting to maximize safety and indepednence in these areas. Pt lives alone, reports  has recently entered a memory care facility.    Therapy Certification   Start of Care Date 11/29/20   Certification date from 11/29/20   Certification date to 11/29/20   Medical Diagnosis fall and inability to ambulate with a urinary tract  infection   Total Evaluation Time (Minutes)   Total Evaluation Time (Minutes) 15

## 2024-11-23 NOTE — TELEPHONE ENCOUNTER
No protocol for requested medication.  Medication: medroxyPROGESTERone (DEPO-PROVERA) 150 MG/ML injectable suspension   Last office visit date: 2/8/24  Pharmacy: [id number] - [place], [place] - [address] AT [facility] [address]  Order pended, routed to clinician for review.   Genesys texted and needs provider review.   Please call the patient regarding the Ambien refill.  I know she is been on it for years but it can affect her memory.  If at all possible I would stop it but if she cannot I would try the lowest dose possible such as 2.5 mg.    Marky Josue M.D.     No

## 2025-06-08 NOTE — PROGRESS NOTES
[facility]    Medicine Progress Note - Hospitalist Service       Date of Admission:  6/2/2021    Assessment & Plan          81 year old female admitted on 6/2/21.  She has past medical history of hypertension, hyperlipidemia presented to the ER at [facility] from her assisted living facility for evaluation of low back pain in the setting of frequent falls.  Her last hospitalization at [facility] was from 4/23/2021 to 4/27/2021 after mechanical fall resulting in closed head injury with scalp laceration and right patella fracture, she was discharged to TCU from hospital and then subsequently was transferred to assisted living facility.  During this admission she was found to have large fracture of right orbital floor with extension of extraconal fat and the right inferior rectus muscle through orbital floor defect.  CT head without contrast did not show any acute abnormality in the brain, CT lumbar spine showed chronic appearing compression fracture of L1, moderate to severe right L5-S1 foraminal compromise potentially correlating with \right L5 radiculopathy.  She was transferred to [facility] because [facility] does not have ophthalmology consultation.    History of recurrent falls; was found to have mild UTI  No obvious etiology determined during this admission.  Labs were largely normal except for low potassium which is being replaced.  She was noted to be little dehydrated on admission.  EKG showed normal sinus rhythm with  ms, no arrhythmias noted on telemetry monitor.  Echocardiogram unremarkable.  Check vitamin B12 level, vitamin D level, folate level, tsh level.  PT OT following.  Recommending TCU.  Occupational Therapy said slums score foot 17/30 placing on the category alcohol for dementia.  Decreased concentration.  Continue fall precautions.    Orbital floor fracture:  Visual acuity 20/20 in both eyes, intraocular pressure  normal.  No diplopia, extraocular muscles full motility.  Full confrontational visual field  -Subacute appearing facial laceration along right sided maxillary cheek.    -Dilated funduscopic examination was unremarkable.  -CT scan was notable for a large right infraorbital rim and floor fracture with herniation of the inferior rectus along with fat.   No acute intervention is warranted at this time.    Ophthalmology service has recommended of outpatient follow-up in 1 to 2 weeks after discharge for reevaluation.      HTN:  Patient is on amlodipine 5 mg twice daily and hydralazine at home.  Both resumed     Depression:  Resume home dose of venlafaxine 187.5 mg daily  Resume seroquel 25 mg at bedtime       UTI:  UC: E. Fecalis  Empirically started on iv Ceftriaxone.   06/05: Switch to amoxicillin 500 tid x total 7 days.      History of right patella fracture:   X-ray knee showed Ongoing healing nondisplaced vertically oriented lateral  patellar facet fracture.   Orthopedic consultation - reviewed. Appreciate input.        Diet: Combination Diet Regular Diet Adult  Advance Diet as Tolerated    DVT Prophylaxis: Pneumatic Compression Devices  Ram Catheter: not present  Code Status: No CPR- Do NOT Intubate           Disposition Plan   Expected discharge: ok to discharge today   Entered: Charbel Stephen MD 06/10/2021, 1:34 PM       The patient's care was discussed with the Bedside Nurse, Care Coordinator/ and Patient.    Charbel Stephen MD  Hospitalist Service  [facility]  Contact information available via [facility] Paging/Directory    ______________________________________________________________________    Interval History      Patient seen and examined.  Denies chest pain, shortness of, fever, chills, nausea, vomiting, belly pain, dysuria, hematuria, dizziness lightheadedness.  No acute events overnight.  Interval events reviewed.       Data reviewed today: I reviewed all  medications, new labs and imaging results over the last 24 hours. I personally reviewed no images or EKG's today.    Physical Exam   Vital Signs: Temp: 97.6  F (36.4  C) Temp src: Oral BP: (!) 161/97 Pulse: 72   Resp: 18 SpO2: 99 % O2 Device: None (Room air)    Weight: 199 lbs 15.32 oz    General Appearance:  NAD, right maxilla cheek Steri-Strips.  Trace left eye injection.  Respiratory: Normal work of breathing.    Cardiovascular: S1 S2 Regular  GI: Soft. NT. ND.  Extremities: Distally wwp. R LE> L LE edema.   Skin: No acute rash on exposed areas.   Other: Grossly nonfocal.  Alert oriented.    Data   Recent Labs   Lab 06/10/21  1044 06/08/21  1946 06/08/21  0820   WBC  --   --  8.8   HGB  --   --  11.5*   MCV  --   --  94   PLT  --   --  282   NA  --   --  138   POTASSIUM 4.0 4.0 3.2*   CHLORIDE  --   --  105   CO2  --   --  24   BUN  --   --  25   CR  --   --  0.85   ANIONGAP  --   --  9   MICHELLE  --   --  9.1   GLC  --   --  85   ALBUMIN  --   --  3.5   PROTTOTAL  --   --  6.8   BILITOTAL  --   --  0.3   ALKPHOS  --   --  90   ALT  --   --  20   AST  --   --  12     No results found for this or any previous visit (from the past 24 hour(s)).  Medications       acetaminophen  975 mg Oral TID     amLODIPine  5 mg Oral BID     calcium carbonate 600 mg-vitamin D 400 units  1 tablet Oral BID     hydrALAZINE  5 mg Oral TID     potassium chloride ER  10 mEq Oral Daily     QUEtiapine  25 mg Oral At Bedtime     venlafaxine  150 mg Oral Daily     venlafaxine  37.5 mg Oral Daily      Yes